# Patient Record
Sex: FEMALE | Race: BLACK OR AFRICAN AMERICAN | NOT HISPANIC OR LATINO | Employment: FULL TIME | URBAN - METROPOLITAN AREA
[De-identification: names, ages, dates, MRNs, and addresses within clinical notes are randomized per-mention and may not be internally consistent; named-entity substitution may affect disease eponyms.]

---

## 2017-02-04 RX ORDER — MEDROXYPROGESTERONE ACETATE 150 MG/ML
INJECTION, SUSPENSION INTRAMUSCULAR
Qty: 1 SYRINGE | Refills: 2 | Status: SHIPPED | OUTPATIENT
Start: 2017-02-04 | End: 2017-11-03 | Stop reason: SDUPTHER

## 2017-11-03 RX ORDER — MEDROXYPROGESTERONE ACETATE 150 MG/ML
INJECTION, SUSPENSION INTRAMUSCULAR
Qty: 1 SYRINGE | Refills: 2 | Status: SHIPPED | OUTPATIENT
Start: 2017-11-03 | End: 2018-08-24 | Stop reason: SDUPTHER

## 2018-02-14 ENCOUNTER — OFFICE VISIT (OUTPATIENT)
Dept: URGENT CARE | Facility: CLINIC | Age: 48
End: 2018-02-14
Payer: COMMERCIAL

## 2018-02-14 VITALS
HEIGHT: 69 IN | SYSTOLIC BLOOD PRESSURE: 157 MMHG | OXYGEN SATURATION: 97 % | TEMPERATURE: 100 F | DIASTOLIC BLOOD PRESSURE: 95 MMHG | BODY MASS INDEX: 29.47 KG/M2 | WEIGHT: 199 LBS | RESPIRATION RATE: 18 BRPM | HEART RATE: 89 BPM

## 2018-02-14 DIAGNOSIS — M25.512 LEFT SHOULDER PAIN, UNSPECIFIED CHRONICITY: Primary | ICD-10-CM

## 2018-02-14 PROCEDURE — 3008F BODY MASS INDEX DOCD: CPT | Mod: S$GLB,,, | Performed by: EMERGENCY MEDICINE

## 2018-02-14 PROCEDURE — 99213 OFFICE O/P EST LOW 20 MIN: CPT | Mod: S$GLB,,, | Performed by: EMERGENCY MEDICINE

## 2018-02-14 RX ORDER — NAPROXEN 500 MG/1
500 TABLET ORAL 2 TIMES DAILY WITH MEALS
Qty: 30 TABLET | Refills: 0 | Status: SHIPPED | OUTPATIENT
Start: 2018-02-14 | End: 2019-02-14

## 2018-02-15 NOTE — PATIENT INSTRUCTIONS
Shoulder Pain with Uncertain Cause  Shoulder pain can have many causes. Pain often comes from the structures that surround the shoulder joint. These are the joint capsule, ligaments, tendons, muscles, and bursa. Pain can also come from cartilage in the joint. Cartilage can become worn out or injured. Its important to know whats causing your pain so the healthcare provider can use the correct treatment. But sometimes its difficult to find the exact cause of shoulder pain. You may need to see a specialist (orthopedist). You may also need special tests such as a CT scan or MRI. The provider may need to use special tools to look inside the joint (arthroscopy).  Shoulder pain can be treated with a sling or a device that keeps your shoulder from moving. You can take an anti-inflammatory medicine such as ibuprofen to ease pain. You may need to do special shoulder exercises. Follow up with a specialist if the pain is severe or doesnt go away after a few weeks.  Home care  Follow these tips when caring for yourself at home:  · If a sling was given to you, leave it in place for the time advised by your healthcare provider. If you arent sure how long to wear it, ask for advice. If the sling becomes loose, adjust it so that your forearm is level with the ground. Your shoulder should feel well supported.  · Put an ice pack on the injured area for 20 minutes every 1 to 2 hours the first day. You can make your own ice pack by putting ice cubes in a plastic bag. Wrap the bag in a thin towel. Continue with ice packs 3 to 4 times a day for the next 2 days. Then use the pack as needed to ease pain and swelling.  · You may use acetaminophen or ibuprofen to control pain, unless another pain medicine was prescribed. If you have chronic liver or kidney disease, talk with your healthcare provider before using these medicines. Also talk with your provider if youve ever had a stomach ulcer or GI bleeding.  · Shoulder pain may seem  worse at night, when there is less to distract you from the pain. If you sleep on your side, try to keep weight off your painful shoulder. Propping pillows behind you may stop you from rolling over onto that shoulder during sleep.   · Shoulder and elbow joints can become stiff if left in a sling for too long. You should start range of motion exercises about 7 to 10 days after the injury. Talk with your provider to find out what type of exercises to do and how soon to start.  · You can take the sling off to shower or bathe.  Follow-up care  Follow up with your healthcare provider if you dont start to get better in the next 5 days.  When to seek medical advice  Call your healthcare provider right away if any of these occur:  · Pain or swelling gets worse or continues for more than a few days  · Your hand or fingers become cold, blue, numb, or tingly  · Large amount of bruising on your shoulder or upper arm  · Difficulty moving your hand or fingers  · Weakness in your hand or fingers  · Your shoulder becomes stiff  · It feels like your shoulder is popping out  · You are less able to do your daily activities  Date Last Reviewed: 10/1/2016  © 5373-4512 [employer]. [address] [place] [address], [place] [address]. All rights reserved. This information is not intended as a substitute for professional medical care. Always follow your healthcare professional's instructions.
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
## 2018-08-24 RX ORDER — MEDROXYPROGESTERONE ACETATE 150 MG/ML
INJECTION, SUSPENSION INTRAMUSCULAR
Qty: 1 SYRINGE | Refills: 2 | Status: SHIPPED | OUTPATIENT
Start: 2018-08-24 | End: 2019-05-31 | Stop reason: SDUPTHER

## 2019-05-08 ENCOUNTER — OFFICE VISIT (OUTPATIENT)
Dept: URGENT CARE | Facility: CLINIC | Age: 49
End: 2019-05-08
Payer: COMMERCIAL

## 2019-05-08 VITALS
TEMPERATURE: 99 F | HEART RATE: 90 BPM | WEIGHT: 200 LBS | SYSTOLIC BLOOD PRESSURE: 149 MMHG | HEIGHT: 69 IN | BODY MASS INDEX: 29.62 KG/M2 | DIASTOLIC BLOOD PRESSURE: 95 MMHG | RESPIRATION RATE: 19 BRPM | OXYGEN SATURATION: 100 %
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
## 2019-05-08 PROCEDURE — 90471 TDAP VACCINE GREATER THAN OR EQUAL TO 7YO IM: ICD-10-PCS | Mod: S$GLB,,, | Performed by: NURSE PRACTITIONER

## 2019-05-08 PROCEDURE — 3008F PR BODY MASS INDEX (BMI) DOCUMENTED: ICD-10-PCS | Mod: CPTII,S$GLB,, | Performed by: NURSE PRACTITIONER

## 2019-05-08 PROCEDURE — 90471 IMMUNIZATION ADMIN: CPT | Mod: S$GLB,,, | Performed by: NURSE PRACTITIONER

## 2019-05-08 PROCEDURE — 90715 TDAP VACCINE 7 YRS/> IM: CPT | Mod: S$GLB,,, | Performed by: NURSE PRACTITIONER

## 2019-05-08 PROCEDURE — 99214 PR OFFICE/OUTPT VISIT, EST, LEVL IV, 30-39 MIN: ICD-10-PCS | Mod: 25,S$GLB,, | Performed by: NURSE PRACTITIONER

## 2019-05-08 PROCEDURE — 99214 OFFICE O/P EST MOD 30 MIN: CPT | Mod: 25,S$GLB,, | Performed by: NURSE PRACTITIONER

## 2019-05-08 PROCEDURE — 3008F BODY MASS INDEX DOCD: CPT | Mod: CPTII,S$GLB,, | Performed by: NURSE PRACTITIONER

## 2019-05-08 PROCEDURE — 90715 TDAP VACCINE GREATER THAN OR EQUAL TO 7YO IM: ICD-10-PCS | Mod: S$GLB,,, | Performed by: NURSE PRACTITIONER

## 2019-05-08 RX ORDER — DOXYCYCLINE 100 MG/1
100 CAPSULE ORAL EVERY 12 HOURS
Qty: 10 CAPSULE | Refills: 0 | Status: SHIPPED | OUTPATIENT
Start: 2019-05-08 | End: 2019-05-13

## 2019-05-08 RX ORDER — MUPIROCIN 20 MG/G
OINTMENT TOPICAL
Qty: 22 G | Refills: 0 | Status: SHIPPED | OUTPATIENT
Start: 2019-05-08 | End: 2020-07-09 | Stop reason: SDUPTHER

## 2019-05-08 NOTE — PROGRESS NOTES
"Subjective:       Patient ID: Celia Alford is a 48 y.o. female.    Vitals:  height is 5' 9" (1.753 m) and weight is 90.7 kg (200 lb). Her oral temperature is 98.7 °F (37.1 °C). Her blood pressure is 149/95 (abnormal) and her pulse is 90. Her respiration is 19 and oxygen saturation is 100%.     Chief Complaint: Insect Bite    48 year old female presents today with complaints of "spider bite" to her lateral left thigh and posterior thigh. She states she did not see if it was a spider but assumed it was. Denies being diabetic.     Insect Bite   This is a new problem. The current episode started yesterday. The problem occurs constantly. The problem has been unchanged. Pertinent negatives include no abdominal pain, anorexia, arthralgias, change in bowel habit, chest pain, chills, congestion, coughing, diaphoresis, fatigue, fever, headaches, joint swelling, myalgias, nausea, neck pain, numbness, rash, sore throat, swollen glands, urinary symptoms, vertigo, visual change, vomiting or weakness. Associated symptoms comments: Itching, blistering. Nothing aggravates the symptoms. Treatments tried: Hydrocortisone. The treatment provided no relief.       Constitution: Negative for chills, sweating, fatigue and fever.   HENT: Negative for congestion and sore throat.    Neck: Negative for neck pain and painful lymph nodes.   Cardiovascular: Negative for chest pain and leg swelling.   Eyes: Negative for double vision and blurred vision.   Respiratory: Negative for cough and shortness of breath.    Gastrointestinal: Negative for abdominal pain, nausea, vomiting and diarrhea.   Genitourinary: Negative for dysuria, frequency, urgency and history of kidney stones.   Musculoskeletal: Negative for joint pain, joint swelling, muscle cramps and muscle ache.   Skin: Positive for erythema. Negative for color change, pale, rash and bruising.   Allergic/Immunologic: Negative for seasonal allergies.   Neurological: Negative for dizziness, " history of vertigo, light-headedness, passing out, headaches and numbness.   Hematologic/Lymphatic: Negative for swollen lymph nodes.   Psychiatric/Behavioral: Negative for nervous/anxious, sleep disturbance and depression. The patient is not nervous/anxious.        Objective:      Physical Exam   Constitutional: She is oriented to person, place, and time. She appears well-developed and well-nourished.   HENT:   Head: Normocephalic and atraumatic. Head is without abrasion, without contusion and without laceration.   Right Ear: External ear normal.   Left Ear: External ear normal.   Nose: Nose normal.   Mouth/Throat: Oropharynx is clear and moist.   Eyes: Pupils are equal, round, and reactive to light. Conjunctivae, EOM and lids are normal.   Neck: Trachea normal, full passive range of motion without pain and phonation normal. Neck supple.   Cardiovascular: Normal rate, regular rhythm and normal heart sounds.   Pulmonary/Chest: Effort normal and breath sounds normal. No stridor. No respiratory distress.   Musculoskeletal: Normal range of motion.   Neurological: She is alert and oriented to person, place, and time.   Skin: Skin is warm, dry and intact. Capillary refill takes less than 2 seconds. Rash noted. No abrasion, no bruising, no burn, no ecchymosis, no laceration and no lesion noted. Rash is pustular (draining clear yellow). There is erythema.        Psychiatric: She has a normal mood and affect. Her speech is normal and behavior is normal. Judgment and thought content normal. Cognition and memory are normal.   Nursing note and vitals reviewed.      Assessment:       1. Insect bite, initial encounter        Plan:         Insect bite, initial encounter  -     doxycycline (VIBRAMYCIN) 100 MG Cap; Take 1 capsule (100 mg total) by mouth every 12 (twelve) hours. for 5 days  Dispense: 10 capsule; Refill: 0  -     (In Office Administered) Tdap Vaccine    Other orders  -     mupirocin (BACTROBAN) 2 % ointment; Apply to  affected area 3 times daily  Dispense: 22 g; Refill: 0            Patient Instructions     CLEANSE WITH SOAP AND WATER  BACTROBAN OINTMENT AT LEAST TWICE A DAY    You must understand that you've received an Urgent Care treatment only and that you may be released before all your medical problems are known or treated. You, the patient, will arrange for follow up care as instructed.  If your condition worsens we recommend that you receive another evaluation at the emergency room immediately or contact your primary medical clinics after hours call service to discuss your concerns.  Please return here or go to the Emergency Department for any concerns or worsening of condition.      Insect Bite  Insects most often bite to protect themselves or their nests. Certain bugs, like fleas and mosquitoes, bite to feed. In some cases, the actual bite causes no pain. An itchy red welt or swelling may develop at the site of the bite. Most insect bites do not cause illness. And the itching and swelling most often go away without treatment. However, an infection can develop if the bite is scratched and the skin broken. Rarely, a person may have an allergic reaction to an insect bite.  If a stinger is visible at the bite spot, remove it as quickly as possible, as this can decrease the amount of venom that gets into your body. Scrape it out with a dull edge, such as the edge of a credit card. Try not to squeeze it. Do not try to dig it out, as you may damage the skin and also increase the chance of infection.     To help reduce swelling and itching, apply a cold pack or ice in a zip-top plastic bag wrapped in a thin towel.   Home care  · Your healthcare provider may prescribe over-the-counter medicines to help relieve itching and swelling. Use each medicine according to the directions on the package. If the bite becomes infected, you will need an antibiotic. This may be in pill form taken by mouth or as an ointment or cream put directly  on the skin. Be sure to use them exactly as prescribed.  · Bite symptoms usually go away on their own within a week or two.  · To help prevent infection, avoid scratching or picking at the bite.  · To help relieve itching and swelling, apply ice in a zip-top plastic bag wrapped in a thin towel to the bites. Do this for up to 10 minutes at a time. Avoid hot showers or baths as these tend to make itching worse.  · An over-the-counter anti-itch medicine such as calamine lotion or an antihistamine cream may be helpful.  · If you suspect you have insects in your home, talk to a licensed pest-control professional. He or she can inspect your home and tell you how to get rid of bugs safely.  Follow-up care  Follow up with your healthcare provider, or as advised.  Call 911  Call 911 if any of these occur:  · Trouble breathing or swallowing  · Wheezing  · Feeling like your throat is closing up  · Fainting, loss of consciousness  · Swelling around the face or mouth  When to seek medical advice  Call your healthcare provider right away if any of these occur:  · Fever of 100.4°F (38°C) or higher, or as directed by your healthcare provider  · Signs of infection, such as increased swelling and pain, warmth, red streaks, or drainage from the skin  · Signs of allergic reaction, such as hives, a spreading rash, or throat itching  Date Last Reviewed: 10/1/2016  © 6234-0109 [employer]. [address] [place] [address], [place] [address]. All rights reserved. This information is not intended as a substitute for professional medical care. Always follow your healthcare professional's instructions.

## 2019-05-08 NOTE — PATIENT INSTRUCTIONS
CLEANSE WITH SOAP AND WATER  BACTROBAN OINTMENT AT LEAST TWICE A DAY    You must understand that you've received an Urgent Care treatment only and that you may be released before all your medical problems are known or treated. You, the patient, will arrange for follow up care as instructed.  If your condition worsens we recommend that you receive another evaluation at the emergency room immediately or contact your primary medical clinics after hours call service to discuss your concerns.  Please return here or go to the Emergency Department for any concerns or worsening of condition.      Insect Bite  Insects most often bite to protect themselves or their nests. Certain bugs, like fleas and mosquitoes, bite to feed. In some cases, the actual bite causes no pain. An itchy red welt or swelling may develop at the site of the bite. Most insect bites do not cause illness. And the itching and swelling most often go away without treatment. However, an infection can develop if the bite is scratched and the skin broken. Rarely, a person may have an allergic reaction to an insect bite.  If a stinger is visible at the bite spot, remove it as quickly as possible, as this can decrease the amount of venom that gets into your body. Scrape it out with a dull edge, such as the edge of a credit card. Try not to squeeze it. Do not try to dig it out, as you may damage the skin and also increase the chance of infection.     To help reduce swelling and itching, apply a cold pack or ice in a zip-top plastic bag wrapped in a thin towel.   Home care  · Your healthcare provider may prescribe over-the-counter medicines to help relieve itching and swelling. Use each medicine according to the directions on the package. If the bite becomes infected, you will need an antibiotic. This may be in pill form taken by mouth or as an ointment or cream put directly on the skin. Be sure to use them exactly as prescribed.  · Bite symptoms usually go away  on their own within a week or two.  · To help prevent infection, avoid scratching or picking at the bite.  · To help relieve itching and swelling, apply ice in a zip-top plastic bag wrapped in a thin towel to the bites. Do this for up to 10 minutes at a time. Avoid hot showers or baths as these tend to make itching worse.  · An over-the-counter anti-itch medicine such as calamine lotion or an antihistamine cream may be helpful.  · If you suspect you have insects in your home, talk to a licensed pest-control professional. He or she can inspect your home and tell you how to get rid of bugs safely.  Follow-up care  Follow up with your healthcare provider, or as advised.  Call 911  Call 911 if any of these occur:  · Trouble breathing or swallowing  · Wheezing  · Feeling like your throat is closing up  · Fainting, loss of consciousness  · Swelling around the face or mouth  When to seek medical advice  Call your healthcare provider right away if any of these occur:  · Fever of 100.4°F (38°C) or higher, or as directed by your healthcare provider  · Signs of infection, such as increased swelling and pain, warmth, red streaks, or drainage from the skin  · Signs of allergic reaction, such as hives, a spreading rash, or throat itching  Date Last Reviewed: 10/1/2016  © 0246-5137 [employer]. [address] [place] [address], [place] [address]. All rights reserved. This information is not intended as a substitute for professional medical care. Always follow your healthcare professional's instructions.

## 2019-05-31 RX ORDER — MEDROXYPROGESTERONE ACETATE 150 MG/ML
INJECTION, SUSPENSION INTRAMUSCULAR
Qty: 1 SYRINGE | Refills: 2 | Status: SHIPPED | OUTPATIENT
Start: 2019-05-31 | End: 2020-07-09 | Stop reason: SDUPTHER

## 2019-07-18 ENCOUNTER — OFFICE VISIT (OUTPATIENT)
Dept: URGENT CARE | Facility: CLINIC | Age: 49
End: 2019-07-18
Payer: OTHER GOVERNMENT

## 2019-07-18 VITALS
HEIGHT: 69 IN | WEIGHT: 200 LBS | TEMPERATURE: 96 F | OXYGEN SATURATION: 100 % | RESPIRATION RATE: 16 BRPM | BODY MASS INDEX: 29.62 KG/M2 | HEART RATE: 88 BPM | DIASTOLIC BLOOD PRESSURE: 93 MMHG | SYSTOLIC BLOOD PRESSURE: 140 MMHG

## 2019-07-18 DIAGNOSIS — S80.00XA CONTUSION OF KNEE, UNSPECIFIED LATERALITY, INITIAL ENCOUNTER: ICD-10-CM
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
## 2019-07-18 DIAGNOSIS — S40.019A CONTUSION OF SHOULDER, UNSPECIFIED LATERALITY, INITIAL ENCOUNTER: ICD-10-CM

## 2019-07-18 PROCEDURE — 73562 X-RAY EXAM OF KNEE 3: CPT | Mod: RT,S$GLB,, | Performed by: RADIOLOGY

## 2019-07-18 PROCEDURE — 73562 XR KNEE 3 VIEW RIGHT: ICD-10-PCS | Mod: RT,S$GLB,, | Performed by: RADIOLOGY

## 2019-07-18 PROCEDURE — 99203 OFFICE O/P NEW LOW 30 MIN: CPT | Mod: S$GLB,,, | Performed by: FAMILY MEDICINE

## 2019-07-18 PROCEDURE — 99203 PR OFFICE/OUTPT VISIT, NEW, LEVL III, 30-44 MIN: ICD-10-PCS | Mod: S$GLB,,, | Performed by: FAMILY MEDICINE

## 2019-07-18 NOTE — LETTER
Work Status Summary - Page 1 of 2  ______________________________________________________________________    Date :  July 18, 2019 Carrier :    To :  Fax # :    ______________________________________________________________________    Patient Name: Celia Alford   YOB: 1970   Employer: [employer]   Occupation: Distrubution    Date of Injury: 7/18/19   Diagnosis: Shoulder and knee contusion   ______________________________________________________________________     [ X  ] ABLE to work (pre-injury work level / full duty)    [   ] NOT ABLE to work at present  Estimated release to return to work:      [   ] ABLE to work --- transitional duty (as follows):   [   ] Sedentary Work: Lifting 10 lbs. maximum and occasionally lifting and/or  carrying articles such as dockets, ledgers and small tools. Although a sedentary  job is defined as one which involves sitting, a certain amount of walking and  standing is often necessary in carrying out job duties. Jobs are sedentary if  walking and standing are required only occasionally and other sedentary criteria  are met.      [   ] Light Work: Lifting 20 lbs. maximum with frequent lifting and/or carrying of  objects weighing up to 10 lbs. Even though the weight lifted may be only a  negligible amount, a job is in this category when it requires walking or standing  to a significant degree, or when it involves sitting most of the time with a degree  of pushing and pulling of arm and/or leg controls.      [   ] Medium Work: Lifting 50 lbs. maximum with frequent lifting and/or carrying  of objects weighing up to 25 lbs.      [   ] Heavy Work: Lifting 100 lbs. maximum with frequent lifting and/or carrying  of objects weighing up to 50 lbs.      [   ] Very Heavy Work:  Lifting objects in excess of 100 lbs. with frequent lifting  and/or carrying of objects weighing 50 lbs or more.                   THERAPY RECOMMENDATIONS:   [   ] Physical Therapy     Visits per week:    Duration (in weeks):        [   ] Occupational Therapy  Visits per week:   Duration (in weeks):        Recommended Follow Up:    Primary Care Physician in:   days General Surgeon in:   days   Orthopedist in:   days Ophthalmologist in:   days     Other:  (list other follow up recommendation here)   days     Prescribed Medications:  OTC tylenol and ibuprofen     Comments:          juwan raza np 7/18/19 1:10pm    ______________________________________________________________________  Provider Signature / Print Name / Date / Time       Work Status Summary - Page 2 of 2    Form No. 3291   (Rev 6/21/16)   [employer] [facility]

## 2019-07-18 NOTE — PATIENT INSTRUCTIONS
PLEASE READ YOUR DISCHARGE INSTRUCTIONS ENTIRELY AS IT CONTAINS IMPORTANT INFORMATION.    Tylenol and ibuprofen for pain    Rest, ice, compress, and elevate at home    Avoid prolonged use of the affected area until better.     Please see occupational health if you cannot return to work tomorrow or if your symptoms get worse    Please arrange follow up with your primary medical clinic as soon as possible. You must understand that you've received an Urgent Care treatment only and that you may be released before all of your medical problems are known or treated. You, the patient, will arrange for follow up as instructed. If your symptoms worsen or fail to improve you should go to the Emergency Room.    Bruises (Contusions)    A contusion is a bruise. A bruise happens when a blow to your body doesn't break the skin but does break blood vessels beneath the skin. Blood leaking from the broken vessels causes redness and swelling. As it heals, your bruise is likely to turn colors like purple, green, and yellow. This is normal. The bruise should fade in 2 or 3 weeks.  Factors that make you more likely to bruise  Almost everyone bruises now and then. Certain people do bruise more easily than others. You're more prone to bruising as you get older. That's because blood vessels become more fragile with age. You're also more likely to bruise if you have a clotting disorder such as hemophilia or take medications that reduce clotting, including aspirin, coumadin, newer agents.  When to go to the emergency room (ER)  Bruises almost always heal on their own without special treatment. But for some people, a bad bruise can be serious. Seek medical care if you:  · Have a clotting disorder such as hemophilia.  · Have cirrhosis or other serious liver disease.  · Take blood-thinning medications such as warfarin (Coumadin).  What to expect in the ER  A doctor will examine your bruise and ask about any health conditions you have. In some  cases, you may have a test to check how well your blood clots. Other treatment will depend on your needs.  Follow-up care  Sometimes a bruise gets worse instead of better. It may become larger and more swollen. This can occur when your body walls off a small pool of blood under the skin (hematoma). In very rare cases, your doctor may need to drain excess blood from the area.  Tip:  Apply an ice pack or bag of frozen peas to a bruise (keep a thin cloth between the cold source and your skin). This can help reduce redness and swelling.   Date Last Reviewed: 12/1/2016  © 9107-7081 [employer]. [address] [place] [address], [place] [address]. All rights reserved. This information is not intended as a substitute for professional medical care. Always follow your healthcare professional's instructions.

## 2019-07-18 NOTE — PROGRESS NOTES
"Subjective:       Patient ID: Celia Alford is a 48 y.o. female.    Vitals:    07/18/19 1210   BP: (!) 140/93   Pulse: 88   Resp: 16   Temp: 96.2 °F (35.7 °C)   SpO2: 100%   Weight: 90.7 kg (200 lb)   Height: 5' 9" (1.753 m)       Chief Complaint: Knee Pain and Shoulder Pain    Pt states she tripped and fell this am at apprx. 9:00a.  Tripped on some strep several holding magazines together.  Did not hit her head.  Patient works with the [employer].  Pt states bilateral knee and shoulder pain. Right side worse knees hurting the worst out of everything.    Knee Injury   This is a new problem. The current episode started today. The problem occurs constantly. The problem has been unchanged. Pertinent negatives include no abdominal pain, chest pain, chills, fever, headaches, nausea, rash, sore throat or vomiting. Nothing aggravates the symptoms. She has tried nothing for the symptoms.     Review of Systems   Constitution: Negative for chills and fever.   HENT: Negative for sore throat.    Eyes: Negative for blurred vision.   Cardiovascular: Negative for chest pain.   Respiratory: Negative for shortness of breath.    Skin: Negative for rash.   Musculoskeletal: Positive for joint pain. Negative for back pain.   Gastrointestinal: Negative for abdominal pain, diarrhea, nausea and vomiting.   Neurological: Negative for headaches.   Psychiatric/Behavioral: The patient is not nervous/anxious.        Objective:      Physical Exam   Constitutional: She is oriented to person, place, and time. She appears well-developed and well-nourished. She is cooperative.  Non-toxic appearance. She does not appear ill. No distress.   HENT:   Head: Normocephalic and atraumatic. Head is without abrasion, without contusion and without laceration.   Right Ear: Hearing, tympanic membrane, external ear and ear canal normal. No hemotympanum.   Left Ear: Hearing, tympanic membrane, external ear and ear canal normal. No hemotympanum.   Nose: Nose " normal. No mucosal edema, rhinorrhea or nasal deformity. No epistaxis. Right sinus exhibits no maxillary sinus tenderness and no frontal sinus tenderness. Left sinus exhibits no maxillary sinus tenderness and no frontal sinus tenderness.   Mouth/Throat: Uvula is midline, oropharynx is clear and moist and mucous membranes are normal. No trismus in the jaw. Normal dentition. No uvula swelling. No posterior oropharyngeal erythema.   Eyes: Pupils are equal, round, and reactive to light. Conjunctivae, EOM and lids are normal. Right eye exhibits no discharge. Left eye exhibits no discharge. No scleral icterus.   Sclera clear bilat   Neck: Trachea normal, normal range of motion, full passive range of motion without pain and phonation normal. Neck supple. No spinous process tenderness and no muscular tenderness present. No neck rigidity. No tracheal deviation present.   Cardiovascular: Normal rate, regular rhythm, normal heart sounds, intact distal pulses and normal pulses.   Pulmonary/Chest: Effort normal and breath sounds normal. No respiratory distress.   Abdominal: Soft. Normal appearance and bowel sounds are normal. She exhibits no distension, no pulsatile midline mass and no mass. There is no tenderness.   Musculoskeletal: Normal range of motion. She exhibits no edema or deformity.        Right shoulder: She exhibits tenderness (Mild with moving). She exhibits normal range of motion, no bony tenderness, no crepitus, no deformity and no laceration.        Left shoulder: She exhibits normal range of motion, no tenderness, no bony tenderness, no crepitus and no deformity.        Right knee: She exhibits swelling (Mild) and bony tenderness (Mild). She exhibits normal range of motion, no ecchymosis, no deformity, no laceration, no LCL laxity, normal patellar mobility, normal meniscus and no MCL laxity.        Left knee: She exhibits bony tenderness (Mild). She exhibits normal range of motion, no effusion, no ecchymosis, no  deformity, no laceration, no LCL laxity, normal patellar mobility, normal meniscus and no MCL laxity.        Cervical back: She exhibits normal range of motion and no bony tenderness.   Shoulder bilaterally  Full range of motion maintained  Negative empty can test  No pain with internal-external rotation   Neurological: She is alert and oriented to person, place, and time. She has normal strength. No cranial nerve deficit or sensory deficit. She exhibits normal muscle tone. She displays no seizure activity. Coordination normal. GCS eye subscore is 4. GCS verbal subscore is 5. GCS motor subscore is 6.   Skin: Skin is warm, dry and intact. Capillary refill takes less than 2 seconds. No abrasion, no bruising, no burn, no ecchymosis and no laceration noted. She is not diaphoretic. No pallor.   Psychiatric: She has a normal mood and affect. Her speech is normal and behavior is normal. Judgment and thought content normal. Cognition and memory are normal.   Nursing note and vitals reviewed.    Xr Knee 3 View Right    Result Date: 7/18/2019  EXAMINATION: XR KNEE 3 VIEW RIGHT CLINICAL HISTORY: Unspecified fall, initial encounter TECHNIQUE: AP, lateral, and Merchant views of the right knee were performed. COMPARISON: None FINDINGS: On three views of the right knee I detect no fracture, dislocation, radiopaque retained foreign body, lytic or blastic lesion, erosion or chondrocalcinosis.  Lateral view was performed with routine technique rather than cross-table technique.  I detect no convincing evidence of fluid in the suprapatellar bursa. However if there is persistent clinical concern for acute nondisplaced fracture which can be radiographically occult, and especially if the patient has difficulty bearing weight, CT for more sensitive assessment.     Please see above. Electronically signed by: Avani Gillette MD Date:    07/18/2019 Time:    13:07    Assessment:       1. Fall, initial encounter    2. Contusion of knee,  unspecified laterality, initial encounter    3. Contusion of shoulder, unspecified laterality, initial encounter        Plan:       Celia was seen today for knee pain and shoulder pain.    Diagnoses and all orders for this visit:    Fall, initial encounter  -     XR KNEE 3 VIEW RIGHT; Future  -     Ambulatory referral to Occupational Medicine    Contusion of knee, unspecified laterality, initial encounter  -     Ambulatory referral to Occupational Medicine    Contusion of shoulder, unspecified laterality, initial encounter  -     Ambulatory referral to Occupational Medicine      Patient Instructions     PLEASE READ YOUR DISCHARGE INSTRUCTIONS ENTIRELY AS IT CONTAINS IMPORTANT INFORMATION.    Tylenol and ibuprofen for pain    Rest, ice, compress, and elevate at home    Avoid prolonged use of the affected area until better.     Please see occupational health if you cannot return to work tomorrow or if your symptoms get worse    Please arrange follow up with your primary medical clinic as soon as possible. You must understand that you've received an Urgent Care treatment only and that you may be released before all of your medical problems are known or treated. You, the patient, will arrange for follow up as instructed. If your symptoms worsen or fail to improve you should go to the Emergency Room.    Bruises (Contusions)    A contusion is a bruise. A bruise happens when a blow to your body doesn't break the skin but does break blood vessels beneath the skin. Blood leaking from the broken vessels causes redness and swelling. As it heals, your bruise is likely to turn colors like purple, green, and yellow. This is normal. The bruise should fade in 2 or 3 weeks.  Factors that make you more likely to bruise  Almost everyone bruises now and then. Certain people do bruise more easily than others. You're more prone to bruising as you get older. That's because blood vessels become more fragile with age. You're also more  likely to bruise if you have a clotting disorder such as hemophilia or take medications that reduce clotting, including aspirin, coumadin, newer agents.  When to go to the emergency room (ER)  Bruises almost always heal on their own without special treatment. But for some people, a bad bruise can be serious. Seek medical care if you:  · Have a clotting disorder such as hemophilia.  · Have cirrhosis or other serious liver disease.  · Take blood-thinning medications such as warfarin (Coumadin).  What to expect in the ER  A doctor will examine your bruise and ask about any health conditions you have. In some cases, you may have a test to check how well your blood clots. Other treatment will depend on your needs.  Follow-up care  Sometimes a bruise gets worse instead of better. It may become larger and more swollen. This can occur when your body walls off a small pool of blood under the skin (hematoma). In very rare cases, your doctor may need to drain excess blood from the area.  Tip:  Apply an ice pack or bag of frozen peas to a bruise (keep a thin cloth between the cold source and your skin). This can help reduce redness and swelling.   Date Last Reviewed: 12/1/2016  © 5585-0740 [employer]. [address] [place] [address], [place] [address]. All rights reserved. This information is not intended as a substitute for professional medical care. Always follow your healthcare professional's instructions.

## 2019-07-19 ENCOUNTER — OFFICE VISIT (OUTPATIENT)
Dept: URGENT CARE | Facility: CLINIC | Age: 49
End: 2019-07-19
Payer: OTHER GOVERNMENT

## 2019-07-19 VITALS
DIASTOLIC BLOOD PRESSURE: 87 MMHG | OXYGEN SATURATION: 98 % | WEIGHT: 200 LBS | TEMPERATURE: 98 F | SYSTOLIC BLOOD PRESSURE: 132 MMHG | HEART RATE: 95 BPM | RESPIRATION RATE: 16 BRPM | BODY MASS INDEX: 29.62 KG/M2 | HEIGHT: 69 IN
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
## 2019-07-19 DIAGNOSIS — S80.01XA CONTUSION OF RIGHT KNEE, INITIAL ENCOUNTER: ICD-10-CM
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
## 2019-07-19 DIAGNOSIS — S46.912A STRAIN OF LEFT SHOULDER, INITIAL ENCOUNTER: ICD-10-CM

## 2019-07-19 PROCEDURE — 99214 PR OFFICE/OUTPT VISIT, EST, LEVL IV, 30-39 MIN: ICD-10-PCS | Mod: S$GLB,,, | Performed by: NURSE PRACTITIONER

## 2019-07-19 PROCEDURE — 73030 X-RAY EXAM OF SHOULDER: CPT | Mod: LT,S$GLB,, | Performed by: RADIOLOGY

## 2019-07-19 PROCEDURE — 99214 OFFICE O/P EST MOD 30 MIN: CPT | Mod: S$GLB,,, | Performed by: NURSE PRACTITIONER

## 2019-07-19 PROCEDURE — 73030 XR SHOULDER TRAUMA 3 VIEW LEFT: ICD-10-PCS | Mod: LT,S$GLB,, | Performed by: RADIOLOGY

## 2019-07-19 RX ORDER — FLUTICASONE PROPIONATE 0.5 MG/G
CREAM TOPICAL
COMMUNITY
Start: 2019-07-18 | End: 2020-07-09 | Stop reason: SDUPTHER

## 2019-07-19 RX ORDER — DEXTROMETHORPHAN HYDROBROMIDE, GUAIFENESIN 5; 100 MG/5ML; MG/5ML
650 LIQUID ORAL EVERY 8 HOURS
Refills: 0 | COMMUNITY
Start: 2019-07-19 | End: 2019-08-16 | Stop reason: SDUPTHER

## 2019-07-19 RX ORDER — IBUPROFEN 200 MG
400 TABLET ORAL EVERY 6 HOURS PRN
Refills: 0 | COMMUNITY
Start: 2019-07-19 | End: 2019-07-26 | Stop reason: ALTCHOICE

## 2019-07-19 NOTE — PROGRESS NOTES
Subjective:       Patient ID: Celia Alford is a 48 y.o. female.    Chief Complaint: Work Related Injury    Pt presents for a f/u after a fall yesterday at work.  Pt states she fell on the concrete floor.  Pt states she has pain in both knees, but more in the right knee and pain in her left shoulder. Pt states this was 9:30am yesterday.  Pt took some tylenol last night.  Pt has not used ice or heat.    Fall   The accident occurred 12 to 24 hours ago. The fall occurred while walking. She fell from a height of 1 to 2 ft. She landed on concrete. There was no blood loss. The point of impact was the right knee and left knee. The pain is present in the left shoulder, right knee and left knee. The pain is at a severity of 6/10. The pain is moderate. The symptoms are aggravated by movement and pressure on injury. Pertinent negatives include no abdominal pain, fever, headaches, nausea, numbness, tingling or vomiting. She has tried acetaminophen and NSAID for the symptoms. The treatment provided mild relief.     Review of Systems   Constitution: Negative for chills and fever.   HENT: Negative for sore throat.    Eyes: Negative for blurred vision.   Cardiovascular: Negative for chest pain.   Respiratory: Negative for shortness of breath.    Skin: Negative for rash.   Musculoskeletal: Positive for falls, joint pain, joint swelling, myalgias and stiffness. Negative for back pain.   Gastrointestinal: Negative for abdominal pain, diarrhea, nausea and vomiting.   Neurological: Negative for headaches, numbness and tingling.   Psychiatric/Behavioral: The patient is not nervous/anxious.    All other systems reviewed and are negative.      Objective:      Physical Exam   Constitutional: She is oriented to person, place, and time. Vital signs are normal. She appears well-developed and well-nourished. She is cooperative.  Non-toxic appearance. She does not appear ill. No distress.   Uncomfortable on exam   HENT:   Head: Normocephalic and  atraumatic. Head is without abrasion, without contusion and without laceration.   Right Ear: Hearing and external ear normal. No hemotympanum.   Left Ear: Hearing and external ear normal. No hemotympanum.   Nose: Nose normal. No nasal deformity. No epistaxis.   Mouth/Throat: Mucous membranes are normal.   Eyes: Pupils are equal, round, and reactive to light. Conjunctivae, EOM and lids are normal. Right eye exhibits no discharge. Left eye exhibits no discharge. No scleral icterus.   Sclera clear bilat   Neck: Trachea normal, normal range of motion, full passive range of motion without pain and phonation normal. Neck supple. No spinous process tenderness and no muscular tenderness present. No neck rigidity. No tracheal deviation present.   Cardiovascular: Normal rate, intact distal pulses and normal pulses.   Pulses:       Radial pulses are 2+ on the right side, and 2+ on the left side.        Dorsalis pedis pulses are 2+ on the right side, and 2+ on the left side.        Posterior tibial pulses are 2+ on the right side, and 2+ on the left side.   Pulmonary/Chest: Effort normal. No stridor. No respiratory distress.   Abdominal: Normal appearance. She exhibits no pulsatile midline mass.   Musculoskeletal: She exhibits tenderness. She exhibits no edema or deformity.        Right shoulder: She exhibits normal range of motion, no tenderness, no bony tenderness, no crepitus, no deformity and no laceration.        Left shoulder: She exhibits decreased range of motion, tenderness, bony tenderness and pain. She exhibits no crepitus and no deformity.        Right knee: She exhibits swelling (Mild) and bony tenderness (Mild). She exhibits normal range of motion, no ecchymosis, no deformity, no laceration, no LCL laxity, normal patellar mobility, normal meniscus and no MCL laxity. Tenderness found. Medial joint line tenderness noted.        Left knee: She exhibits normal range of motion, no effusion, no ecchymosis, no deformity,  no laceration, no LCL laxity, normal patellar mobility, no bony tenderness, normal meniscus and no MCL laxity.        Cervical back: She exhibits normal range of motion and no bony tenderness.   Shoulder bilaterally  Full range of motion maintained  Negative empty can test  No pain with internal-external rotation  Stiffness to left shoulder with pain with abduction at 75° and with lift-off maneuver.  No weakness appreciated to the joint.  No  weakness bilaterally.   Neurological: She is alert and oriented to person, place, and time. She has normal strength. She displays normal reflexes. No cranial nerve deficit or sensory deficit. She exhibits normal muscle tone. She displays no seizure activity. Coordination normal. GCS eye subscore is 4. GCS verbal subscore is 5. GCS motor subscore is 6.   Skin: Skin is warm, dry and intact. Capillary refill takes less than 2 seconds. No abrasion, no bruising, no burn, no ecchymosis and no laceration noted. She is not diaphoretic. No pallor.   Psychiatric: She has a normal mood and affect. Her speech is normal and behavior is normal. Judgment and thought content normal. Cognition and memory are normal.   Nursing note and vitals reviewed.    Xr Knee 3 View Right    Result Date: 7/18/2019  EXAMINATION: XR KNEE 3 VIEW RIGHT CLINICAL HISTORY: Unspecified fall, initial encounter TECHNIQUE: AP, lateral, and Merchant views of the right knee were performed. COMPARISON: None FINDINGS: On three views of the right knee I detect no fracture, dislocation, radiopaque retained foreign body, lytic or blastic lesion, erosion or chondrocalcinosis.  Lateral view was performed with routine technique rather than cross-table technique.  I detect no convincing evidence of fluid in the suprapatellar bursa. However if there is persistent clinical concern for acute nondisplaced fracture which can be radiographically occult, and especially if the patient has difficulty bearing weight, CT for more  sensitive assessment.     Please see above. Electronically signed by: Avani Gillette MD Date:    07/18/2019 Time:    13:07    X-ray Shoulder Trauma 3 View Left    Result Date: 7/19/2019  EXAMINATION: XR SHOULDER TRAUMA 3 VIEW LEFT TECHNIQUE: Three views of the left shoulder were obtained, with AP, lateral, and axillary projections submitted. COMPARISON: No relevant comparison examinations are currently available.  Information obtained from the electronic medical record indicates a history of trauma on 07/18/2019. FINDINGS: Visualized osseous structures appear intact, with no definite evidence of recent fracture or other significant abnormality identified.  No glenohumeral dislocation.     As above Electronically signed by: Miguel Mcginnis MD Date:    07/19/2019 Time:    12:36  Assessment:       1. Work related injury    2. Contusion of right knee, initial encounter    3. Strain of left shoulder, initial encounter    4. Fall, initial encounter        Plan:         Medications Ordered This Encounter   Medications    acetaminophen (TYLENOL) 650 MG TbSR     Sig: Take 1 tablet (650 mg total) by mouth every 8 (eight) hours.     Refill:  0    ibuprofen (ADVIL,MOTRIN) 200 MG tablet     Sig: Take 2 tablets (400 mg total) by mouth every 6 (six) hours as needed for Pain.     Refill:  0     Patient Instructions: Attention not to aggravate affected area, Daily home exercises/warm soaks, Apply ice 24-48 hours then apply heat/warm soaks(you may take tylenol or ibuprofen for pain and discomfort)   Restrictions: (see CA17 for restrictions; 07/19/19)  Follow up in about 1 week (around 7/26/2019).

## 2019-07-19 NOTE — LETTER
[facility] [address] [address]  Phone: [phone]  Fax: [phone]  [facility]: [email or web address]    Pt Name: Celia Alford  Injury Date: 07/18/2019   Employee ID:  Date of First Treatment: 07/19/2019   Company: [employer]      Appointment Time: 11:00 AM Arrived: 1115am   Provider: Cinthia Maldonado NP Time Out:1248pm     Office Treatment:   1. Work related injury    2. Contusion of right knee, initial encounter    3. Strain of left shoulder, initial encounter    4. Fall, initial encounter      Medications Ordered This Encounter   Medications    acetaminophen (TYLENOL) 650 MG TbSR    ibuprofen (ADVIL,MOTRIN) 200 MG tablet      Patient Instructions: Attention not to aggravate affected area, Daily home exercises/warm soaks, Apply ice 24-48 hours then apply heat/warm soaks(you may take tylenol or ibuprofen for pain and discomfort)    Restrictions: (see CA17 for restrictions; 07/19/19)     Return Appointment: 07/26/19 at 10am

## 2019-07-26 ENCOUNTER — OFFICE VISIT (OUTPATIENT)
Dept: URGENT CARE | Facility: CLINIC | Age: 49
End: 2019-07-26
Payer: OTHER GOVERNMENT

## 2019-07-26 VITALS
BODY MASS INDEX: 29.71 KG/M2 | RESPIRATION RATE: 20 BRPM | SYSTOLIC BLOOD PRESSURE: 144 MMHG | HEART RATE: 93 BPM | HEIGHT: 69 IN | OXYGEN SATURATION: 100 % | WEIGHT: 200.63 LBS | DIASTOLIC BLOOD PRESSURE: 90 MMHG | TEMPERATURE: 98 F

## 2019-07-26 DIAGNOSIS — S46.912D STRAIN OF LEFT SHOULDER, SUBSEQUENT ENCOUNTER: ICD-10-CM

## 2019-07-26 DIAGNOSIS — S46.911D STRAIN OF RIGHT SHOULDER, SUBSEQUENT ENCOUNTER: ICD-10-CM

## 2019-07-26 DIAGNOSIS — S80.01XD CONTUSION OF RIGHT KNEE, SUBSEQUENT ENCOUNTER: Primary | ICD-10-CM
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
## 2019-07-26 PROCEDURE — 99214 OFFICE O/P EST MOD 30 MIN: CPT | Mod: S$GLB,,, | Performed by: PHYSICIAN ASSISTANT

## 2019-07-26 PROCEDURE — 99214 PR OFFICE/OUTPT VISIT, EST, LEVL IV, 30-39 MIN: ICD-10-PCS | Mod: S$GLB,,, | Performed by: PHYSICIAN ASSISTANT

## 2019-07-26 RX ORDER — NAPROXEN 500 MG/1
500 TABLET ORAL 2 TIMES DAILY WITH MEALS
Qty: 30 TABLET | Refills: 0 | Status: SHIPPED | OUTPATIENT
Start: 2019-07-26 | End: 2020-01-22 | Stop reason: SDUPTHER

## 2019-07-26 NOTE — PROGRESS NOTES
Subjective:       Patient ID: Celia Alford is a 48 y.o. female.    Chief Complaint: Work Related Injury    Patient fell at work on last Thursday the 18th of July and hurt both knees and arms. She still has pain in both shoulders and right knee.     Arm Pain    The incident occurred more than 1 week ago. The incident occurred at work. The injury mechanism was a fall. The pain is present in the left shoulder and right shoulder. The quality of the pain is described as aching. The pain does not radiate. The pain is at a severity of 6/10. The pain is moderate. The pain has been constant since the incident. Pertinent negatives include no chest pain or numbness. The symptoms are aggravated by movement. She has tried NSAIDs for the symptoms. The treatment provided moderate relief.     Review of Systems   Constitution: Negative for chills and fever.   HENT: Negative for hearing loss, nosebleeds and sore throat.    Eyes: Negative for blurred vision and redness.   Cardiovascular: Negative for chest pain and syncope.   Respiratory: Negative for cough and shortness of breath.    Hematologic/Lymphatic: Negative for bleeding problem.   Skin: Negative for color change and rash.   Musculoskeletal: Positive for falls and joint pain. Negative for back pain and neck pain.   Gastrointestinal: Negative for abdominal pain, diarrhea, nausea and vomiting.   Neurological: Negative for headaches, numbness and paresthesias.   Psychiatric/Behavioral: The patient is not nervous/anxious.    All other systems reviewed and are negative.      Objective:      Physical Exam   Constitutional: She appears well-developed and well-nourished. She is active. No distress.   HENT:   Head: Normocephalic and atraumatic.   Right Ear: Hearing and external ear normal.   Left Ear: Hearing and external ear normal.   Nose: Nose normal. No nasal deformity. No epistaxis.   Mouth/Throat: Oropharynx is clear and moist and mucous membranes are normal.   Eyes: Conjunctivae  and lids are normal. No scleral icterus.   Neck: Trachea normal and normal range of motion.   Cardiovascular: Intact distal pulses and normal pulses.   Pulmonary/Chest: Effort normal. No stridor. No respiratory distress.   Musculoskeletal:        Right shoulder: She exhibits tenderness (Anterior aspect). She exhibits normal range of motion, no swelling, no deformity, no laceration, no pain (Wang, O'Randall negative), normal pulse and normal strength.        Left shoulder: She exhibits decreased range of motion (Flexion to 170°, full active range of motion in all other planes) and tenderness (Anterior aspect, posterior trapezius region). She exhibits no swelling, no deformity, no pain (Wang, O'Randall negative), normal pulse and normal strength.        Right knee: She exhibits normal range of motion, no swelling, no effusion, no ecchymosis, no deformity, no LCL laxity, no bony tenderness, normal meniscus and no MCL laxity. Tenderness found.        Legs:  Neurological: She is alert. She has normal strength. She is not disoriented. No sensory deficit. GCS eye subscore is 4. GCS verbal subscore is 5. GCS motor subscore is 6.   Skin: Skin is warm, dry and intact. Capillary refill takes less than 2 seconds. She is not diaphoretic.   Psychiatric: She has a normal mood and affect. Her speech is normal and behavior is normal. She is attentive.   Nursing note and vitals reviewed.      Assessment:       1. Contusion of right knee, subsequent encounter    2. Strain of right shoulder, subsequent encounter    3. Strain of left shoulder, subsequent encounter    4. Work related injury        Plan:         Medications Ordered This Encounter   Medications    naproxen (NAPROSYN) 500 MG tablet     Sig: Take 1 tablet (500 mg total) by mouth 2 (two) times daily with meals.     Dispense:  30 tablet     Refill:  0     Patient Instructions: Daily home exercises/warm soaks   Restrictions: (See CA-17)  Follow up in about 1 week (around  8/2/2019).        Patient Instructions       Exercises for Shoulder Flexibility: External Rotation    This stretch can help restore shoulder flexibility and relieve pain over time. When stretching, be sure to breathe deeply. Follow any special instructions from your doctor or physical therapist:  1.  a doorway. Grasp the doorjamb with the hand on the frozen side. Your arm should be bent.  2. With the other hand, hold the elbow on the frozen side firmly against your body.  3. Standing in the same spot, rotate your body away from the doorjamb. Stop when you feel the stretch in the shoulder. At first, try to hold the stretch for 5 seconds.  4. Work up to doing 3 sets of this stretch, 3 times a day. Work up to holding the stretch for 30 to 60 seconds.  Note: Keep your arms as still as you can. Over time, rotate your body a little more to enhance the stretch. But be careful not to twist your back.  Frozen shoulder  Frozen shoulder is another name for adhesive capsulitis, which causes restricted movement in the shoulder. If you have frozen shoulder, this stretch may cause discomfort, especially when you first get started. A few months may pass before you achieve the results you want. But once your shoulder heals, it rarely becomes frozen again. So stick to your stretching program. If you have any questions, be sure to ask your doctor.   Date Last Reviewed: 8/16/2015 © 2000-2017 [employer]. [address] [place] [address]. All rights reserved. This information is not intended as a substitute for professional medical care. Always follow your healthcare professional's instructions.        Exercises for Shoulder Flexibility: Internal Rotation    This stretch can help restore shoulder flexibility and relieve pain over time. When stretching, be sure to breathe deeply. Follow any special instructions from your healthcare provider or physical therapist.  5. While seated, move the arm on the side  you want to stretch toward the middle of your back. The palm of your hand should face out.  6. Cup your other hand under the hand thats behind your back. Gently push your cupped hand upward until you feel the stretch in the shoulder. Try to hold the stretch for 5 seconds.  7. Work up to doing 3 sets of this stretch, 3 times a day. Work up to holding the stretch for 30 to 60 seconds.  Note: Keep your back straight. Its OK if your hand cant reach the middle of your back. Instead, start the stretch with your hand as close as you can get it to the middle of your back.     Frozen shoulder  Frozen shoulder is another name for adhesive capsulitis. This causes restricted movement in the shoulder. If you have frozen shoulder, this stretch may cause discomfort, especially when you first get started. A few months may pass before you achieve the results you want. But once your shoulder heals, it rarely becomes frozen again. So stick to your stretching program. If you have any questions, be sure to ask your healthcare provider.   Date Last Reviewed: 10/14/2015  © 4986-9400 [employer]. [address] [place] [address]. All rights reserved. This information is not intended as a substitute for professional medical care. Always follow your healthcare professional's instructions.        Exercises for Shoulder Flexibility: Adduction (Reaching Across)    This stretch can help restore shoulder flexibility and relieve pain over time. When stretching, be sure to breathe deeply. And follow any special instructions from your doctor or physical therapist:  8. Put the hand from the side you want to stretch on your opposite shoulder. Your elbow should point away from your body. Try to raise your elbow as close to shoulder height as you can.  9. With your other hand, push the raised elbow toward the opposite shoulder. Avoid turning your head. Stop when you feel the stretch. Try to hold the stretch  for 5 seconds.  10. Work up to doing 3 sets of this stretch, 3 times a day. Work up to holding the stretch for 30 to 60 seconds.  Note: Be sure to push your elbow across your chest, not up toward your chin. Over time, try to push your elbow farther across your chest to enhance the stretch.  Frozen shoulder  Frozen shoulder is another name for adhesive capsulitis, which causes restricted movement in the shoulder. If you have frozen shoulder, this stretch may cause discomfort, especially when you first get started. A few months may pass before you achieve the results you want. Once your shoulder heals, it rarely becomes frozen again. So stick to your stretching program. If you have any questions, be sure to ask your doctor.   Date Last Reviewed: 8/16/2015 © 2000-2017 [employer]. [address] [place] [address], [place] [address]. All rights reserved. This information is not intended as a substitute for professional medical care. Always follow your healthcare professional's instructions.        Exercises for Shoulder Flexibility: Back Scratch    Improving your flexibility can reduce pain. Stretching exercises also can help increase your range of pain-free motion. Breathe normally when you exercise. Try to use smooth, fluid movements. Never force a stretch.  Note: Follow any special instructions you are given. If you feel pain, stop the exercise. If the pain continues after stopping, call your healthcare provider.  · Stand straight, placing the back of your hand on the side you want to stretch flat against your lower back.  · Throw one end of a towel over your shoulder. Grab it behind your back with your other hand.  · Pull down gently on the towel with your front arm. Let your back arm slide up as high as is comfortable. Youll feel a stretch in your shoulder. Hold the stretch for a few seconds.  · Repeat 3 to 5 times. Build up to holding each stretch for 30 to 60 seconds.  For your safety, check with your  healthcare provider before starting an exercise program.   Date Last Reviewed: 8/26/2015  © 0599-2341 [employer] [address] [place] [address]. All rights reserved. This information is not intended as a substitute for professional medical care. Always follow your healthcare professional's instructions.        Exercises for Shoulder Flexibility: Wall Walk    Improving your flexibility can reduce pain. Stretching exercises also can help increase your range of pain-free motion. Breathe normally when you exercise. Use smooth, fluid movements.  Note: Follow any special instructions you are given. If you feel pain, stop the exercise. If the pain continues after stopping, call your healthcare provider:  · Stand with your shoulder about 2 feet from the wall.  · Raise your arm to shoulder level and gently walk your fingers up the wall as high as you can.  · Hold for a few seconds. Then walk your fingers back down.  · Repeat 3 times. Move closer to the wall as you repeat.  · Build up to holding each stretch for 30 seconds.  Caution: Do this stretch only if your healthcare provider recommends it. Dont do it when you are first injured.       Date Last Reviewed: 8/16/2015  © 9271-2116 [employer] [address] [place] [address]. All rights reserved. This information is not intended as a substitute for professional medical care. Always follow your healthcare professional's instructions.

## 2019-07-26 NOTE — LETTER
[facility] [address] [address]  Phone: [phone]  Fax: [phone]  [facility]: [id number]    Pt Name: Celia Alford  Injury Date: 07/18/2019   Employee ID:  Date of First Treatment: 07/26/2019   Company: [employer]      Appointment Time: 03:30 PM Arrived: 4:04 PM   Provider: Alex Aly PA-C Time Out: 5:15 PM     Office Treatment:   1. Contusion of right knee, subsequent encounter    2. Strain of right shoulder, subsequent encounter    3. Strain of left shoulder, subsequent encounter    4. Work related injury      Medications Ordered This Encounter   Medications    naproxen (NAPROSYN) 500 MG tablet      Patient Instructions: Daily home exercises/warm soaks    Restrictions: (See CA-17)     Return Appointment: Friday, 8/2/2019 at 3:45 PM

## 2019-08-02 ENCOUNTER — OFFICE VISIT (OUTPATIENT)
Dept: URGENT CARE | Facility: CLINIC | Age: 49
End: 2019-08-02
Payer: OTHER GOVERNMENT

## 2019-08-02 VITALS
WEIGHT: 200 LBS | HEIGHT: 69 IN | RESPIRATION RATE: 15 BRPM | DIASTOLIC BLOOD PRESSURE: 82 MMHG | SYSTOLIC BLOOD PRESSURE: 155 MMHG | BODY MASS INDEX: 29.62 KG/M2 | TEMPERATURE: 98 F | OXYGEN SATURATION: 99 % | HEART RATE: 100 BPM
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
## 2019-08-02 DIAGNOSIS — S46.911D STRAIN OF RIGHT SHOULDER, SUBSEQUENT ENCOUNTER: ICD-10-CM

## 2019-08-02 DIAGNOSIS — S46.912D STRAIN OF LEFT SHOULDER, SUBSEQUENT ENCOUNTER: ICD-10-CM

## 2019-08-02 PROCEDURE — 99214 OFFICE O/P EST MOD 30 MIN: CPT | Mod: S$GLB,,, | Performed by: NURSE PRACTITIONER

## 2019-08-02 PROCEDURE — 99214 PR OFFICE/OUTPT VISIT, EST, LEVL IV, 30-39 MIN: ICD-10-PCS | Mod: S$GLB,,, | Performed by: NURSE PRACTITIONER

## 2019-08-02 NOTE — PROGRESS NOTES
Subjective:       Patient ID: Celia Alford is a 48 y.o. female.    Chief Complaint: Shoulder Pain    Pt states her knees are better but still having bilat shoulder pain which is worse with erpetative movements.    Shoulder Pain    The pain is present in the right shoulder and left shoulder. This is a new problem. The current episode started 1 to 4 weeks ago. There has been a history of trauma. The problem occurs constantly. The problem has been gradually improving. The quality of the pain is described as burning and aching. The pain is at a severity of 6/10. The pain is mild. Associated symptoms include a limited range of motion and stiffness. Pertinent negatives include no fever, headaches or itching. The symptoms are aggravated by activity. She has tried acetaminophen and NSAIDS for the symptoms. The treatment provided mild relief.     Review of Systems   Constitution: Negative for chills and fever.   HENT: Negative for sore throat.    Eyes: Negative for blurred vision.   Cardiovascular: Negative for chest pain.   Respiratory: Negative for shortness of breath.    Skin: Negative for itching and rash.   Musculoskeletal: Positive for joint pain and stiffness. Negative for back pain.   Gastrointestinal: Negative for abdominal pain, diarrhea, nausea and vomiting.   Genitourinary: Negative for dysuria, genital sores, hematuria, missed menses, non-menstrual bleeding and urgency.   Neurological: Negative for headaches.   Psychiatric/Behavioral: The patient is not nervous/anxious.    All other systems reviewed and are negative.      Objective:      Physical Exam   Constitutional: She appears well-developed and well-nourished. She is active.  Non-toxic appearance. She does not appear ill. No distress.   Elevated bp in clinic   HENT:   Head: Normocephalic and atraumatic.   Right Ear: Hearing and external ear normal.   Left Ear: Hearing and external ear normal.   Nose: Nose normal. No nasal deformity. No epistaxis.    Mouth/Throat: Mucous membranes are normal.   Eyes: Conjunctivae and lids are normal. No scleral icterus.   Neck: Trachea normal, normal range of motion and full passive range of motion without pain. Neck supple. No spinous process tenderness and no muscular tenderness present. No neck rigidity. Normal range of motion present.   Cardiovascular: Intact distal pulses and normal pulses.   Pulses:       Radial pulses are 2+ on the right side, and 2+ on the left side.   Pulmonary/Chest: Effort normal. No stridor. No respiratory distress.   Musculoskeletal: She exhibits tenderness.        Right shoulder: She exhibits tenderness (Anterior aspect) and spasm. She exhibits normal range of motion, no swelling, no deformity, no laceration, no pain (Wang, O'Randall negative), normal pulse and normal strength.        Left shoulder: She exhibits decreased range of motion (Flexion to 170°, full active range of motion in all other planes) and tenderness (Anterior aspect, posterior trapezius region). She exhibits no swelling, no deformity, no pain (Wang, O'Randall negative), normal pulse and normal strength.        Right knee: Normal. She exhibits normal range of motion, no swelling, no effusion, no ecchymosis, no deformity, no LCL laxity, no bony tenderness, normal meniscus and no MCL laxity. No tenderness found.        Arms:       Legs:  Neurological: She is alert. She has normal strength. She is not disoriented. No sensory deficit. GCS eye subscore is 4. GCS verbal subscore is 5. GCS motor subscore is 6.   Skin: Skin is warm, dry and intact. Capillary refill takes less than 2 seconds. She is not diaphoretic.   Psychiatric: She has a normal mood and affect. Her speech is normal and behavior is normal. She is attentive.   Nursing note and vitals reviewed.      Assessment:       1. Work related injury    2. Strain of left shoulder, subsequent encounter    3. Strain of right shoulder, subsequent encounter        Plan:            Patient  Instructions: Attention not to aggravate affected area, Daily home exercises/warm soaks, Apply ice 24-48 hours then apply heat/warm soaks, PT to be scheduled once authorized(you may take tylenol or ibuprofen for pain and discomfort)   Restrictions: (see CA 17 for restrictions; 08/02/19)  Follow up in about 2 weeks (around 8/16/2019).

## 2019-08-02 NOTE — LETTER
[facility] [address] [address]  Phone: [phone]  Fax: [phone]  [facility]: [id number]    Pt Name: Celia Alford  Injury Date: 07/18/2019   Employee ID:  Date of First Treatment: 08/02/2019   Company: [employer]      Appointment Time: 03:30 PM Arrived: 1050am   Provider: Cinthia Maldonado NP Time Out:1125am     Office Treatment:   1. Work related injury    2. Strain of left shoulder, subsequent encounter    3. Strain of right shoulder, subsequent encounter          Patient Instructions: Attention not to aggravate affected area, Daily home exercises/warm soaks, Apply ice 24-48 hours then apply heat/warm soaks, PT to be scheduled once authorized(you may take tylenol or ibuprofen for pain and discomfort)    Restrictions: (see CA 17 for restrictions; 08/02/19)     Return Appointment: 08/16/19 [id number]

## 2019-08-16 ENCOUNTER — OFFICE VISIT (OUTPATIENT)
Dept: URGENT CARE | Facility: CLINIC | Age: 49
End: 2019-08-16
Payer: OTHER GOVERNMENT

## 2019-08-16 VITALS
WEIGHT: 200 LBS | BODY MASS INDEX: 29.62 KG/M2 | TEMPERATURE: 98 F | DIASTOLIC BLOOD PRESSURE: 97 MMHG | RESPIRATION RATE: 20 BRPM | OXYGEN SATURATION: 98 % | HEIGHT: 69 IN | SYSTOLIC BLOOD PRESSURE: 134 MMHG | HEART RATE: 74 BPM

## 2019-08-16 DIAGNOSIS — S80.01XA CONTUSION OF RIGHT KNEE, INITIAL ENCOUNTER: ICD-10-CM
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
## 2019-08-16 DIAGNOSIS — S46.912D STRAIN OF LEFT SHOULDER, SUBSEQUENT ENCOUNTER: ICD-10-CM

## 2019-08-16 DIAGNOSIS — S46.911D STRAIN OF RIGHT SHOULDER, SUBSEQUENT ENCOUNTER: ICD-10-CM

## 2019-08-16 PROCEDURE — 99214 PR OFFICE/OUTPT VISIT, EST, LEVL IV, 30-39 MIN: ICD-10-PCS | Mod: S$GLB,,, | Performed by: NURSE PRACTITIONER

## 2019-08-16 PROCEDURE — 99214 OFFICE O/P EST MOD 30 MIN: CPT | Mod: S$GLB,,, | Performed by: NURSE PRACTITIONER

## 2019-08-16 RX ORDER — DEXTROMETHORPHAN HYDROBROMIDE, GUAIFENESIN 5; 100 MG/5ML; MG/5ML
650 LIQUID ORAL EVERY 8 HOURS
Refills: 0 | COMMUNITY
Start: 2019-08-16 | End: 2019-08-30 | Stop reason: SDUPTHER

## 2019-08-16 NOTE — PROGRESS NOTES
Subjective:       Patient ID: Celia Alford is a 48 y.o. female.    Chief Complaint: Work Related Injury    Patient here today for a follow up visit on Right shoulder strain, Patient states her shoulder is not any better. Patient states she has not started PT.    Shoulder Injury    The incident occurred at work. The right shoulder is affected. The incident occurred more than 1 week ago. The injury mechanism was a fall. The quality of the pain is described as burning and aching. The pain does not radiate. The pain is at a severity of 6/10. The pain is moderate. Pertinent negatives include no chest pain, muscle weakness, numbness or tingling. The symptoms are aggravated by overhead lifting, movement and palpation. She has tried NSAIDs for the symptoms. The treatment provided mild relief.     Review of Systems   Constitution: Negative for chills, decreased appetite and diaphoresis.   Eyes: Negative for blurred vision, discharge and double vision.   Cardiovascular: Negative for chest pain, claudication, cyanosis and dyspnea on exertion.   Respiratory: Negative for cough and hemoptysis.    Skin: Negative for nail changes.   Musculoskeletal: Positive for joint pain and stiffness. Negative for muscle cramps, myalgias and neck pain.   Genitourinary: Negative for frequency, genital sores and hematuria.   Neurological: Negative for numbness and tingling.   Psychiatric/Behavioral: Negative for altered mental status, depression and hallucinations.   All other systems reviewed and are negative.      Objective:      Physical Exam   Constitutional: She appears well-developed and well-nourished. She is active.  Non-toxic appearance. She does not appear ill. No distress.   HENT:   Head: Normocephalic and atraumatic.   Right Ear: Hearing and external ear normal.   Left Ear: Hearing and external ear normal.   Nose: Nose normal. No nasal deformity. No epistaxis.   Mouth/Throat: Mucous membranes are normal.   Eyes: Conjunctivae and lids  are normal. No scleral icterus.   Neck: Trachea normal, normal range of motion and full passive range of motion without pain. Neck supple. No spinous process tenderness and no muscular tenderness present. No neck rigidity. Normal range of motion present.   Cardiovascular: Intact distal pulses and normal pulses.   Pulses:       Radial pulses are 2+ on the right side, and 2+ on the left side.   Pulmonary/Chest: Effort normal. No stridor. No respiratory distress.   Musculoskeletal: She exhibits tenderness.        Right shoulder: She exhibits tenderness (posterior aspect) and spasm. She exhibits normal range of motion, no swelling, no deformity, no laceration, no pain (Wang, O'Randall negative), normal pulse and normal strength.        Left shoulder: She exhibits tenderness (posterior trapezius region). She exhibits normal range of motion, no swelling, no deformity, no pain (Wang, O'Randall negative), normal pulse and normal strength.        Right knee: Normal. She exhibits normal range of motion, no swelling, no effusion, no ecchymosis, no deformity, no LCL laxity, no bony tenderness, normal meniscus and no MCL laxity. No tenderness found.        Arms:  Neurological: She is alert. She has normal strength. She is not disoriented. No sensory deficit. GCS eye subscore is 4. GCS verbal subscore is 5. GCS motor subscore is 6.   Skin: Skin is warm, dry and intact. Capillary refill takes less than 2 seconds. She is not diaphoretic.   Psychiatric: She has a normal mood and affect. Her speech is normal and behavior is normal. She is attentive.   Nursing note and vitals reviewed.      Assessment:       1. Work related injury    2. Strain of left shoulder, subsequent encounter    3. Strain of right shoulder, subsequent encounter    4. Contusion of right knee, initial encounter        Plan:     called the [facility] and spoke with John.  Paperwork has been sent over to [facility] for them to get approval from the  [employer] for physical therapy.  Discussed this with the patient.  She voiced understanding.    Medications Ordered This Encounter   Medications    acetaminophen (TYLENOL) 650 MG TbSR     Sig: Take 1 tablet (650 mg total) by mouth every 8 (eight) hours.     Refill:  0     Patient Instructions: Attention not to aggravate affected area, Daily home exercises/warm soaks, Apply ice 24-48 hours then apply heat/warm soaks, PT to be scheduled once authorized(You may take Tylenol or ibuprofen as directed on the bottle for pain and discomfort)   Restrictions: (See CA 17 for work restrictions; 08/16/2019)  Follow up in about 2 weeks (around 8/30/2019).

## 2019-08-16 NOTE — LETTER
[facility] [address] [address]  Phone: [phone]  Fax: [phone]  [facility]: [id number]    Pt Name: Celia Alford  Injury Date: 07/18/2019   Employee ID: [id number] Date of Treatment: 08/16/2019   Company: [employer]      Appointment Time: 10:15 AM Arrived: 9:30am   Provider: Cinthia Maldonado NP Time Out:10:00am     Office Treatment:   1. Work related injury    2. Strain of left shoulder, subsequent encounter    3. Strain of right shoulder, subsequent encounter    4. Contusion of right knee, initial encounter      Medications Ordered This Encounter   Medications    acetaminophen (TYLENOL) 650 MG TbSR      Patient Instructions: Attention not to aggravate affected area, Daily home exercises/warm soaks, Apply ice 24-48 hours then apply heat/warm soaks, PT to be scheduled once authorized(You may take Tylenol or ibuprofen as directed on the bottle for pain and discomfort)    Restrictions: (See CA 17 for work restrictions; 08/16/2019)     Return Appointment: 8/30/2019 at 9:30am

## 2019-08-26 ENCOUNTER — TELEPHONE (OUTPATIENT)
Dept: URGENT CARE | Facility: CLINIC | Age: 49
End: 2019-08-26

## 2019-08-26 NOTE — TELEPHONE ENCOUNTER
Attempted to contact patient re: PT being approved at [facility].  Unable to leave msg as voice mail is full.

## 2019-08-30 ENCOUNTER — OFFICE VISIT (OUTPATIENT)
Dept: URGENT CARE | Facility: CLINIC | Age: 49
End: 2019-08-30
Payer: OTHER GOVERNMENT

## 2019-08-30 VITALS
TEMPERATURE: 98 F | SYSTOLIC BLOOD PRESSURE: 143 MMHG | DIASTOLIC BLOOD PRESSURE: 95 MMHG | RESPIRATION RATE: 20 BRPM | BODY MASS INDEX: 29.61 KG/M2 | HEIGHT: 69 IN | OXYGEN SATURATION: 100 % | HEART RATE: 88 BPM | WEIGHT: 199.94 LBS

## 2019-08-30 DIAGNOSIS — S46.911D STRAIN OF RIGHT SHOULDER, SUBSEQUENT ENCOUNTER: ICD-10-CM
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
## 2019-08-30 DIAGNOSIS — S80.01XA CONTUSION OF RIGHT KNEE, INITIAL ENCOUNTER: ICD-10-CM

## 2019-08-30 DIAGNOSIS — S46.912D STRAIN OF LEFT SHOULDER, SUBSEQUENT ENCOUNTER: ICD-10-CM

## 2019-08-30 PROCEDURE — 99214 PR OFFICE/OUTPT VISIT, EST, LEVL IV, 30-39 MIN: ICD-10-PCS | Mod: S$GLB,,, | Performed by: NURSE PRACTITIONER

## 2019-08-30 PROCEDURE — 99214 OFFICE O/P EST MOD 30 MIN: CPT | Mod: S$GLB,,, | Performed by: NURSE PRACTITIONER

## 2019-08-30 RX ORDER — DEXTROMETHORPHAN HYDROBROMIDE, GUAIFENESIN 5; 100 MG/5ML; MG/5ML
650 LIQUID ORAL EVERY 8 HOURS
Refills: 0 | COMMUNITY
Start: 2019-08-30 | End: 2019-10-04 | Stop reason: SDUPTHER

## 2019-08-30 NOTE — LETTER
[facility] [address] [address]  Phone: [phone]  Fax: [phone]  [facility]: [id number]    Pt Name: Celia Alford  Injury Date: 07/18/2019   Employee ID:  Date of First Treatment: 08/30/2019   Company: [employer]      Appointment Time: 09:15 AM Arrived: 1010am   Provider: Cinthia Maldonado NP Time Out:1108am     Office Treatment:   1. Work related injury    2. Strain of left shoulder, subsequent encounter    3. Strain of right shoulder, subsequent encounter    4. Contusion of right knee, initial encounter    5. Fall, subsequent encounter      Medications Ordered This Encounter   Medications    acetaminophen (TYLENOL) 650 MG TbSR      Patient Instructions: Attention not to aggravate affected area, Daily home exercises/warm soaks, Apply ice 24-48 hours then apply heat/warm soaks, Continue Physical Therapy(You may take Tylenol or ibuprofen for pain and discomfort)    Restrictions: (See [place] 17 for work restrictions; 08/30/2019)     Return Appointment: 09/19/19 at 10am

## 2019-09-19 ENCOUNTER — OFFICE VISIT (OUTPATIENT)
Dept: URGENT CARE | Facility: CLINIC | Age: 49
End: 2019-09-19
Payer: OTHER GOVERNMENT

## 2019-09-19 VITALS
DIASTOLIC BLOOD PRESSURE: 79 MMHG | HEIGHT: 69 IN | TEMPERATURE: 98 F | WEIGHT: 200.63 LBS | BODY MASS INDEX: 29.71 KG/M2 | RESPIRATION RATE: 20 BRPM | HEART RATE: 94 BPM | SYSTOLIC BLOOD PRESSURE: 113 MMHG | OXYGEN SATURATION: 97 %
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
## 2019-09-19 DIAGNOSIS — S46.911D STRAIN OF RIGHT SHOULDER, SUBSEQUENT ENCOUNTER: ICD-10-CM
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
## 2019-09-19 PROCEDURE — 99214 PR OFFICE/OUTPT VISIT, EST, LEVL IV, 30-39 MIN: ICD-10-PCS | Mod: S$GLB,,, | Performed by: NURSE PRACTITIONER

## 2019-09-19 PROCEDURE — 99214 OFFICE O/P EST MOD 30 MIN: CPT | Mod: S$GLB,,, | Performed by: NURSE PRACTITIONER

## 2019-09-19 NOTE — PROGRESS NOTES
Subjective:       Patient ID: Celia Alford is a 48 y.o. female.    Chief Complaint: Work Related Injury    Patient states she is still having pain in her arm. She says she missed her PT last week and part of this week because she was out of town but the PT makes it hurt more.     Pain   This is a new problem. The current episode started more than 1 month ago. The problem occurs constantly. The problem has been gradually improving. Pertinent negatives include no abdominal pain, chest pain, chills, fever, headaches, nausea, rash, sore throat or vomiting. Nothing aggravates the symptoms. She has tried NSAIDs and acetaminophen (PT) for the symptoms. The treatment provided mild relief.     Review of Systems   Constitution: Negative for chills and fever.   HENT: Negative for sore throat.    Eyes: Negative for blurred vision.   Cardiovascular: Negative for chest pain.   Respiratory: Negative for shortness of breath.    Skin: Negative for rash.   Musculoskeletal: Positive for joint pain and stiffness. Negative for back pain.   Gastrointestinal: Negative for abdominal pain, diarrhea, nausea and vomiting.   Neurological: Negative for headaches.   Psychiatric/Behavioral: The patient is not nervous/anxious.    All other systems reviewed and are negative.      Objective:      Physical Exam   Constitutional: She appears well-developed and well-nourished. She is active.  Non-toxic appearance. She does not appear ill. No distress.   Uncomfortable and stiff on exam   HENT:   Head: Normocephalic and atraumatic.   Right Ear: Hearing and external ear normal.   Left Ear: Hearing and external ear normal.   Nose: Nose normal. No nasal deformity. No epistaxis.   Mouth/Throat: Mucous membranes are normal.   Eyes: Conjunctivae and lids are normal. No scleral icterus.   Neck: Trachea normal, normal range of motion and full passive range of motion without pain. Neck supple. No spinous process tenderness and no muscular tenderness present. No  neck rigidity. Normal range of motion present.   Cardiovascular: Intact distal pulses and normal pulses.   Pulses:       Radial pulses are 2+ on the right side, and 2+ on the left side.   Pulmonary/Chest: Effort normal. No stridor. No respiratory distress.   Musculoskeletal: She exhibits tenderness.        Right shoulder: She exhibits tenderness (posterior aspect), pain (Wang, O'Randall negative) and spasm. She exhibits normal range of motion, no swelling, no deformity, no laceration, normal pulse and normal strength.        Left shoulder: She exhibits normal range of motion, no tenderness, no swelling, no deformity, no pain (Wang, O'Randall negative), normal pulse and normal strength.        Right knee: Normal. She exhibits normal range of motion, no swelling, no effusion, no ecchymosis, no deformity, no LCL laxity, no bony tenderness, normal meniscus and no MCL laxity. No tenderness found.        Arms:       Right hand: Decreased strength () noted.   Neurological: She is alert. She has normal strength. She is not disoriented. No sensory deficit. GCS eye subscore is 4. GCS verbal subscore is 5. GCS motor subscore is 6.   Reflex Scores:       Patellar reflexes are 2+ on the right side and 2+ on the left side.  Skin: Skin is warm, dry and intact. Capillary refill takes less than 2 seconds. She is not diaphoretic.   Psychiatric: She has a normal mood and affect. Her speech is normal and behavior is normal. She is attentive.   Nursing note and vitals reviewed.      Assessment:       1. Work related injury    2. Strain of right shoulder, subsequent encounter    3. Fall, subsequent encounter        Plan:            Patient Instructions: Attention not to aggravate affected area, Daily home exercises/warm soaks, MRI to be scheduled once authorized, Continue Physical Therapy(please take tylenol or ibuprofen as directed for pain and discomfort)   Restrictions: (see CA17 for restrictions)  Follow up in about 2 weeks (around  10/3/2019).

## 2019-09-19 NOTE — LETTER
[facility] [address] [address]  Phone: [phone]  Fax: [phone]  [facility]: [id number]    Pt Name: Celia Alford  Injury Date: 07/18/2019   Employee ID:  Date of First Treatment: 09/19/2019   Company: [employer]      Appointment Time: 09:45 AM Arrived: 11am   Provider: Cinthia Maldonado NP Time Out:1145am     Office Treatment:   1. Work related injury    2. Strain of right shoulder, subsequent encounter    3. Fall, subsequent encounter          Patient Instructions: Attention not to aggravate affected area, Daily home exercises/warm soaks, MRI to be scheduled once authorized, Continue Physical Therapy(please take tylenol or ibuprofen as directed for pain and discomfort)    Restrictions: (see CA17 for restrictions)     Return Appointment: 10/03/19 at 11am

## 2019-09-19 NOTE — PROGRESS NOTES
"Subjective:       Patient ID: Celia Alford is a 48 y.o. female.    Vitals:  height is 5' 9" (1.753 m) and weight is 91 kg (200 lb 9.9 oz). Her temperature is 98.2 °F (36.8 °C). Her blood pressure is 113/79 and her pulse is 94. Her respiration is 20 and oxygen saturation is 97%.     Chief Complaint: Work Related Injury    HPI  <OUCOOHADULT>    Objective:      Physical Exam    Assessment:       No diagnosis found.    Plan:         There are no diagnoses linked to this encounter.     "

## 2019-09-24 ENCOUNTER — OFFICE VISIT (OUTPATIENT)
Dept: URGENT CARE | Facility: CLINIC | Age: 49
End: 2019-09-24
Payer: OTHER GOVERNMENT

## 2019-09-24 VITALS
DIASTOLIC BLOOD PRESSURE: 92 MMHG | WEIGHT: 200.63 LBS | TEMPERATURE: 98 F | SYSTOLIC BLOOD PRESSURE: 128 MMHG | OXYGEN SATURATION: 99 % | BODY MASS INDEX: 29.71 KG/M2 | HEIGHT: 69 IN | HEART RATE: 79 BPM | RESPIRATION RATE: 18 BRPM

## 2019-09-24 DIAGNOSIS — S46.911D STRAIN OF RIGHT SHOULDER, SUBSEQUENT ENCOUNTER: ICD-10-CM
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
## 2019-09-24 PROCEDURE — 99214 OFFICE O/P EST MOD 30 MIN: CPT | Mod: S$GLB,,, | Performed by: NURSE PRACTITIONER

## 2019-09-24 PROCEDURE — 99214 PR OFFICE/OUTPT VISIT, EST, LEVL IV, 30-39 MIN: ICD-10-PCS | Mod: S$GLB,,, | Performed by: NURSE PRACTITIONER

## 2019-09-24 RX ORDER — METHYLPREDNISOLONE 4 MG/1
TABLET ORAL
Qty: 1 PACKAGE | Refills: 0 | Status: SHIPPED | OUTPATIENT
Start: 2019-09-24 | End: 2019-10-04

## 2019-09-24 RX ORDER — CYCLOBENZAPRINE HCL 10 MG
10 TABLET ORAL 3 TIMES DAILY PRN
Qty: 21 TABLET | Refills: 0 | Status: SHIPPED | OUTPATIENT
Start: 2019-09-24 | End: 2019-10-01

## 2019-09-24 NOTE — LETTER
[facility] [address] [address]  Phone: [phone]  Fax: [phone]  [facility]: [id number]    Pt Name: Celia Alford  Injury Date: 07/18/2019   Employee ID:  Date of First Treatment: 09/24/2019   Company: [employer]      Appointment Time: 08:00 AM Arrived: 815am   Provider: Cinthia Maldonado NP Time Out:850am     Office Treatment:   1. Work related injury    2. Strain of right shoulder, subsequent encounter    3. Fall, subsequent encounter      Medications Ordered This Encounter   Medications    cyclobenzaprine (FLEXERIL) 10 MG tablet    methylPREDNISolone (MEDROL DOSEPACK) 4 mg tablet      Patient Instructions: Attention not to aggravate affected area, Daily home exercises/warm soaks, Continue Physical Therapy, MRI to be scheduled once authorized(Take meds as directed for pain and discomfort)    Restrictions: (See [place] 17 for work restrictions; 09/24/19)     Return Appointment: 10/3/2019 at 11am

## 2019-09-24 NOTE — PROGRESS NOTES
Subjective:       Patient ID: Celia Alford is a 48 y.o. female.    Chief Complaint: Work Related Injury    Patient reports waking up Sunday with increased pain in Right shoulder. States she completed PT on Friday and they are waiting for reapproval.    Shoulder Pain    The pain is present in the right shoulder. This is a recurrent problem. The current episode started more than 1 month ago. There has been a history of trauma. The problem has been gradually worsening. The quality of the pain is described as burning. The pain is at a severity of 6/10. The pain is moderate. Associated symptoms include a limited range of motion and stiffness. Pertinent negatives include no fever, headaches, inability to bear weight, itching, joint locking, joint swelling, numbness, tingling or visual symptoms. The symptoms are aggravated by activity. She has tried movement and heat (pt) for the symptoms. The treatment provided mild relief. Family history does not include arthritis. Her past medical history is significant for Injuries to Extremity. There is no history of diabetes or migraines.     Review of Systems   Constitution: Negative for fever.   Eyes: Negative for blurred vision, discharge, double vision and pain.   Endocrine: Negative for cold intolerance, heat intolerance and polydipsia.   Skin: Negative for itching.   Musculoskeletal: Positive for joint pain and stiffness.   Gastrointestinal: Negative for bloating, abdominal pain and anorexia.   Genitourinary: Negative for bladder incontinence, decreased libido, dysuria and flank pain.   Neurological: Negative for aphonia, brief paralysis, difficulty with concentration, headaches, numbness and tingling.   All other systems reviewed and are negative.      Objective:      Physical Exam   Constitutional: She appears well-developed and well-nourished. She is active.  Non-toxic appearance. She does not appear ill. No distress.   Uncomfortable and stiff on exam   HENT:   Head:  Normocephalic and atraumatic.   Right Ear: Hearing and external ear normal.   Left Ear: Hearing and external ear normal.   Nose: Nose normal. No nasal deformity. No epistaxis.   Mouth/Throat: Mucous membranes are normal.   Eyes: Conjunctivae and lids are normal. No scleral icterus.   Neck: Trachea normal, normal range of motion and full passive range of motion without pain. Neck supple. No spinous process tenderness and no muscular tenderness present. No neck rigidity. Normal range of motion present.   Cardiovascular: Intact distal pulses and normal pulses.   Pulses:       Radial pulses are 2+ on the right side, and 2+ on the left side.   Pulmonary/Chest: Effort normal. No stridor. No respiratory distress.   Musculoskeletal: She exhibits tenderness.        Right shoulder: She exhibits decreased range of motion, tenderness (posterior aspect), pain (Wang, O'Randall negative), spasm and decreased strength. She exhibits no swelling, no deformity, no laceration and normal pulse.        Left shoulder: She exhibits normal range of motion, no tenderness, no swelling, no deformity, no pain (Wang, O'Randall negative), normal pulse and normal strength.        Right knee: Normal. She exhibits normal range of motion, no swelling, no effusion, no ecchymosis, no deformity, no LCL laxity, no bony tenderness, normal meniscus and no MCL laxity. No tenderness found.        Arms:       Right hand: Decreased strength () noted.   Neurological: She is alert. She has normal strength. She is not disoriented. No sensory deficit. GCS eye subscore is 4. GCS verbal subscore is 5. GCS motor subscore is 6.   Reflex Scores:       Patellar reflexes are 2+ on the right side and 2+ on the left side.  Skin: Skin is warm, dry and intact. Capillary refill takes less than 2 seconds. She is not diaphoretic.   Psychiatric: She has a normal mood and affect. Her speech is normal and behavior is normal. She is attentive.   Nursing note and vitals reviewed.       Assessment:       1. Work related injury    2. Strain of right shoulder, subsequent encounter    3. Fall, subsequent encounter        Plan:         Medications Ordered This Encounter   Medications    cyclobenzaprine (FLEXERIL) 10 MG tablet     Sig: Take 1 tablet (10 mg total) by mouth 3 (three) times daily as needed.     Dispense:  21 tablet     Refill:  0    methylPREDNISolone (MEDROL DOSEPACK) 4 mg tablet     Sig: use as directed     Dispense:  1 Package     Refill:  0     Patient Instructions: Attention not to aggravate affected area, Daily home exercises/warm soaks, Continue Physical Therapy, MRI to be scheduled once authorized(Take meds as directed for pain and discomfort)   Restrictions: (See [place] 17 for work restrictions; 09/24/19)  Follow up in about 9 days (around 10/3/2019).

## 2019-10-04 ENCOUNTER — OFFICE VISIT (OUTPATIENT)
Dept: URGENT CARE | Facility: CLINIC | Age: 49
End: 2019-10-04
Payer: OTHER GOVERNMENT

## 2019-10-04 VITALS
HEART RATE: 89 BPM | TEMPERATURE: 98 F | BODY MASS INDEX: 29.71 KG/M2 | SYSTOLIC BLOOD PRESSURE: 140 MMHG | OXYGEN SATURATION: 100 % | HEIGHT: 69 IN | DIASTOLIC BLOOD PRESSURE: 88 MMHG | WEIGHT: 200.63 LBS | RESPIRATION RATE: 20 BRPM
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
## 2019-10-04 DIAGNOSIS — S46.911D STRAIN OF RIGHT SHOULDER, SUBSEQUENT ENCOUNTER: ICD-10-CM
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
## 2019-10-04 PROCEDURE — 99214 PR OFFICE/OUTPT VISIT, EST, LEVL IV, 30-39 MIN: ICD-10-PCS | Mod: S$GLB,,, | Performed by: NURSE PRACTITIONER

## 2019-10-04 PROCEDURE — 99214 OFFICE O/P EST MOD 30 MIN: CPT | Mod: S$GLB,,, | Performed by: NURSE PRACTITIONER

## 2019-10-04 RX ORDER — DEXTROMETHORPHAN HYDROBROMIDE, GUAIFENESIN 5; 100 MG/5ML; MG/5ML
650 LIQUID ORAL EVERY 8 HOURS
Refills: 0 | COMMUNITY
Start: 2019-10-04 | End: 2020-07-09 | Stop reason: SDUPTHER

## 2019-10-04 NOTE — LETTER
[facility] [address] [address]  Phone: [phone]  Fax: [phone]  [facility]: [id number]    Pt Name: Celia Alford  Injury Date: 07/18/2019   Employee ID:  Date of First Treatment: 10/04/2019   Company: [employer]      Appointment Time: 11:45 AM Arrived: 1pm   Provider: Cinthia Maldonado NP Time Out:2pm     Office Treatment:   1. Work related injury    2. Strain of right shoulder, subsequent encounter    3. Fall, subsequent encounter      Medications Ordered This Encounter   Medications    acetaminophen (TYLENOL) 650 MG TbSR      Patient Instructions: Attention not to aggravate affected area, Daily home exercises/warm soaks, Apply ice 24-48 hours then apply heat/warm soaks, Continue Physical Therapy(please take meds as directed for pain and discomfort)    Restrictions: (See [place] 17 for restrictions)     Return Appointment: 10/18/19 at 1pm

## 2019-10-18 ENCOUNTER — OFFICE VISIT (OUTPATIENT)
Dept: URGENT CARE | Facility: CLINIC | Age: 49
End: 2019-10-18
Payer: OTHER GOVERNMENT

## 2019-10-18 VITALS
DIASTOLIC BLOOD PRESSURE: 88 MMHG | SYSTOLIC BLOOD PRESSURE: 129 MMHG | HEART RATE: 109 BPM | WEIGHT: 200 LBS | TEMPERATURE: 99 F | BODY MASS INDEX: 29.62 KG/M2 | OXYGEN SATURATION: 97 % | RESPIRATION RATE: 16 BRPM | HEIGHT: 69 IN
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
## 2019-10-18 DIAGNOSIS — M77.8 TENDONITIS OF SHOULDER, RIGHT: ICD-10-CM
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
## 2019-10-18 PROCEDURE — 99214 PR OFFICE/OUTPT VISIT, EST, LEVL IV, 30-39 MIN: ICD-10-PCS | Mod: S$GLB,,, | Performed by: NURSE PRACTITIONER

## 2019-10-18 PROCEDURE — 99214 OFFICE O/P EST MOD 30 MIN: CPT | Mod: S$GLB,,, | Performed by: NURSE PRACTITIONER

## 2019-10-18 NOTE — PROGRESS NOTES
Subjective:       Patient ID: Celia Alford is a 49 y.o. female.    Chief Complaint: Work Related Injury    Work related injury follow up right shoulder pain. States she had her MRI performed.     Shoulder Pain    The pain is present in the right shoulder. The current episode started more than 1 month ago. There has been a history of trauma. The problem occurs intermittently. The problem has been gradually improving. The quality of the pain is described as aching and dull. The pain is at a severity of 4/10. The pain is moderate. Associated symptoms include a limited range of motion and stiffness. Pertinent negatives include no fever or headaches. The symptoms are aggravated by activity (palpation). The treatment provided moderate relief.     Review of Systems   Constitution: Negative for chills and fever.   HENT: Negative for sore throat.    Eyes: Negative for blurred vision.   Cardiovascular: Negative for chest pain.   Respiratory: Negative for shortness of breath.    Skin: Negative for rash.   Musculoskeletal: Positive for joint pain and stiffness. Negative for back pain.   Gastrointestinal: Negative for abdominal pain, diarrhea, nausea and vomiting.   Neurological: Negative for headaches.   Psychiatric/Behavioral: The patient is not nervous/anxious.    All other systems reviewed and are negative.      Objective:      Physical Exam   Constitutional: She appears well-developed and well-nourished. She is active.  Non-toxic appearance. She does not appear ill. No distress.   HENT:   Head: Normocephalic and atraumatic.   Right Ear: Hearing and external ear normal.   Left Ear: Hearing and external ear normal.   Nose: Nose normal. No nasal deformity. No epistaxis.   Mouth/Throat: Mucous membranes are normal.   Eyes: Conjunctivae and lids are normal. No scleral icterus.   Neck: Trachea normal, normal range of motion and full passive range of motion without pain. Neck supple. No spinous process tenderness and no muscular  tenderness present. No neck rigidity. Normal range of motion present.   Cardiovascular: Intact distal pulses and normal pulses.   Pulses:       Radial pulses are 2+ on the right side, and 2+ on the left side.   Pulmonary/Chest: Effort normal. No stridor. No respiratory distress.   Musculoskeletal: She exhibits tenderness.        Right shoulder: She exhibits decreased range of motion, tenderness (posterior aspect), pain (Wang, O'Randall negative), spasm and decreased strength. She exhibits no swelling, no deformity, no laceration and normal pulse.        Left shoulder: She exhibits normal range of motion, no tenderness, no swelling, no deformity, no pain (Wang, O'Randall negative), normal pulse and normal strength.        Right knee: Normal. She exhibits normal range of motion, no swelling, no effusion, no ecchymosis, no deformity, no LCL laxity, no bony tenderness, normal meniscus and no MCL laxity. No tenderness found.        Arms:       Right hand: Decreased strength () noted.   ROM improved over last visit   Neurological: She is alert. She has normal strength. She is not disoriented. No sensory deficit. GCS eye subscore is 4. GCS verbal subscore is 5. GCS motor subscore is 6.   Reflex Scores:       Patellar reflexes are 2+ on the right side and 2+ on the left side.  Skin: Skin is warm, dry and intact. Capillary refill takes less than 2 seconds. She is not diaphoretic.   Psychiatric: She has a normal mood and affect. Her speech is normal and behavior is normal. She is attentive.   Nursing note and vitals reviewed.      Assessment:       1. Work related injury    2. Tendonitis of shoulder, right    3. Fall, subsequent encounter        Plan:            Patient Instructions: Attention not to aggravate affected area, Referral to specialist to be scheduled, once authorized, Continue Physical Therapy   Restrictions: (See [place] 17 for restrictions; 10/18/2019)  Follow up in about 2 weeks (around 11/1/2019).

## 2019-10-18 NOTE — LETTER
[facility] [address] [address]  Phone: [phone]  Fax: [phone]  [facility]: [id number]    Pt Name: Celia Alford  Injury Date: 07/18/2019   Employee ID:  Date of First Treatment: 10/18/2019   Company: [employer]      Appointment Time: 12:45 PM Arrived: 1145pm   Provider: Cinthia Maldonado NP Time Out:1215pm     Office Treatment:   1. Work related injury    2. Tendonitis of shoulder, right    3. Fall, subsequent encounter          Patient Instructions: Attention not to aggravate affected area, Referral to specialist to be scheduled, once authorized, Continue Physical Therapy    Restrictions: (See [place] 17 for restrictions; 10/18/2019)     Return Appointment: 11/1/2019 at 11am

## 2019-11-06 ENCOUNTER — OFFICE VISIT (OUTPATIENT)
Dept: ORTHOPEDICS | Facility: CLINIC | Age: 49
End: 2019-11-06
Attending: ORTHOPAEDIC SURGERY
Payer: OTHER GOVERNMENT

## 2019-11-06 VITALS — BODY MASS INDEX: 29.62 KG/M2 | WEIGHT: 200 LBS | HEIGHT: 69 IN

## 2019-11-06 DIAGNOSIS — G89.29 CHRONIC RIGHT SHOULDER PAIN: ICD-10-CM

## 2019-11-06 DIAGNOSIS — M25.511 CHRONIC RIGHT SHOULDER PAIN: ICD-10-CM

## 2019-11-06 PROCEDURE — 20610 PR DRAIN/INJECT LARGE JOINT/BURSA: ICD-10-PCS | Mod: RT,S$GLB,, | Performed by: ORTHOPAEDIC SURGERY

## 2019-11-06 PROCEDURE — 99203 PR OFFICE/OUTPT VISIT, NEW, LEVL III, 30-44 MIN: ICD-10-PCS | Mod: 25,S$GLB,, | Performed by: ORTHOPAEDIC SURGERY

## 2019-11-06 PROCEDURE — 99999 PR PBB SHADOW E&M-EST. PATIENT-LVL III: CPT | Mod: PBBFAC,,, | Performed by: ORTHOPAEDIC SURGERY

## 2019-11-06 PROCEDURE — 20610 DRAIN/INJ JOINT/BURSA W/O US: CPT | Mod: RT,S$GLB,, | Performed by: ORTHOPAEDIC SURGERY

## 2019-11-06 PROCEDURE — 99203 OFFICE O/P NEW LOW 30 MIN: CPT | Mod: 25,S$GLB,, | Performed by: ORTHOPAEDIC SURGERY

## 2019-11-06 PROCEDURE — 99999 PR PBB SHADOW E&M-EST. PATIENT-LVL III: ICD-10-PCS | Mod: PBBFAC,,, | Performed by: ORTHOPAEDIC SURGERY

## 2019-11-06 RX ORDER — ETODOLAC 400 MG/1
400 TABLET, EXTENDED RELEASE ORAL DAILY
Qty: 30 TABLET | Refills: 1 | Status: SHIPPED | OUTPATIENT
Start: 2019-11-06 | End: 2019-12-06

## 2019-11-06 RX ORDER — TRIAMCINOLONE ACETONIDE 40 MG/ML
40 INJECTION, SUSPENSION INTRA-ARTICULAR; INTRAMUSCULAR
Status: COMPLETED | OUTPATIENT
Start: 2019-11-06 | End: 2019-11-06

## 2019-11-06 RX ADMIN — TRIAMCINOLONE ACETONIDE 40 MG: 40 INJECTION, SUSPENSION INTRA-ARTICULAR; INTRAMUSCULAR at 03:11

## 2019-11-06 NOTE — LETTER
November 6, 2019      Cinthia Maldonado, NP  [address]  [place] [address]           [facility] [address] [facility] [address]  [address], [address]  [facility] [address]  Phone: [phone]          Patient: Celia Alford   MR Number: [id number]   YOB: 1970   Date of Visit: 11/6/2019       Dear Cinthia Maldonado:    Thank you for referring Celia Alford to me for evaluation. Attached you will find relevant portions of my assessment and plan of care.    If you have questions, please do not hesitate to call me. I look forward to following Celia Alford along with you.    Sincerely,    Jens Han Jr., MD    Enclosure  CC:  No Recipients    If you would like to receive this communication electronically, please contact [email or web address] or [phone] to request more information on [employer] access.    For providers and/or their staff who would like to refer a patient to [facility], please contact us through our one-stop-shop provider referral line, [facility], at [phone].    If you feel you have received this communication in error or would no longer like to receive these types of communications, please e-mail [email or web address]

## 2019-11-06 NOTE — PROGRESS NOTES
Subjective:      Patient ID: Celia Alford is a 49 y.o. female.    Chief Complaint: Pain of the Right Shoulder      HPI  Celia Alford is a  49 y.o. female presenting today for right shoulder pain.  There was a history of trauma.  Onset of symptoms began about 3 months ago when she fell at work sustained injury to her right shoulder  X-rays at that time negative for fracture  However she has had ongoing pain in the right shoulder  She is currently in therapy but thinks that therapy may be making her symptoms worse  She is currently on light duty work.      Review of patient's allergies indicates:   Allergen Reactions    Pcn [penicillins] Hives and Rash         Current Outpatient Medications   Medication Sig Dispense Refill    acetaminophen (TYLENOL) 650 MG TbSR Take 1 tablet (650 mg total) by mouth every 8 (eight) hours.  0    medroxyPROGESTERone (DEPO-PROVERA) 150 mg/mL injection Inject 150 mg into the muscle every 3 (three) months.        medroxyPROGESTERone (DEPO-PROVERA) 150 mg/mL Syrg INJECT 1 ML (150 MG TOTAL) INTO THE MUSCLE EVERY 3 (THREE) MONTHS. 1 Syringe 2    medroxyPROGESTERone (DEPO-PROVERA) 150 mg/mL Syrg Inject 1 mL (150 mg total) into the muscle every 3 (three) months. 1 mL 3    medroxyPROGESTERone (DEPO-PROVERA) 150 mg/mL Syrg INJECT 1 ML INTO THE MUSCLE EVERY 3 MONTHS 1 Syringe 2    etodolac (LODINE XL) 400 MG 24 hr tablet Take 1 tablet (400 mg total) by mouth once daily. 30 tablet 1    fluticasone propionate (CUTIVATE) 0.05 % cream       mupirocin (BACTROBAN) 2 % ointment Apply to affected area 3 times daily (Patient not taking: Reported on 10/18/2019) 22 g 0    naproxen (NAPROSYN) 500 MG tablet Take 1 tablet (500 mg total) by mouth 2 (two) times daily with meals. (Patient not taking: Reported on 11/6/2019) 30 tablet 0     Current Facility-Administered Medications   Medication Dose Route Frequency Provider Last Rate Last Dose    [COMPLETED] triamcinolone acetonide injection 40 mg  40 mg  "Other 1 time in Clinic/HOD Jens Han Jr., MD   40 mg at 11/06/19 1530       No past medical history on file.    Past Surgical History:   Procedure Laterality Date    TONSILLECTOMY         Review of Systems:  ROS    OBJECTIVE:     PHYSICAL EXAM:  Height: 5' 9" (175.3 cm) Weight: 90.7 kg (200 lb)  Vitals:    11/06/19 1450   Weight: 90.7 kg (200 lb)   Height: 5' 9" (1.753 m)   PainSc:   4     Well developed, well nourished female in no acute distress  Alert and oriented x 3  HEENT- Normal exam  Lungs- Clear to auscultation  Heart- Regular rate and rhythm  Abdomen- Soft nontender  Extremity exam- examination right shoulder no tenderness no swelling  Range of motion shoulder full  Positive impingement sign  Rotator cuff strength intact  Mildly positive supraspinatus stress test  Negative drop-arm test  Neurologic exam intact  No instability right shoulder      RADIOGRAPHS:  MRI exam reviewed right shoulder demonstrates some mild tendinosis of the rotator cuff with impingement right shoulder  Comments: I have personally reviewed the imaging and I agree with the above radiologist's report.    ASSESSMENT/PLAN:     IMPRESSION:  Right shoulder tendinitis/tendinosis rotator cuff    PLAN:  I explained the nature of the problem to the patient. Recommended injection today.  After pause for time-out identified the right shoulder injected with Kenalog 40 mg 2 cc xylocaine sterile technique  Tolerated the procedure well without complication  I have started her on Lodine 400 mg once a day with food  Recommended she continue therapy  Remain light duty work no lifting more than 10 lb  Follow-up 3-4 weeks for recheck       - We talked at length about the anatomy and pathophysiology of   Encounter Diagnosis   Name Primary?    Chronic right shoulder pain            Disclaimer: This note has been generated using voice-recognition software. There may be typographical errors that have been missed during proof-reading.  "

## 2019-12-11 ENCOUNTER — OFFICE VISIT (OUTPATIENT)
Dept: ORTHOPEDICS | Facility: CLINIC | Age: 49
End: 2019-12-11
Attending: ORTHOPAEDIC SURGERY
Payer: OTHER GOVERNMENT

## 2019-12-11 DIAGNOSIS — M25.511 RIGHT SHOULDER PAIN, UNSPECIFIED CHRONICITY: Primary | ICD-10-CM

## 2019-12-11 DIAGNOSIS — M25.511 CHRONIC RIGHT SHOULDER PAIN: ICD-10-CM

## 2019-12-11 DIAGNOSIS — G89.29 CHRONIC RIGHT SHOULDER PAIN: ICD-10-CM

## 2019-12-11 PROCEDURE — 99999 PR PBB SHADOW E&M-EST. PATIENT-LVL III: ICD-10-PCS | Mod: PBBFAC,,, | Performed by: ORTHOPAEDIC SURGERY

## 2019-12-11 PROCEDURE — 99213 OFFICE O/P EST LOW 20 MIN: CPT | Mod: S$GLB,,, | Performed by: ORTHOPAEDIC SURGERY

## 2019-12-11 PROCEDURE — 99999 PR PBB SHADOW E&M-EST. PATIENT-LVL III: CPT | Mod: PBBFAC,,, | Performed by: ORTHOPAEDIC SURGERY

## 2019-12-11 PROCEDURE — 99213 PR OFFICE/OUTPT VISIT, EST, LEVL III, 20-29 MIN: ICD-10-PCS | Mod: S$GLB,,, | Performed by: ORTHOPAEDIC SURGERY

## 2019-12-11 RX ORDER — NABUMETONE 500 MG/1
500 TABLET, FILM COATED ORAL 2 TIMES DAILY WITH MEALS
Qty: 60 TABLET | Refills: 1 | Status: SHIPPED | OUTPATIENT
Start: 2019-12-11 | End: 2020-07-09 | Stop reason: SDUPTHER

## 2019-12-11 NOTE — PROGRESS NOTES
Subjective:      Patient ID: Celia Alford is a 49 y.o. female.  Chief Complaint: Pain of the Right Shoulder      HPI  Celia Alford is a  49 y.o. female presenting today for follow up of right shoulder symptoms.  She reports that she is still having pain in the right shoulder particularly with elevation of the arm and with lifting male at work which she does with the right arm outs stretched and this seems to cause pain she describes as a burning type pain  No numbness or tingling is reported.  She is not currently in therapy  She reports only slight improvement after the injection last visit.    Review of patient's allergies indicates:   Allergen Reactions    Pcn [penicillins] Hives and Rash         Current Outpatient Medications   Medication Sig Dispense Refill    acetaminophen (TYLENOL) 650 MG TbSR Take 1 tablet (650 mg total) by mouth every 8 (eight) hours.  0    fluticasone propionate (CUTIVATE) 0.05 % cream       medroxyPROGESTERone (DEPO-PROVERA) 150 mg/mL injection Inject 150 mg into the muscle every 3 (three) months.        medroxyPROGESTERone (DEPO-PROVERA) 150 mg/mL Syrg INJECT 1 ML (150 MG TOTAL) INTO THE MUSCLE EVERY 3 (THREE) MONTHS. 1 Syringe 2    medroxyPROGESTERone (DEPO-PROVERA) 150 mg/mL Syrg Inject 1 mL (150 mg total) into the muscle every 3 (three) months. 1 mL 3    medroxyPROGESTERone (DEPO-PROVERA) 150 mg/mL Syrg INJECT 1 ML INTO THE MUSCLE EVERY 3 MONTHS 1 Syringe 2    mupirocin (BACTROBAN) 2 % ointment Apply to affected area 3 times daily 22 g 0    naproxen (NAPROSYN) 500 MG tablet Take 1 tablet (500 mg total) by mouth 2 (two) times daily with meals. 30 tablet 0     No current facility-administered medications for this visit.        No past medical history on file.    Past Surgical History:   Procedure Laterality Date    TONSILLECTOMY         OBJECTIVE:   PHYSICAL EXAM:       Vitals:    12/11/19 1416   PainSc:   4     Ortho/SPM Exam  Examination right shoulder some mild tenderness  anteriorly  No bruising no swelling  Full range of motion right shoulder  Mildly positive impingement sign  No instability  Rotator cuff strength intact  Neurologic exam normal    RADIOGRAPHS:  Previous MRI reviewed again shows mild tendinosis of the rotator cuff right shoulder no evidence of rotator cuff tear or labral tear  Comments: I have personally reviewed the imaging and I agree with the above radiologist's report.    ASSESSMENT/PLAN:  Related to rotator cuff tendinosis     IMPRESSION:  Right shoulder pain    PLAN:  I think we should try some physical therapy again so I have ordered that today for the right shoulder for range of motion and strengthening of the rotator cuff  I have placed her on Relafen 500 mg b.i.d. with food  Continue light duty work  No lifting more than 10 lb      FOLLOW UP:  4-6 weeks  If symptoms do not improve then we may need to consider surgical treatment    Disclaimer: This note has been generated using voice-recognition software. There may be typographical errors that have been missed during proof-reading.

## 2020-01-15 ENCOUNTER — TELEPHONE (OUTPATIENT)
Dept: ORTHOPEDICS | Facility: CLINIC | Age: 50
End: 2020-01-15

## 2020-01-15 NOTE — TELEPHONE ENCOUNTER
----- Message from Ave Engel sent at 1/15/2020 12:10 PM CST -----  Contact: Pt  Pt called to speak to the nurse to reschedule her workers comp appt for today at 2:40 pm and would like a call back to r/s and can be reached at [phone]

## 2020-01-15 NOTE — TELEPHONE ENCOUNTER
Spoke with patient. Rescheduled appointment to 1/22 at 3:40 at the [facility] location. Patient is aware of date and time.

## 2020-01-22 ENCOUNTER — OFFICE VISIT (OUTPATIENT)
Dept: ORTHOPEDICS | Facility: CLINIC | Age: 50
End: 2020-01-22
Attending: ORTHOPAEDIC SURGERY
Payer: OTHER GOVERNMENT

## 2020-01-22 DIAGNOSIS — S46.911D STRAIN OF RIGHT SHOULDER, SUBSEQUENT ENCOUNTER: ICD-10-CM

## 2020-01-22 DIAGNOSIS — G89.29 CHRONIC RIGHT SHOULDER PAIN: Primary | ICD-10-CM

## 2020-01-22 DIAGNOSIS — M25.511 CHRONIC RIGHT SHOULDER PAIN: Primary | ICD-10-CM

## 2020-01-22 PROCEDURE — 99213 PR OFFICE/OUTPT VISIT, EST, LEVL III, 20-29 MIN: ICD-10-PCS | Mod: S$GLB,,, | Performed by: ORTHOPAEDIC SURGERY

## 2020-01-22 PROCEDURE — 99999 PR PBB SHADOW E&M-EST. PATIENT-LVL III: CPT | Mod: PBBFAC,,, | Performed by: ORTHOPAEDIC SURGERY

## 2020-01-22 PROCEDURE — 99213 OFFICE O/P EST LOW 20 MIN: CPT | Mod: S$GLB,,, | Performed by: ORTHOPAEDIC SURGERY

## 2020-01-22 PROCEDURE — 99999 PR PBB SHADOW E&M-EST. PATIENT-LVL III: ICD-10-PCS | Mod: PBBFAC,,, | Performed by: ORTHOPAEDIC SURGERY

## 2020-01-22 RX ORDER — NAPROXEN 500 MG/1
500 TABLET ORAL 2 TIMES DAILY WITH MEALS
Qty: 60 TABLET | Refills: 1 | Status: SHIPPED | OUTPATIENT
Start: 2020-01-22 | End: 2020-03-04 | Stop reason: SDUPTHER

## 2020-01-22 NOTE — PROGRESS NOTES
Subjective:      Patient ID: Celia Alford is a 49 y.o. female.  Chief Complaint: Pain of the Right Shoulder      HPI  Celia Alford is a  49 y.o. female presenting today for follow up of right shoulder rotator cuff tendinosis.  She reports that she is still having the same symptoms as before  Last visit I ordered therapy but through some miss communication it was never followed up on but she thinks it is approved with workman's comp.    Review of patient's allergies indicates:   Allergen Reactions    Pcn [penicillins] Hives and Rash         Current Outpatient Medications   Medication Sig Dispense Refill    acetaminophen (TYLENOL) 650 MG TbSR Take 1 tablet (650 mg total) by mouth every 8 (eight) hours.  0    fluticasone propionate (CUTIVATE) 0.05 % cream       medroxyPROGESTERone (DEPO-PROVERA) 150 mg/mL injection Inject 150 mg into the muscle every 3 (three) months.        medroxyPROGESTERone (DEPO-PROVERA) 150 mg/mL Syrg INJECT 1 ML (150 MG TOTAL) INTO THE MUSCLE EVERY 3 (THREE) MONTHS. 1 Syringe 2    medroxyPROGESTERone (DEPO-PROVERA) 150 mg/mL Syrg Inject 1 mL (150 mg total) into the muscle every 3 (three) months. 1 mL 3    medroxyPROGESTERone (DEPO-PROVERA) 150 mg/mL Syrg INJECT 1 ML INTO THE MUSCLE EVERY 3 MONTHS 1 Syringe 2    mupirocin (BACTROBAN) 2 % ointment Apply to affected area 3 times daily 22 g 0    nabumetone (RELAFEN) 500 MG tablet Take 1 tablet (500 mg total) by mouth 2 (two) times daily with meals. 60 tablet 1    naproxen (NAPROSYN) 500 MG tablet Take 1 tablet (500 mg total) by mouth 2 (two) times daily with meals. 30 tablet 0     No current facility-administered medications for this visit.        No past medical history on file.    Past Surgical History:   Procedure Laterality Date    TONSILLECTOMY         OBJECTIVE:   PHYSICAL EXAM:       Vitals:    01/22/20 1540   PainSc:   3     Ortho/SPM Exam  She is currently on light duty work examination right shoulder there is no tenderness no  swelling  Range of motion full  Positive impingement sign  Positive supraspinatus stress test  No instability  Neurologic exam normal    RADIOGRAPHS:  Previous MRI demonstrates rotator cuff tendinosis  Comments: I have personally reviewed the imaging and I agree with the above radiologist's report.    ASSESSMENT/PLAN:     IMPRESSION:  Right shoulder impingement tendinosis    PLAN:  Since the therapy is ordered I would like her to go ahead and follow-up with therapy for rotator cuff strengthening right shoulder  However I explained to the patient that if symptoms fail to improve we may need to go ahead and consider surgery for right shoulder arthroscopy and decompression  In the meantime continue anti-inflammatory medication by mouth (Naprosyn)  And also continue light duty work no lifting more than 20 lb    FOLLOW UP:  4-6 weeks    Disclaimer: This note has been generated using voice-recognition software. There may be typographical errors that have been missed during proof-reading.

## 2020-01-28 RX ORDER — ETODOLAC 400 MG/1
TABLET, EXTENDED RELEASE ORAL
Qty: 30 TABLET | Refills: 1 | Status: SHIPPED | OUTPATIENT
Start: 2020-01-28 | End: 2020-07-09 | Stop reason: SDUPTHER

## 2020-02-17 ENCOUNTER — CLINICAL SUPPORT (OUTPATIENT)
Dept: REHABILITATION | Facility: HOSPITAL | Age: 50
End: 2020-02-17
Attending: ORTHOPAEDIC SURGERY
Payer: OTHER GOVERNMENT

## 2020-02-17 DIAGNOSIS — S46.911D STRAIN OF RIGHT SHOULDER, SUBSEQUENT ENCOUNTER: ICD-10-CM

## 2020-02-17 PROCEDURE — 97010 HOT OR COLD PACKS THERAPY: CPT | Mod: PO

## 2020-02-17 PROCEDURE — 97110 THERAPEUTIC EXERCISES: CPT | Mod: PO

## 2020-02-17 PROCEDURE — 97161 PT EVAL LOW COMPLEX 20 MIN: CPT | Mod: PO

## 2020-02-17 PROCEDURE — 97140 MANUAL THERAPY 1/> REGIONS: CPT | Mod: PO,59

## 2020-02-17 NOTE — PROGRESS NOTES
[facility]  Physical Therapy Initial Evaluation    Name: Celia Alford  Clinic Number: [id number]    Therapy Diagnosis: R shoulder pain, shoulder dyskinesia, decreased shoulder ROM  Physician: Jens Han Jr., *    Physician Orders: PT Eval and Treat   Medical Diagnosis from Referral: S46.911D (ICD-10-CM) - Strain of right shoulder, subsequent encounter  Evaluation Date: 2/17/2020;  Authorization Period Expiration: 12/31/20  Plan of Care Expiration: 4/30/20  Visit # / Visits authorized: 1/ 20    Time In: 807 am  Time Out: 900 am  Total Billable Time: 53  Minutes :  Eval, MT, TE, MHP x 10 min  Precautions: Standard    Subjective   Date of onset: 7 months ago with fall at work around July 2019  History of current condition - Celia reports:   Per MD report: 11/6/2019 with Dr. Han: Celia Alford is a  49 y.o. female presenting today for right shoulder pain.  There was a history of trauma.  Onset of symptoms began about 3 months ago,in July 2019 when she fell at work sustained injury to her right shoulder  X-rays at that time negative for fracture  However she has had ongoing pain in the right shoulder  She is currently in therapy but thinks that therapy may be making her symptoms worse due to overly aggressive stretch, and use of dry needling was not effective which has been discontinued and steroid injection towards the end of the year has made minimal improvement  She is currently on light duty work.      Medical History:   No past medical history on file.    Surgical History:   Celia Alford  has a past surgical history that includes Tonsillectomy.    Medications:   Celia has a current medication list which includes the following prescription(s): acetaminophen, etodolac, fluticasone propionate, medroxyprogesterone, medroxyprogesterone, medroxyprogesterone, medroxyprogesterone, mupirocin, nabumetone, and naproxen.    Allergies:   Review of patient's allergies indicates:   Allergen  Reactions    Pcn [penicillins] Hives and Rash        Imaging, :   Reading Physician Reading Date Result Priority   Miguel Mcginnis MD 7/19/2019       Narrative     EXAMINATION:  XR SHOULDER TRAUMA 3 VIEW LEFT    TECHNIQUE:  Three views of the left shoulder were obtained, with AP, lateral, and axillary projections submitted.    COMPARISON:  No relevant comparison examinations are currently available.  Information obtained from the electronic medical record indicates a history of trauma on 07/18/2019.    FINDINGS:  Visualized osseous structures appear intact, with no definite evidence of recent fracture or other significant abnormality identified.  No glenohumeral dislocation.      Impression       As above      Electronically signed by: Miguel Mcginnis MD  Date: 07/19/2019  Time: 12:36            Prior Therapy: yes  Social History:  lives alone  Occupation:   Prior Level of Function: community level ambulation no restriction at work  Current Level of Function: community level ambulation on light duty due to shoulder injury    Pain:  Current 3/10, worst 6/10, best 2/10   Location: right shoulder   Description: Aching, Burning and Tight  Aggravating Factors: use of R arm especially overhead and reaching across the body  Easing Factors: relaxation, pain medication and heating pad    Pts goals: pt to be pain free for most of the day work with equal use of B arms and not overuse of left arm    Objective     AROM/PROM    Cervical  WFL in flex and ext and bilateral rotation but painful on R side with right side rotation.    Shoulder pain at end ROM on right  Flex R 165/170 L WNL  Ext R WFL  L WNL  IR R WFL  L WNL  ER R 60/65 L WNL  ABD  R 150/170 L WNL      Strength at shoulder    Flex R 3-/5  L  5/5  Ext R 4/5  L 5/5  IR R 4/5  L 5/5  ER R 4-/5  L 5/5  ABD     R 3-/5  L 5/5    Alar ligament intact  Empty can positive R  Speeds positive R  Cervical compression NP    Palpation and joint play R sided unless designated  B/bilateral, rhomboids, levator, teres minor, suboccipitals B, scalenes, B, SCM B, supraspinatus tendon, long head biceps tendon, medial and long head triceps, forearm flexor,       CMS Impairment/Limitation/Restriction for FOTO shoulder Survey    Therapist reviewed FOTO scores for Celia Alford on 2/17/2020.   FOTO documents entered into [employer] - see Media section.    Limitation Score: 43%  Category: carrying and occupational functional activities    Current : CK = at least 40% but < 60% impaired, limited or restricted  Goal: CJ = at least 20% but < 40% impaired, limited or restricted  Discharge:          TREATMENT   Treatment Time In: 820 am  Treatment Time Out: 900 am  Total Treatment time separate from Evaluation: 40 minutes    Celia received therapeutic exercises to develop strength, endurance and ROM for 8 minutes including: cervical 1 set and shoulder exercises demo only as noted.    Cervical  AROM slow and controlled  Flex and extension 1-3 x 20 reps pause for 1 seconds at end ROM   Rotation left to right 1-3 x 20 reps pause for 1 second at end ROM  Chin tucks 1-3 x 20 reps pause for 1 second at end ROM    Shoulder all start with performing and holding shoulder retraction  Shoulder retraction 20x hold 1 sec  Shoulder extension with YTB 3 x10 reps demo only  Shoulder rows with Otb 3 x10  Reps demo only  Bilateral ER with OTB 3 x10 reps demo only     Celia received the following manual therapy techniques: Manual traction, Myofacial release and Soft tissue Mobilization were applied to the: Levator, supraspinatus, teres minor, scalenes, SCM, suboccipital for 17 minutes, including:  Cervical traction grade II  GH distraction and caudal traction grade II    Celia received hot pack for 10 minutes to cervical and R shoulder.      Home Exercises and Patient Education Provided    Education provided:   - HEP, pain management and sleeping position    Written Home Exercises Provided: Patient instructed to cont  prior HEP.  Exercises were reviewed and Celia was able to demonstrate them prior to the end of the session.  Celia demonstrated good  understanding of the education provided.     See EMR under Patient Instructions for exercises provided 2/17/2020 and pt reported independence with exercises from prior PT sessions a few months ago.    Assessment   Celia is a 49 y.o. female referred to outpatient Physical Therapy with a medical diagnosis of R shoulder strain. Pt presents with R shoulder guarding and impaired mechanics and dysfunction since fall 7 months ago. Pt with good overall AROM but pain at end ROM and decreased overall endurance and strength due to pain.  Pt with prior attempts with steroid injection and rehab services to include dry needling and cupping which has not been successful or yielded relief. Pt with decreased guarding after session especially with levator, rhomboids, and teres minor.    Pt prognosis is Good.   Pt will benefit from skilled outpatient Physical Therapy to address the deficits stated above and in the chart below, provide pt/family education, and to maximize pt's level of independence.     Plan of care discussed with patient: Yes  Pt's spiritual, cultural and educational needs considered and patient is agreeable to the plan of care and goals as stated below:     Anticipated Barriers for therapy: chronic nature of injury at this point and occupational duties.    Medical Necessity is demonstrated by the following  History  Co-morbidities and personal factors that may impact the plan of care Co-morbidities:   young age    Personal Factors:   none     low   Examination  Body Structures and Functions, activity limitations and participation restrictions that may impact the plan of care Body Regions:   neck  upper extremities    Body Systems:    ROM  strength  gross coordinated movement    Participation Restrictions:   none    Activity limitations:   Learning and applying knowledge  no  deficits    General Tasks and Commands  no deficits    Communication  no deficits    Mobility  lifting and carrying objects  fine hand use (grasping/picking up)  driving (bike, car, motorcycle)    Self care  washing oneself (bathing, drying, washing hands)  dressing    Domestic Life  shopping  cooking  doing house work (cleaning house, washing dishes, laundry)    Interactions/Relationships  no deficits    Life Areas  no deficits    Community and Social Life  recreation and leisure         moderate   Clinical Presentation stable and uncomplicated moderate   Decision Making/ Complexity Score: moderate     Goals:    Short Term Goals: 3 weeks   1.  Pt to be independent with HEP   2.  Pt to decrease pain after session to 2/10  3.  Pt to increase AROM at R shoulder to 170 without pain  4.  Pt to increase activity tolerance to 45 min of shopping or housework without increased pain for increased functional activity      Long Term Goals: 10 weeks   1.  Pt to be independent with sleeping position and verbalize 2 pain management strategies.  2.  Pt to decrease pain  to 2/10 over 50% of the work day with light duty to include overhead activities.  3.  Pt to increase AROM at R shoulder abduction  to 165 without pain  4.  Pt to increase activity tolerance to 4 hrs continuous work without increased pain to start the day.  5.  Pt to decrease limitation score to less than 25% for increased functional mobility and improved pain.  6.  Pt to perform ADLs dressing and self care in the morning without compensation and no increased pain for increased functional activities.    Plan   Plan of care Certification: 2/17/2020 to 4/31/20.    Outpatient Physical Therapy 2 times weekly for 10 weeks to include the following interventions: Cervical/Lumbar Traction, Electrical Stimulation, kinesiotape, Manual Therapy, Moist Heat/ Ice, Patient Education and Therapeutic Exercise.     Darya Chou, PT

## 2020-03-04 ENCOUNTER — OFFICE VISIT (OUTPATIENT)
Dept: ORTHOPEDICS | Facility: CLINIC | Age: 50
End: 2020-03-04
Attending: ORTHOPAEDIC SURGERY
Payer: OTHER GOVERNMENT

## 2020-03-04 VITALS — WEIGHT: 200 LBS | HEIGHT: 69 IN | BODY MASS INDEX: 29.62 KG/M2

## 2020-03-04 DIAGNOSIS — S46.911D STRAIN OF RIGHT SHOULDER, SUBSEQUENT ENCOUNTER: ICD-10-CM

## 2020-03-04 DIAGNOSIS — G89.29 CHRONIC RIGHT SHOULDER PAIN: Primary | ICD-10-CM

## 2020-03-04 DIAGNOSIS — M25.511 CHRONIC RIGHT SHOULDER PAIN: Primary | ICD-10-CM

## 2020-03-04 PROCEDURE — 99213 PR OFFICE/OUTPT VISIT, EST, LEVL III, 20-29 MIN: ICD-10-PCS | Mod: S$GLB,,, | Performed by: ORTHOPAEDIC SURGERY

## 2020-03-04 PROCEDURE — 99213 OFFICE O/P EST LOW 20 MIN: CPT | Mod: S$GLB,,, | Performed by: ORTHOPAEDIC SURGERY

## 2020-03-04 PROCEDURE — 99999 PR PBB SHADOW E&M-EST. PATIENT-LVL III: CPT | Mod: PBBFAC,,, | Performed by: ORTHOPAEDIC SURGERY

## 2020-03-04 PROCEDURE — 99999 PR PBB SHADOW E&M-EST. PATIENT-LVL III: ICD-10-PCS | Mod: PBBFAC,,, | Performed by: ORTHOPAEDIC SURGERY

## 2020-03-04 RX ORDER — TIZANIDINE 4 MG/1
4 TABLET ORAL 2 TIMES DAILY PRN
Qty: 60 TABLET | Refills: 1 | Status: SHIPPED | OUTPATIENT
Start: 2020-03-04 | End: 2020-03-29

## 2020-03-04 RX ORDER — NAPROXEN 500 MG/1
500 TABLET ORAL 2 TIMES DAILY WITH MEALS
Qty: 60 TABLET | Refills: 1 | Status: SHIPPED | OUTPATIENT
Start: 2020-03-04 | End: 2021-01-11 | Stop reason: SDUPTHER

## 2020-03-04 NOTE — PROGRESS NOTES
Subjective:      Patient ID: Celia Alford is a 49 y.o. female.  Chief Complaint: Follow-up and Pain of the Right Shoulder      HPI  Celia Alford is a  49 y.o. female presenting today for follow up of right shoulder impingement and pain.  She reports that she is still having pain in the right shoulder  She is currently in therapy but has only had 1 or 2 visits  She is currently on light duty work.    Review of patient's allergies indicates:   Allergen Reactions    Pcn [penicillins] Hives and Rash         Current Outpatient Medications   Medication Sig Dispense Refill    acetaminophen (TYLENOL) 650 MG TbSR Take 1 tablet (650 mg total) by mouth every 8 (eight) hours.  0    medroxyPROGESTERone (DEPO-PROVERA) 150 mg/mL injection Inject 150 mg into the muscle every 3 (three) months.        medroxyPROGESTERone (DEPO-PROVERA) 150 mg/mL Syrg INJECT 1 ML (150 MG TOTAL) INTO THE MUSCLE EVERY 3 (THREE) MONTHS. 1 Syringe 2    medroxyPROGESTERone (DEPO-PROVERA) 150 mg/mL Syrg Inject 1 mL (150 mg total) into the muscle every 3 (three) months. 1 mL 3    medroxyPROGESTERone (DEPO-PROVERA) 150 mg/mL Syrg INJECT 1 ML INTO THE MUSCLE EVERY 3 MONTHS 1 Syringe 2    naproxen (NAPROSYN) 500 MG tablet Take 1 tablet (500 mg total) by mouth 2 (two) times daily with meals. 60 tablet 1    etodolac (LODINE XL) 400 MG 24 hr tablet TAKE 1 TABLET BY MOUTH ONCE DAILY (Patient not taking: Reported on 3/4/2020) 30 tablet 1    fluticasone propionate (CUTIVATE) 0.05 % cream       mupirocin (BACTROBAN) 2 % ointment Apply to affected area 3 times daily (Patient not taking: Reported on 3/4/2020) 22 g 0    nabumetone (RELAFEN) 500 MG tablet Take 1 tablet (500 mg total) by mouth 2 (two) times daily with meals. (Patient not taking: Reported on 3/4/2020) 60 tablet 1     No current facility-administered medications for this visit.        No past medical history on file.    Past Surgical History:   Procedure Laterality Date    TONSILLECTOMY    "      OBJECTIVE:   PHYSICAL EXAM:  Height: 5' 9" (175.3 cm) Weight: 90.7 kg (200 lb)  Vitals:    03/04/20 1500   Weight: 90.7 kg (200 lb)   Height: 5' 9" (1.753 m)   PainSc:   4     Ortho/SPM Exam  Examination right shoulder no tenderness no swelling  Range of motion slightly decreased secondary to pain  Positive impingement sign  No instability  Rotator cuff strength intact  Neurologic exam normal    RAD no tenderness no swelling   IOGRAPHS:  None  Comments: I have personally reviewed the imaging and I agree with the above radiologist's report.    ASSESSMENT/PLAN:     IMPRESSION:  Right shoulder impingement tendinitis    PLAN:  Continue therapy she is having some muscle spasms so I have started her on Zanaflex twice a day with food continue Naprosyn refilled today  Continue light duty work no lifting more than 10 lb    FOLLOW UP:  3-4 weeks    Disclaimer: This note has been generated using voice-recognition software. There may be typographical errors that have been missed during proof-reading.  "

## 2020-03-09 ENCOUNTER — TELEPHONE (OUTPATIENT)
Dept: ORTHOPEDICS | Facility: CLINIC | Age: 50
End: 2020-03-09

## 2020-03-09 ENCOUNTER — CLINICAL SUPPORT (OUTPATIENT)
Dept: REHABILITATION | Facility: HOSPITAL | Age: 50
End: 2020-03-09
Attending: ORTHOPAEDIC SURGERY
Payer: OTHER GOVERNMENT

## 2020-03-09 DIAGNOSIS — M25.9 SHOULDER JOINT DYSFUNCTION: ICD-10-CM

## 2020-03-09 DIAGNOSIS — M25.511 CHRONIC RIGHT SHOULDER PAIN: ICD-10-CM

## 2020-03-09 DIAGNOSIS — M25.611 DECREASED RANGE OF MOTION OF RIGHT SHOULDER: ICD-10-CM

## 2020-03-09 DIAGNOSIS — G89.29 CHRONIC RIGHT SHOULDER PAIN: ICD-10-CM

## 2020-03-09 PROCEDURE — 97140 MANUAL THERAPY 1/> REGIONS: CPT | Mod: PO

## 2020-03-09 PROCEDURE — 97010 HOT OR COLD PACKS THERAPY: CPT | Mod: PO

## 2020-03-09 PROCEDURE — 97110 THERAPEUTIC EXERCISES: CPT | Mod: PO

## 2020-03-09 NOTE — TELEPHONE ENCOUNTER
----- Message from Aida Martinez sent at 3/9/2020  8:25 AM CDT -----  Contact: Celia [phone]  Type: Patient Call Back    Who called:Celia     What is the request in detail: The patient is requesting a call back from the staff. Its in regards to form that she needs filled out.    Can the clinic reply by [facility]?no    Would the patient rather a call back or a response via [facility]? Call back     Best call back number:[phone]

## 2020-03-09 NOTE — TELEPHONE ENCOUNTER
----- Message from David Bueno MA sent at 3/9/2020 10:47 AM CDT -----  Contact: PAT VANG      ----- Message -----  From: Gwendolyn Ghosh  Sent: 3/9/2020  10:40 AM CDT  To: [facility]AY Staff    Name of Who is Calling: PAT VANG      What is the request in detail: Would like to speak to staff in regards to needing a form filled out and needing to make a sooner appointment. Please advise.       Can the clinic reply by [facility]: No      What Number to [facility] Back [facility]: [phone]

## 2020-03-09 NOTE — PROGRESS NOTES
Physical Therapy Daily Treatment Note     Name: Celia Alford  Clinic Number: [id number]    Therapy Diagnosis: R shoulder pain, shoulder dyskinesia, decreased shoulder ROM  Physician: Jens Han Jr., *    Visit Date: 3/9/2020    Physician Orders: PT Eval and Treat   Medical Diagnosis from Referral: S46.911D (ICD-10-CM) - Strain of right shoulder, subsequent encounter  Evaluation Date: 2/17/2020;  Authorization Period Expiration: 12/31/20  Plan of Care Expiration: 4/30/20  Visit # / Visits authorized: 2/ 20    Time In: 340 pm  Time Out: 440 pm  Total Billable Time: 60 minutes    TE 2, MT1, MHP x 15 min    Precautions: Standard    Subjective     Pt reports: increased pain due to workload and stress.  She was partially compliant with home exercise program.  Response to previous treatment: none at this time just temporary relief  Functional change: none at this time    Pain: 6/10 pre-session and  0/10 post session at neck and 4/10 at R shoulder only.  Location: bilateral neck R>L and shoulder      Objective     Celia received therapeutic exercises to develop strength, endurance, ROM and posture for 30 minutes including:  Cervical  AROM slow and controlled  Flex and extension 3 x 10 reps pause for 1 seconds at end ROM   Rotation left to right 3 x 10 reps pause for 1 second at end ROM  Chin tucks 1 x 20 reps pause for 1 second at end ROM     Shoulder all start with performing and holding shoulder retraction  Shoulder retraction 20x hold 1 sec  Shoulder extension with YTB 3 x10 reps   Shoulder rows with Otb 3 x10  Reps  Bilateral ER with OTB 3 x10 reps NP    Wall stretch reach up and just across midline for thoracic, teres minor, latissimus.  3 x 30 to each side.  Open book in sidelying or standing with 3 x 10 reps and 30 sec stretch after each set in open book finishing position NP    Celia received the following manual therapy techniques: Manual traction, Myofacial release and Soft tissue Mobilization were  applied to the: Levator, supraspinatus, teres minor, scalenes, SCM, suboccipital for 15 minutes, including:  GH and cervical traction        Celia received hot pack for 15 minutes to R shoulder and cervical.          Home Exercises Provided and Patient Education Provided     Education provided:   - HEP, pain management, sleep positioning.    Written Home Exercises Provided: Patient instructed to cont prior HEP.  Exercises were reviewed and Celia was able to demonstrate them prior to the end of the session.  Celia demonstrated good  understanding of the education provided.     See EMR under Pt instructions for exercises provided prior visit.    Assessment     Pt is limited progress due to decreased compliance with cervical AROM and increased stress and workload at work. Pt with increased scapular tightness and especially levator and rhomboids but hopefully with more consistency with sessions and compliance will make progress after +17 day break from the date of eval and treat.  Pt with mild reduction in pain at R shoulder and no pain at cervical after session.    Celia is progressing well towards her goals.   Pt prognosis is Good.     Pt will continue to benefit from skilled outpatient physical therapy to address the deficits listed in the problem list box on initial evaluation, provide pt/family education and to maximize pt's level of independence in the home and community environment.     Pt's spiritual, cultural and educational needs considered and pt agreeable to plan of care and goals.     Anticipated barriers to physical therapy: none    Goals:   Short Term Goals: 3 weeks   1.  Pt to be independent with HEP. Progressing 3/9/20  2.  Pt to decrease pain after session to 2/10. Progressing 3/9/20  3.  Pt to increase AROM at R shoulder to 170 without pain  4.  Pt to increase activity tolerance to 45 min of shopping or housework without increased pain for increased functional activity        Long Term  Goals: 10 weeks   1.  Pt to be independent with sleeping position and verbalize 2 pain management strategies.  2.  Pt to decrease pain  to 2/10 over 50% of the work day with light duty to include overhead activities.  3.  Pt to increase AROM at R shoulder abduction  to 165 without pain  4.  Pt to increase activity tolerance to 4 hrs continuous work without increased pain to start the day.  5.  Pt to decrease limitation score to less than 25% for increased functional mobility and improved pain.  6.  Pt to perform ADLs dressing and self care in the morning without compensation and no increased pain for increased functional activities.    Plan     Cont with POC    Darya Chou, PT

## 2020-03-11 ENCOUNTER — CLINICAL SUPPORT (OUTPATIENT)
Dept: REHABILITATION | Facility: HOSPITAL | Age: 50
End: 2020-03-11
Attending: ORTHOPAEDIC SURGERY
Payer: OTHER GOVERNMENT

## 2020-03-11 DIAGNOSIS — M25.611 DECREASED RANGE OF MOTION OF RIGHT SHOULDER: ICD-10-CM

## 2020-03-11 DIAGNOSIS — M25.9 SHOULDER JOINT DYSFUNCTION: ICD-10-CM

## 2020-03-11 PROCEDURE — 97010 HOT OR COLD PACKS THERAPY: CPT | Mod: PO

## 2020-03-11 PROCEDURE — 97110 THERAPEUTIC EXERCISES: CPT | Mod: PO

## 2020-03-11 PROCEDURE — 97140 MANUAL THERAPY 1/> REGIONS: CPT | Mod: PO

## 2020-03-11 NOTE — PROGRESS NOTES
Physical Therapy Daily Treatment Note     Name: Celia Alford  Clinic Number: [id number]    Therapy Diagnosis: R shoulder pain, shoulder dyskinesia, decreased shoulder ROM  Physician: Jens Han Jr., *    Visit Date: 3/11/2020    Physician Orders: PT Eval and Treat   Medical Diagnosis from Referral: S46.911D (ICD-10-CM) - Strain of right shoulder, subsequent encounter  Evaluation Date: 2/17/2020;  Authorization Period Expiration: 12/31/20  Plan of Care Expiration: 4/30/20  Visit # / Visits authorized: 2/ 20    Time In: 110 pm  Time Out: 200 pm  Total Billable Time: 40 minutes    TE 1, MT1, MHP x 15 min    Precautions: Standard    Subjective     Pt reports: had good day after session but did not take pain meds prior to sleep and woke up with pain in 4 hrs into sleep.  She was partially compliant with home exercise program.  Response to previous treatment: none at this time just temporary relief  Functional change: none at this time    Pain: 0/10 pre-session and  0/10 post session at neck and 5/10 at R shoulder and /10 after session  Location: bilateral neck R>L and shoulder      Objective     Celia received therapeutic exercises to develop strength, endurance, ROM and posture for 20 minutes including:  Cervical only 1 set today  AROM slow and controlled  Flex and extension 3 x 10 reps pause for 1 seconds at end ROM   Rotation left to right 3 x 10 reps pause for 1 second at end ROM  Chin tucks 1 x 20 reps pause for 1 second at end ROM     Shoulder all start with performing and holding shoulder retraction  Shoulder retraction 20x hold 1 sec  Shoulder extension with YTB 3 x10 reps   Shoulder rows with Otb 3 x10  Reps  Bilateral ER with OTB 3 x10 reps NP    Wall stretch reach up and just across midline for thoracic, teres minor, latissimus.  3 x 30 to each side.  Open book in sidelying or standing with 3 x 10 reps and 30 sec stretch after each set in open book finishing position ALICIA    Celia received the  following manual therapy techniques: Manual traction, Myofacial release and Soft tissue Mobilization were applied to the: Levator, supraspinatus, teres minor, scalenes, SCM, suboccipital for 20 minutes, including:  GH and cervical traction        Celia received hot pack for 15 minutes to R shoulder and cervical.          Home Exercises Provided and Patient Education Provided     Education provided:   - HEP, pain management, sleep positioning.    Written Home Exercises Provided: Patient instructed to cont prior HEP.  Exercises were reviewed and Celia was able to demonstrate them prior to the end of the session.  Celia demonstrated fair/poor understanding of the education provided especially with pain management.     See EMR under Pt instructions for exercises provided prior visit.    Assessment     Pt is limited progress due to decreased compliance with cervical AROM and educated on need to relax during session as we do manual with no use of the phone during session especially when we are trying to shut down guarding but was non compliant twice after education.  PT asked if she was awaiting an important call or any emergent issues which she responded no.  Session limited due to late arrival and decreased compliance. Pt with improved form and mechanics on exercise but needed cues to do rest breaks for 30 secs for recovery.    Celia is progressing well towards her goals.   Pt prognosis is Good.     Pt will continue to benefit from skilled outpatient physical therapy to address the deficits listed in the problem list box on initial evaluation, provide pt/family education and to maximize pt's level of independence in the home and community environment.     Pt's spiritual, cultural and educational needs considered and pt agreeable to plan of care and goals.     Anticipated barriers to physical therapy: compliance, insight into POC    Goals:   Short Term Goals: 3 weeks   1.  Pt to be independent with HEP.  Progressing 3/11/20  2.  Pt to decrease pain after session to 2/10. Progressing 3/11/20  3.  Pt to increase AROM at R shoulder to 170 without pain  4.  Pt to increase activity tolerance to 45 min of shopping or housework without increased pain for increased functional activity        Long Term Goals: 10 weeks   1.  Pt to be independent with sleeping position and verbalize 2 pain management strategies.  2.  Pt to decrease pain  to 2/10 over 50% of the work day with light duty to include overhead activities.  3.  Pt to increase AROM at R shoulder abduction  to 165 without pain  4.  Pt to increase activity tolerance to 4 hrs continuous work without increased pain to start the day.  5.  Pt to decrease limitation score to less than 25% for increased functional mobility and improved pain.  6.  Pt to perform ADLs dressing and self care in the morning without compensation and no increased pain for increased functional activities.    Plan     Cont with JAVIER Chou, PT

## 2020-03-24 RX ORDER — MEDROXYPROGESTERONE ACETATE 150 MG/ML
INJECTION, SUSPENSION INTRAMUSCULAR
Qty: 1 SYRINGE | Refills: 2 | OUTPATIENT
Start: 2020-03-24

## 2020-03-25 ENCOUNTER — TELEPHONE (OUTPATIENT)
Dept: REHABILITATION | Facility: HOSPITAL | Age: 50
End: 2020-03-25

## 2020-03-25 NOTE — TELEPHONE ENCOUNTER
Patient: Celia Alfodr  Date: 3/25/2020  MRN: [id number]    Left VM with patient due to therapy following updates regarding COVID-19 closely and taking every precaution to ensure the safety of our patients, staff and community.  In an abundance of caution and in an effort to help reduce risk and limit community spread, we have decided to temporarily postpone appointments for patients who may be at increased risk to attend in-person therapy or where therapy is not critically needed at this time. Patient is instructed to contact clinic with questions regarding HEP/POC/therapy as needed.     Stephanie Field, PT, DPT  3/25/2020

## 2020-03-26 ENCOUNTER — TELEPHONE (OUTPATIENT)
Dept: ORTHOPEDICS | Facility: CLINIC | Age: 50
End: 2020-03-26

## 2020-03-29 RX ORDER — TIZANIDINE 4 MG/1
4 TABLET ORAL 2 TIMES DAILY PRN
Qty: 60 TABLET | Refills: 1 | Status: SHIPPED | OUTPATIENT
Start: 2020-03-29 | End: 2020-05-04 | Stop reason: SDUPTHER

## 2020-04-02 ENCOUNTER — TELEPHONE (OUTPATIENT)
Dept: OBSTETRICS AND GYNECOLOGY | Facility: CLINIC | Age: 50
End: 2020-04-02

## 2020-04-02 ENCOUNTER — PATIENT MESSAGE (OUTPATIENT)
Dept: OBSTETRICS AND GYNECOLOGY | Facility: CLINIC | Age: 50
End: 2020-04-02

## 2020-04-02 NOTE — TELEPHONE ENCOUNTER
Appointment Request From: Celia Alford      With Provider: Alex Kincaid MD [[facility] - [facility]]      Preferred Date Range: Any date 5/6/2020 or later      Preferred Times: Wednesday Afternoon      Reason for visit: Annual check up      Comments:   Annual checkup to receive Depo!

## 2020-04-03 ENCOUNTER — TELEPHONE (OUTPATIENT)
Dept: ORTHOPEDICS | Facility: CLINIC | Age: 50
End: 2020-04-03

## 2020-04-03 ENCOUNTER — TELEPHONE (OUTPATIENT)
Dept: REHABILITATION | Facility: HOSPITAL | Age: 50
End: 2020-04-03

## 2020-04-03 NOTE — TELEPHONE ENCOUNTER
----- Message from Vianca Alexis sent at 4/3/2020  8:59 AM CDT -----  Contact: Self [phone]  Patient Returning Your Phone Call please fax paper work to [phone] patient states is the light duty form.

## 2020-04-03 NOTE — TELEPHONE ENCOUNTER
Attempted to contact patient today for weekly follow up.  Unable to leave voice mail as per recording, the mail box is full.  Will follow up next week.    Stephanie Field, PT, DPT  4/3/2020

## 2020-04-03 NOTE — TELEPHONE ENCOUNTER
----- Message from Janeth August sent at 4/3/2020  8:25 AM CDT -----  Contact: [phone]/self   Patient calling to speak with you about her paperwork   Please call back to assist at [phone]

## 2020-04-09 ENCOUNTER — TELEPHONE (OUTPATIENT)
Dept: REHABILITATION | Facility: HOSPITAL | Age: 50
End: 2020-04-09

## 2020-04-09 NOTE — TELEPHONE ENCOUNTER
Called patient for weekly follow up and to see if patient is interested in virtual visits.  No answer and unable to LVM as patient mailbox is full.     Stephanie Field, PT, DPT  4/9/2020

## 2020-04-11 ENCOUNTER — PATIENT MESSAGE (OUTPATIENT)
Dept: ORTHOPEDICS | Facility: CLINIC | Age: 50
End: 2020-04-11

## 2020-04-13 ENCOUNTER — PATIENT MESSAGE (OUTPATIENT)
Dept: ORTHOPEDICS | Facility: CLINIC | Age: 50
End: 2020-04-13

## 2020-04-14 ENCOUNTER — TELEPHONE (OUTPATIENT)
Dept: REHABILITATION | Facility: HOSPITAL | Age: 50
End: 2020-04-14

## 2020-04-14 NOTE — TELEPHONE ENCOUNTER
Called patient and LVM for weekly follow up and inform patient of virtual visits. Patient was asked to contact clinic with questions/concerns related to HEP/POC and/or to make virtual visit appointment.  Clinic phone number was provided.     Stephanie Field, PT, DPT  4/14/2020

## 2020-04-21 ENCOUNTER — PATIENT MESSAGE (OUTPATIENT)
Dept: ORTHOPEDICS | Facility: CLINIC | Age: 50
End: 2020-04-21

## 2020-04-21 ENCOUNTER — OFFICE VISIT (OUTPATIENT)
Dept: ORTHOPEDICS | Facility: CLINIC | Age: 50
End: 2020-04-21
Payer: OTHER GOVERNMENT

## 2020-04-21 DIAGNOSIS — G89.29 CHRONIC RIGHT SHOULDER PAIN: Primary | ICD-10-CM

## 2020-04-21 DIAGNOSIS — M25.511 CHRONIC RIGHT SHOULDER PAIN: Primary | ICD-10-CM

## 2020-04-21 PROCEDURE — 99213 PR OFFICE/OUTPT VISIT, EST, LEVL III, 20-29 MIN: ICD-10-PCS | Mod: 95,,, | Performed by: ORTHOPAEDIC SURGERY

## 2020-04-21 PROCEDURE — 99213 OFFICE O/P EST LOW 20 MIN: CPT | Mod: 95,,, | Performed by: ORTHOPAEDIC SURGERY

## 2020-04-21 NOTE — PROGRESS NOTES
The patient location is:  home  The chief complaint leading to consultation is:  Right shoulder pain related to impingement  Visit type: audiovisual  Total time spent with patient:  15 min  Each patient to whom he or she provides medical services by telemedicine is:  (1) informed of the relationship between the physician and patient and the respective role of any other health care provider with respect to management of the patient; and (2) notified that he or she may decline to receive medical services by telemedicine and may withdraw from such care at any time.    Notes:   Subjective:      Patient ID: Celia Alford is a 49 y.o. female.  Chief Complaint: No chief complaint on file.      HPI  Celia Alford is a  49 y.o. female presenting today for follow up of right shoulder impingement.  She reports that she is still having pain in the right shoulder which is getting worse  Previously ordered therapy but it was discontinued because of COVID crisis  Her pain is getting worse  She has difficulty with overhead lifting  She remains on light duty work no lifting more than 5-10 lb  No overhead use right arm  We have discussed surgery in the future she may want to consider that sooner rather than later if her symptoms continue to worsen.    Review of patient's allergies indicates:   Allergen Reactions    Pcn [penicillins] Hives and Rash         Current Outpatient Medications   Medication Sig Dispense Refill    acetaminophen (TYLENOL) 650 MG TbSR Take 1 tablet (650 mg total) by mouth every 8 (eight) hours.  0    etodolac (LODINE XL) 400 MG 24 hr tablet TAKE 1 TABLET BY MOUTH ONCE DAILY (Patient not taking: Reported on 3/4/2020) 30 tablet 1    fluticasone propionate (CUTIVATE) 0.05 % cream       medroxyPROGESTERone (DEPO-PROVERA) 150 mg/mL injection Inject 150 mg into the muscle every 3 (three) months.        medroxyPROGESTERone (DEPO-PROVERA) 150 mg/mL Syrg INJECT 1 ML (150 MG TOTAL) INTO THE MUSCLE EVERY 3 (THREE)  MONTHS. 1 Syringe 2    medroxyPROGESTERone (DEPO-PROVERA) 150 mg/mL Syrg Inject 1 mL (150 mg total) into the muscle every 3 (three) months. 1 mL 3    medroxyPROGESTERone (DEPO-PROVERA) 150 mg/mL Syrg INJECT 1 ML INTO THE MUSCLE EVERY 3 MONTHS 1 Syringe 2    mupirocin (BACTROBAN) 2 % ointment Apply to affected area 3 times daily (Patient not taking: Reported on 3/4/2020) 22 g 0    nabumetone (RELAFEN) 500 MG tablet Take 1 tablet (500 mg total) by mouth 2 (two) times daily with meals. (Patient not taking: Reported on 3/4/2020) 60 tablet 1    naproxen (NAPROSYN) 500 MG tablet Take 1 tablet (500 mg total) by mouth 2 (two) times daily with meals. 60 tablet 1    tiZANidine (ZANAFLEX) 4 MG tablet TAKE 1 TABLET (4 MG TOTAL) BY MOUTH 2 (TWO) TIMES DAILY AS NEEDED. 60 tablet 1     No current facility-administered medications for this visit.        No past medical history on file.    Past Surgical History:   Procedure Laterality Date    TONSILLECTOMY         OBJECTIVE:   PHYSICAL EXAM:       There were no vitals filed for this visit.  Ortho/SPM Exam  Examination right shoulder by video there is tenderness described over the anterior acromion  She has pain with abduction and elevation of the arm beyond 90°  No numbness reported    RADIOGRAPHS:  None  Comments: I have personally reviewed the imaging and I agree with the above radiologist's report.    ASSESSMENT/PLAN:     IMPRESSION:  Right shoulder impingement tendinosis    PLAN:  I have reordered therapy for the right shoulder  I want to try this for another 3 or 4 weeks  Continue anti-inflammatory medication  Continue light duty work and avoid overhead lifting  No lifting more than 5-10 lb  Follow-up 3-4 weeks if symptoms have not improved we may need to proceed with surgical treatment for right shoulder arthroscopy    FOLLOW UP:  3-4 weeks    Disclaimer: This note has been generated using voice-recognition software. There may be typographical errors that have been  missed during proof-reading.

## 2020-04-22 ENCOUNTER — TELEPHONE (OUTPATIENT)
Dept: REHABILITATION | Facility: HOSPITAL | Age: 50
End: 2020-04-22

## 2020-04-22 NOTE — TELEPHONE ENCOUNTER
Called patient for weekly follow up due to COVID19.  Patient stated she went to the doctor the other day and is ready to start therapy again. Information was provided to  to contact patient to get an appointment set up either virtually or in person based on pt preference as she is work comp.  Patient verbalized understanding.    Gave information to  to contact patient to schedule follow up visits.     Stephanie Field, PT, DPT  4/22/2020

## 2020-05-04 RX ORDER — TIZANIDINE 4 MG/1
4 TABLET ORAL 2 TIMES DAILY PRN
Qty: 60 TABLET | Refills: 1 | Status: SHIPPED | OUTPATIENT
Start: 2020-05-04 | End: 2021-03-22 | Stop reason: ALTCHOICE

## 2020-05-21 ENCOUNTER — OFFICE VISIT (OUTPATIENT)
Dept: ORTHOPEDICS | Facility: CLINIC | Age: 50
End: 2020-05-21
Payer: OTHER GOVERNMENT

## 2020-05-21 VITALS — TEMPERATURE: 98 F

## 2020-05-21 DIAGNOSIS — M25.511 CHRONIC RIGHT SHOULDER PAIN: Primary | ICD-10-CM

## 2020-05-21 DIAGNOSIS — G89.29 CHRONIC RIGHT SHOULDER PAIN: Primary | ICD-10-CM

## 2020-05-21 PROCEDURE — 20610 DRAIN/INJ JOINT/BURSA W/O US: CPT | Mod: RT,S$GLB,, | Performed by: ORTHOPAEDIC SURGERY

## 2020-05-21 PROCEDURE — 99213 OFFICE O/P EST LOW 20 MIN: CPT | Mod: 25,S$GLB,, | Performed by: ORTHOPAEDIC SURGERY

## 2020-05-21 PROCEDURE — 99999 PR PBB SHADOW E&M-EST. PATIENT-LVL III: ICD-10-PCS | Mod: PBBFAC,,, | Performed by: ORTHOPAEDIC SURGERY

## 2020-05-21 PROCEDURE — 20610 PR DRAIN/INJECT LARGE JOINT/BURSA: ICD-10-PCS | Mod: RT,S$GLB,, | Performed by: ORTHOPAEDIC SURGERY

## 2020-05-21 PROCEDURE — 99213 PR OFFICE/OUTPT VISIT, EST, LEVL III, 20-29 MIN: ICD-10-PCS | Mod: 25,S$GLB,, | Performed by: ORTHOPAEDIC SURGERY

## 2020-05-21 PROCEDURE — 99999 PR PBB SHADOW E&M-EST. PATIENT-LVL III: CPT | Mod: PBBFAC,,, | Performed by: ORTHOPAEDIC SURGERY

## 2020-05-21 RX ORDER — TRIAMCINOLONE ACETONIDE 40 MG/ML
40 INJECTION, SUSPENSION INTRA-ARTICULAR; INTRAMUSCULAR
Status: COMPLETED | OUTPATIENT
Start: 2020-05-21 | End: 2020-05-21

## 2020-05-21 RX ADMIN — TRIAMCINOLONE ACETONIDE 40 MG: 40 INJECTION, SUSPENSION INTRA-ARTICULAR; INTRAMUSCULAR at 11:05

## 2020-05-21 NOTE — PROGRESS NOTES
Subjective:      Patient ID: Celia Alford is a 49 y.o. female.  Chief Complaint: Pain of the Right Shoulder      HPI  Celia Alford is a  49 y.o. female presenting today for follow up of right shoulder impingement tendinosis.  She reports that she is continues to have pain in the right shoulder and difficulty with overhead lifting  She is currently on light duty work  No numbness or tingling reported  She has been through physical therapy without much improvement  We have discussed surgery as an option in the future.    Review of patient's allergies indicates:   Allergen Reactions    Pcn [penicillins] Hives and Rash         Current Outpatient Medications   Medication Sig Dispense Refill    acetaminophen (TYLENOL) 650 MG TbSR Take 1 tablet (650 mg total) by mouth every 8 (eight) hours.  0    etodolac (LODINE XL) 400 MG 24 hr tablet TAKE 1 TABLET BY MOUTH ONCE DAILY 30 tablet 1    fluticasone propionate (CUTIVATE) 0.05 % cream       medroxyPROGESTERone (DEPO-PROVERA) 150 mg/mL injection Inject 150 mg into the muscle every 3 (three) months.        medroxyPROGESTERone (DEPO-PROVERA) 150 mg/mL Syrg INJECT 1 ML (150 MG TOTAL) INTO THE MUSCLE EVERY 3 (THREE) MONTHS. 1 Syringe 2    medroxyPROGESTERone (DEPO-PROVERA) 150 mg/mL Syrg Inject 1 mL (150 mg total) into the muscle every 3 (three) months. 1 mL 3    medroxyPROGESTERone (DEPO-PROVERA) 150 mg/mL Syrg INJECT 1 ML INTO THE MUSCLE EVERY 3 MONTHS 1 Syringe 2    mupirocin (BACTROBAN) 2 % ointment Apply to affected area 3 times daily 22 g 0    nabumetone (RELAFEN) 500 MG tablet Take 1 tablet (500 mg total) by mouth 2 (two) times daily with meals. 60 tablet 1    naproxen (NAPROSYN) 500 MG tablet Take 1 tablet (500 mg total) by mouth 2 (two) times daily with meals. 60 tablet 1    tiZANidine (ZANAFLEX) 4 MG tablet TAKE 1 TABLET (4 MG TOTAL) BY MOUTH 2 (TWO) TIMES DAILY AS NEEDED. 60 tablet 1     No current facility-administered medications for this visit.        No  past medical history on file.    Past Surgical History:   Procedure Laterality Date    TONSILLECTOMY         OBJECTIVE:   PHYSICAL EXAM:       Vitals:    05/21/20 1114   Temp: 98.3 °F (36.8 °C)   PainSc:   4     Ortho/SPM Exam  Examination right shoulder no tenderness no swelling  Positive impingement sign  Positive supraspinatus stress test  No instability  Rotator cuff strength intact  Neurologic exam normal      RADIOGRAPHS:  Previous MRI demonstrates impingement of the rotator cuff and tendinosis  Comments: I have personally reviewed the imaging and I agree with the above radiologist's report.    ASSESSMENT/PLAN:     IMPRESSION:  Rotator cuff tendinosis with impingement right shoulder    PLAN:  I think at this point we need to consider surgical treatment but the patient would like try another injection 1st  After pause for time-out identified the right shoulder injected with Kenalog 40 mg 2 cc xylocaine sterile technique  She tolerated the procedure well without complication  Continue anti-inflammatory medication by mouth home exercise program for strengthening rotator cuff  Light duty work no lifting more than 10 lb      FOLLOW UP:  4-6 weeks    Disclaimer: This note has been generated using voice-recognition software. There may be typographical errors that have been missed during proof-reading.

## 2020-07-01 ENCOUNTER — OFFICE VISIT (OUTPATIENT)
Dept: URGENT CARE | Facility: CLINIC | Age: 50
End: 2020-07-01
Payer: COMMERCIAL

## 2020-07-01 VITALS
OXYGEN SATURATION: 98 % | DIASTOLIC BLOOD PRESSURE: 94 MMHG | WEIGHT: 200 LBS | RESPIRATION RATE: 16 BRPM | HEART RATE: 117 BPM | HEIGHT: 69 IN | BODY MASS INDEX: 29.62 KG/M2 | SYSTOLIC BLOOD PRESSURE: 135 MMHG | TEMPERATURE: 98 F

## 2020-07-01 DIAGNOSIS — R10.9 ABDOMINAL PAIN, UNSPECIFIED ABDOMINAL LOCATION: Primary | ICD-10-CM

## 2020-07-01 LAB
BILIRUB UR QL STRIP: NEGATIVE
GLUCOSE UR QL STRIP: NEGATIVE
KETONES UR QL STRIP: NEGATIVE
LEUKOCYTE ESTERASE UR QL STRIP: NEGATIVE
PH, POC UA: 6 (ref 5–8)
POC BLOOD, URINE: NEGATIVE
POC NITRATES, URINE: NEGATIVE
PROT UR QL STRIP: NEGATIVE
SP GR UR STRIP: 1.02 (ref 1–1.03)
UROBILINOGEN UR STRIP-ACNC: NORMAL (ref 0.1–1.1)

## 2020-07-01 PROCEDURE — 99213 OFFICE O/P EST LOW 20 MIN: CPT | Mod: 25,S$GLB,, | Performed by: NURSE PRACTITIONER

## 2020-07-01 PROCEDURE — 81003 URINALYSIS AUTO W/O SCOPE: CPT | Mod: QW,S$GLB,, | Performed by: NURSE PRACTITIONER

## 2020-07-01 PROCEDURE — 81003 POCT URINALYSIS, DIPSTICK, AUTOMATED, W/O SCOPE: ICD-10-PCS | Mod: QW,S$GLB,, | Performed by: NURSE PRACTITIONER

## 2020-07-01 PROCEDURE — 99213 PR OFFICE/OUTPT VISIT, EST, LEVL III, 20-29 MIN: ICD-10-PCS | Mod: 25,S$GLB,, | Performed by: NURSE PRACTITIONER

## 2020-07-01 NOTE — PROGRESS NOTES
"Subjective:       Patient ID: Celia Alford is a 49 y.o. female.    Vitals:  height is 5' 9" (1.753 m) and weight is 90.7 kg (200 lb). Her temperature is 98.3 °F (36.8 °C). Her blood pressure is 135/94 (abnormal) and her pulse is 117 (abnormal). Her respiration is 16 and oxygen saturation is 98%.     Chief Complaint: Abdominal Pain    Pt reporting abd tenderness and bloating since yesterday.  She has attempted milk of magnesia without relief.  Denies fever, urinary symptoms, diarrhea, vomiting or blood in the stool.  Pt reports pain when going over bumps in car. RUQ and RLQ abd pain.     Abdominal Pain  This is a new problem. The current episode started today. The onset quality is sudden. The problem occurs constantly. The problem has been unchanged. The pain is located in the generalized abdominal region. The pain is at a severity of 10/10. The pain is severe. The quality of the pain is aching. The abdominal pain does not radiate. Pertinent negatives include no constipation, diarrhea, dysuria, fever, nausea or vomiting. The pain is aggravated by movement. The pain is relieved by nothing. Treatments tried: imodium  The treatment provided no relief. There is no history of abdominal surgery.       Constitution: Negative for appetite change, chills, sweating and fever.   HENT: Negative for trouble swallowing.    Cardiovascular: Negative for chest pain.   Respiratory: Negative for shortness of breath.    Gastrointestinal: Positive for abdominal pain and abdominal bloating. Negative for abdominal trauma, history of abdominal surgery, nausea, vomiting, constipation, diarrhea, dark colored stools and heartburn.   Genitourinary: Negative for dysuria, missed menses and pelvic pain.   Musculoskeletal: Negative for back pain.       Objective:      Physical Exam   Constitutional: She is oriented to person, place, and time. She appears well-developed.   HENT:   Head: Normocephalic and atraumatic.   Right Ear: External ear normal. "   Left Ear: External ear normal.   Nose: Nose normal.   Mouth/Throat: Mucous membranes are normal.   Eyes: Conjunctivae and lids are normal.   Neck: Trachea normal and full passive range of motion without pain. Neck supple.   Cardiovascular: Normal rate, regular rhythm and normal heart sounds.   Pulmonary/Chest: Effort normal and breath sounds normal. No respiratory distress.   Abdominal: Soft. Normal appearance and bowel sounds are normal. She exhibits no distension, no abdominal bruit, no pulsatile midline mass and no mass. There is abdominal tenderness in the right upper quadrant and epigastric area. There is rebound, guarding and positive Swartz's sign.   Musculoskeletal: Normal range of motion.   Neurological: She is alert and oriented to person, place, and time. She has normal strength.   Skin: Skin is warm, dry, intact, not diaphoretic and not pale.   Psychiatric: Her speech is normal and behavior is normal. Judgment and thought content normal.   Nursing note and vitals reviewed.        Assessment:       1. Abdominal pain, unspecified abdominal location        Plan:       Abdominal pain with rebound and guarding.  Peritoneal symptoms.    Spoke with Dr Jaimes in the ER.      Abdominal pain, unspecified abdominal location  -     POCT Urinalysis, Dipstick, Automated, W/O Scope         Patient Instructions       Abdominal Pain    Abdominal pain is pain in the stomach or belly area. Everyone has this pain from time to time. In many cases it goes away on its own. But abdominal pain can sometimes be due to a serious problem, such as appendicitis. So its important to know when to seek help.  Causes of abdominal pain  There are many possible causes of abdominal pain. Common causes in adults include:  · Constipation, diarrhea, or gas  · Stomach acid flowing back up into the esophagus (acid reflux or heartburn)  · Severe acid reflux, called GERD (gastroesophageal reflux disease)  · A sore in the lining of the stomach or  small intestine (peptic ulcer)  · Inflammation of the gallbladder, liver, or pancreas  · Gallstones or kidney stones  · Appendicitis   · Intestinal blockage   · An internal organ pushing through a muscle or other tissue (hernia)  · Urinary tract infections  · In women, menstrual cramps, fibroids, or endometriosis  · Inflammation or infection of the intestines  Diagnosing the cause of abdominal pain  Your healthcare provider will do a physical exam help find the cause of your pain. If needed, tests will be ordered. Belly pain has many possible causes. So it can be hard to find the reason for your pain. Giving details about your pain can help. Tell your provider where and when you feel the pain, and what makes it better or worse. Also let your provider know if you have other symptoms such as:  · Fever  · Tiredness  · Upset stomach (nausea)  · Vomiting  · Changes in bathroom habits  Treating abdominal pain  Some causes of pain need emergency medical treatment right away. These include appendicitis or a bowel blockage. Other problems can be treated with rest, fluids, or medicines. Your healthcare provider can give you specific instructions for treatment or self-care based on what is causing your pain.  If you have vomiting or diarrhea, sip water or other clear fluids. When you are ready to eat solid foods again, start with small amounts of easy-to-digest, low-fat foods. These include apple sauce, toast, or crackers.   When to seek medical care  Call 911 or go to the hospital right away if you:  · Cant pass stool and are vomiting  · Are vomiting blood or have bloody diarrhea or black, tarry diarrhea  · Have chest, neck, or shoulder pain  · Feel like you might pass out  · Have pain in your shoulder blades with nausea  · Have sudden, severe belly pain  · Have new, severe pain unlike any you have felt before  · Have a belly that is rigid, hard, and tender to touch  Call your healthcare provider if you have:  · Pain for  more than 5 days  · Bloating for more than 2 days  · Diarrhea for more than 5 days  · A fever of 100.4°F (38.0°C) or higher, or as directed by your provider  · Pain that gets worse  · Weight loss for no reason  · Continued lack of appetite  · Blood in your stool  How to prevent abdominal pain  Here are some tips to help prevent abdominal pain:  · Eat smaller amounts of food at one time.  · Avoid greasy, fried, or other high-fat foods.  · Avoid foods that give you gas.  · Exercise regularly.  · Drink plenty of fluids.  To help prevent GERD symptoms:  · Quit smoking.  · Reduce alcohol and certain foods that increase stomach acid.  · Avoid aspirin and over-the-counter pain and fever medicines (NSAIDS or nonsteroidal anti-inflammatory drugs), if possible  · Lose extra weight.  · Finish eating at least 2 hours before you go to bed or lie down.  · Raise the head of your bed.  Date Last Reviewed: 7/1/2016  © 1181-6532 [employer], [employer]. [address] [place] [address], [place] [address]. All rights reserved. This information is not intended as a substitute for professional medical care. Always follow your healthcare professional's instructions.

## 2020-07-01 NOTE — PATIENT INSTRUCTIONS
Abdominal Pain    Abdominal pain is pain in the stomach or belly area. Everyone has this pain from time to time. In many cases it goes away on its own. But abdominal pain can sometimes be due to a serious problem, such as appendicitis. So its important to know when to seek help.  Causes of abdominal pain  There are many possible causes of abdominal pain. Common causes in adults include:  · Constipation, diarrhea, or gas  · Stomach acid flowing back up into the esophagus (acid reflux or heartburn)  · Severe acid reflux, called GERD (gastroesophageal reflux disease)  · A sore in the lining of the stomach or small intestine (peptic ulcer)  · Inflammation of the gallbladder, liver, or pancreas  · Gallstones or kidney stones  · Appendicitis   · Intestinal blockage   · An internal organ pushing through a muscle or other tissue (hernia)  · Urinary tract infections  · In women, menstrual cramps, fibroids, or endometriosis  · Inflammation or infection of the intestines  Diagnosing the cause of abdominal pain  Your healthcare provider will do a physical exam help find the cause of your pain. If needed, tests will be ordered. Belly pain has many possible causes. So it can be hard to find the reason for your pain. Giving details about your pain can help. Tell your provider where and when you feel the pain, and what makes it better or worse. Also let your provider know if you have other symptoms such as:  · Fever  · Tiredness  · Upset stomach (nausea)  · Vomiting  · Changes in bathroom habits  Treating abdominal pain  Some causes of pain need emergency medical treatment right away. These include appendicitis or a bowel blockage. Other problems can be treated with rest, fluids, or medicines. Your healthcare provider can give you specific instructions for treatment or self-care based on what is causing your pain.  If you have vomiting or diarrhea, sip water or other clear fluids. When you are ready to eat solid foods again,  start with small amounts of easy-to-digest, low-fat foods. These include apple sauce, toast, or crackers.   When to seek medical care  Call 911 or go to the hospital right away if you:  · Cant pass stool and are vomiting  · Are vomiting blood or have bloody diarrhea or black, tarry diarrhea  · Have chest, neck, or shoulder pain  · Feel like you might pass out  · Have pain in your shoulder blades with nausea  · Have sudden, severe belly pain  · Have new, severe pain unlike any you have felt before  · Have a belly that is rigid, hard, and tender to touch  Call your healthcare provider if you have:  · Pain for more than 5 days  · Bloating for more than 2 days  · Diarrhea for more than 5 days  · A fever of 100.4°F (38.0°C) or higher, or as directed by your provider  · Pain that gets worse  · Weight loss for no reason  · Continued lack of appetite  · Blood in your stool  How to prevent abdominal pain  Here are some tips to help prevent abdominal pain:  · Eat smaller amounts of food at one time.  · Avoid greasy, fried, or other high-fat foods.  · Avoid foods that give you gas.  · Exercise regularly.  · Drink plenty of fluids.  To help prevent GERD symptoms:  · Quit smoking.  · Reduce alcohol and certain foods that increase stomach acid.  · Avoid aspirin and over-the-counter pain and fever medicines (NSAIDS or nonsteroidal anti-inflammatory drugs), if possible  · Lose extra weight.  · Finish eating at least 2 hours before you go to bed or lie down.  · Raise the head of your bed.  Date Last Reviewed: 7/1/2016  © 6811-5688 [employer]. [address] [place] [address], [place] [address]. All rights reserved. This information is not intended as a substitute for professional medical care. Always follow your healthcare professional's instructions.

## 2020-07-09 ENCOUNTER — OFFICE VISIT (OUTPATIENT)
Dept: OBSTETRICS AND GYNECOLOGY | Facility: CLINIC | Age: 50
End: 2020-07-09
Payer: COMMERCIAL

## 2020-07-09 VITALS
DIASTOLIC BLOOD PRESSURE: 86 MMHG | BODY MASS INDEX: 29.59 KG/M2 | WEIGHT: 199.75 LBS | SYSTOLIC BLOOD PRESSURE: 126 MMHG | HEIGHT: 69 IN

## 2020-07-09 DIAGNOSIS — Z12.39 ENCOUNTER FOR SCREENING BREAST EXAMINATION: ICD-10-CM

## 2020-07-09 DIAGNOSIS — Z01.419 WELL WOMAN EXAM: ICD-10-CM

## 2020-07-09 DIAGNOSIS — Z12.4 CERVICAL CANCER SCREENING: Primary | ICD-10-CM

## 2020-07-09 PROCEDURE — 99999 PR PBB SHADOW E&M-EST. PATIENT-LVL III: ICD-10-PCS | Mod: PBBFAC,,, | Performed by: OBSTETRICS & GYNECOLOGY

## 2020-07-09 PROCEDURE — 99999 PR PBB SHADOW E&M-EST. PATIENT-LVL III: CPT | Mod: PBBFAC,,, | Performed by: OBSTETRICS & GYNECOLOGY

## 2020-07-09 PROCEDURE — 99386 PREV VISIT NEW AGE 40-64: CPT | Mod: S$GLB,,, | Performed by: OBSTETRICS & GYNECOLOGY

## 2020-07-09 PROCEDURE — 88175 CYTOPATH C/V AUTO FLUID REDO: CPT

## 2020-07-09 PROCEDURE — 99386 PR PREVENTIVE VISIT,NEW,40-64: ICD-10-PCS | Mod: S$GLB,,, | Performed by: OBSTETRICS & GYNECOLOGY

## 2020-07-09 PROCEDURE — 87624 HPV HI-RISK TYP POOLED RSLT: CPT

## 2020-07-09 RX ORDER — PIMECROLIMUS 10 MG/G
CREAM TOPICAL
COMMUNITY
Start: 2020-04-28 | End: 2022-07-29

## 2020-07-09 RX ORDER — MEDROXYPROGESTERONE ACETATE 150 MG/ML
150 INJECTION, SUSPENSION INTRAMUSCULAR
Qty: 1 SYRINGE | Refills: 3 | Status: SHIPPED | OUTPATIENT
Start: 2020-07-09 | End: 2021-07-27

## 2020-07-09 RX ORDER — HYDROCORTISONE BUTYRATE 1 MG/G
CREAM TOPICAL
COMMUNITY
Start: 2020-04-28 | End: 2022-07-29

## 2020-07-09 RX ORDER — KETOCONAZOLE 20 MG/G
CREAM TOPICAL
COMMUNITY
Start: 2020-04-28 | End: 2022-07-29

## 2020-07-09 NOTE — PROGRESS NOTES
CC: 50 yo  female, here for AE    HPI: Colby is overall well today.  She is a  s/p  x 2. Last pap smear was 2016, normal. Last mammogram was in 2016. She has 3 girls - two age 30 (twins) and 1 in college ([employer]). She works at [employer]. [employer] fan! Likes to go to [employer]. No regular exercise, works a lot. Not SA in some time. She is on depo and would like to continue.     History reviewed. No pertinent past medical history.    Past Surgical History:   Procedure Laterality Date    TONSILLECTOMY         OB History        2    Para   2    Term   2            AB        Living   3       SAB        TAB        Ectopic        Multiple   1    Live Births   3                 Current Outpatient Medications on File Prior to Visit   Medication Sig Dispense Refill    hydrocortisone butyrate 0.1 % Crea cream 1 APPLICATION 2 TIMES A WEEK TOPICALLY 30 DAYS      ketoconazole (NIZORAL) 2 % cream 1 APPLICATION ONCE A DAY TOPICALLY 30 DAYS      naproxen (NAPROSYN) 500 MG tablet Take 1 tablet (500 mg total) by mouth 2 (two) times daily with meals. 60 tablet 1    pimecrolimus (ELIDEL) 1 % cream 1 APPLICATION TWICE A DAY TOPICALLY 30 DAYS      [DISCONTINUED] medroxyPROGESTERone (DEPO-PROVERA) 150 mg/mL Syrg INJECT 1 ML INTO THE MUSCLE EVERY 3 MONTHS 1 Syringe 2    tiZANidine (ZANAFLEX) 4 MG tablet TAKE 1 TABLET (4 MG TOTAL) BY MOUTH 2 (TWO) TIMES DAILY AS NEEDED. (Patient not taking: Reported on 2020) 60 tablet 1    [DISCONTINUED] acetaminophen (TYLENOL) 650 MG TbSR Take 1 tablet (650 mg total) by mouth every 8 (eight) hours.  0    [DISCONTINUED] etodolac (LODINE XL) 400 MG 24 hr tablet TAKE 1 TABLET BY MOUTH ONCE DAILY 30 tablet 1    [DISCONTINUED] fluticasone propionate (CUTIVATE) 0.05 % cream       [DISCONTINUED] medroxyPROGESTERone (DEPO-PROVERA) 150 mg/mL injection Inject 150 mg into the muscle every 3 (three) months.        [DISCONTINUED] medroxyPROGESTERone (DEPO-PROVERA) 150 mg/mL Syrg  "INJECT 1 ML (150 MG TOTAL) INTO THE MUSCLE EVERY 3 (THREE) MONTHS. 1 Syringe 2    [DISCONTINUED] medroxyPROGESTERone (DEPO-PROVERA) 150 mg/mL Syrg Inject 1 mL (150 mg total) into the muscle every 3 (three) months. 1 mL 3    [DISCONTINUED] mupirocin (BACTROBAN) 2 % ointment Apply to affected area 3 times daily 22 g 0    [DISCONTINUED] nabumetone (RELAFEN) 500 MG tablet Take 1 tablet (500 mg total) by mouth 2 (two) times daily with meals. 60 tablet 1     No current facility-administered medications on file prior to visit.          ROS:  GENERAL: Denies weight gain or weight loss. Feeling well overall.   SKIN: Denies rash or lesions.   HEAD: Denies head injury or headache.   CHEST: Denies chest pain or shortness of breath.   CARDIOVASCULAR: Denies palpitations or left sided chest pain.   ABDOMEN: No abdominal pain, constipation, diarrhea, nausea, vomiting or rectal bleeding.   URINARY: No frequency, dysuria, hematuria or burning on urination.  REPRODUCTIVE: See HPI.   HEMATOLOGIC: No easy bruisability or excessive bleeding.   MUSCULOSKELETAL: Denies joint pain or swelling.   NEUROLOGIC: Denies syncope or weakness.   PSYCHIATRIC: Denies depression, anxiety or mood swings.    Physical Exam:   /86 (BP Location: Left arm, Patient Position: Sitting, BP Method: Large (Manual))   Ht 5' 9" (1.753 m)   Wt 90.6 kg (199 lb 11.8 oz)   LMP  (LMP Unknown)   BMI 29.50 kg/m²   General: No distress, well appearing  HEENT: normocephalic, atraumatic   Heart: Regular rate  Lungs: No increased work of breathing  Breasts: Symmetrical, no masses, discharge or skin retractions.  Abdomen: soft, nontender, no masses  MS: lower extremeties symmetrical, no edema  Pelvic Exam:     GENITALIA: Normal external genitalia, no lesions   URETHRA: normal appearing   Bladder non tender   VAGINA: normal vaginal mucosa, no lesions   CERVIX: no lesions visible, no CMT    UTERUS: small, mobile, non tender   ADNEXA: no adnexal masses, tenderness or " fullness  PSYCH: Normal affect, mood appropriate       ASSESSMENT/PLAN: Celia was seen today for well woman.    Diagnoses and all orders for this visit:    Cervical cancer screening  -     Liquid-Based Pap Smear, Screening  -     HPV High Risk Genotypes, PCR    Encounter for screening breast examination  -     Mammo Digital Screening Bilat; Future    Well woman exam  -     Ambulatory referral/consult to Family Practice; Future    Other orders  -     medroxyPROGESTERone (DEPO-PROVERA) 150 mg/mL Syrg; Inject 1 mL (150 mg total) into the muscle every 3 (three) months.      RTO in 1 year for [employer] or sooner if needed.        Faiza Fernando MD  Obstetrics and Gynecology  [facility]     Statement Selected

## 2020-07-10 ENCOUNTER — TELEPHONE (OUTPATIENT)
Dept: OBSTETRICS AND GYNECOLOGY | Facility: CLINIC | Age: 50
End: 2020-07-10

## 2020-07-10 ENCOUNTER — HOSPITAL ENCOUNTER (OUTPATIENT)
Dept: RADIOLOGY | Facility: OTHER | Age: 50
Discharge: HOME | End: 2020-07-10
Attending: OBSTETRICS & GYNECOLOGY
Payer: COMMERCIAL

## 2020-07-10 VITALS — HEIGHT: 69 IN | BODY MASS INDEX: 29.59 KG/M2 | WEIGHT: 199.75 LBS

## 2020-07-10 DIAGNOSIS — Z12.31 BREAST CANCER SCREENING BY MAMMOGRAM: ICD-10-CM

## 2020-07-10 DIAGNOSIS — Z12.39 ENCOUNTER FOR SCREENING BREAST EXAMINATION: ICD-10-CM

## 2020-07-10 PROCEDURE — 77067 SCR MAMMO BI INCL CAD: CPT | Mod: TC

## 2020-07-10 PROCEDURE — 77063 MAMMO DIGITAL SCREENING BILAT WITH TOMOSYNTHESIS_CAD: ICD-10-PCS | Mod: 26,,, | Performed by: RADIOLOGY

## 2020-07-10 PROCEDURE — 77067 MAMMO DIGITAL SCREENING BILAT WITH TOMOSYNTHESIS_CAD: ICD-10-PCS | Mod: 26,,, | Performed by: RADIOLOGY

## 2020-07-10 PROCEDURE — 77067 SCR MAMMO BI INCL CAD: CPT | Mod: 26,,, | Performed by: RADIOLOGY

## 2020-07-10 PROCEDURE — 77063 BREAST TOMOSYNTHESIS BI: CPT | Mod: 26,,, | Performed by: RADIOLOGY

## 2020-07-13 ENCOUNTER — TELEPHONE (OUTPATIENT)
Dept: ORTHOPEDICS | Facility: CLINIC | Age: 50
End: 2020-07-13

## 2020-07-13 ENCOUNTER — OFFICE VISIT (OUTPATIENT)
Dept: ORTHOPEDICS | Facility: CLINIC | Age: 50
End: 2020-07-13
Attending: ORTHOPAEDIC SURGERY
Payer: OTHER GOVERNMENT

## 2020-07-13 ENCOUNTER — HOSPITAL ENCOUNTER (OUTPATIENT)
Dept: RADIOLOGY | Facility: HOSPITAL | Age: 50
Discharge: HOME | End: 2020-07-13
Attending: ORTHOPAEDIC SURGERY
Payer: OTHER GOVERNMENT

## 2020-07-13 VITALS — WEIGHT: 199 LBS | HEIGHT: 69 IN | BODY MASS INDEX: 29.47 KG/M2

## 2020-07-13 DIAGNOSIS — Z41.9 ELECTIVE SURGERY: Primary | ICD-10-CM

## 2020-07-13 DIAGNOSIS — G89.29 CHRONIC RIGHT SHOULDER PAIN: ICD-10-CM

## 2020-07-13 DIAGNOSIS — M54.2 NECK PAIN ON RIGHT SIDE: ICD-10-CM

## 2020-07-13 DIAGNOSIS — M25.9 SHOULDER JOINT DYSFUNCTION: ICD-10-CM

## 2020-07-13 DIAGNOSIS — M25.511 CHRONIC RIGHT SHOULDER PAIN: ICD-10-CM

## 2020-07-13 DIAGNOSIS — M54.2 NECK PAIN ON RIGHT SIDE: Primary | ICD-10-CM

## 2020-07-13 PROCEDURE — 99999 PR PBB SHADOW E&M-EST. PATIENT-LVL III: ICD-10-PCS | Mod: PBBFAC,,, | Performed by: ORTHOPAEDIC SURGERY

## 2020-07-13 PROCEDURE — 72040 X-RAY EXAM NECK SPINE 2-3 VW: CPT | Mod: 26,,, | Performed by: RADIOLOGY

## 2020-07-13 PROCEDURE — 99214 PR OFFICE/OUTPT VISIT, EST, LEVL IV, 30-39 MIN: ICD-10-PCS | Mod: S$GLB,,, | Performed by: ORTHOPAEDIC SURGERY

## 2020-07-13 PROCEDURE — 99999 PR PBB SHADOW E&M-EST. PATIENT-LVL III: CPT | Mod: PBBFAC,,, | Performed by: ORTHOPAEDIC SURGERY

## 2020-07-13 PROCEDURE — 72040 XR CERVICAL SPINE AP LATERAL: ICD-10-PCS | Mod: 26,,, | Performed by: RADIOLOGY

## 2020-07-13 PROCEDURE — 99214 OFFICE O/P EST MOD 30 MIN: CPT | Mod: S$GLB,,, | Performed by: ORTHOPAEDIC SURGERY

## 2020-07-13 PROCEDURE — 72040 X-RAY EXAM NECK SPINE 2-3 VW: CPT | Mod: TC,PN

## 2020-07-13 NOTE — PROGRESS NOTES
CC:  Right shoulder impingement tendinosis possible partial tear rotator cuff        HPI:  Celia Alford is a very pleasant 49 y.o. female with ongoing symptoms right shoulder for 1 year  Symptoms brought on by work activities  We have tried physical therapy injection as well as activity modification without long-term relief  She has been doing exercises at home  She takes anti-inflammatory medication by mouth  Recently symptoms have gotten worse she is now having pain which starts in the neck and radiates down the right arm  No numbness or tingling reported         PAST MEDICAL HISTORY: No past medical history on file.  PAST SURGICAL HISTORY:   Past Surgical History:   Procedure Laterality Date    TONSILLECTOMY       FAMILY HISTORY:   Family History   Problem Relation Age of Onset    Diabetes Maternal Grandmother     Hypertension Maternal Grandmother     No Known Problems Mother     Breast cancer Neg Hx     Colon cancer Neg Hx     Ovarian cancer Neg Hx      SOCIAL HISTORY:   Social History     Socioeconomic History    Marital status: Single     Spouse name: Not on file    Number of children: Not on file    Years of education: Not on file    Highest education level: Not on file   Occupational History    Not on file   Social Needs    Financial resource strain: Not on file    Food insecurity     Worry: Not on file     Inability: Not on file    Transportation needs     Medical: Not on file     Non-medical: Not on file   Tobacco Use    Smoking status: Never Smoker    Smokeless tobacco: Never Used   Substance and Sexual Activity    Alcohol use: Yes     Comment: seldom    Drug use: No    Sexual activity: Not Currently     Partners: Male     Birth control/protection: Injection   Lifestyle    Physical activity     Days per week: Not on file     Minutes per session: Not on file    Stress: Not on file   Relationships    Social connections     Talks on phone: Not on file     Gets together: Not on file      "Attends Yazidism service: Not on file     Active member of club or organization: Not on file     Attends meetings of clubs or organizations: Not on file     Relationship status: Not on file   Other Topics Concern    Not on file   Social History Narrative    Not on file       MEDICATIONS:   Current Outpatient Medications:     hydrocortisone butyrate 0.1 % Crea cream, 1 APPLICATION 2 TIMES A WEEK TOPICALLY 30 DAYS, Disp: , Rfl:     ketoconazole (NIZORAL) 2 % cream, 1 APPLICATION ONCE A DAY TOPICALLY 30 DAYS, Disp: , Rfl:     medroxyPROGESTERone (DEPO-PROVERA) 150 mg/mL Syrg, Inject 1 mL (150 mg total) into the muscle every 3 (three) months., Disp: 1 Syringe, Rfl: 3    naproxen (NAPROSYN) 500 MG tablet, Take 1 tablet (500 mg total) by mouth 2 (two) times daily with meals., Disp: 60 tablet, Rfl: 1    pimecrolimus (ELIDEL) 1 % cream, 1 APPLICATION TWICE A DAY TOPICALLY 30 DAYS, Disp: , Rfl:     tiZANidine (ZANAFLEX) 4 MG tablet, TAKE 1 TABLET (4 MG TOTAL) BY MOUTH 2 (TWO) TIMES DAILY AS NEEDED. (Patient not taking: Reported on 7/9/2020), Disp: 60 tablet, Rfl: 1  ALLERGIES:   Review of patient's allergies indicates:   Allergen Reactions    Pcn [penicillins] Hives and Rash       Review of Systems:  Constitutional: no fever or chills  ENT: no nasal congestion or sore throat  Respiratory: no cough or shortness of breath  Cardiovascular: no chest pain or palpitations  Gastrointestinal: no nausea or vomiting, PUD, GERD, NSAID intolerance  Genitourinary: no hematuria or dysuria  Integument/Breast: no rash or pruritis  Hematologic/Lymphatic: no easy bruising or lymphadenopathy  Musculoskeletal: see HPI  Neurological: no seizures or tremors  Behavioral/Psych: no auditory or visual hallucinations      Physical Exam   Vitals:    07/13/20 0917   Weight: 90.3 kg (199 lb)   Height: 5' 9" (1.753 m)   PainSc:   4       Constitutional: Oriented to person, place, and time. Appears well-developed and well-nourished.   HENT: " "  Head: Normocephalic and atraumatic.   Nose: Nose normal.   Eyes: No scleral icterus.   Neck: Normal range of motion.   Cardiovascular: Normal rate and regular rhythm.    Pulses:       Radial pulses are 2+ on the right side, and 2+ on the left side.   Pulmonary/Chest: Effort normal and breath sounds normal.   Abdominal: Soft.   Neurological: Alert and oriented to person, place, and time.   Skin: Skin is warm.   Psychiatric: Normal mood and affect.     MUSCULOSKELETAL UPPER EXTREMITY:  Examination right shoulder no tenderness no swelling  Range of motion right shoulder full  Positive impingement sign  Positive supraspinatus stress test  No instability  Neck is slightly tender  Neurologic exam intact            Diagnostic Studies:  AP lateral x-rays of the right shoulder demonstrates some spurring at the anterolateral acromion  Previous MRI shows impingement of the rotator cuff possible partial tear  AP lateral x-rays of the cervical spine demonstrates some mild degenerative changes at the C5-C6 level        Assessment:  1.  Right shoulder impingement possible partial tear.    2.  Mild cervical disc disease    Plan:  Patient symptoms have been long-standing involving the right shoulder   But we have tried all other options for the shoulder there forearm recommending right shoulder surgery which would include right shoulder arthroscopy subacromial decompression and possible rotator cuff repair  The risks and benefits of surgery explained to her today she understands in the meantime she remains on light duty work  I explained to her today that there may be some overlay from the neck causing her neck symptoms possibly related to cervical disc disease      The risks and benefits of surgery were discussed with the patient today and they understand.  The consent was signed in the office for surgery.      Jens Han MD (Jay)  [facility]  Orthopedic Upper Extremity Surgery      "

## 2020-07-16 LAB
HPV HR 12 DNA SPEC QL NAA+PROBE: NEGATIVE
HPV16 AG SPEC QL: NEGATIVE
HPV18 DNA SPEC QL NAA+PROBE: NEGATIVE

## 2020-07-17 LAB
FINAL PATHOLOGIC DIAGNOSIS: NORMAL
Lab: NORMAL

## 2020-08-17 RX ORDER — TRAMADOL HYDROCHLORIDE 50 MG/1
50 TABLET ORAL EVERY 6 HOURS PRN
Qty: 40 TABLET | Refills: 0 | Status: SHIPPED | OUTPATIENT
Start: 2020-08-17 | End: 2021-03-03 | Stop reason: SDUPTHER

## 2020-09-19 NOTE — PROGRESS NOTES
Patient: Scotty Oliveira    Procedure(s):  Left  UPPER EXTREMITY Evacuation    Diagnosis: Intramural hematoma of artery of left upper extremity [S45.992A]  Diagnosis Additional Information: No value filed.    Anesthesia Type:   General, Peripheral Nerve Block     Note:  Airway :Face Mask  Patient transferred to:PACU  Handoff Report: Identifed the Patient, Identified the Reponsible Provider, Reviewed the pertinent medical history, Discussed the surgical course, Reviewed Intra-OP anesthesia mangement and issues during anesthesia, Set expectations for post-procedure period and Allowed opportunity for questions and acknowledgement of understanding      Vitals: (Last set prior to Anesthesia Care Transfer)    CRNA VITALS  9/18/2020 1839 - 9/18/2020 1921      9/18/2020             Pulse:  77    SpO2:  100 %                Electronically Signed By: Hernán Calderon III, MD  September 18, 2020  7:21 PM   Subjective:       Patient ID: Celia Alford is a 48 y.o. female.    Chief Complaint: Work Related Injury    Patient here today for a follow up visit. Patient states her Right arm was feeling better until she went to PT on Wednesday, since then she has had increased pain since then.  Shoulder Pain    The pain is present in the right shoulder and left shoulder. This is a new problem. The current episode started more than 1 month ago. There has been a history of trauma. The problem occurs constantly. The problem has been unchanged. The quality of the pain is described as aching and dull. The pain is at a severity of 7/10. The pain is moderate. Associated symptoms include a limited range of motion, stiffness and tingling. The symptoms are aggravated by activity. She has tried acetaminophen and NSAIDS (PT) for the symptoms. The treatment provided mild relief.     Review of Systems   Eyes: Negative for blurred vision, discharge, double vision and pain.   Respiratory: Negative for cough, hemoptysis, shortness of breath and sleep disturbances due to breathing.    Endocrine: Negative for cold intolerance, heat intolerance, polydipsia, polyphagia and polyuria.   Skin: Negative for color change, dry skin, flushing and nail changes.   Musculoskeletal: Positive for joint pain and stiffness.   Neurological: Positive for tingling.   All other systems reviewed and are negative.      Objective:      Physical Exam   Constitutional: She appears well-developed and well-nourished. She is active.  Non-toxic appearance. She does not appear ill. She appears distressed.   Uncomfortable and stiff on exam, elevated bp in clinic   HENT:   Head: Normocephalic and atraumatic.   Right Ear: Hearing and external ear normal.   Left Ear: Hearing and external ear normal.   Nose: Nose normal. No nasal deformity. No epistaxis.   Mouth/Throat: Mucous membranes are normal.   Eyes: Conjunctivae and lids are normal. No scleral icterus.   Neck: Trachea  normal, normal range of motion and full passive range of motion without pain. Neck supple. No spinous process tenderness and no muscular tenderness present. No neck rigidity. Normal range of motion present.   Cardiovascular: Intact distal pulses and normal pulses.   Pulses:       Radial pulses are 2+ on the right side, and 2+ on the left side.   Pulmonary/Chest: Effort normal. No stridor. No respiratory distress.   Musculoskeletal: She exhibits tenderness.        Right shoulder: She exhibits tenderness (posterior aspect) and spasm. She exhibits normal range of motion, no swelling, no deformity, no laceration, no pain (Wang, O'Randall negative), normal pulse and normal strength.        Left shoulder: She exhibits tenderness (posterior trapezius region). She exhibits normal range of motion, no swelling, no deformity, no pain (Wang, O'Randall negative), normal pulse and normal strength.        Right knee: Normal. She exhibits normal range of motion, no swelling, no effusion, no ecchymosis, no deformity, no LCL laxity, no bony tenderness, normal meniscus and no MCL laxity. No tenderness found.        Arms:  Neurological: She is alert. She has normal strength. She is not disoriented. No sensory deficit. GCS eye subscore is 4. GCS verbal subscore is 5. GCS motor subscore is 6.   Reflex Scores:       Patellar reflexes are 2+ on the right side and 2+ on the left side.  Skin: Skin is warm, dry and intact. Capillary refill takes less than 2 seconds. She is not diaphoretic.   Psychiatric: She has a normal mood and affect. Her speech is normal and behavior is normal. She is attentive.   Nursing note and vitals reviewed.      Assessment:       1. Work related injury    2. Strain of left shoulder, subsequent encounter    3. Strain of right shoulder, subsequent encounter    4. Contusion of right knee, initial encounter    5. Fall, subsequent encounter        Plan:         Medications Ordered This Encounter   Medications     acetaminophen (TYLENOL) 650 MG TbSR     Sig: Take 1 tablet (650 mg total) by mouth every 8 (eight) hours.     Refill:  0     Patient Instructions: Attention not to aggravate affected area, Daily home exercises/warm soaks, Apply ice 24-48 hours then apply heat/warm soaks, Continue Physical Therapy(You may take Tylenol or ibuprofen for pain and discomfort)   Restrictions: (See [place] 17 for work restrictions; 08/30/2019)  Follow up in about 20 days (around 9/19/2019).

## 2020-09-29 ENCOUNTER — TELEPHONE (OUTPATIENT)
Dept: SURGERY | Facility: HOSPITAL | Age: 50
End: 2020-09-29

## 2020-09-30 ENCOUNTER — TELEPHONE (OUTPATIENT)
Dept: SURGERY | Facility: HOSPITAL | Age: 50
End: 2020-09-30

## 2020-10-05 ENCOUNTER — ANESTHESIA EVENT (OUTPATIENT)
Dept: SURGERY | Facility: HOSPITAL | Age: 50
End: 2020-10-05
Payer: OTHER GOVERNMENT

## 2020-10-08 ENCOUNTER — PATIENT MESSAGE (OUTPATIENT)
Dept: SURGERY | Facility: HOSPITAL | Age: 50
End: 2020-10-08

## 2020-10-09 ENCOUNTER — TELEPHONE (OUTPATIENT)
Dept: ORTHOPEDICS | Facility: CLINIC | Age: 50
End: 2020-10-09

## 2020-10-09 NOTE — TELEPHONE ENCOUNTER
Returned call to pt. Going straight to voicemail  [employer] message sent regarding available dates for surgery.

## 2020-10-09 NOTE — TELEPHONE ENCOUNTER
----- Message from Janeth August sent at 10/9/2020  8:52 AM CDT -----  Contact: patient   [phone]  Celia Alford calling to speak with you regarding rescheduling her procedure   Please advise

## 2020-10-16 ENCOUNTER — ANESTHESIA (OUTPATIENT)
Dept: SURGERY | Facility: HOSPITAL | Age: 50
End: 2020-10-16
Payer: OTHER GOVERNMENT

## 2020-11-11 ENCOUNTER — HOSPITAL ENCOUNTER (OUTPATIENT)
Dept: PREADMISSION TESTING | Facility: HOSPITAL | Age: 50
Discharge: HOME | End: 2020-11-11
Attending: ORTHOPAEDIC SURGERY
Payer: OTHER GOVERNMENT

## 2020-11-11 VITALS
DIASTOLIC BLOOD PRESSURE: 87 MMHG | BODY MASS INDEX: 29.62 KG/M2 | RESPIRATION RATE: 18 BRPM | HEIGHT: 69 IN | OXYGEN SATURATION: 97 % | SYSTOLIC BLOOD PRESSURE: 129 MMHG | WEIGHT: 200 LBS | HEART RATE: 86 BPM

## 2020-11-11 DIAGNOSIS — G89.29 CHRONIC RIGHT SHOULDER PAIN: Primary | ICD-10-CM

## 2020-11-11 DIAGNOSIS — M25.511 CHRONIC RIGHT SHOULDER PAIN: Primary | ICD-10-CM

## 2020-11-11 RX ORDER — SCOLOPAMINE TRANSDERMAL SYSTEM 1 MG/1
1 PATCH, EXTENDED RELEASE TRANSDERMAL
Status: CANCELLED | OUTPATIENT
Start: 2020-11-11

## 2020-11-11 RX ORDER — LIDOCAINE HYDROCHLORIDE 10 MG/ML
1 INJECTION, SOLUTION EPIDURAL; INFILTRATION; INTRACAUDAL; PERINEURAL ONCE
Status: CANCELLED | OUTPATIENT
Start: 2020-11-11 | End: 2020-11-11

## 2020-11-11 RX ORDER — SODIUM CHLORIDE, SODIUM LACTATE, POTASSIUM CHLORIDE, CALCIUM CHLORIDE 600; 310; 30; 20 MG/100ML; MG/100ML; MG/100ML; MG/100ML
INJECTION, SOLUTION INTRAVENOUS CONTINUOUS
Status: CANCELLED | OUTPATIENT
Start: 2020-11-11

## 2020-11-11 NOTE — ANESTHESIA PREPROCEDURE EVALUATION
11/11/2020  Celia Alford is a 50 y.o., female scheduled for right RCR on 11/20/2020.    History reviewed. No pertinent past medical history.     Past Surgical History:   Procedure Laterality Date    TONSILLECTOMY         Anesthesia Evaluation    I have reviewed the Patient Summary Reports.    I have reviewed the Nursing Notes.    I have reviewed the Medications.     Review of Systems  Anesthesia Hx:  No problems with previous Anesthesia  Denies Family Hx of Anesthesia complications.    Social:  Non-Smoker, Social Alcohol Use    Hematology/Oncology:  Hematology Normal        Cardiovascular:  Cardiovascular Normal Exercise tolerance: good     Pulmonary:  Pulmonary Normal    Renal/:  Renal/ Normal     Hepatic/GI:  Hepatic/GI Normal    Neurological:  Neurology Normal    Endocrine:  Endocrine Normal        Physical Exam  General:  Well nourished    Airway/Jaw/Neck:  Airway Findings: Mouth Opening: Normal Tongue: Normal  General Airway Assessment: Adult  Mallampati: II  TM Distance: Normal, at least 6 cm       Chest/Lungs:  Chest/Lungs Findings: Clear to auscultation, Normal Respiratory Rate     Heart/Vascular:  Heart Findings: Rate: Normal  Rhythm: Regular Rhythm  Sounds: Normal        Mental Status:  Mental Status Findings:  Cooperative, Alert and Oriented         Anesthesia Plan  Type of Anesthesia, risks & benefits discussed:  Anesthesia Type:  general, regional  Patient's Preference:   Intra-op Monitoring Plan: standard ASA monitors  Intra-op Monitoring Plan Comments:   Post Op Pain Control Plan: multimodal analgesia and peripheral nerve block  Post Op Pain Control Plan Comments:   Induction:   IV  Beta Blocker:  Patient is not currently on a Beta-Blocker (No further documentation required).       Informed Consent:    ASA Score: 2     Day of Surgery Review of History & Physical:        Anesthesia  Plan Notes: Anesthesia consent to be signed prior to procedure on 11/20/2020  Covid testing scheduled 11/17.            Ready For Surgery From Anesthesia Perspective.

## 2020-11-11 NOTE — DISCHARGE INSTRUCTIONS
Your surgery is scheduled for 11/20/20    Please report to Front Lobby on the 1st Floor at 1:15p.m.    THIS TIME IS SUBJECT TO CHANGE.  YOU WILL RECEIVE A PHONE CALL THE DAY BEFORE SURGERY BY 3:30 PM TO CONFIRM YOUR TIME OF ARRIVAL.  IF YOU HAVE NOT RECEIVED A PHONE CALL BY 3:30 PM THE DAY BEFORE YOUR SURGERY PLEASE CALL [phone]     INSTRUCTIONS IMPORTANT!!!  ¨ Do not eat or drink after 12 midnight-including water. OK to brush teeth, no   gum, candy or mints!      ____  Proceed to [facility] on 11/11/20 for additional testing.        ____  Do not wear makeup, including mascara.  ____  No powder, lotions or creams to surgical area.  ____  Please remove all jewelry, including piercings and leave at home.  ____  No money or valuables needed. Please leave at home.  ____  Please bring any documents given by your doctor.  ____  If going home the same day, arrange for a ride home. You will not be able to             drive if Anesthesia was used.  ____  Wear loose fitting clothing. Allow for dressings, bandages.  ____  Stop Aspirin, Ibuprofen, Motrin and Aleve at least 3-5 days before surgery, unless otherwise instructed by your doctor, or the nurse.   You MAY use Tylenol/acetaminophen until day of surgery.  ____  Wash the surgical area with Hibiclens the night before surgery, and again the             morning of surgery.  Be sure to rinse hibiclens off completely (if instructed by   nurse).  ____  If you take diabetic medication, do not take am of surgery unless instructed by Doctor.  ____  Call MD for temperature above 101 degrees or any other signs of infection such as Urinary (bladder) infection, Upper respiratory infection, skin boils, etc.  ____ Stop taking any Fish Oil supplement or any Vitamins that contain Vitamin E at least 5 days prior to surgery.  ____ Do Not wear your contact lenses the day of your procedure.  You may wear your glasses.      ____Do not shave surgical site for 3 days prior to  surgery.  ____ Practice Good hand washing before, during, and after procedure.      I have read or had read and explained to me, and understand the above information.  Additional comments or instructions:  For additional questions call [phone]      ANESTHESIA SIDE EFFECTS  -For the first 24 hours after surgery:  Do not drive, use heavy equipment, make important decisions, or drink alcohol  -It is normal to feel sleepy for several hours.  Rest until you are more awake.  -Have someone stay with you, if needed.  They can watch for problems and help keep you safe.  -Some possible post anesthesia side effects include: nausea and vomiting, sore throat and hoarseness, sleepiness, and dizziness.        Pre-Op Bathing Instructions    Before surgery, you can play an important role in your own health.    Because skin is not sterile, we need to be sure that your skin is as free of germs as possible. By following the instructions below, you can reduce the number of germs on your skin before surgery.    IMPORTANT: You will need to shower with a special soap called Hibiclens*, available at any pharmacy.  If you are allergic to Chlorhexidine (the antiseptic in Hibiclens), use an antibacterial soap such as Dial Soap for your preoperative shower.  You will shower with Hibiclens both the night before your surgery and the morning of your surgery.  Do not use Hibiclens on the head, face or genitals to avoid injury to those areas.    STEP #1: THE NIGHT BEFORE YOUR SURGERY     1. Do not shave the area of your body where your surgery will be performed.  2. Shower and wash your hair and body as usual with your normal soap and shampoo.  3. Rinse your hair and body thoroughly after you shower to remove all soap residue.  4. With your hand, apply one packet of Hibiclens soap to the surgical site.   5. Wash the site gently for five (5) minutes. Do not scrub your skin too hard.   6. Do not wash with your regular soap after Hibiclens is  used.  7. Rinse your body thoroughly.  8. Pat yourself dry with a clean, soft towel.  9. Do not use lotion, cream, or powder.  10. Wear clean clothes.    STEP #2: THE MORNING OF YOUR SURGERY     1. Repeat Step #1.    * Not to be used by people allergic to Chlorhexidine.

## 2020-11-13 ENCOUNTER — TELEPHONE (OUTPATIENT)
Dept: ORTHOPEDICS | Facility: CLINIC | Age: 50
End: 2020-11-13

## 2020-11-13 ENCOUNTER — PATIENT MESSAGE (OUTPATIENT)
Dept: ORTHOPEDICS | Facility: CLINIC | Age: 50
End: 2020-11-13

## 2020-11-13 NOTE — TELEPHONE ENCOUNTER
----- Message from Vianca Alexis sent at 11/13/2020 12:36 PM CST -----  Regarding: call back  Contact: [phone]  Patient is calling to talk to nurse in regards to her procedure time.

## 2020-11-17 ENCOUNTER — LAB VISIT (OUTPATIENT)
Dept: LAB | Facility: OTHER | Age: 50
End: 2020-11-17
Attending: ORTHOPAEDIC SURGERY
Payer: OTHER GOVERNMENT

## 2020-11-17 ENCOUNTER — HOSPITAL ENCOUNTER (OUTPATIENT)
Dept: PREADMISSION TESTING | Facility: OTHER | Age: 50
Discharge: HOME | End: 2020-11-17
Attending: ANESTHESIOLOGY
Payer: OTHER GOVERNMENT

## 2020-11-17 DIAGNOSIS — M25.511 CHRONIC RIGHT SHOULDER PAIN: ICD-10-CM

## 2020-11-17 DIAGNOSIS — Z41.9 ELECTIVE SURGERY: ICD-10-CM

## 2020-11-17 DIAGNOSIS — G89.29 CHRONIC RIGHT SHOULDER PAIN: ICD-10-CM

## 2020-11-17 LAB
ANION GAP SERPL CALC-SCNC: 9 MMOL/L (ref 8–16)
BUN SERPL-MCNC: 10 MG/DL (ref 6–20)
CALCIUM SERPL-MCNC: 8.9 MG/DL (ref 8.7–10.5)
CHLORIDE SERPL-SCNC: 107 MMOL/L (ref 95–110)
CO2 SERPL-SCNC: 25 MMOL/L (ref 23–29)
CREAT SERPL-MCNC: 0.8 MG/DL (ref 0.5–1.4)
EST. GFR  (AFRICAN AMERICAN): >60 ML/MIN/1.73 M^2
EST. GFR  (NON AFRICAN AMERICAN): >60 ML/MIN/1.73 M^2
GLUCOSE SERPL-MCNC: 97 MG/DL (ref 70–110)
POTASSIUM SERPL-SCNC: 3.9 MMOL/L (ref 3.5–5.1)
SODIUM SERPL-SCNC: 141 MMOL/L (ref 136–145)

## 2020-11-17 PROCEDURE — 80048 BASIC METABOLIC PNL TOTAL CA: CPT

## 2020-11-17 PROCEDURE — 36415 COLL VENOUS BLD VENIPUNCTURE: CPT

## 2020-11-17 PROCEDURE — U0003 INFECTIOUS AGENT DETECTION BY NUCLEIC ACID (DNA OR RNA); SEVERE ACUTE RESPIRATORY SYNDROME CORONAVIRUS 2 (SARS-COV-2) (CORONAVIRUS DISEASE [COVID-19]), AMPLIFIED PROBE TECHNIQUE, MAKING USE OF HIGH THROUGHPUT TECHNOLOGIES AS DESCRIBED BY CMS-2020-01-R: HCPCS

## 2020-11-19 LAB — SARS-COV-2 RNA RESP QL NAA+PROBE: NOT DETECTED

## 2020-11-20 ENCOUNTER — HOSPITAL ENCOUNTER (OUTPATIENT)
Facility: HOSPITAL | Age: 50
Discharge: HOME | End: 2020-11-20
Attending: ORTHOPAEDIC SURGERY | Admitting: ORTHOPAEDIC SURGERY
Payer: OTHER GOVERNMENT

## 2020-11-20 VITALS
TEMPERATURE: 97 F | SYSTOLIC BLOOD PRESSURE: 144 MMHG | DIASTOLIC BLOOD PRESSURE: 92 MMHG | HEART RATE: 91 BPM | RESPIRATION RATE: 16 BRPM | OXYGEN SATURATION: 99 % | HEIGHT: 69 IN | WEIGHT: 200 LBS | BODY MASS INDEX: 29.62 KG/M2

## 2020-11-20 DIAGNOSIS — S46.011D TRAUMATIC INCOMPLETE TEAR OF RIGHT ROTATOR CUFF, SUBSEQUENT ENCOUNTER: Primary | ICD-10-CM

## 2020-11-20 DIAGNOSIS — M54.2 NECK PAIN ON RIGHT SIDE: ICD-10-CM

## 2020-11-20 DIAGNOSIS — G89.29 CHRONIC RIGHT SHOULDER PAIN: ICD-10-CM

## 2020-11-20 DIAGNOSIS — M25.511 CHRONIC RIGHT SHOULDER PAIN: ICD-10-CM

## 2020-11-20 PROBLEM — S46.011A TRAUMATIC INCOMPLETE TEAR OF RIGHT ROTATOR CUFF: Status: ACTIVE | Noted: 2020-11-20

## 2020-11-20 PROBLEM — S46.012A TRAUMATIC INCOMPLETE TEAR OF LEFT ROTATOR CUFF: Status: ACTIVE | Noted: 2020-11-20

## 2020-11-20 PROCEDURE — 25000003 PHARM REV CODE 250: Performed by: STUDENT IN AN ORGANIZED HEALTH CARE EDUCATION/TRAINING PROGRAM

## 2020-11-20 PROCEDURE — 29822 PR SHLDR ARTHROSCOP,PART DEBRIDE: ICD-10-PCS | Mod: AS,RT,, | Performed by: ORTHOPAEDIC SURGERY

## 2020-11-20 PROCEDURE — 36000711: Performed by: ORTHOPAEDIC SURGERY

## 2020-11-20 PROCEDURE — 64415 NJX AA&/STRD BRCH PLXS IMG: CPT | Performed by: STUDENT IN AN ORGANIZED HEALTH CARE EDUCATION/TRAINING PROGRAM

## 2020-11-20 PROCEDURE — 29826 SHO ARTHRS SRG DECOMPRESSION: CPT | Mod: RT,,, | Performed by: ORTHOPAEDIC SURGERY

## 2020-11-20 PROCEDURE — 27201423 OPTIME MED/SURG SUP & DEVICES STERILE SUPPLY: Performed by: ORTHOPAEDIC SURGERY

## 2020-11-20 PROCEDURE — 29822 PR SHLDR ARTHROSCOP,PART DEBRIDE: ICD-10-PCS | Mod: RT,,, | Performed by: ORTHOPAEDIC SURGERY

## 2020-11-20 PROCEDURE — 71000015 HC POSTOP RECOV 1ST HR: Performed by: ORTHOPAEDIC SURGERY

## 2020-11-20 PROCEDURE — 71000033 HC RECOVERY, INTIAL HOUR: Performed by: ORTHOPAEDIC SURGERY

## 2020-11-20 PROCEDURE — 37000008 HC ANESTHESIA 1ST 15 MINUTES: Performed by: ORTHOPAEDIC SURGERY

## 2020-11-20 PROCEDURE — 36000710: Performed by: ORTHOPAEDIC SURGERY

## 2020-11-20 PROCEDURE — 71000016 HC POSTOP RECOV ADDL HR: Performed by: ORTHOPAEDIC SURGERY

## 2020-11-20 PROCEDURE — 29822 SHO ARTHRS SRG LMTD DBRDMT: CPT | Mod: RT,,, | Performed by: ORTHOPAEDIC SURGERY

## 2020-11-20 PROCEDURE — 29826 PR SHLDR ARTHROSCOP,PART ACROMIOPLAS: ICD-10-PCS | Mod: RT,,, | Performed by: ORTHOPAEDIC SURGERY

## 2020-11-20 PROCEDURE — 63600175 PHARM REV CODE 636 W HCPCS: Performed by: STUDENT IN AN ORGANIZED HEALTH CARE EDUCATION/TRAINING PROGRAM

## 2020-11-20 PROCEDURE — 37000009 HC ANESTHESIA EA ADD 15 MINS: Performed by: ORTHOPAEDIC SURGERY

## 2020-11-20 PROCEDURE — 63600175 PHARM REV CODE 636 W HCPCS: Performed by: ORTHOPAEDIC SURGERY

## 2020-11-20 PROCEDURE — 25000003 PHARM REV CODE 250: Performed by: ORTHOPAEDIC SURGERY

## 2020-11-20 PROCEDURE — 29822 SHO ARTHRS SRG LMTD DBRDMT: CPT | Mod: AS,RT,, | Performed by: ORTHOPAEDIC SURGERY

## 2020-11-20 PROCEDURE — 29826 SHO ARTHRS SRG DECOMPRESSION: CPT | Mod: AS,RT,, | Performed by: ORTHOPAEDIC SURGERY

## 2020-11-20 PROCEDURE — 25000003 PHARM REV CODE 250: Performed by: NURSE PRACTITIONER

## 2020-11-20 PROCEDURE — 29826 PR SHLDR ARTHROSCOP,PART ACROMIOPLAS: ICD-10-PCS | Mod: AS,RT,, | Performed by: ORTHOPAEDIC SURGERY

## 2020-11-20 PROCEDURE — C9290 INJ, BUPIVACAINE LIPOSOME: HCPCS | Performed by: STUDENT IN AN ORGANIZED HEALTH CARE EDUCATION/TRAINING PROGRAM

## 2020-11-20 PROCEDURE — 76942 ECHO GUIDE FOR BIOPSY: CPT | Performed by: STUDENT IN AN ORGANIZED HEALTH CARE EDUCATION/TRAINING PROGRAM

## 2020-11-20 RX ORDER — EPINEPHRINE 1 MG/ML
INJECTION, SOLUTION INTRACARDIAC; INTRAMUSCULAR; INTRAVENOUS; SUBCUTANEOUS
Status: DISCONTINUED | OUTPATIENT
Start: 2020-11-20 | End: 2020-11-20 | Stop reason: HOSPADM

## 2020-11-20 RX ORDER — OXYCODONE HYDROCHLORIDE 5 MG/1
10 TABLET ORAL EVERY 4 HOURS PRN
Status: DISCONTINUED | OUTPATIENT
Start: 2020-11-20 | End: 2020-11-20 | Stop reason: HOSPADM

## 2020-11-20 RX ORDER — ONDANSETRON 8 MG/1
8 TABLET, ORALLY DISINTEGRATING ORAL EVERY 8 HOURS PRN
Status: DISCONTINUED | OUTPATIENT
Start: 2020-11-20 | End: 2020-11-20 | Stop reason: HOSPADM

## 2020-11-20 RX ORDER — MIDAZOLAM HYDROCHLORIDE 1 MG/ML
INJECTION, SOLUTION INTRAMUSCULAR; INTRAVENOUS
Status: DISCONTINUED | OUTPATIENT
Start: 2020-11-20 | End: 2020-11-20

## 2020-11-20 RX ORDER — NEOSTIGMINE METHYLSULFATE 1 MG/ML
INJECTION, SOLUTION INTRAVENOUS
Status: DISCONTINUED | OUTPATIENT
Start: 2020-11-20 | End: 2020-11-20

## 2020-11-20 RX ORDER — SODIUM CHLORIDE 0.9 % (FLUSH) 0.9 %
10 SYRINGE (ML) INJECTION
Status: DISCONTINUED | OUTPATIENT
Start: 2020-11-20 | End: 2020-11-20 | Stop reason: HOSPADM

## 2020-11-20 RX ORDER — CLINDAMYCIN PHOSPHATE 900 MG/50ML
900 INJECTION, SOLUTION INTRAVENOUS
Status: COMPLETED | OUTPATIENT
Start: 2020-11-20 | End: 2020-11-20

## 2020-11-20 RX ORDER — KETOROLAC TROMETHAMINE 30 MG/ML
30 INJECTION, SOLUTION INTRAMUSCULAR; INTRAVENOUS ONCE
Status: DISCONTINUED | OUTPATIENT
Start: 2020-11-20 | End: 2020-11-20 | Stop reason: HOSPADM

## 2020-11-20 RX ORDER — LIDOCAINE HCL/PF 100 MG/5ML
SYRINGE (ML) INTRAVENOUS
Status: DISCONTINUED | OUTPATIENT
Start: 2020-11-20 | End: 2020-11-20

## 2020-11-20 RX ORDER — DEXAMETHASONE SODIUM PHOSPHATE 4 MG/ML
INJECTION, SOLUTION INTRA-ARTICULAR; INTRALESIONAL; INTRAMUSCULAR; INTRAVENOUS; SOFT TISSUE
Status: DISCONTINUED | OUTPATIENT
Start: 2020-11-20 | End: 2020-11-20

## 2020-11-20 RX ORDER — SODIUM CHLORIDE, SODIUM LACTATE, POTASSIUM CHLORIDE, CALCIUM CHLORIDE 600; 310; 30; 20 MG/100ML; MG/100ML; MG/100ML; MG/100ML
INJECTION, SOLUTION INTRAVENOUS CONTINUOUS PRN
Status: DISCONTINUED | OUTPATIENT
Start: 2020-11-20 | End: 2020-11-20

## 2020-11-20 RX ORDER — LIDOCAINE HYDROCHLORIDE 10 MG/ML
1 INJECTION, SOLUTION EPIDURAL; INFILTRATION; INTRACAUDAL; PERINEURAL ONCE
Status: DISCONTINUED | OUTPATIENT
Start: 2020-11-20 | End: 2020-11-20 | Stop reason: HOSPADM

## 2020-11-20 RX ORDER — SODIUM CHLORIDE, SODIUM LACTATE, POTASSIUM CHLORIDE, CALCIUM CHLORIDE 600; 310; 30; 20 MG/100ML; MG/100ML; MG/100ML; MG/100ML
INJECTION, SOLUTION INTRAVENOUS CONTINUOUS
Status: DISCONTINUED | OUTPATIENT
Start: 2020-11-20 | End: 2020-11-20 | Stop reason: HOSPADM

## 2020-11-20 RX ORDER — OXYCODONE AND ACETAMINOPHEN 7.5; 325 MG/1; MG/1
1 TABLET ORAL EVERY 4 HOURS PRN
Qty: 40 TABLET | Refills: 0 | Status: SHIPPED | OUTPATIENT
Start: 2020-11-20 | End: 2022-07-03

## 2020-11-20 RX ORDER — PHENYLEPHRINE HYDROCHLORIDE 10 MG/ML
INJECTION INTRAVENOUS
Status: DISCONTINUED | OUTPATIENT
Start: 2020-11-20 | End: 2020-11-20

## 2020-11-20 RX ORDER — ACETAMINOPHEN 325 MG/1
650 TABLET ORAL EVERY 4 HOURS PRN
Status: DISCONTINUED | OUTPATIENT
Start: 2020-11-20 | End: 2020-11-20 | Stop reason: HOSPADM

## 2020-11-20 RX ORDER — PROPOFOL 10 MG/ML
VIAL (ML) INTRAVENOUS
Status: DISCONTINUED | OUTPATIENT
Start: 2020-11-20 | End: 2020-11-20

## 2020-11-20 RX ORDER — SUCCINYLCHOLINE CHLORIDE 20 MG/ML
INJECTION INTRAMUSCULAR; INTRAVENOUS
Status: DISCONTINUED | OUTPATIENT
Start: 2020-11-20 | End: 2020-11-20

## 2020-11-20 RX ORDER — SCOLOPAMINE TRANSDERMAL SYSTEM 1 MG/1
1 PATCH, EXTENDED RELEASE TRANSDERMAL
Status: DISCONTINUED | OUTPATIENT
Start: 2020-11-20 | End: 2020-11-20 | Stop reason: HOSPADM

## 2020-11-20 RX ORDER — FENTANYL CITRATE 50 UG/ML
INJECTION, SOLUTION INTRAMUSCULAR; INTRAVENOUS
Status: DISCONTINUED | OUTPATIENT
Start: 2020-11-20 | End: 2020-11-20

## 2020-11-20 RX ORDER — BUPIVACAINE HYDROCHLORIDE 2.5 MG/ML
INJECTION, SOLUTION EPIDURAL; INFILTRATION; INTRACAUDAL
Status: DISCONTINUED | OUTPATIENT
Start: 2020-11-20 | End: 2020-11-20

## 2020-11-20 RX ORDER — HYDROMORPHONE HYDROCHLORIDE 2 MG/ML
0.5 INJECTION, SOLUTION INTRAMUSCULAR; INTRAVENOUS; SUBCUTANEOUS EVERY 5 MIN PRN
Status: DISCONTINUED | OUTPATIENT
Start: 2020-11-20 | End: 2020-11-20 | Stop reason: HOSPADM

## 2020-11-20 RX ORDER — ROCURONIUM BROMIDE 10 MG/ML
INJECTION, SOLUTION INTRAVENOUS
Status: DISCONTINUED | OUTPATIENT
Start: 2020-11-20 | End: 2020-11-20

## 2020-11-20 RX ORDER — ONDANSETRON 2 MG/ML
INJECTION INTRAMUSCULAR; INTRAVENOUS
Status: DISCONTINUED | OUTPATIENT
Start: 2020-11-20 | End: 2020-11-20

## 2020-11-20 RX ADMIN — PHENYLEPHRINE HYDROCHLORIDE 50 MCG/MIN: 10 INJECTION INTRAVENOUS at 10:11

## 2020-11-20 RX ADMIN — FENTANYL CITRATE 50 MCG: 50 INJECTION, SOLUTION INTRAMUSCULAR; INTRAVENOUS at 11:11

## 2020-11-20 RX ADMIN — LIDOCAINE HYDROCHLORIDE 100 MG: 20 INJECTION, SOLUTION INTRAVENOUS at 10:11

## 2020-11-20 RX ADMIN — PROPOFOL 50 MG: 10 INJECTION, EMULSION INTRAVENOUS at 11:11

## 2020-11-20 RX ADMIN — MIDAZOLAM 4 MG: 1 INJECTION INTRAMUSCULAR; INTRAVENOUS at 10:11

## 2020-11-20 RX ADMIN — PHENYLEPHRINE HYDROCHLORIDE 100 MCG: 10 INJECTION INTRAVENOUS at 10:11

## 2020-11-20 RX ADMIN — BUPIVACAINE HYDROCHLORIDE 10 ML: 2.5 INJECTION, SOLUTION EPIDURAL; INFILTRATION; INTRACAUDAL; PERINEURAL at 10:11

## 2020-11-20 RX ADMIN — PHENYLEPHRINE HYDROCHLORIDE 200 MCG: 10 INJECTION INTRAVENOUS at 10:11

## 2020-11-20 RX ADMIN — BUPIVACAINE 10 ML: 13.3 INJECTION, SUSPENSION, LIPOSOMAL INFILTRATION at 10:11

## 2020-11-20 RX ADMIN — SUCCINYLCHOLINE CHLORIDE 120 MG: 20 INJECTION, SOLUTION INTRAMUSCULAR; INTRAVENOUS at 10:11

## 2020-11-20 RX ADMIN — SODIUM CHLORIDE, SODIUM LACTATE, POTASSIUM CHLORIDE, AND CALCIUM CHLORIDE: .6; .31; .03; .02 INJECTION, SOLUTION INTRAVENOUS at 10:11

## 2020-11-20 RX ADMIN — CLINDAMYCIN PHOSPHATE 900 MG: 18 INJECTION, SOLUTION INTRAVENOUS at 10:11

## 2020-11-20 RX ADMIN — ROCURONIUM BROMIDE 35 MG: 10 INJECTION, SOLUTION INTRAVENOUS at 10:11

## 2020-11-20 RX ADMIN — FENTANYL CITRATE 100 MCG: 50 INJECTION, SOLUTION INTRAMUSCULAR; INTRAVENOUS at 10:11

## 2020-11-20 RX ADMIN — GLYCOPYRROLATE 0.8 MG: 0.2 INJECTION, SOLUTION INTRAMUSCULAR; INTRAVITREAL at 11:11

## 2020-11-20 RX ADMIN — NEOSTIGMINE METHYLSULFATE 5 MG: 1 INJECTION INTRAVENOUS at 11:11

## 2020-11-20 RX ADMIN — PROPOFOL 150 MG: 10 INJECTION, EMULSION INTRAVENOUS at 10:11

## 2020-11-20 RX ADMIN — ONDANSETRON 4 MG: 2 INJECTION, SOLUTION INTRAMUSCULAR; INTRAVENOUS at 11:11

## 2020-11-20 RX ADMIN — SCOPALAMINE 1 PATCH: 1 PATCH, EXTENDED RELEASE TRANSDERMAL at 09:11

## 2020-11-20 RX ADMIN — DEXAMETHASONE SODIUM PHOSPHATE 4 MG: 4 INJECTION, SOLUTION INTRA-ARTICULAR; INTRALESIONAL; INTRAMUSCULAR; INTRAVENOUS; SOFT TISSUE at 11:11

## 2020-11-20 NOTE — ANESTHESIA POSTPROCEDURE EVALUATION
Anesthesia Post Evaluation    Patient: Celia Alford    Procedure(s) Performed: Procedure(s) (LRB):  REPAIR, ROTATOR CUFF, ARTHROSCOPIC (Right)    Final Anesthesia Type: general    Patient location during evaluation: PACU  Patient participation: Yes- Able to Participate  Level of consciousness: awake and alert  Post-procedure vital signs: reviewed and stable  Pain management: adequate  Airway patency: patent    PONV status at discharge: No PONV  Anesthetic complications: no      Cardiovascular status: blood pressure returned to baseline  Respiratory status: unassisted  Hydration status: euvolemic  Follow-up not needed.          Vitals Value Taken Time   /92 11/20/20 1430   Temp 36.2 °C (97.2 °F) 11/20/20 1430   Pulse 91 11/20/20 1430   Resp 16 11/20/20 1430   SpO2 99 % 11/20/20 1430         Event Time   Out of Recovery 12:59:03         Pain/Maria Teresa Score: Pain Rating Prior to Med Admin: 0 (11/20/2020 12:51 PM)  Pain Rating Post Med Admin: 0 (11/20/2020 12:51 PM)  Maria Teresa Score: 10 (11/20/2020  2:35 PM)

## 2020-11-20 NOTE — ANESTHESIA PROCEDURE NOTES
Intubation  Performed by: Ray Kam MD  Authorized by: Sheeba Barfield MD     Intubation:     Induction:  Intravenous    Intubated:  Postinduction    Mask Ventilation:  Easy mask    Attempts:  1    Attempted By:  Resident anesthesiologist    Method of Intubation:  Direct    Blade:  Fercho 3    Laryngeal View Grade: Grade I - full view of chords      Difficult Airway Encountered?: No      Airway Device:  Oral endotracheal tube    Airway Device Size:  7.0    Style/Cuff Inflation:  Cuffed    Inflation Amount (mL):  8    Tube secured:  23    Secured at:  The lips    Placement Verified By:  Capnometry    Complicating Factors:  None    Findings Post-Intubation:  BS equal bilateral and atraumatic/condition of teeth unchanged

## 2020-11-20 NOTE — TRANSFER OF CARE
"Anesthesia Transfer of Care Note    Patient: Celia Alford    Procedure(s) Performed: Procedure(s) (LRB):  REPAIR, ROTATOR CUFF, ARTHROSCOPIC (Right)    Patient location: PACU    Transport from OR: Transported from OR on 6-10 L/min O2 by face mask with adequate spontaneous ventilation    Post pain: adequate analgesia    Post assessment: no apparent anesthetic complications    Post vital signs: stable    Level of consciousness: awake, alert and oriented    Nausea/Vomiting: no nausea/vomiting    Complications: none    Transfer of care protocol was followed      Last vitals:   Visit Vitals  /85   Pulse 86   Temp 37.2 °C (99 °F) (Skin)   Resp 16   Ht 5' 9" (1.753 m)   Wt 90.7 kg (200 lb)   SpO2 97%   Breastfeeding No   BMI 29.53 kg/m²     "

## 2020-11-20 NOTE — ANESTHESIA PROCEDURE NOTES
Peripheral Block    Patient location during procedure: pre-op   Block not for primary anesthetic.  Reason for block: at surgeon's request and post-op pain management   Post-op Pain Location: RUE   Start time: 11/20/2020 10:05 AM  Timeout: 11/20/2020 10:00 AM   End time: 11/20/2020 10:11 AM    Staffing  Authorizing Provider: Tara Gatica MD  Performing Provider: Kota Epstein MD    Preanesthetic Checklist  Completed: patient identified, site marked, surgical consent, pre-op evaluation, timeout performed, IV checked, risks and benefits discussed and monitors and equipment checked  Peripheral Block  Patient position: sitting  Prep: ChloraPrep  Patient monitoring: heart rate, cardiac monitor, continuous pulse ox and frequent blood pressure checks  Block type: interscalene  Laterality: right  Injection technique: single shot  Needle  Needle type: Stimuplex   Needle gauge: 21 G  Needle length: 4 in  Needle localization: anatomical landmarks and ultrasound guidance   -ultrasound image captured on disc.  Assessment  Injection assessment: negative aspiration, negative parasthesia and local visualized surrounding nerve  Paresthesia pain: none  Heart rate change: no  Slow fractionated injection: yes  Additional Notes  VSS.  [facility] RN monitoring vitals throughout procedure.  Patient tolerated procedure well.

## 2020-11-20 NOTE — H&P
CC:  Right shoulder impingement tendinosis possible partial tear rotator cuff           HPI:  Celia Alford is a very pleasant 49 y.o. female with ongoing symptoms right shoulder for 1 year  Symptoms brought on by work activities  We have tried physical therapy injection as well as activity modification without long-term relief  She has been doing exercises at home  She takes anti-inflammatory medication by mouth  Recently symptoms have gotten worse she is now having pain which starts in the neck and radiates down the right arm  No numbness or tingling reported            PAST MEDICAL HISTORY: No past medical history on file.  PAST SURGICAL HISTORY:         Past Surgical History:   Procedure Laterality Date    TONSILLECTOMY         FAMILY HISTORY:         Family History   Problem Relation Age of Onset    Diabetes Maternal Grandmother      Hypertension Maternal Grandmother      No Known Problems Mother      Breast cancer Neg Hx      Colon cancer Neg Hx      Ovarian cancer Neg Hx       SOCIAL HISTORY:   Social History               Socioeconomic History    Marital status: Single       Spouse name: Not on file    Number of children: Not on file    Years of education: Not on file    Highest education level: Not on file   Occupational History    Not on file   Social Needs    Financial resource strain: Not on file    Food insecurity       Worry: Not on file       Inability: Not on file    Transportation needs       Medical: Not on file       Non-medical: Not on file   Tobacco Use    Smoking status: Never Smoker    Smokeless tobacco: Never Used   Substance and Sexual Activity    Alcohol use: Yes       Comment: seldom    Drug use: No    Sexual activity: Not Currently       Partners: Male       Birth control/protection: Injection   Lifestyle    Physical activity       Days per week: Not on file       Minutes per session: Not on file    Stress: Not on file   Relationships    Social connections        "Talks on phone: Not on file       Gets together: Not on file       Attends Caoda[employer] service: Not on file       Active member of club or organization: Not on file       Attends meetings of clubs or organizations: Not on file       Relationship status: Not on file   Other Topics Concern    Not on file   Social History Narrative    Not on file           MEDICATIONS:   Current Outpatient Medications:     hydrocortisone butyrate 0.1 % Crea cream, 1 APPLICATION 2 TIMES A WEEK TOPICALLY 30 DAYS, Disp: , Rfl:     ketoconazole (NIZORAL) 2 % cream, 1 APPLICATION ONCE A DAY TOPICALLY 30 DAYS, Disp: , Rfl:     medroxyPROGESTERone (DEPO-PROVERA) 150 mg/mL Syrg, Inject 1 mL (150 mg total) into the muscle every 3 (three) months., Disp: 1 Syringe, Rfl: 3    naproxen (NAPROSYN) 500 MG tablet, Take 1 tablet (500 mg total) by mouth 2 (two) times daily with meals., Disp: 60 tablet, Rfl: 1    pimecrolimus (ELIDEL) 1 % cream, 1 APPLICATION TWICE A DAY TOPICALLY 30 DAYS, Disp: , Rfl:     tiZANidine (ZANAFLEX) 4 MG tablet, TAKE 1 TABLET (4 MG TOTAL) BY MOUTH 2 (TWO) TIMES DAILY AS NEEDED. (Patient not taking: Reported on 7/9/2020), Disp: 60 tablet, Rfl: 1  ALLERGIES:        Review of patient's allergies indicates:   Allergen Reactions    Pcn [penicillins] Hives and Rash        Review of Systems:  Constitutional: no fever or chills  ENT: no nasal congestion or sore throat  Respiratory: no cough or shortness of breath  Cardiovascular: no chest pain or palpitations  Gastrointestinal: no nausea or vomiting, PUD, GERD, NSAID intolerance  Genitourinary: no hematuria or dysuria  Integument/Breast: no rash or pruritis  Hematologic/Lymphatic: no easy bruising or lymphadenopathy  Musculoskeletal: see HPI  Neurological: no seizures or tremors  Behavioral/Psych: no auditory or visual hallucinations        Physical Exam       Vitals:     07/13/20 0917   Weight: 90.3 kg (199 lb)   Height: 5' 9" (1.753 m)   PainSc:   4        Constitutional: " "Oriented to person, place, and time. Appears well-developed and well-nourished.   HENT:   Head: Normocephalic and atraumatic.   Nose: Nose normal.   Eyes: No scleral icterus.   Neck: Normal range of motion.   Cardiovascular: Normal rate and regular rhythm.    Pulses:       Radial pulses are 2+ on the right side, and 2+ on the left side.   Pulmonary/Chest: Effort normal and breath sounds normal.   Abdominal: Soft.   Neurological: Alert and oriented to person, place, and time.   Skin: Skin is warm.   Psychiatric: Normal mood and affect.      MUSCULOSKELETAL UPPER EXTREMITY:  Examination right shoulder no tenderness no swelling  Range of motion right shoulder full  Positive impingement sign  Positive supraspinatus stress test  No instability  Neck is slightly tender  Neurologic exam intact                 Diagnostic Studies:  AP lateral x-rays of the right shoulder demonstrates some spurring at the anterolateral acromion  Previous MRI shows impingement of the rotator cuff possible partial tear  AP lateral x-rays of the cervical spine demonstrates some mild degenerative changes at the C5-C6 level           Assessment:  1.  Right shoulder impingement possible partial tear.     2.  Mild cervical disc disease     Plan:  Patient symptoms have been long-standing involving the right shoulder   But we have tried all other options for the shoulder there forearm recommending right shoulder surgery which would include right shoulder arthroscopy subacromial decompression and possible rotator cuff repair  The risks and benefits of surgery explained to her today she understands in the meantime she remains on light duty work  I explained to her today that there may be some overlay from the neck causing her neck symptoms possibly related to cervical disc disease        The risks and benefits of surgery were discussed with the patient today and they understand.  The consent was signed in the office for surgery.        Jens Cody" " MD [place]  [facility]  Orthopedic Upper Extremity Surgery

## 2020-11-20 NOTE — OP NOTE
[facility]  Operative Note      Date of Procedure: 11/20/2020     Procedure: Procedure(s) (LRB):  REPAIR, ROTATOR CUFF, ARTHROSCOPIC (Right)     Surgeon(s) and Role:     * Jens Han Jr., MD - Primary    Assisting Surgeon: None    Pre-Operative Diagnosis: Chronic right shoulder pain [M25.511, G89.29]    Post-Operative Diagnosis: Post-Op Diagnosis Codes:     * Chronic right shoulder pain [M25.511, G89.29]    Anesthesia: General    Technical Procedures Used:  Shoulder arthroscopy right    Description of the Findings of the Procedure: DATE OF PROCEDURE:   11/20/2020     PREOPERATIVE DIAGNOSES: right shoulder impingement tendinosis with partial tear rotator cuff     POSTOPERATIVE DIAGNOSES:right shoulder impingement tendinosis with partial tear     OPERATIVE PROCEDURES: right shoulder arthroscopy with subacromial decompression and debridement of partial tear rotator cuff     SURGEON:  Jens Han Jr., MJOHNATHAN     FIRST ASSISTANT:  Ellen Contreras.     ANESTHESIA:  General endotracheal.     ESTIMATED BLOOD LOSS:  Minimal.     COMPLICATIONS:  None.     SPECIMENS:  None.     BRIEF INDICATIONS:  A 50-year-old female with impingement of the shoulder, possible partial tear, taken to surgery for the above procedure.     OPERATIVE PROCEDURE IN DETAIL:  After operative consent was obtained, the   patient brought to the Operating Room, placed supine on the operating room   table.  Anesthesia by GET method was performed by the Anesthesia staff.  After   the patient was asleep, carefully turned to lateral decubitus position   stabilized on the bean bag, theright shoulder then prepped and draped out in the normal sterile fashion suspended longitudinal traction 12 pounds.     Posterolateral stab incision was made with a #15 blade and the scope inserted in   the left shoulder joint.  Diagnostic arthroscopy showed a partial tear on the   bursal side less than 50%.  The scope was placed into the subacromial space  and   a lateral portal was created with a #15 blade and a sucker shaver inserted   laterally and a complete bursectomy performed including removal of the CA   ligament, which was very tight and thickened.  The rotator cuff was visualized   and noted to have a partial tear less than 50%.  This was just debrided   carefully.  The bur was then brought in laterally and an acromioplasty was   performed from lateral to medial, decompressing the subacromial space all the   way to the AC joint.  The distal clavicle had some mild degeneration and it was   co-planed slightly.  After smoothing all bony surfaces, the rotator cuff was   visualized again noted to be intact with excellent decompression of the space.    The instruments were then removed from the joint.  Excess fluid and debris   evacuated from the joint.  The portals then closed using interrupted 3-0 nylon   suture on the skin.  Sterile dressing applied followed by a sling.  The patient   extubated and brought to Recovery Room in stable condition.  All sponge and   needle counts reported as correct.  No complications.           Significant Surgical Tasks Conducted by the Assistant(s), if Applicable: scope    Complications: No    Estimated Blood Loss (EBL): * No values recorded between 11/20/2020 11:03 AM and 11/20/2020 11:59 AM *           Implants: * No implants in log *    Specimens:   Specimen (12h ago, onward)    None                  Condition: Good    Disposition: PACU - hemodynamically stable.    Attestation: I was present and scrubbed for the entire procedure.    Discharge Note    OUTCOME: Patient tolerated treatment/procedure well without complication and is now ready for discharge.    DISPOSITION: Home or Self Care    FINAL DIAGNOSIS:  Traumatic incomplete tear of right rotator cuff    FOLLOWUP: In clinic    DISCHARGE INSTRUCTIONS:    Discharge Procedure Orders   Diet general     Call MD for:  temperature >100.4     Call MD for:  persistent nausea and  vomiting     Call MD for:  severe uncontrolled pain     Ice to affected area   Order Comments: using barrier between ice and skin (specify duration&frequency)     Remove dressing in 24 hours

## 2020-11-20 NOTE — OP NOTE
Certification of Assistant at Surgery       Surgery Date: 11/20/2020     Participating Surgeons:  Surgeon(s) and Role:     * Jens Han Jr., MD - Primary    Procedures:  Procedure(s) (LRB):  REPAIR, ROTATOR CUFF, ARTHROSCOPIC (Right)    Assistant Surgeon's Certification of Necessity:  I understand that section 1842 (b) (6) (d) of the Social Security Act generally prohibits Medicare Part B reasonable charge payment for the services of assistants at surgery in teaching hospitals when qualified residents are available to furnish such services. I certify that the services for which payment is claimed were medically necessary, and that no qualified resident was available to perform the services. I further understand that these services are subject to post-payment review by the Medicare carrier.    **  Ellen Contreras PA-C    11/20/2020  2:54 PM

## 2020-11-24 ENCOUNTER — TELEPHONE (OUTPATIENT)
Dept: ORTHOPEDICS | Facility: CLINIC | Age: 50
End: 2020-11-24

## 2020-12-01 ENCOUNTER — OFFICE VISIT (OUTPATIENT)
Dept: ORTHOPEDICS | Facility: CLINIC | Age: 50
End: 2020-12-01
Payer: OTHER GOVERNMENT

## 2020-12-01 VITALS
HEIGHT: 69 IN | HEART RATE: 107 BPM | BODY MASS INDEX: 29.61 KG/M2 | SYSTOLIC BLOOD PRESSURE: 132 MMHG | WEIGHT: 199.94 LBS | DIASTOLIC BLOOD PRESSURE: 91 MMHG

## 2020-12-01 DIAGNOSIS — Z98.890 S/P ROTATOR CUFF SURGERY: Primary | ICD-10-CM

## 2020-12-01 PROCEDURE — 99024 POSTOP FOLLOW-UP VISIT: CPT | Mod: S$GLB,,, | Performed by: ORTHOPAEDIC SURGERY

## 2020-12-01 PROCEDURE — 99024 PR POST-OP FOLLOW-UP VISIT: ICD-10-PCS | Mod: S$GLB,,, | Performed by: ORTHOPAEDIC SURGERY

## 2020-12-01 PROCEDURE — 99999 PR PBB SHADOW E&M-EST. PATIENT-LVL III: CPT | Mod: PBBFAC,,, | Performed by: ORTHOPAEDIC SURGERY

## 2020-12-01 PROCEDURE — 99999 PR PBB SHADOW E&M-EST. PATIENT-LVL III: ICD-10-PCS | Mod: PBBFAC,,, | Performed by: ORTHOPAEDIC SURGERY

## 2020-12-01 NOTE — LETTER
December 1, 2020      Jens Han Jr., MD  [address]  [place] [address]           [facility]  [address]  [facility] [address]  Phone: [phone]          Patient: Celia Alford   MR Number: [id number]   YOB: 1970   Date of Visit: 12/1/2020       Dear Dr. Jens Han Jr.:    Thank you for referring Celia Alford to me for evaluation. Attached you will find relevant portions of my assessment and plan of care.    If you have questions, please do not hesitate to call me. I look forward to following Celia Alford along with you.    Sincerely,    Ellen Contreras PA-C    Enclosure  CC:  No Recipients    If you would like to receive this communication electronically, please contact [email or web address] or [phone] to request more information on [employer] access.    For providers and/or their staff who would like to refer a patient to [facility], please contact us through our one-stop-shop provider referral line, [facility], at [phone].    If you feel you have received this communication in error or would no longer like to receive these types of communications, please e-mail [email or web address]

## 2020-12-01 NOTE — PROGRESS NOTES
"Subjective:      Patient ID: Celia Alford is a 50 y.o. female.    Chief Complaint: Post-op Evaluation (right shoulder)      HPI: Celia Alford is here for a PO visit. She is approximately 2 weeks s/p right shoulder arthroscopy with subacromial decompression and debridement of partial rotator cuff tear. She reports pain is tolerable with naproxen as needed. She has been complaint with the sling for activities. PO Complaints include: none.    History reviewed. No pertinent past medical history.    Current Outpatient Medications:     hydrocortisone butyrate 0.1 % Crea cream, 1 APPLICATION 2 TIMES A WEEK TOPICALLY 30 DAYS, Disp: , Rfl:     ketoconazole (NIZORAL) 2 % cream, 1 APPLICATION ONCE A DAY TOPICALLY 30 DAYS, Disp: , Rfl:     medroxyPROGESTERone (DEPO-PROVERA) 150 mg/mL Syrg, Inject 1 mL (150 mg total) into the muscle every 3 (three) months., Disp: 1 Syringe, Rfl: 3    naproxen (NAPROSYN) 500 MG tablet, Take 1 tablet (500 mg total) by mouth 2 (two) times daily with meals., Disp: 60 tablet, Rfl: 1    oxyCODONE-acetaminophen (PERCOCET) 7.5-325 mg per tablet, Take 1 tablet by mouth every 4 (four) hours as needed for Pain., Disp: 40 tablet, Rfl: 0    pimecrolimus (ELIDEL) 1 % cream, 1 APPLICATION TWICE A DAY TOPICALLY 30 DAYS, Disp: , Rfl:     tiZANidine (ZANAFLEX) 4 MG tablet, TAKE 1 TABLET (4 MG TOTAL) BY MOUTH 2 (TWO) TIMES DAILY AS NEEDED., Disp: 60 tablet, Rfl: 1    traMADoL (ULTRAM) 50 mg tablet, Take 1 tablet (50 mg total) by mouth every 6 (six) hours as needed for Pain., Disp: 40 tablet, Rfl: 0  Review of patient's allergies indicates:   Allergen Reactions    Pcn [penicillins] Hives and Rash       BP (!) 132/91 (BP Location: Left arm, Patient Position: Sitting, BP Method: Medium (Automatic))   Pulse 107   Ht 5' 9" (1.753 m)   Wt 90.7 kg (199 lb 15.3 oz)   BMI 29.53 kg/m²     Review of Systems   Constitution: Negative for chills and fever.   Cardiovascular: Negative for chest pain " and palpitations.   Respiratory: Negative for shortness of breath and wheezing.    Skin: Negative for poor wound healing and rash.   Musculoskeletal: Positive for joint pain and stiffness.   Gastrointestinal: Negative for nausea and vomiting.   Genitourinary: Negative for dysuria and hematuria.   Neurological: Negative for numbness, paresthesias, seizures and tremors.   Psychiatric/Behavioral: Negative for altered mental status.   Allergic/Immunologic: Negative for environmental allergies and persistent infections.         Objective:    Ortho Exam       Right shoulder  Skin: No rashes or lesions on exposed areas. 2 small incisions with sutures in place. Wound margins are well approximated. No sign of infection.  Atrophy: none noted.  Tenderness to palpation: None.  PROM (deg): abduction-90, flexion-130, rotation- unrestricted, painful rotation- absent.  Cross arm adduction test- equivocal.  Instability testing: negative.   Distal neuro: normal, no muscle wasting or atrophy.  Pulses: Positive peripheral pulses..  GEN: Well developed, well nourished female. AAOX3. No acute distress.   Normocephalic, atraumatic.   VERO  Breathing unlabored.  Mood and affect appropriate.     Assessment:     Imaging: No new        1. S/P rotator cuff surgery          Plan:       Orders Placed This Encounter    Ambulatory referral/consult to Physical/Occupational Therapy      I explained the natural post op recovery.   Sutures removed and wound care explained.   Start PT.  Encouraged pendulum and ROM exercises.  May wean out of sling.   No heavy lifting.   Return to work, light duty     Follow up in about 3 weeks (around 12/22/2020).

## 2020-12-08 ENCOUNTER — CLINICAL SUPPORT (OUTPATIENT)
Dept: REHABILITATION | Facility: HOSPITAL | Age: 50
End: 2020-12-08
Payer: OTHER GOVERNMENT

## 2020-12-08 DIAGNOSIS — M25.511 CHRONIC RIGHT SHOULDER PAIN: ICD-10-CM

## 2020-12-08 DIAGNOSIS — G89.29 CHRONIC RIGHT SHOULDER PAIN: ICD-10-CM

## 2020-12-08 DIAGNOSIS — Z98.890 S/P ROTATOR CUFF SURGERY: ICD-10-CM

## 2020-12-08 PROCEDURE — 97161 PT EVAL LOW COMPLEX 20 MIN: CPT | Mod: PO

## 2020-12-08 PROCEDURE — 97530 THERAPEUTIC ACTIVITIES: CPT | Mod: PO

## 2020-12-08 NOTE — PROGRESS NOTES
[facility]  Physical Therapy Initial Evaluation    Date: 12/8/2020   Name: Celia Alford  Clinic Number: [id number]    Therapy Diagnosis:   Encounter Diagnoses   Name Primary?    S/P rotator cuff surgery     Chronic right shoulder pain      Physician: Ellen Contreras PA-C    Physician Orders: PT Eval and Treat   Medical Diagnosis from Referral: S/P rotator cuff surgery  Evaluation Date: 12/8/2020  Authorization Period Expiration: 3/30/2021  Plan of Care Expiration: 2/7/20201  Visit # / Visits authorized: 1/ 24    Time In: 9:45  Time Out: 10:30  Total Appointment Time (timed & untimed codes): 45 minutes    Precautions: Standard and R rotator cuff repair 11/20/2020    Subjective   Date of onset: R rotator cuff surgery 11/20/2020 (shoulder decompression and tendon debridement)   History of current condition - Celia reports: Pt fell at work in July of 2019 injuring her R shoulder     Medical History:   No past medical history on file.    Surgical History:   Celia Alford  has a past surgical history that includes Tonsillectomy and Arthroscopic repair of rotator cuff of shoulder (Right, 11/20/2020).    Medications:   Celia has a current medication list which includes the following prescription(s): hydrocortisone butyrate, ketoconazole, medroxyprogesterone, naproxen, oxycodone-acetaminophen, pimecrolimus, tizanidine, and tramadol.    Allergies:   Review of patient's allergies indicates:   Allergen Reactions    Pcn [penicillins] Hives and Rash        Imaging, MRI studies:     Prior Therapy: yes  Social History: Pt lives with their family  Occupation: lead [employer]  Prior Level of Function: independent in ADL's prior to her fall in July of 2019  Current Level of Function: difficult to dress and bath.  Not able to lift over 10#.    Pain:  Current 6/10, worst 8/10, best 6/10   Location: right shoulder    Description: Burning  Aggravating Factors: movement  Easing  Factors: rest    Patients goals: to get her R arm back to 100%    Objective     Observation: Pt entered the clinic guarding her R UE    Posture: rounded shoulders      Passive Range of Motion:   Shoulder Left Right   Flexion 170 30   Abduction 165 40   ER at 0 nt 14   ER at 90 90 nt   IR 50 To abdomin        Upper Extremity Strength   (L) UE (R) UE   Shoulder flexion: 4+/5 nt   Shoulder Abduction: 4/5 nt   Shoulder ER 4+/5 3-/5   Shoulder IR 5/5 4/5   Lower Trap nt nt   Middle Trap nt nt   Rhomboids nt nt         Joint Mobility: Hypomobile R GH joint    Palpation: R shoulder joint line    Sensation: intact (pt reports intermittent numbnenss)    Flexibility: not tested      TREATMENT   Treatment Time In: 10:10  Treatment Time Out: 10:30  Total Treatment time (time-based codes) separate from Evaluation: 20 minutes    Celia received therapeutic exercises to develop strength, ROM, posture and core stabilization for 15 minutes including:  Codman's forward and back  Passive R shoulder ER  Scapular retractions    Celia received the following manual therapy techniques: Joint mobilizations were applied to the: R GH joint for 5 minutes, including:  Passive mobilization R GH joint      Home Exercises and Patient Education Provided    Education provided:   - yes    Written Home Exercises Provided: yes.  Exercises were reviewed and Celia was able to demonstrate them prior to the end of the session.  Celia demonstrated good  understanding of the education provided.     See EMR under Patient Instructions for exercises provided 12/8/2020.    Assessment   Celia is a 50 y.o. female referred to outpatient Physical Therapy with a medical diagnosis of S/P rotator cuff surgery. Patient presents with limited R shoulder ROM and reported pain with R UE movements    Patient prognosis is Good.   Patientt will benefit from skilled outpatient Physical Therapy to address the deficits stated above and in the chart below, provide  patient /family education, and to maximize patientt's level of independence.     Plan of care discussed with patient: Yes  Patient's spiritual, cultural and educational needs considered and patient is agreeable to the plan of care and goals as stated below:     Anticipated Barriers for therapy: scheduling    Medical Necessity is demonstrated by the following  History  Co-morbidities and personal factors that may impact the plan of care Co-morbidities:   none    Personal Factors:   original R shoulder injury in July of 2019     low   Examination  Body Structures and Functions, activity limitations and participation restrictions that may impact the plan of care Body Regions:   neck  upper extremities  trunk    Body Systems:    ROM  strength    Participation Restrictions:   Overhead work with R UE    Activity limitations:   Learning and applying knowledge  no deficits    General Tasks and Commands  no deficits    Communication  no deficits    Mobility  lifting and carrying objects    Self care  washing oneself (bathing, drying, washing hands)  dressing    Domestic Life  doing house work (cleaning house, washing dishes, laundry)    Interactions/Relationships  no deficits    Life Areas  no deficits    Community and Social Life  recreation and leisure         low   Clinical Presentation stable and uncomplicated low   Decision Making/ Complexity Score: low     Goals:  Short Term Goals: 3 weeks   1. Pt will be instructed in an exercise program to address R shoulder ROM and R UE functional deficits  2. Improve R shoulder flexion to 110 degrees.  3. Improve R shoulder abduction to 90 degrees.  4. Improve R shoulder external rotation to 45 degrees at 0 degrees of abduction.    Long Term Goals: 12 weeks   1. Pt will be independent in a HEP to further improve R shoulder ROM, R shoulder girdle strength and function.  2. Improve R shoulder flexion to 165 degrees.  3. Improve R shoulder abduction to 160 degrees.  4. Improve R  shoulder external rotation to 80 degrees at 90 degrees of abduction.  5. Pt will report no difficulty with dressing or bathing  6. Pt will report that she is independent in all ADL's and work related activities.     Plan   Plan of care Certification: 12/8/2020 to 2/5/2021.    Outpatient Physical Therapy 2 times weekly for 10 weeks to include the following interventions: Electrical Stimulation as indicated, Manual Therapy, Moist Heat/ Ice, Neuromuscular Re-ed, Patient Education, Self Care, Therapeutic Activites, Therapeutic Exercise and Dry needling.     Sudhakar Santiago, PT

## 2020-12-10 ENCOUNTER — CLINICAL SUPPORT (OUTPATIENT)
Dept: REHABILITATION | Facility: HOSPITAL | Age: 50
End: 2020-12-10
Payer: OTHER GOVERNMENT

## 2020-12-10 DIAGNOSIS — M25.511 RIGHT SHOULDER PAIN, UNSPECIFIED CHRONICITY: ICD-10-CM

## 2020-12-10 PROCEDURE — 97140 MANUAL THERAPY 1/> REGIONS: CPT | Mod: PO

## 2020-12-10 NOTE — PROGRESS NOTES
Physical Therapy Treatment Note     Name: Celia Alford  Clinic Number: [id number]    Therapy Diagnosis:   Encounter Diagnosis   Name Primary?    Right shoulder pain, unspecified chronicity      Physician: Ellen Contreras PA-C    Visit Date: 12/10/2020     Physician Orders: PT Eval and Treat   Medical Diagnosis from Referral: S/P rotator cuff surgery  Evaluation Date: 12/8/2020  Authorization Period Expiration: 3/30/2021  Plan of Care Expiration: 2/7/20201  Visit # / Visits authorized: 1/ 24     Time In: 11:05  Time Out: 11:45  Total Appointment Time (timed & untimed codes): 35 minutes     Precautions: Standard and R rotator cuff repair 11/20/2020    Subjective     Pt reports: that she has been wearing her sling so that people will not bump into her arm, but that her shoulder seems to hurt more in the sling.  She was compliant with home exercise program.  Response to previous treatment: pt was sore following her initial evaluation.  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    Pain: 5/10  Location: right shoulder      Objective      Celia received therapeutic exercises to develop strength, posture and core stabilization for 5 minutes including:  Scapular retractions - reviewed   Pendulum - reviewed    Celia received the following manual therapy techniques: Joint mobilizations and ROM were applied to the: R shoulder for 25 minutes, including:  Passive mobilization R GH joint grade I - II  PROM R shoulder as tolerated    Celia received hot pack for 10 minutes to R shoulder following Tx.          Home Exercises Provided and Patient Education Provided     Education provided:   - continue with HEP    Written Home Exercises Provided: Patient instructed to cont prior HEP.  Exercises were reviewed and Celia was able to demonstrate them prior to the end of the session.  Celia demonstrated good  understanding of the education provided.     See EMR  under Patient Instructions for exercises provided prior visit.    Assessment     Pt in her sling today so that incase someone were to bump into her at work.  Pt remains very limited with R shoulder ROM   Celia is progressing well towards her goals.   Pt prognosis is Good.     Pt will continue to benefit from skilled outpatient physical therapy to address the deficits listed in the problem list box on initial evaluation, provide pt/family education and to maximize pt's level of independence in the home and community environment.     Pt's spiritual, cultural and educational needs considered and pt agreeable to plan of care and goals.     Anticipated barriers to physical therapy: none    Goals:   Short Term Goals: 3 weeks   1. Pt will be instructed in an exercise program to address R shoulder ROM and R UE functional deficits  2. Improve R shoulder flexion to 110 degrees.  3. Improve R shoulder abduction to 90 degrees.  4. Improve R shoulder external rotation to 45 degrees at 0 degrees of abduction.     Long Term Goals: 12 weeks   1. Pt will be independent in a HEP to further improve R shoulder ROM, R shoulder girdle strength and function.  2. Improve R shoulder flexion to 165 degrees.  3. Improve R shoulder abduction to 160 degrees.  4. Improve R shoulder external rotation to 80 degrees at 90 degrees of abduction.  5. Pt will report no difficulty with dressing or bathing  6. Pt will report that she is independent in all ADL's and work related activities.   Plan     Continue Physical Therapy to improve R shoulder ROM, R shoulder girdle muscle strength and function.    Sudhakar Santiago, PT

## 2020-12-10 NOTE — PLAN OF CARE
[facility]  Physical Therapy Initial Evaluation    Date: 12/8/2020   Name: Celia Alford  Clinic Number: [id number]    Therapy Diagnosis:   Encounter Diagnoses   Name Primary?    S/P rotator cuff surgery     Chronic right shoulder pain      Physician: Ellen Contreras PA-C    Physician Orders: PT Eval and Treat   Medical Diagnosis from Referral: S/P rotator cuff surgery  Evaluation Date: 12/8/2020  Authorization Period Expiration: 3/30/2021  Plan of Care Expiration: 2/7/20201  Visit # / Visits authorized: 1/ 24    Time In: 9:45  Time Out: 10:30  Total Appointment Time (timed & untimed codes): 45 minutes    Precautions: Standard and R rotator cuff repair 11/20/2020    Subjective   Date of onset: R rotator cuff surgery 11/20/2020 (shoulder decompression and tendon debridement)   History of current condition - Celia reports: Pt fell at work in July of 2019 injuring her R shoulder     Medical History:   No past medical history on file.    Surgical History:   Celia Alford  has a past surgical history that includes Tonsillectomy and Arthroscopic repair of rotator cuff of shoulder (Right, 11/20/2020).    Medications:   Celia has a current medication list which includes the following prescription(s): hydrocortisone butyrate, ketoconazole, medroxyprogesterone, naproxen, oxycodone-acetaminophen, pimecrolimus, tizanidine, and tramadol.    Allergies:   Review of patient's allergies indicates:   Allergen Reactions    Pcn [penicillins] Hives and Rash        Imaging, MRI studies:     Prior Therapy: yes  Social History: Pt lives with their family  Occupation: lead [employer]  Prior Level of Function: independent in ADL's prior to her fall in July of 2019  Current Level of Function: difficult to dress and bath.  Not able to lift over 10#.    Pain:  Current 6/10, worst 8/10, best 6/10   Location: right shoulder    Description: Burning  Aggravating Factors: movement  Easing  Factors: rest    Patients goals: to get her R arm back to 100%    Objective     Observation: Pt entered the clinic guarding her R UE    Posture: rounded shoulders      Passive Range of Motion:   Shoulder Left Right   Flexion 170 30   Abduction 165 40   ER at 0 nt 14   ER at 90 90 nt   IR 50 To abdomin        Upper Extremity Strength   (L) UE (R) UE   Shoulder flexion: 4+/5 nt   Shoulder Abduction: 4/5 nt   Shoulder ER 4+/5 3-/5   Shoulder IR 5/5 4/5   Lower Trap nt nt   Middle Trap nt nt   Rhomboids nt nt         Joint Mobility: Hypomobile R GH joint    Palpation: R shoulder joint line    Sensation: intact (pt reports intermittent numbnenss)    Flexibility: not tested      TREATMENT   Treatment Time In: 10:10  Treatment Time Out: 10:30  Total Treatment time (time-based codes) separate from Evaluation: 20 minutes    Celia received therapeutic exercises to develop strength, ROM, posture and core stabilization for 15 minutes including:  Codman's forward and back  Passive R shoulder ER  Scapular retractions    Celia received the following manual therapy techniques: Joint mobilizations were applied to the: R GH joint for 5 minutes, including:  Passive mobilization R GH joint      Home Exercises and Patient Education Provided    Education provided:   - yes    Written Home Exercises Provided: yes.  Exercises were reviewed and Celia was able to demonstrate them prior to the end of the session.  Celia demonstrated good  understanding of the education provided.     See EMR under Patient Instructions for exercises provided 12/8/2020.    Assessment   Celia is a 50 y.o. female referred to outpatient Physical Therapy with a medical diagnosis of S/P rotator cuff surgery. Patient presents with limited R shoulder ROM and reported pain with R UE movements    Patient prognosis is Good.   Patientt will benefit from skilled outpatient Physical Therapy to address the deficits stated above and in the chart below, provide  patient /family education, and to maximize patientt's level of independence.     Plan of care discussed with patient: Yes  Patient's spiritual, cultural and educational needs considered and patient is agreeable to the plan of care and goals as stated below:     Anticipated Barriers for therapy: scheduling    Medical Necessity is demonstrated by the following  History  Co-morbidities and personal factors that may impact the plan of care Co-morbidities:   none    Personal Factors:   original R shoulder injury in July of 2019     low   Examination  Body Structures and Functions, activity limitations and participation restrictions that may impact the plan of care Body Regions:   neck  upper extremities  trunk    Body Systems:    ROM  strength    Participation Restrictions:   Overhead work with R UE    Activity limitations:   Learning and applying knowledge  no deficits    General Tasks and Commands  no deficits    Communication  no deficits    Mobility  lifting and carrying objects    Self care  washing oneself (bathing, drying, washing hands)  dressing    Domestic Life  doing house work (cleaning house, washing dishes, laundry)    Interactions/Relationships  no deficits    Life Areas  no deficits    Community and Social Life  recreation and leisure         low   Clinical Presentation stable and uncomplicated low   Decision Making/ Complexity Score: low     Goals:  Short Term Goals: 3 weeks   1. Pt will be instructed in an exercise program to address R shoulder ROM and R UE functional deficits  2. Improve R shoulder flexion to 110 degrees.  3. Improve R shoulder abduction to 90 degrees.  4. Improve R shoulder external rotation to 45 degrees at 0 degrees of abduction.    Long Term Goals: 12 weeks   1. Pt will be independent in a HEP to further improve R shoulder ROM, R shoulder girdle strength and function.  2. Improve R shoulder flexion to 165 degrees.  3. Improve R shoulder abduction to 160 degrees.  4. Improve R  shoulder external rotation to 80 degrees at 90 degrees of abduction.  5. Pt will report no difficulty with dressing or bathing  6. Pt will report that she is independent in all ADL's and work related activities.     Plan   Plan of care Certification: 12/8/2020 to 2/5/2021.    Outpatient Physical Therapy 2 times weekly for 10 weeks to include the following interventions: Electrical Stimulation as indicated, Manual Therapy, Moist Heat/ Ice, Neuromuscular Re-ed, Patient Education, Self Care, Therapeutic Activites, Therapeutic Exercise and Dry needling.     Sudhakar Santiago, PT

## 2020-12-14 ENCOUNTER — CLINICAL SUPPORT (OUTPATIENT)
Dept: REHABILITATION | Facility: HOSPITAL | Age: 50
End: 2020-12-14
Payer: OTHER GOVERNMENT

## 2020-12-14 DIAGNOSIS — M25.511 RIGHT SHOULDER PAIN, UNSPECIFIED CHRONICITY: ICD-10-CM

## 2020-12-14 PROCEDURE — 97110 THERAPEUTIC EXERCISES: CPT | Mod: PO

## 2020-12-14 NOTE — PROGRESS NOTES
Physical Therapy Treatment Note     Name: Celia Hernandez shawn  Clinic Number: [id number]    Therapy Diagnosis:   Encounter Diagnosis   Name Primary?    Right shoulder pain, unspecified chronicity         Physician: Ellen Contreras PA-C    Visit Date: 12/14/2020     Physician Orders: PT Eval and Treat   Medical Diagnosis from Referral: S/P rotator cuff surgery  Evaluation Date: 12/8/2020  Authorization Period Expiration: 3/30/2021  Plan of Care Expiration: 2/7/20201  Visit # / Visits authorized: 3/ 24     Time In: 9:55  Time Out: 10:40  Total Appointment Time (timed & untimed codes): 45 minutes     Precautions: Standard and R rotator cuff repair 11/20/2020    Subjective     Pt reports: Her shoulder has been ok, is not hurting worse after therapy. Her arm feels tight.   She was compliant with home exercise program.  Response to previous treatment: pt reports her arm felt ok after therapy, no more pain than before.  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    Pain: 5/10  Location: right shoulder      Objective      Celia received therapeutic exercises to develop strength, posture and core stabilization for 10 minutes including:  Scapular retractions - reviewed and performed   Pendulum - reviewed  Ball squeezes R side 3x10   Elbow flexion/ extension 2x10  Wrist AROM flexion/ extension 3x10         Celia received the following manual therapy techniques: Joint mobilizations and ROM were applied to the: R shoulder for 25 minutes, including:  Passive mobilization R GH joint grade I - II- discontinued due to pt tolerance   PROM R shoulder as tolerated- Flexion, ABD  Elbow PROM           Celia received hot pack for 10 minutes to R shoulder following Tx.          Home Exercises Provided and Patient Education Provided     Education provided:   - continue with HEP, follow protocol with using sling    Written Home Exercises Provided: Patient instructed to cont  prior HEP.  Exercises were reviewed and Celia was able to demonstrate them prior to the end of the session.  Celia demonstrated good  understanding of the education provided.     See EMR under Patient Instructions for exercises provided prior visit.    Assessment     Pt tolerated treatment fair today. She did not tolerate any palpation of shoulder joint. Pt remains very limited with R shoulder ROM.   Celia is progressing well towards her goals.   Pt prognosis is Good.     Pt will continue to benefit from skilled outpatient physical therapy to address the deficits listed in the problem list box on initial evaluation, provide pt/family education and to maximize pt's level of independence in the home and community environment.     Pt's spiritual, cultural and educational needs considered and pt agreeable to plan of care and goals.     Anticipated barriers to physical therapy: none    Goals:   Short Term Goals: 3 weeks   1. Pt will be instructed in an exercise program to address R shoulder ROM and R UE functional deficits  2. Improve R shoulder flexion to 110 degrees.  3. Improve R shoulder abduction to 90 degrees.  4. Improve R shoulder external rotation to 45 degrees at 0 degrees of abduction.     Long Term Goals: 12 weeks   1. Pt will be independent in a HEP to further improve R shoulder ROM, R shoulder girdle strength and function.  2. Improve R shoulder flexion to 165 degrees.  3. Improve R shoulder abduction to 160 degrees.  4. Improve R shoulder external rotation to 80 degrees at 90 degrees of abduction.  5. Pt will report no difficulty with dressing or bathing  6. Pt will report that she is independent in all ADL's and work related activities.   Plan     Continue Physical Therapy to improve R shoulder ROM, R shoulder girdle muscle strength and function.    Leandra Urbina, PT

## 2020-12-28 ENCOUNTER — CLINICAL SUPPORT (OUTPATIENT)
Dept: REHABILITATION | Facility: HOSPITAL | Age: 50
End: 2020-12-28
Payer: OTHER GOVERNMENT

## 2020-12-28 DIAGNOSIS — M25.511 ACUTE PAIN OF RIGHT SHOULDER: ICD-10-CM

## 2020-12-28 PROCEDURE — 97140 MANUAL THERAPY 1/> REGIONS: CPT | Mod: PO

## 2020-12-28 PROCEDURE — 97110 THERAPEUTIC EXERCISES: CPT | Mod: PO

## 2020-12-28 NOTE — PROGRESS NOTES
"  Physical Therapy Treatment Note     Name: Celia Hernandez shawn  Clinic Number: [id number]    Therapy Diagnosis:   Encounter Diagnosis   Name Primary?    Right shoulder pain, unspecified chronicity         Physician: Ellen Contreras PA-C    Visit Date: 12/28/2020     Physician Orders: PT Eval and Treat   Medical Diagnosis from Referral: S/P rotator cuff surgery  Evaluation Date: 12/8/2020  Authorization Period Expiration: 3/30/2021  Plan of Care Expiration: 2/7/20201  Visit # / Visits authorized: 4/ 24     Time In: 1040, pt arrived 10 mins late for session  Time Out: 1115  Total Appointment Time (timed & untimed codes): 35 minutes     Precautions: Standard and R rotator cuff repair 11/20/2020    Subjective     Pt reports: Her shoulder just feels tight.     She was compliant with home exercise program.  Response to previous treatment: pt reports her arm felt ok after therapy, no more pain than before.  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    Pain: 3/10  Location: right shoulder      Objective      Celia received therapeutic exercises to develop strength, posture and core stabilization for 8 minutes including:  Scapular retractions - 10"x30  Pendulum - NP, on HEP    Elbow flexion/ extension 2x10  Wrist AROM flexion/ extension 3x10     Celia received the following manual therapy techniques: Joint mobilizations and ROM were applied to the: R shoulder for 23 minutes, including:  Gentle distraction w/grade 1-2 oscillation for R GH joint   PROM R shoulder as tolerated- Flexion, ABD  STM and passive stretching to R upper trap, B levator scap insertions    Celia received hot pack for 00 minutes to R shoulder following Tx.          Home Exercises Provided and Patient Education Provided     Education provided:   - continue with HEP, follow protocol with using sling    Written Home Exercises Provided: Patient instructed to cont prior HEP.  Exercises were " reviewed and Celia was able to demonstrate them prior to the end of the session.  Celia demonstrated good  understanding of the education provided.     See EMR under Patient Instructions for exercises provided prior visit.    Assessment     Pt with appropriate tightness during MT following her RC procedure. She required cueing to relax her arm to allow for increased PROM. Noted comfort with MT to neck and scapular tissue.   Advised pt to apply cold pack to her shoulder for ten mins later today.     Celia is progressing well towards her goals.   Pt prognosis is Good.     Pt will continue to benefit from skilled outpatient physical therapy to address the deficits listed in the problem list box on initial evaluation, provide pt/family education and to maximize pt's level of independence in the home and community environment.     Pt's spiritual, cultural and educational needs considered and pt agreeable to plan of care and goals.     Anticipated barriers to physical therapy: none    Goals:   Short Term Goals: 3 weeks   1. Pt will be instructed in an exercise program to address R shoulder ROM and R UE functional deficits  2. Improve R shoulder flexion to 110 degrees.  3. Improve R shoulder abduction to 90 degrees.  4. Improve R shoulder external rotation to 45 degrees at 0 degrees of abduction.     Long Term Goals: 12 weeks   1. Pt will be independent in a HEP to further improve R shoulder ROM, R shoulder girdle strength and function.  2. Improve R shoulder flexion to 165 degrees.  3. Improve R shoulder abduction to 160 degrees.  4. Improve R shoulder external rotation to 80 degrees at 90 degrees of abduction.  5. Pt will report no difficulty with dressing or bathing  6. Pt will report that she is independent in all ADL's and work related activities.   Plan     Continue Physical Therapy to improve R shoulder ROM, R shoulder girdle muscle strength and function.    Jessie Garcia, PT

## 2020-12-30 ENCOUNTER — PATIENT MESSAGE (OUTPATIENT)
Dept: ORTHOPEDICS | Facility: CLINIC | Age: 50
End: 2020-12-30

## 2021-01-05 ENCOUNTER — CLINICAL SUPPORT (OUTPATIENT)
Dept: REHABILITATION | Facility: HOSPITAL | Age: 51
End: 2021-01-05
Payer: OTHER GOVERNMENT

## 2021-01-05 DIAGNOSIS — M25.511 ACUTE PAIN OF RIGHT SHOULDER: ICD-10-CM

## 2021-01-05 PROCEDURE — 97140 MANUAL THERAPY 1/> REGIONS: CPT | Mod: PO

## 2021-01-05 PROCEDURE — 97110 THERAPEUTIC EXERCISES: CPT | Mod: PO

## 2021-01-05 PROCEDURE — 97530 THERAPEUTIC ACTIVITIES: CPT | Mod: PO

## 2021-01-07 ENCOUNTER — CLINICAL SUPPORT (OUTPATIENT)
Dept: REHABILITATION | Facility: HOSPITAL | Age: 51
End: 2021-01-07
Payer: OTHER GOVERNMENT

## 2021-01-07 DIAGNOSIS — M25.511 ACUTE PAIN OF RIGHT SHOULDER: ICD-10-CM

## 2021-01-07 PROCEDURE — 97110 THERAPEUTIC EXERCISES: CPT | Mod: PO

## 2021-01-07 PROCEDURE — 97140 MANUAL THERAPY 1/> REGIONS: CPT | Mod: PO

## 2021-01-09 ENCOUNTER — OFFICE VISIT (OUTPATIENT)
Dept: URGENT CARE | Facility: CLINIC | Age: 51
End: 2021-01-09
Payer: COMMERCIAL

## 2021-01-09 VITALS
HEIGHT: 69 IN | BODY MASS INDEX: 29.61 KG/M2 | SYSTOLIC BLOOD PRESSURE: 166 MMHG | TEMPERATURE: 98 F | HEART RATE: 87 BPM | WEIGHT: 199.94 LBS | RESPIRATION RATE: 20 BRPM | DIASTOLIC BLOOD PRESSURE: 110 MMHG | OXYGEN SATURATION: 99 %

## 2021-01-09 DIAGNOSIS — R03.0 ELEVATED BLOOD PRESSURE READING IN OFFICE WITHOUT DIAGNOSIS OF HYPERTENSION: ICD-10-CM

## 2021-01-09 DIAGNOSIS — R06.02 SHORTNESS OF BREATH: ICD-10-CM

## 2021-01-09 DIAGNOSIS — R05.9 COUGH: Primary | ICD-10-CM

## 2021-01-09 LAB
CTP QC/QA: YES
SARS-COV-2 RDRP RESP QL NAA+PROBE: NEGATIVE

## 2021-01-09 PROCEDURE — 99214 PR OFFICE/OUTPT VISIT, EST, LEVL IV, 30-39 MIN: ICD-10-PCS | Mod: S$GLB,,, | Performed by: PHYSICIAN ASSISTANT

## 2021-01-09 PROCEDURE — 3008F PR BODY MASS INDEX (BMI) DOCUMENTED: ICD-10-PCS | Mod: CPTII,S$GLB,, | Performed by: PHYSICIAN ASSISTANT

## 2021-01-09 PROCEDURE — U0002: ICD-10-PCS | Mod: QW,S$GLB,, | Performed by: PHYSICIAN ASSISTANT

## 2021-01-09 PROCEDURE — 99214 OFFICE O/P EST MOD 30 MIN: CPT | Mod: S$GLB,,, | Performed by: PHYSICIAN ASSISTANT

## 2021-01-09 PROCEDURE — 3008F BODY MASS INDEX DOCD: CPT | Mod: CPTII,S$GLB,, | Performed by: PHYSICIAN ASSISTANT

## 2021-01-09 PROCEDURE — U0002 COVID-19 LAB TEST NON-CDC: HCPCS | Mod: QW,S$GLB,, | Performed by: PHYSICIAN ASSISTANT

## 2021-01-09 RX ORDER — BENZONATATE 200 MG/1
200 CAPSULE ORAL 3 TIMES DAILY PRN
Qty: 30 CAPSULE | Refills: 0 | Status: SHIPPED | OUTPATIENT
Start: 2021-01-09 | End: 2021-01-19

## 2021-01-09 RX ORDER — PROMETHAZINE HYDROCHLORIDE AND DEXTROMETHORPHAN HYDROBROMIDE 6.25; 15 MG/5ML; MG/5ML
5 SYRUP ORAL NIGHTLY PRN
Qty: 118 ML | Refills: 0 | Status: SHIPPED | OUTPATIENT
Start: 2021-01-09 | End: 2021-01-19

## 2021-01-09 RX ORDER — ALBUTEROL SULFATE 90 UG/1
1-2 AEROSOL, METERED RESPIRATORY (INHALATION) EVERY 6 HOURS PRN
Qty: 18 G | Refills: 0 | Status: SHIPPED | OUTPATIENT
Start: 2021-01-09 | End: 2021-03-29

## 2021-01-11 ENCOUNTER — OFFICE VISIT (OUTPATIENT)
Dept: ORTHOPEDICS | Facility: CLINIC | Age: 51
End: 2021-01-11
Attending: ORTHOPAEDIC SURGERY
Payer: OTHER GOVERNMENT

## 2021-01-11 VITALS — BODY MASS INDEX: 29.47 KG/M2 | WEIGHT: 199 LBS | HEIGHT: 69 IN

## 2021-01-11 DIAGNOSIS — S46.011D TRAUMATIC INCOMPLETE TEAR OF RIGHT ROTATOR CUFF, SUBSEQUENT ENCOUNTER: Primary | ICD-10-CM

## 2021-01-11 DIAGNOSIS — S46.911D STRAIN OF RIGHT SHOULDER, SUBSEQUENT ENCOUNTER: ICD-10-CM

## 2021-01-11 PROCEDURE — 99999 PR PBB SHADOW E&M-EST. PATIENT-LVL III: ICD-10-PCS | Mod: PBBFAC,,, | Performed by: ORTHOPAEDIC SURGERY

## 2021-01-11 PROCEDURE — 99024 POSTOP FOLLOW-UP VISIT: CPT | Mod: S$GLB,,, | Performed by: ORTHOPAEDIC SURGERY

## 2021-01-11 PROCEDURE — 99024 PR POST-OP FOLLOW-UP VISIT: ICD-10-PCS | Mod: S$GLB,,, | Performed by: ORTHOPAEDIC SURGERY

## 2021-01-11 PROCEDURE — 99999 PR PBB SHADOW E&M-EST. PATIENT-LVL III: CPT | Mod: PBBFAC,,, | Performed by: ORTHOPAEDIC SURGERY

## 2021-01-11 RX ORDER — NAPROXEN 500 MG/1
500 TABLET ORAL 2 TIMES DAILY WITH MEALS
Qty: 60 TABLET | Refills: 1 | Status: SHIPPED | OUTPATIENT
Start: 2021-01-11 | End: 2021-03-22 | Stop reason: ALTCHOICE

## 2021-01-12 ENCOUNTER — CLINICAL SUPPORT (OUTPATIENT)
Dept: REHABILITATION | Facility: HOSPITAL | Age: 51
End: 2021-01-12
Payer: OTHER GOVERNMENT

## 2021-01-12 DIAGNOSIS — M25.511 ACUTE PAIN OF RIGHT SHOULDER: ICD-10-CM

## 2021-01-12 PROCEDURE — 97140 MANUAL THERAPY 1/> REGIONS: CPT | Mod: PO

## 2021-01-12 PROCEDURE — 97110 THERAPEUTIC EXERCISES: CPT | Mod: PO

## 2021-01-21 ENCOUNTER — CLINICAL SUPPORT (OUTPATIENT)
Dept: REHABILITATION | Facility: HOSPITAL | Age: 51
End: 2021-01-21
Payer: OTHER GOVERNMENT

## 2021-01-21 DIAGNOSIS — M25.511 ACUTE PAIN OF RIGHT SHOULDER: ICD-10-CM

## 2021-01-21 PROCEDURE — 97140 MANUAL THERAPY 1/> REGIONS: CPT | Mod: PO

## 2021-02-04 ENCOUNTER — CLINICAL SUPPORT (OUTPATIENT)
Dept: REHABILITATION | Facility: HOSPITAL | Age: 51
End: 2021-02-04
Payer: COMMERCIAL

## 2021-02-04 DIAGNOSIS — M25.511 ACUTE PAIN OF RIGHT SHOULDER: ICD-10-CM

## 2021-02-04 PROCEDURE — 97110 THERAPEUTIC EXERCISES: CPT | Mod: PO,CQ

## 2021-02-04 PROCEDURE — 97140 MANUAL THERAPY 1/> REGIONS: CPT | Mod: PO,CQ

## 2021-02-14 ENCOUNTER — PATIENT MESSAGE (OUTPATIENT)
Dept: ORTHOPEDICS | Facility: CLINIC | Age: 51
End: 2021-02-14

## 2021-02-18 ENCOUNTER — CLINICAL SUPPORT (OUTPATIENT)
Dept: REHABILITATION | Facility: HOSPITAL | Age: 51
End: 2021-02-18
Payer: COMMERCIAL

## 2021-02-18 DIAGNOSIS — M25.511 ACUTE PAIN OF RIGHT SHOULDER: ICD-10-CM

## 2021-02-18 PROCEDURE — 97140 MANUAL THERAPY 1/> REGIONS: CPT | Mod: PO,CQ

## 2021-03-03 ENCOUNTER — OFFICE VISIT (OUTPATIENT)
Dept: ORTHOPEDICS | Facility: CLINIC | Age: 51
End: 2021-03-03
Attending: ORTHOPAEDIC SURGERY
Payer: OTHER GOVERNMENT

## 2021-03-03 VITALS — BODY MASS INDEX: 29.48 KG/M2 | TEMPERATURE: 98 F | WEIGHT: 199.06 LBS | HEIGHT: 69 IN

## 2021-03-03 DIAGNOSIS — S46.011D TRAUMATIC INCOMPLETE TEAR OF RIGHT ROTATOR CUFF, SUBSEQUENT ENCOUNTER: Primary | ICD-10-CM

## 2021-03-03 PROCEDURE — 99999 PR PBB SHADOW E&M-EST. PATIENT-LVL III: ICD-10-PCS | Mod: PBBFAC,,, | Performed by: ORTHOPAEDIC SURGERY

## 2021-03-03 PROCEDURE — 3008F PR BODY MASS INDEX (BMI) DOCUMENTED: ICD-10-PCS | Mod: CPTII,S$GLB,, | Performed by: ORTHOPAEDIC SURGERY

## 2021-03-03 PROCEDURE — 3008F BODY MASS INDEX DOCD: CPT | Mod: CPTII,S$GLB,, | Performed by: ORTHOPAEDIC SURGERY

## 2021-03-03 PROCEDURE — 1125F AMNT PAIN NOTED PAIN PRSNT: CPT | Mod: S$GLB,,, | Performed by: ORTHOPAEDIC SURGERY

## 2021-03-03 PROCEDURE — 99999 PR PBB SHADOW E&M-EST. PATIENT-LVL III: CPT | Mod: PBBFAC,,, | Performed by: ORTHOPAEDIC SURGERY

## 2021-03-03 PROCEDURE — 1125F PR PAIN SEVERITY QUANTIFIED, PAIN PRESENT: ICD-10-PCS | Mod: S$GLB,,, | Performed by: ORTHOPAEDIC SURGERY

## 2021-03-03 PROCEDURE — 99213 OFFICE O/P EST LOW 20 MIN: CPT | Mod: S$GLB,,, | Performed by: ORTHOPAEDIC SURGERY

## 2021-03-03 PROCEDURE — 99213 PR OFFICE/OUTPT VISIT, EST, LEVL III, 20-29 MIN: ICD-10-PCS | Mod: S$GLB,,, | Performed by: ORTHOPAEDIC SURGERY

## 2021-03-03 RX ORDER — TRAMADOL HYDROCHLORIDE 50 MG/1
50 TABLET ORAL
Qty: 30 TABLET | Refills: 0 | Status: SHIPPED | OUTPATIENT
Start: 2021-03-03 | End: 2021-04-02

## 2021-03-16 ENCOUNTER — PATIENT MESSAGE (OUTPATIENT)
Dept: ORTHOPEDICS | Facility: CLINIC | Age: 51
End: 2021-03-16

## 2021-03-22 ENCOUNTER — OFFICE VISIT (OUTPATIENT)
Dept: URGENT CARE | Facility: CLINIC | Age: 51
End: 2021-03-22
Payer: COMMERCIAL

## 2021-03-22 VITALS
RESPIRATION RATE: 18 BRPM | TEMPERATURE: 99 F | SYSTOLIC BLOOD PRESSURE: 129 MMHG | DIASTOLIC BLOOD PRESSURE: 83 MMHG | BODY MASS INDEX: 29.62 KG/M2 | OXYGEN SATURATION: 97 % | HEIGHT: 69 IN | HEART RATE: 90 BPM | WEIGHT: 200 LBS

## 2021-03-22 DIAGNOSIS — M75.100 TEAR OF ROTATOR CUFF, UNSPECIFIED LATERALITY, UNSPECIFIED TEAR EXTENT, UNSPECIFIED WHETHER TRAUMATIC: Primary | ICD-10-CM

## 2021-03-22 DIAGNOSIS — Z00.00 HEALTH CARE MAINTENANCE: ICD-10-CM

## 2021-03-22 DIAGNOSIS — M54.41 ACUTE RIGHT-SIDED LOW BACK PAIN WITH RIGHT-SIDED SCIATICA: Primary | ICD-10-CM

## 2021-03-22 PROCEDURE — 99214 PR OFFICE/OUTPT VISIT, EST, LEVL IV, 30-39 MIN: ICD-10-PCS | Mod: 25,S$GLB,, | Performed by: NURSE PRACTITIONER

## 2021-03-22 PROCEDURE — 96372 THER/PROPH/DIAG INJ SC/IM: CPT | Mod: S$GLB,,, | Performed by: NURSE PRACTITIONER

## 2021-03-22 PROCEDURE — 96372 PR INJECTION,THERAP/PROPH/DIAG2ST, IM OR SUBCUT: ICD-10-PCS | Mod: S$GLB,,, | Performed by: NURSE PRACTITIONER

## 2021-03-22 PROCEDURE — 3008F BODY MASS INDEX DOCD: CPT | Mod: CPTII,S$GLB,, | Performed by: NURSE PRACTITIONER

## 2021-03-22 PROCEDURE — 3008F PR BODY MASS INDEX (BMI) DOCUMENTED: ICD-10-PCS | Mod: CPTII,S$GLB,, | Performed by: NURSE PRACTITIONER

## 2021-03-22 PROCEDURE — 99214 OFFICE O/P EST MOD 30 MIN: CPT | Mod: 25,S$GLB,, | Performed by: NURSE PRACTITIONER

## 2021-03-22 RX ORDER — IBUPROFEN 600 MG/1
600 TABLET ORAL EVERY 8 HOURS PRN
Qty: 30 TABLET | Refills: 0 | Status: SHIPPED | OUTPATIENT
Start: 2021-03-23 | End: 2021-09-09

## 2021-03-22 RX ORDER — CYCLOBENZAPRINE HCL 10 MG
TABLET ORAL
Qty: 30 TABLET | Refills: 0 | Status: SHIPPED | OUTPATIENT
Start: 2021-03-22 | End: 2021-09-09 | Stop reason: SDUPTHER

## 2021-03-22 RX ORDER — KETOROLAC TROMETHAMINE 30 MG/ML
30 INJECTION, SOLUTION INTRAMUSCULAR; INTRAVENOUS
Status: COMPLETED | OUTPATIENT
Start: 2021-03-22 | End: 2021-03-22

## 2021-03-22 RX ADMIN — KETOROLAC TROMETHAMINE 30 MG: 30 INJECTION, SOLUTION INTRAMUSCULAR; INTRAVENOUS at 11:03

## 2021-03-29 ENCOUNTER — OFFICE VISIT (OUTPATIENT)
Dept: INTERNAL MEDICINE | Facility: CLINIC | Age: 51
End: 2021-03-29
Payer: COMMERCIAL

## 2021-03-29 VITALS
WEIGHT: 215.63 LBS | HEIGHT: 69 IN | OXYGEN SATURATION: 97 % | SYSTOLIC BLOOD PRESSURE: 136 MMHG | BODY MASS INDEX: 31.94 KG/M2 | DIASTOLIC BLOOD PRESSURE: 82 MMHG | HEART RATE: 101 BPM

## 2021-03-29 DIAGNOSIS — M54.41 ACUTE RIGHT-SIDED LOW BACK PAIN WITH RIGHT-SIDED SCIATICA: Primary | ICD-10-CM

## 2021-03-29 PROCEDURE — 3008F BODY MASS INDEX DOCD: CPT | Mod: CPTII,S$GLB,, | Performed by: FAMILY MEDICINE

## 2021-03-29 PROCEDURE — 99999 PR PBB SHADOW E&M-EST. PATIENT-LVL IV: CPT | Mod: PBBFAC,,, | Performed by: FAMILY MEDICINE

## 2021-03-29 PROCEDURE — 99214 OFFICE O/P EST MOD 30 MIN: CPT | Mod: S$GLB,,, | Performed by: FAMILY MEDICINE

## 2021-03-29 PROCEDURE — 99214 PR OFFICE/OUTPT VISIT, EST, LEVL IV, 30-39 MIN: ICD-10-PCS | Mod: S$GLB,,, | Performed by: FAMILY MEDICINE

## 2021-03-29 PROCEDURE — 99999 PR PBB SHADOW E&M-EST. PATIENT-LVL IV: ICD-10-PCS | Mod: PBBFAC,,, | Performed by: FAMILY MEDICINE

## 2021-03-29 PROCEDURE — 1125F PR PAIN SEVERITY QUANTIFIED, PAIN PRESENT: ICD-10-PCS | Mod: S$GLB,,, | Performed by: FAMILY MEDICINE

## 2021-03-29 PROCEDURE — 3008F PR BODY MASS INDEX (BMI) DOCUMENTED: ICD-10-PCS | Mod: CPTII,S$GLB,, | Performed by: FAMILY MEDICINE

## 2021-03-29 PROCEDURE — 1125F AMNT PAIN NOTED PAIN PRSNT: CPT | Mod: S$GLB,,, | Performed by: FAMILY MEDICINE

## 2021-03-30 ENCOUNTER — CLINICAL SUPPORT (OUTPATIENT)
Dept: REHABILITATION | Facility: OTHER | Age: 51
End: 2021-03-30
Attending: ORTHOPAEDIC SURGERY
Payer: OTHER GOVERNMENT

## 2021-03-30 DIAGNOSIS — M75.100 TEAR OF ROTATOR CUFF, UNSPECIFIED LATERALITY, UNSPECIFIED TEAR EXTENT, UNSPECIFIED WHETHER TRAUMATIC: ICD-10-CM

## 2021-03-30 DIAGNOSIS — R29.898 DECREASED STRENGTH OF UPPER EXTREMITY: ICD-10-CM

## 2021-03-30 DIAGNOSIS — M25.611 DECREASED RANGE OF MOTION OF SHOULDER, RIGHT: ICD-10-CM

## 2021-03-30 PROCEDURE — 97110 THERAPEUTIC EXERCISES: CPT | Mod: PN

## 2021-03-30 PROCEDURE — 97161 PT EVAL LOW COMPLEX 20 MIN: CPT | Mod: PN

## 2021-03-31 PROBLEM — M25.611 DECREASED RANGE OF MOTION OF SHOULDER, RIGHT: Status: ACTIVE | Noted: 2021-03-31

## 2021-03-31 PROBLEM — R29.898 DECREASED STRENGTH OF UPPER EXTREMITY: Status: ACTIVE | Noted: 2021-03-31

## 2021-04-01 ENCOUNTER — TELEPHONE (OUTPATIENT)
Dept: INTERNAL MEDICINE | Facility: CLINIC | Age: 51
End: 2021-04-01

## 2021-04-09 ENCOUNTER — CLINICAL SUPPORT (OUTPATIENT)
Dept: REHABILITATION | Facility: OTHER | Age: 51
End: 2021-04-09
Attending: ORTHOPAEDIC SURGERY
Payer: COMMERCIAL

## 2021-04-09 DIAGNOSIS — M25.611 DECREASED RANGE OF MOTION OF SHOULDER, RIGHT: Primary | ICD-10-CM

## 2021-04-09 DIAGNOSIS — R29.898 DECREASED STRENGTH OF UPPER EXTREMITY: ICD-10-CM

## 2021-04-09 PROCEDURE — 97110 THERAPEUTIC EXERCISES: CPT | Mod: PN

## 2021-04-09 PROCEDURE — 97140 MANUAL THERAPY 1/> REGIONS: CPT | Mod: PN

## 2021-04-13 ENCOUNTER — DOCUMENTATION ONLY (OUTPATIENT)
Dept: REHABILITATION | Facility: OTHER | Age: 51
End: 2021-04-13

## 2021-04-14 ENCOUNTER — DOCUMENTATION ONLY (OUTPATIENT)
Dept: REHABILITATION | Facility: OTHER | Age: 51
End: 2021-04-14

## 2021-04-21 ENCOUNTER — PATIENT MESSAGE (OUTPATIENT)
Dept: ORTHOPEDICS | Facility: CLINIC | Age: 51
End: 2021-04-21

## 2021-04-22 ENCOUNTER — PATIENT MESSAGE (OUTPATIENT)
Dept: REHABILITATION | Facility: OTHER | Age: 51
End: 2021-04-22

## 2021-04-22 ENCOUNTER — DOCUMENTATION ONLY (OUTPATIENT)
Dept: REHABILITATION | Facility: OTHER | Age: 51
End: 2021-04-22

## 2021-04-27 ENCOUNTER — PATIENT MESSAGE (OUTPATIENT)
Dept: INTERNAL MEDICINE | Facility: CLINIC | Age: 51
End: 2021-04-27

## 2021-05-18 ENCOUNTER — PATIENT MESSAGE (OUTPATIENT)
Dept: ORTHOPEDICS | Facility: CLINIC | Age: 51
End: 2021-05-18

## 2021-05-19 ENCOUNTER — OFFICE VISIT (OUTPATIENT)
Dept: ORTHOPEDICS | Facility: CLINIC | Age: 51
End: 2021-05-19
Attending: ORTHOPAEDIC SURGERY
Payer: OTHER GOVERNMENT

## 2021-05-19 VITALS — HEIGHT: 69 IN | BODY MASS INDEX: 31.94 KG/M2 | WEIGHT: 215.63 LBS

## 2021-05-19 DIAGNOSIS — S46.011D TRAUMATIC INCOMPLETE TEAR OF RIGHT ROTATOR CUFF, SUBSEQUENT ENCOUNTER: Primary | ICD-10-CM

## 2021-05-19 PROCEDURE — 99999 PR PBB SHADOW E&M-EST. PATIENT-LVL III: ICD-10-PCS | Mod: PBBFAC,,, | Performed by: ORTHOPAEDIC SURGERY

## 2021-05-19 PROCEDURE — 99999 PR PBB SHADOW E&M-EST. PATIENT-LVL III: CPT | Mod: PBBFAC,,, | Performed by: ORTHOPAEDIC SURGERY

## 2021-05-19 PROCEDURE — 99213 PR OFFICE/OUTPT VISIT, EST, LEVL III, 20-29 MIN: ICD-10-PCS | Mod: S$GLB,,, | Performed by: ORTHOPAEDIC SURGERY

## 2021-05-19 PROCEDURE — 99213 OFFICE O/P EST LOW 20 MIN: CPT | Mod: S$GLB,,, | Performed by: ORTHOPAEDIC SURGERY

## 2021-05-19 RX ORDER — TRAMADOL HYDROCHLORIDE 50 MG/1
50 TABLET ORAL EVERY 6 HOURS PRN
Qty: 30 TABLET | Refills: 0 | Status: SHIPPED | OUTPATIENT
Start: 2021-05-19 | End: 2021-05-29

## 2021-06-30 ENCOUNTER — OFFICE VISIT (OUTPATIENT)
Dept: ORTHOPEDICS | Facility: CLINIC | Age: 51
End: 2021-06-30
Attending: ORTHOPAEDIC SURGERY
Payer: OTHER GOVERNMENT

## 2021-06-30 VITALS — BODY MASS INDEX: 31.84 KG/M2 | WEIGHT: 215 LBS | HEIGHT: 69 IN

## 2021-06-30 DIAGNOSIS — S46.011D TRAUMATIC INCOMPLETE TEAR OF RIGHT ROTATOR CUFF, SUBSEQUENT ENCOUNTER: Primary | ICD-10-CM

## 2021-06-30 PROCEDURE — 99213 OFFICE O/P EST LOW 20 MIN: CPT | Mod: S$GLB,,, | Performed by: ORTHOPAEDIC SURGERY

## 2021-06-30 PROCEDURE — 99999 PR PBB SHADOW E&M-EST. PATIENT-LVL III: CPT | Mod: PBBFAC,,, | Performed by: ORTHOPAEDIC SURGERY

## 2021-06-30 PROCEDURE — 99213 PR OFFICE/OUTPT VISIT, EST, LEVL III, 20-29 MIN: ICD-10-PCS | Mod: S$GLB,,, | Performed by: ORTHOPAEDIC SURGERY

## 2021-06-30 PROCEDURE — 99999 PR PBB SHADOW E&M-EST. PATIENT-LVL III: ICD-10-PCS | Mod: PBBFAC,,, | Performed by: ORTHOPAEDIC SURGERY

## 2021-07-19 NOTE — PROGRESS NOTES
Date of Service: 06/28/2021    FOLLOWUP:  1.  Diabetes mellitus type 2.  2.  Hypercholesterolemia.  3.  Nicotine addiction.  4.  Due for cardiovascular screening.    Efraín is 48. He has diabetes mellitus type 2, controlled, improved significantly.  Has nicotine dependency.  I urged him to quit smoking.  He has hypercholesterolemia.  We have discussed diet, exercise and medical management. He is trying his best. He is due for cardiovascular screening.  No chest pain.  Weight loss is related to better diabetes control and more awareness of his environment with food intake.  No loss of consciousness.    PHYSICAL EXAMINATION:  GENERAL:  Pleasant, very appropriate behavior.  SKIN:  No skin rashes, good skin turgor.  HEENT:  No conjunctival erythema.  RESPIRATORY:  No respiratory distress. Lungs are clear to auscultation.  HEART:  S1, S2 regular.  No peripheral edema.  PSYCHIATRIC:  Normal judgment and insight.  Alert and oriented x3.  EXTREMITIES:  No clubbing.  NEUROLOGIC:  Gait and station normal.    IMPRESSION AND PLAN:  1.  Diabetes mellitus type 2, at goal.  Continue treatment.  2.  Hypercholesterolemia.  Diet, exercise and medical management.  3.  Nicotine addiction, quit smoking.  4.  Due for cardiovascular screening, stress test.    Follow up with me whenever the patient has any complaints.  Regular follow up in 6 months.      Dictated By: Delmis Joseph MD  Signing Provider: Delmis Joseph MD    MS/SS1 ([id number])  DD: 07/16/2021 12:46:08 TD: 07/19/2021 06:52:33    Copy Sent To:    Subjective:       Patient ID: Celia Alford is a 48 y.o. female.    Chief Complaint: Work Related Injury    Patient is still having pain in her arm. States she is waiting to have her MRI.  Patient states that when they call to schedule they told her she had to use her private insurance.  When she told him that it was work comp date shoulder that it was not approved yet.  States she also starts back at physical therapy on Tuesday.    Arm Pain    The incident occurred more than 1 week ago. The incident occurred at work. The injury mechanism was a direct blow. The pain is present in the right shoulder. The quality of the pain is described as aching. The pain does not radiate. The pain is at a severity of 5/10. The pain is moderate. The pain has been constant since the incident. Pertinent negatives include no chest pain. The symptoms are aggravated by lifting and movement. She has tried NSAIDs (PT) for the symptoms. The treatment provided mild relief.     Review of Systems   Constitution: Negative for chills and fever.   HENT: Negative for sore throat.    Eyes: Negative for blurred vision.   Cardiovascular: Negative for chest pain.   Respiratory: Negative for shortness of breath.    Skin: Negative for rash.   Musculoskeletal: Positive for joint pain and stiffness. Negative for back pain.   Gastrointestinal: Negative for abdominal pain, diarrhea, nausea and vomiting.   Neurological: Negative for headaches.   Psychiatric/Behavioral: The patient is not nervous/anxious.    All other systems reviewed and are negative.      Objective:      Physical Exam   Constitutional: She appears well-developed and well-nourished. She is active.  Non-toxic appearance. She does not appear ill. No distress.   Uncomfortable and stiff on exam   HENT:   Head: Normocephalic and atraumatic.   Right Ear: Hearing and external ear normal.   Left Ear: Hearing and external ear normal.   Nose: Nose normal. No nasal deformity. No epistaxis.    Mouth/Throat: Mucous membranes are normal.   Eyes: Conjunctivae and lids are normal. No scleral icterus.   Neck: Trachea normal, normal range of motion and full passive range of motion without pain. Neck supple. No spinous process tenderness and no muscular tenderness present. No neck rigidity. Normal range of motion present.   Cardiovascular: Intact distal pulses and normal pulses.   Pulses:       Radial pulses are 2+ on the right side, and 2+ on the left side.   Pulmonary/Chest: Effort normal. No stridor. No respiratory distress.   Musculoskeletal: She exhibits tenderness.        Right shoulder: She exhibits decreased range of motion, tenderness (posterior aspect), pain (Wang, O'Randall negative), spasm and decreased strength. She exhibits no swelling, no deformity, no laceration and normal pulse.        Left shoulder: She exhibits normal range of motion, no tenderness, no swelling, no deformity, no pain (Wang, O'Randall negative), normal pulse and normal strength.        Right knee: Normal. She exhibits normal range of motion, no swelling, no effusion, no ecchymosis, no deformity, no LCL laxity, no bony tenderness, normal meniscus and no MCL laxity. No tenderness found.        Arms:       Right hand: Decreased strength () noted.   No change in assessment over prior visit   Neurological: She is alert. She has normal strength. She is not disoriented. No sensory deficit. GCS eye subscore is 4. GCS verbal subscore is 5. GCS motor subscore is 6.   Reflex Scores:       Patellar reflexes are 2+ on the right side and 2+ on the left side.  Skin: Skin is warm, dry and intact. Capillary refill takes less than 2 seconds. She is not diaphoretic.   Psychiatric: She has a normal mood and affect. Her speech is normal and behavior is normal. She is attentive.   Nursing note and vitals reviewed.      Assessment:       1. Work related injury    2. Strain of right shoulder, subsequent encounter    3. Fall, subsequent encounter         Plan:         Medications Ordered This Encounter   Medications    acetaminophen (TYLENOL) 650 MG TbSR     Sig: Take 1 tablet (650 mg total) by mouth every 8 (eight) hours.     Refill:  0     Patient Instructions: Attention not to aggravate affected area, Daily home exercises/warm soaks, Apply ice 24-48 hours then apply heat/warm soaks, Continue Physical Therapy(please take meds as directed for pain and discomfort)   Restrictions: (See CA 17 for restrictions)  Follow up in about 2 weeks (around 10/18/2019).

## 2021-07-27 ENCOUNTER — PATIENT MESSAGE (OUTPATIENT)
Dept: INTERNAL MEDICINE | Facility: CLINIC | Age: 51
End: 2021-07-27

## 2021-07-27 DIAGNOSIS — U07.1 COVID-19 VIRUS INFECTION: Primary | ICD-10-CM

## 2021-07-27 DIAGNOSIS — R51.9 ACUTE NONINTRACTABLE HEADACHE, UNSPECIFIED HEADACHE TYPE: Primary | ICD-10-CM

## 2021-07-27 RX ORDER — BUTALBITAL, ACETAMINOPHEN AND CAFFEINE 50; 325; 40 MG/1; MG/1; MG/1
1 TABLET ORAL EVERY 6 HOURS PRN
Qty: 30 TABLET | Refills: 0 | Status: SHIPPED | OUTPATIENT
Start: 2021-07-27 | End: 2021-08-26

## 2021-07-28 DIAGNOSIS — Z12.31 OTHER SCREENING MAMMOGRAM: ICD-10-CM

## 2021-07-30 ENCOUNTER — NURSE TRIAGE (OUTPATIENT)
Dept: ADMINISTRATIVE | Facility: CLINIC | Age: 51
End: 2021-07-30

## 2021-08-04 ENCOUNTER — PATIENT MESSAGE (OUTPATIENT)
Dept: INTERNAL MEDICINE | Facility: CLINIC | Age: 51
End: 2021-08-04

## 2021-08-04 ENCOUNTER — PATIENT MESSAGE (OUTPATIENT)
Dept: ORTHOPEDICS | Facility: CLINIC | Age: 51
End: 2021-08-04

## 2021-08-06 ENCOUNTER — OFFICE VISIT (OUTPATIENT)
Dept: INTERNAL MEDICINE | Facility: CLINIC | Age: 51
End: 2021-08-06
Payer: COMMERCIAL

## 2021-08-06 DIAGNOSIS — U07.1 COVID-19 VIRUS INFECTION: Primary | ICD-10-CM

## 2021-08-06 DIAGNOSIS — R53.83 FATIGUE, UNSPECIFIED TYPE: ICD-10-CM

## 2021-08-06 DIAGNOSIS — J20.9 ACUTE BRONCHITIS, UNSPECIFIED ORGANISM: ICD-10-CM

## 2021-08-06 PROCEDURE — 99214 PR OFFICE/OUTPT VISIT, EST, LEVL IV, 30-39 MIN: ICD-10-PCS | Mod: 95,,, | Performed by: FAMILY MEDICINE

## 2021-08-06 PROCEDURE — 1159F PR MEDICATION LIST DOCUMENTED IN MEDICAL RECORD: ICD-10-PCS | Mod: CPTII,95,, | Performed by: FAMILY MEDICINE

## 2021-08-06 PROCEDURE — 99214 OFFICE O/P EST MOD 30 MIN: CPT | Mod: 95,,, | Performed by: FAMILY MEDICINE

## 2021-08-06 PROCEDURE — 1160F RVW MEDS BY RX/DR IN RCRD: CPT | Mod: CPTII,95,, | Performed by: FAMILY MEDICINE

## 2021-08-06 PROCEDURE — 1159F MED LIST DOCD IN RCRD: CPT | Mod: CPTII,95,, | Performed by: FAMILY MEDICINE

## 2021-08-06 PROCEDURE — 1160F PR REVIEW ALL MEDS BY PRESCRIBER/CLIN PHARMACIST DOCUMENTED: ICD-10-PCS | Mod: CPTII,95,, | Performed by: FAMILY MEDICINE

## 2021-08-06 RX ORDER — BENZONATATE 100 MG/1
100 CAPSULE ORAL 3 TIMES DAILY PRN
COMMUNITY
Start: 2021-07-24 | End: 2021-09-09

## 2021-08-06 RX ORDER — ALBUTEROL SULFATE 90 UG/1
2 AEROSOL, METERED RESPIRATORY (INHALATION) EVERY 4 HOURS PRN
COMMUNITY
Start: 2021-07-24 | End: 2021-09-09

## 2021-08-06 RX ORDER — FLUTICASONE PROPIONATE 50 MCG
1 SPRAY, SUSPENSION (ML) NASAL 2 TIMES DAILY
COMMUNITY
Start: 2021-07-23 | End: 2021-09-09

## 2021-08-06 RX ORDER — AZITHROMYCIN 250 MG/1
TABLET, FILM COATED ORAL
Qty: 6 TABLET | Refills: 0 | Status: SHIPPED | OUTPATIENT
Start: 2021-08-06 | End: 2021-08-11

## 2021-08-06 RX ORDER — METHYLPREDNISOLONE 4 MG/1
TABLET ORAL
Qty: 1 PACKAGE | Refills: 0 | Status: SHIPPED | OUTPATIENT
Start: 2021-08-06 | End: 2022-07-03

## 2021-08-19 ENCOUNTER — PATIENT OUTREACH (OUTPATIENT)
Dept: ADMINISTRATIVE | Facility: OTHER | Age: 51
End: 2021-08-19

## 2021-08-24 ENCOUNTER — OFFICE VISIT (OUTPATIENT)
Dept: ORTHOPEDICS | Facility: CLINIC | Age: 51
End: 2021-08-24
Payer: OTHER GOVERNMENT

## 2021-08-24 VITALS — HEIGHT: 69 IN | BODY MASS INDEX: 31.84 KG/M2 | WEIGHT: 215 LBS

## 2021-08-24 DIAGNOSIS — M75.110 INCOMPLETE TEAR OF ROTATOR CUFF, UNSPECIFIED LATERALITY, UNSPECIFIED WHETHER TRAUMATIC: Primary | ICD-10-CM

## 2021-08-24 DIAGNOSIS — S46.011D TRAUMATIC INCOMPLETE TEAR OF RIGHT ROTATOR CUFF, SUBSEQUENT ENCOUNTER: ICD-10-CM

## 2021-08-24 PROCEDURE — 99213 PR OFFICE/OUTPT VISIT, EST, LEVL III, 20-29 MIN: ICD-10-PCS | Mod: S$GLB,,, | Performed by: ORTHOPAEDIC SURGERY

## 2021-08-24 PROCEDURE — 99213 OFFICE O/P EST LOW 20 MIN: CPT | Mod: S$GLB,,, | Performed by: ORTHOPAEDIC SURGERY

## 2021-08-24 PROCEDURE — 99999 PR PBB SHADOW E&M-EST. PATIENT-LVL III: CPT | Mod: PBBFAC,,, | Performed by: ORTHOPAEDIC SURGERY

## 2021-08-24 PROCEDURE — 99999 PR PBB SHADOW E&M-EST. PATIENT-LVL III: ICD-10-PCS | Mod: PBBFAC,,, | Performed by: ORTHOPAEDIC SURGERY

## 2021-08-24 RX ORDER — IBUPROFEN 600 MG/1
600 TABLET ORAL 2 TIMES DAILY WITH MEALS
Qty: 60 TABLET | Refills: 1 | Status: SHIPPED | OUTPATIENT
Start: 2021-08-24 | End: 2022-07-29

## 2021-09-07 ENCOUNTER — PATIENT MESSAGE (OUTPATIENT)
Dept: INTERNAL MEDICINE | Facility: CLINIC | Age: 51
End: 2021-09-07

## 2021-09-09 ENCOUNTER — OFFICE VISIT (OUTPATIENT)
Dept: INTERNAL MEDICINE | Facility: CLINIC | Age: 51
End: 2021-09-09
Payer: COMMERCIAL

## 2021-09-09 ENCOUNTER — LAB VISIT (OUTPATIENT)
Dept: LAB | Facility: HOSPITAL | Age: 51
End: 2021-09-09
Attending: FAMILY MEDICINE
Payer: COMMERCIAL

## 2021-09-09 VITALS
WEIGHT: 207.88 LBS | SYSTOLIC BLOOD PRESSURE: 133 MMHG | HEART RATE: 87 BPM | OXYGEN SATURATION: 97 % | BODY MASS INDEX: 30.79 KG/M2 | HEIGHT: 69 IN | DIASTOLIC BLOOD PRESSURE: 82 MMHG

## 2021-09-09 DIAGNOSIS — Z11.59 NEED FOR HEPATITIS C SCREENING TEST: ICD-10-CM

## 2021-09-09 DIAGNOSIS — R00.2 PALPITATIONS: ICD-10-CM

## 2021-09-09 DIAGNOSIS — R03.0 ELEVATED BLOOD PRESSURE READING WITHOUT DIAGNOSIS OF HYPERTENSION: Primary | ICD-10-CM

## 2021-09-09 DIAGNOSIS — Z79.899 ENCOUNTER FOR LONG-TERM (CURRENT) USE OF OTHER MEDICATIONS: ICD-10-CM

## 2021-09-09 DIAGNOSIS — R03.0 ELEVATED BLOOD PRESSURE READING WITHOUT DIAGNOSIS OF HYPERTENSION: ICD-10-CM

## 2021-09-09 DIAGNOSIS — E66.09 CLASS 1 OBESITY DUE TO EXCESS CALORIES WITHOUT SERIOUS COMORBIDITY WITH BODY MASS INDEX (BMI) OF 30.0 TO 30.9 IN ADULT: ICD-10-CM

## 2021-09-09 DIAGNOSIS — M62.838 NECK MUSCLE SPASM: ICD-10-CM

## 2021-09-09 DIAGNOSIS — Z13.220 SCREENING CHOLESTEROL LEVEL: ICD-10-CM

## 2021-09-09 PROBLEM — E66.811 CLASS 1 OBESITY DUE TO EXCESS CALORIES WITHOUT SERIOUS COMORBIDITY WITH BODY MASS INDEX (BMI) OF 30.0 TO 30.9 IN ADULT: Status: ACTIVE | Noted: 2021-09-09

## 2021-09-09 LAB
ALBUMIN SERPL BCP-MCNC: 4.7 G/DL (ref 3.5–5.2)
ALP SERPL-CCNC: 69 U/L (ref 55–135)
ALT SERPL W/O P-5'-P-CCNC: 46 U/L (ref 10–44)
ANION GAP SERPL CALC-SCNC: 13 MMOL/L (ref 8–16)
AST SERPL-CCNC: 27 U/L (ref 10–40)
BILIRUB SERPL-MCNC: 0.6 MG/DL (ref 0.1–1)
BUN SERPL-MCNC: 8 MG/DL (ref 6–20)
CALCIUM SERPL-MCNC: 10.3 MG/DL (ref 8.7–10.5)
CHLORIDE SERPL-SCNC: 107 MMOL/L (ref 95–110)
CHOLEST SERPL-MCNC: 260 MG/DL (ref 120–199)
CHOLEST/HDLC SERPL: 4.2 {RATIO} (ref 2–5)
CO2 SERPL-SCNC: 21 MMOL/L (ref 23–29)
CREAT SERPL-MCNC: 0.8 MG/DL (ref 0.5–1.4)
ERYTHROCYTE [DISTWIDTH] IN BLOOD BY AUTOMATED COUNT: 13.2 % (ref 11.5–14.5)
EST. GFR  (AFRICAN AMERICAN): >60 ML/MIN/1.73 M^2
EST. GFR  (NON AFRICAN AMERICAN): >60 ML/MIN/1.73 M^2
ESTIMATED AVG GLUCOSE: 100 MG/DL (ref 68–131)
GLUCOSE SERPL-MCNC: 85 MG/DL (ref 70–110)
HBA1C MFR BLD: 5.1 % (ref 4–5.6)
HCT VFR BLD AUTO: 37.4 % (ref 37–48.5)
HDLC SERPL-MCNC: 62 MG/DL (ref 40–75)
HDLC SERPL: 23.8 % (ref 20–50)
HGB BLD-MCNC: 11.9 G/DL (ref 12–16)
LDLC SERPL CALC-MCNC: 168.4 MG/DL (ref 63–159)
MCH RBC QN AUTO: 33.1 PG (ref 27–31)
MCHC RBC AUTO-ENTMCNC: 31.8 G/DL (ref 32–36)
MCV RBC AUTO: 104 FL (ref 82–98)
NONHDLC SERPL-MCNC: 198 MG/DL
PLATELET # BLD AUTO: 389 K/UL (ref 150–450)
PMV BLD AUTO: 10.3 FL (ref 9.2–12.9)
POTASSIUM SERPL-SCNC: 3.8 MMOL/L (ref 3.5–5.1)
PROT SERPL-MCNC: 8 G/DL (ref 6–8.4)
RBC # BLD AUTO: 3.6 M/UL (ref 4–5.4)
SODIUM SERPL-SCNC: 141 MMOL/L (ref 136–145)
TRIGL SERPL-MCNC: 148 MG/DL (ref 30–150)
TSH SERPL DL<=0.005 MIU/L-ACNC: 0.67 UIU/ML (ref 0.4–4)
WBC # BLD AUTO: 5.58 K/UL (ref 3.9–12.7)

## 2021-09-09 PROCEDURE — 1160F RVW MEDS BY RX/DR IN RCRD: CPT | Mod: CPTII,S$GLB,, | Performed by: FAMILY MEDICINE

## 2021-09-09 PROCEDURE — 86803 HEPATITIS C AB TEST: CPT | Performed by: FAMILY MEDICINE

## 2021-09-09 PROCEDURE — 3008F BODY MASS INDEX DOCD: CPT | Mod: CPTII,S$GLB,, | Performed by: FAMILY MEDICINE

## 2021-09-09 PROCEDURE — 1159F MED LIST DOCD IN RCRD: CPT | Mod: CPTII,S$GLB,, | Performed by: FAMILY MEDICINE

## 2021-09-09 PROCEDURE — 99214 PR OFFICE/OUTPT VISIT, EST, LEVL IV, 30-39 MIN: ICD-10-PCS | Mod: S$GLB,,, | Performed by: FAMILY MEDICINE

## 2021-09-09 PROCEDURE — 99214 OFFICE O/P EST MOD 30 MIN: CPT | Mod: S$GLB,,, | Performed by: FAMILY MEDICINE

## 2021-09-09 PROCEDURE — 1159F PR MEDICATION LIST DOCUMENTED IN MEDICAL RECORD: ICD-10-PCS | Mod: CPTII,S$GLB,, | Performed by: FAMILY MEDICINE

## 2021-09-09 PROCEDURE — 3075F PR MOST RECENT SYSTOLIC BLOOD PRESS GE 130-139MM HG: ICD-10-PCS | Mod: CPTII,S$GLB,, | Performed by: FAMILY MEDICINE

## 2021-09-09 PROCEDURE — 84443 ASSAY THYROID STIM HORMONE: CPT | Performed by: FAMILY MEDICINE

## 2021-09-09 PROCEDURE — 85027 COMPLETE CBC AUTOMATED: CPT | Performed by: FAMILY MEDICINE

## 2021-09-09 PROCEDURE — 80061 LIPID PANEL: CPT | Performed by: FAMILY MEDICINE

## 2021-09-09 PROCEDURE — 99999 PR PBB SHADOW E&M-EST. PATIENT-LVL III: CPT | Mod: PBBFAC,,, | Performed by: FAMILY MEDICINE

## 2021-09-09 PROCEDURE — 3075F SYST BP GE 130 - 139MM HG: CPT | Mod: CPTII,S$GLB,, | Performed by: FAMILY MEDICINE

## 2021-09-09 PROCEDURE — 1160F PR REVIEW ALL MEDS BY PRESCRIBER/CLIN PHARMACIST DOCUMENTED: ICD-10-PCS | Mod: CPTII,S$GLB,, | Performed by: FAMILY MEDICINE

## 2021-09-09 PROCEDURE — 36415 COLL VENOUS BLD VENIPUNCTURE: CPT | Performed by: FAMILY MEDICINE

## 2021-09-09 PROCEDURE — 80053 COMPREHEN METABOLIC PANEL: CPT | Performed by: FAMILY MEDICINE

## 2021-09-09 PROCEDURE — 3079F PR MOST RECENT DIASTOLIC BLOOD PRESSURE 80-89 MM HG: ICD-10-PCS | Mod: CPTII,S$GLB,, | Performed by: FAMILY MEDICINE

## 2021-09-09 PROCEDURE — 3079F DIAST BP 80-89 MM HG: CPT | Mod: CPTII,S$GLB,, | Performed by: FAMILY MEDICINE

## 2021-09-09 PROCEDURE — 99999 PR PBB SHADOW E&M-EST. PATIENT-LVL III: ICD-10-PCS | Mod: PBBFAC,,, | Performed by: FAMILY MEDICINE

## 2021-09-09 PROCEDURE — 83036 HEMOGLOBIN GLYCOSYLATED A1C: CPT | Performed by: FAMILY MEDICINE

## 2021-09-09 PROCEDURE — 3008F PR BODY MASS INDEX (BMI) DOCUMENTED: ICD-10-PCS | Mod: CPTII,S$GLB,, | Performed by: FAMILY MEDICINE

## 2021-09-09 RX ORDER — CYCLOBENZAPRINE HCL 10 MG
10 TABLET ORAL 3 TIMES DAILY PRN
Qty: 30 TABLET | Refills: 0 | Status: SHIPPED | OUTPATIENT
Start: 2021-09-09 | End: 2022-07-03

## 2021-09-09 RX ORDER — HYDROCORTISONE 25 MG/G
OINTMENT TOPICAL DAILY
COMMUNITY
Start: 2021-08-24 | End: 2022-07-29

## 2021-09-10 LAB — HCV AB SERPL QL IA: NEGATIVE

## 2021-10-04 ENCOUNTER — PATIENT MESSAGE (OUTPATIENT)
Dept: ADMINISTRATIVE | Facility: HOSPITAL | Age: 51
End: 2021-10-04

## 2021-10-06 ENCOUNTER — PATIENT OUTREACH (OUTPATIENT)
Dept: ADMINISTRATIVE | Facility: OTHER | Age: 51
End: 2021-10-06

## 2021-10-06 ENCOUNTER — PATIENT MESSAGE (OUTPATIENT)
Dept: ORTHOPEDICS | Facility: CLINIC | Age: 51
End: 2021-10-06

## 2021-10-07 ENCOUNTER — PATIENT MESSAGE (OUTPATIENT)
Dept: ORTHOPEDICS | Facility: CLINIC | Age: 51
End: 2021-10-07

## 2021-10-13 ENCOUNTER — TELEPHONE (OUTPATIENT)
Dept: ORTHOPEDICS | Facility: CLINIC | Age: 51
End: 2021-10-13

## 2021-10-25 ENCOUNTER — OFFICE VISIT (OUTPATIENT)
Dept: ORTHOPEDICS | Facility: CLINIC | Age: 51
End: 2021-10-25
Payer: OTHER GOVERNMENT

## 2021-10-25 VITALS — WEIGHT: 207 LBS | BODY MASS INDEX: 30.57 KG/M2

## 2021-10-25 DIAGNOSIS — S46.011D TRAUMATIC INCOMPLETE TEAR OF RIGHT ROTATOR CUFF, SUBSEQUENT ENCOUNTER: Primary | ICD-10-CM

## 2021-10-25 DIAGNOSIS — M25.519 SHOULDER PAIN, UNSPECIFIED CHRONICITY, UNSPECIFIED LATERALITY: ICD-10-CM

## 2021-10-25 PROCEDURE — 99213 OFFICE O/P EST LOW 20 MIN: CPT | Mod: S$GLB,,, | Performed by: ORTHOPAEDIC SURGERY

## 2021-10-25 PROCEDURE — 99999 PR PBB SHADOW E&M-EST. PATIENT-LVL III: CPT | Mod: PBBFAC,,, | Performed by: ORTHOPAEDIC SURGERY

## 2021-10-25 PROCEDURE — 99999 PR PBB SHADOW E&M-EST. PATIENT-LVL III: ICD-10-PCS | Mod: PBBFAC,,, | Performed by: ORTHOPAEDIC SURGERY

## 2021-10-25 PROCEDURE — 99213 PR OFFICE/OUTPT VISIT, EST, LEVL III, 20-29 MIN: ICD-10-PCS | Mod: S$GLB,,, | Performed by: ORTHOPAEDIC SURGERY

## 2021-11-05 ENCOUNTER — IMMUNIZATION (OUTPATIENT)
Dept: INTERNAL MEDICINE | Facility: CLINIC | Age: 51
End: 2021-11-05
Payer: COMMERCIAL

## 2021-11-05 DIAGNOSIS — Z23 NEED FOR VACCINATION: Primary | ICD-10-CM

## 2021-11-05 PROCEDURE — 0004A COVID-19, MRNA, LNP-S, PF, 30 MCG/0.3 ML DOSE VACCINE: CPT | Mod: PBBFAC | Performed by: INTERNAL MEDICINE

## 2021-11-09 ENCOUNTER — TELEPHONE (OUTPATIENT)
Dept: ORTHOPEDICS | Facility: CLINIC | Age: 51
End: 2021-11-09
Payer: COMMERCIAL

## 2021-11-10 ENCOUNTER — PATIENT MESSAGE (OUTPATIENT)
Dept: ORTHOPEDICS | Facility: CLINIC | Age: 51
End: 2021-11-10
Payer: COMMERCIAL

## 2021-11-22 ENCOUNTER — PATIENT MESSAGE (OUTPATIENT)
Dept: ORTHOPEDICS | Facility: CLINIC | Age: 51
End: 2021-11-22
Payer: COMMERCIAL

## 2021-11-22 RX ORDER — DIAZEPAM 5 MG/1
5 TABLET ORAL DAILY PRN
Qty: 3 TABLET | Refills: 0 | Status: SHIPPED | OUTPATIENT
Start: 2021-11-22 | End: 2022-07-03

## 2021-11-30 ENCOUNTER — PATIENT MESSAGE (OUTPATIENT)
Dept: ORTHOPEDICS | Facility: CLINIC | Age: 51
End: 2021-11-30
Payer: COMMERCIAL

## 2021-12-06 ENCOUNTER — HOSPITAL ENCOUNTER (OUTPATIENT)
Dept: RADIOLOGY | Facility: OTHER | Age: 51
Discharge: HOME | End: 2021-12-06
Attending: ORTHOPAEDIC SURGERY
Payer: COMMERCIAL

## 2021-12-06 ENCOUNTER — PATIENT MESSAGE (OUTPATIENT)
Dept: ORTHOPEDICS | Facility: CLINIC | Age: 51
End: 2021-12-06
Payer: COMMERCIAL

## 2021-12-06 DIAGNOSIS — M25.519 SHOULDER PAIN, UNSPECIFIED CHRONICITY, UNSPECIFIED LATERALITY: ICD-10-CM

## 2022-01-12 ENCOUNTER — PATIENT MESSAGE (OUTPATIENT)
Dept: ORTHOPEDICS | Facility: CLINIC | Age: 52
End: 2022-01-12
Payer: COMMERCIAL

## 2022-01-19 ENCOUNTER — PATIENT OUTREACH (OUTPATIENT)
Dept: ADMINISTRATIVE | Facility: OTHER | Age: 52
End: 2022-01-19
Payer: COMMERCIAL

## 2022-01-19 DIAGNOSIS — Z12.11 ENCOUNTER FOR FIT (FECAL IMMUNOCHEMICAL TEST) SCREENING: Primary | ICD-10-CM

## 2022-01-19 NOTE — PROGRESS NOTES
Health Maintenance Due   Topic Date Due    Colorectal Cancer Screening  Never done    Mammogram  07/10/2021    Influenza Vaccine (1) 09/01/2021     Updates were requested from care everywhere.  Chart was reviewed for overdue Proactive [facility] Encounters ([facility]) topics (CRS, Breast Cancer Screening, Eye exam)  Health Maintenance has been updated.  [facility] immunization registry triggered.  Immunizations were reconciled.  Order placed for FIT kit.

## 2022-01-20 ENCOUNTER — OFFICE VISIT (OUTPATIENT)
Dept: ORTHOPEDICS | Facility: CLINIC | Age: 52
End: 2022-01-20
Payer: OTHER GOVERNMENT

## 2022-01-20 VITALS — WEIGHT: 207 LBS | HEIGHT: 69 IN | BODY MASS INDEX: 30.66 KG/M2

## 2022-01-20 DIAGNOSIS — M25.511 CHRONIC RIGHT SHOULDER PAIN: Primary | ICD-10-CM

## 2022-01-20 DIAGNOSIS — G89.29 CHRONIC RIGHT SHOULDER PAIN: Primary | ICD-10-CM

## 2022-01-20 PROCEDURE — 20610 DRAIN/INJ JOINT/BURSA W/O US: CPT | Mod: RT,S$GLB,, | Performed by: ORTHOPAEDIC SURGERY

## 2022-01-20 PROCEDURE — 99999 PR PBB SHADOW E&M-EST. PATIENT-LVL III: CPT | Mod: PBBFAC,,, | Performed by: ORTHOPAEDIC SURGERY

## 2022-01-20 PROCEDURE — 99999 PR PBB SHADOW E&M-EST. PATIENT-LVL III: ICD-10-PCS | Mod: PBBFAC,,, | Performed by: ORTHOPAEDIC SURGERY

## 2022-01-20 PROCEDURE — 99213 OFFICE O/P EST LOW 20 MIN: CPT | Mod: 25,S$GLB,, | Performed by: ORTHOPAEDIC SURGERY

## 2022-01-20 PROCEDURE — 20610 PR DRAIN/INJECT LARGE JOINT/BURSA: ICD-10-PCS | Mod: RT,S$GLB,, | Performed by: ORTHOPAEDIC SURGERY

## 2022-01-20 PROCEDURE — 99213 PR OFFICE/OUTPT VISIT, EST, LEVL III, 20-29 MIN: ICD-10-PCS | Mod: 25,S$GLB,, | Performed by: ORTHOPAEDIC SURGERY

## 2022-01-20 RX ORDER — TRIAMCINOLONE ACETONIDE 40 MG/ML
40 INJECTION, SUSPENSION INTRA-ARTICULAR; INTRAMUSCULAR
Status: COMPLETED | OUTPATIENT
Start: 2022-01-20 | End: 2022-01-20

## 2022-01-20 RX ADMIN — TRIAMCINOLONE ACETONIDE 40 MG: 40 INJECTION, SUSPENSION INTRA-ARTICULAR; INTRAMUSCULAR at 09:01

## 2022-01-20 NOTE — PROGRESS NOTES
Subjective:      Patient ID: Celia Alford is a 51 y.o. female.  Chief Complaint: Results of the Right Shoulder      HPI  Celia Alford is a  51 y.o. female presenting today for follow up of right shoulder pain.  She reports that she is still having pain in the right shoulder after previous shoulder arthroscopy was about a year ago  Recent MRI scan of the right shoulder was reviewed with the patient  The findings on the MRI shows some mild arthritis and mild tendinosis no evidence of rotator cuff tear or other abnormalities  I went over that with her and gave her copy the report  She is still having pain in the right shoulder in having difficulty with use she is currently on medium duty work.    Review of patient's allergies indicates:   Allergen Reactions    Pcn [penicillins] Hives and Rash         Current Outpatient Medications   Medication Sig Dispense Refill    cyclobenzaprine (FLEXERIL) 10 MG tablet Take 1 tablet (10 mg total) by mouth 3 (three) times daily as needed for Muscle spasms. 30 tablet 0    diazePAM (VALIUM) 5 MG tablet Take 1 tablet (5 mg total) by mouth daily as needed for Anxiety. 3 tablet 0    hydrocortisone 2.5 % ointment Apply topically once daily.      hydrocortisone butyrate 0.1 % Crea cream 1 APPLICATION 2 TIMES A WEEK TOPICALLY 30 DAYS      ibuprofen (ADVIL,MOTRIN) 600 MG tablet Take 1 tablet (600 mg total) by mouth 2 (two) times daily with meals. 60 tablet 1    ketoconazole (NIZORAL) 2 % cream 1 APPLICATION ONCE A DAY TOPICALLY 30 DAYS      medroxyPROGESTERone (DEPO-PROVERA) 150 mg/mL Syrg INJECT 1 ML (150 MG TOTAL) INTO THE MUSCLE EVERY 3 (THREE) MONTHS. 1 Syringe 3    pimecrolimus (ELIDEL) 1 % cream 1 APPLICATION TWICE A DAY TOPICALLY 30 DAYS      methylPREDNISolone (MEDROL, SHAN,) 4 mg tablet use as directed (Patient not taking: Reported on 1/20/2022) 1 Package 0    oxyCODONE-acetaminophen (PERCOCET) 7.5-325 mg per tablet Take 1 tablet by mouth every 4 (four)  "hours as needed for Pain. (Patient not taking: Reported on 1/20/2022) 40 tablet 0     No current facility-administered medications for this visit.       No past medical history on file.    Past Surgical History:   Procedure Laterality Date    ARTHROSCOPIC REPAIR OF ROTATOR CUFF OF SHOULDER Right 11/20/2020    Procedure: REPAIR, ROTATOR CUFF, ARTHROSCOPIC;  Surgeon: Jens Han Jr., MD;  Location: [facility];  Service: Orthopedics;  Laterality: Right;  need opus system- [employer] notified tori-11/10  video Yarsani confirmed 11/19/2020 KB 0915    ROTATOR CUFF REPAIR  11/20/2020    TONSILLECTOMY         OBJECTIVE:   PHYSICAL EXAM:  Height: 5' 9" (175.3 cm) Weight: 93.9 kg (207 lb)  Vitals:    01/20/22 0850   Weight: 93.9 kg (207 lb)   Height: 5' 9" (1.753 m)   PainSc:   4     Ortho/SPM Exam  Examination right shoulder no tenderness no swelling  Range of motion full  Strength is good in the rotator cuff no instability neurologic exam normal    RADIOGRAPHS:  MRI report is noted above  Comments: I have personally reviewed the imaging and I agree with the above radiologist's report.    ASSESSMENT/PLAN:     IMPRESSION:  Right shoulder pain after previous shoulder arthroscopy    PLAN:  I explained the patient that I believe her symptoms are most likely coming from the arthritis in the shoulder  For treatment I recommended that she continue with Advil or Motrin by mouth and we may consider an injection today  She would like try injection  After pause for time-out identified the right shoulder injected with Kenalog 40 mg 2 cc xylocaine sterile technique  She tolerated the procedure well without complication  Fortunately the arthritis is mild and I do not think it will cause significant problems in the future  In terms of work I think she can be released full duty work at this point      FOLLOW UP:  6 weeks    Disclaimer: This note has been generated using voice-recognition software. There may be typographical errors " that have been missed during proof-reading.

## 2022-01-24 ENCOUNTER — TELEPHONE (OUTPATIENT)
Dept: ADMINISTRATIVE | Facility: OTHER | Age: 52
End: 2022-01-24
Payer: COMMERCIAL

## 2022-01-26 ENCOUNTER — PATIENT MESSAGE (OUTPATIENT)
Dept: ADMINISTRATIVE | Facility: HOSPITAL | Age: 52
End: 2022-01-26
Payer: COMMERCIAL

## 2022-02-28 ENCOUNTER — PATIENT OUTREACH (OUTPATIENT)
Dept: ADMINISTRATIVE | Facility: OTHER | Age: 52
End: 2022-02-28
Payer: COMMERCIAL

## 2022-03-01 NOTE — PROGRESS NOTES
Health Maintenance Due   Topic Date Due    Colorectal Cancer Screening  Never done    Mammogram  07/10/2021    Influenza Vaccine (1) 09/01/2021     Updates were requested from care everywhere.  Chart was reviewed for overdue Proactive [facility] Encounters ([facility]) topics (CRS, Breast Cancer Screening, Eye exam)  Health Maintenance has been updated.  LINKS immunization registry triggered.  Immunizations were reconciled.

## 2022-03-02 ENCOUNTER — PATIENT MESSAGE (OUTPATIENT)
Dept: ORTHOPEDICS | Facility: CLINIC | Age: 52
End: 2022-03-02
Payer: COMMERCIAL

## 2022-03-16 ENCOUNTER — PATIENT MESSAGE (OUTPATIENT)
Dept: ADMINISTRATIVE | Facility: HOSPITAL | Age: 52
End: 2022-03-16
Payer: COMMERCIAL

## 2022-03-21 ENCOUNTER — OFFICE VISIT (OUTPATIENT)
Dept: ORTHOPEDICS | Facility: CLINIC | Age: 52
End: 2022-03-21
Payer: OTHER GOVERNMENT

## 2022-03-21 ENCOUNTER — PATIENT MESSAGE (OUTPATIENT)
Dept: ADMINISTRATIVE | Facility: HOSPITAL | Age: 52
End: 2022-03-21
Payer: COMMERCIAL

## 2022-03-21 VITALS — BODY MASS INDEX: 30.66 KG/M2 | HEIGHT: 69 IN | WEIGHT: 207 LBS

## 2022-03-21 DIAGNOSIS — M25.511 CHRONIC RIGHT SHOULDER PAIN: Primary | ICD-10-CM

## 2022-03-21 DIAGNOSIS — G89.29 CHRONIC RIGHT SHOULDER PAIN: Primary | ICD-10-CM

## 2022-03-21 PROCEDURE — 99999 PR PBB SHADOW E&M-EST. PATIENT-LVL III: ICD-10-PCS | Mod: PBBFAC,,, | Performed by: ORTHOPAEDIC SURGERY

## 2022-03-21 PROCEDURE — 99213 PR OFFICE/OUTPT VISIT, EST, LEVL III, 20-29 MIN: ICD-10-PCS | Mod: S$GLB,,, | Performed by: ORTHOPAEDIC SURGERY

## 2022-03-21 PROCEDURE — 99213 OFFICE O/P EST LOW 20 MIN: CPT | Mod: S$GLB,,, | Performed by: ORTHOPAEDIC SURGERY

## 2022-03-21 PROCEDURE — 99999 PR PBB SHADOW E&M-EST. PATIENT-LVL III: CPT | Mod: PBBFAC,,, | Performed by: ORTHOPAEDIC SURGERY

## 2022-03-21 NOTE — PROGRESS NOTES
Subjective:      Patient ID: Celia Alford is a 51 y.o. female.  Chief Complaint: Pain and Follow-up of the Right Shoulder      HPI  Celia Alford is a  51 y.o. female presenting today for follow up of chronic right shoulder pain after previous shoulder arthroscopy.  She reports that she is still having the same symptoms in her right shoulder  We tried an injection 2 months ago and she says this really did not help  I have also given her various anti-inflammatory medication by mouth which she also says does not help  Recent MRI scan reviewed with the patient showed some mild.  Tendinosis of the rotator cuff no evidence of tear or other abnormalities  Review of patient's allergies indicates:   Allergen Reactions    Pcn [penicillins] Hives and Rash         Current Outpatient Medications   Medication Sig Dispense Refill    cyclobenzaprine (FLEXERIL) 10 MG tablet Take 1 tablet (10 mg total) by mouth 3 (three) times daily as needed for Muscle spasms. 30 tablet 0    diazePAM (VALIUM) 5 MG tablet Take 1 tablet (5 mg total) by mouth daily as needed for Anxiety. 3 tablet 0    hydrocortisone 2.5 % ointment Apply topically once daily.      hydrocortisone butyrate 0.1 % Crea cream 1 APPLICATION 2 TIMES A WEEK TOPICALLY 30 DAYS      ibuprofen (ADVIL,MOTRIN) 600 MG tablet Take 1 tablet (600 mg total) by mouth 2 (two) times daily with meals. 60 tablet 1    ketoconazole (NIZORAL) 2 % cream 1 APPLICATION ONCE A DAY TOPICALLY 30 DAYS      medroxyPROGESTERone (DEPO-PROVERA) 150 mg/mL Syrg INJECT 1 ML (150 MG TOTAL) INTO THE MUSCLE EVERY 3 (THREE) MONTHS. 1 Syringe 3    methylPREDNISolone (MEDROL, SHAN,) 4 mg tablet use as directed 1 Package 0    oxyCODONE-acetaminophen (PERCOCET) 7.5-325 mg per tablet Take 1 tablet by mouth every 4 (four) hours as needed for Pain. 40 tablet 0    pimecrolimus (ELIDEL) 1 % cream 1 APPLICATION TWICE A DAY TOPICALLY 30 DAYS       No current facility-administered medications for  "this visit.       No past medical history on file.    Past Surgical History:   Procedure Laterality Date    ARTHROSCOPIC REPAIR OF ROTATOR CUFF OF SHOULDER Right 11/20/2020    Procedure: REPAIR, ROTATOR CUFF, ARTHROSCOPIC;  Surgeon: Jens Han Jr., MD;  Location: [facility];  Service: Orthopedics;  Laterality: Right;  need opus system- W[employer] notified tori-11/10  video Yarsanism confirmed 11/19/2020 KB 0915    ROTATOR CUFF REPAIR  11/20/2020    TONSILLECTOMY         OBJECTIVE:   PHYSICAL EXAM:  Height: 5' 9" (175.3 cm) Weight: 93.9 kg (207 lb)  Vitals:    03/21/22 0930   Weight: 93.9 kg (207 lb)   Height: 5' 9" (1.753 m)   PainSc:   4     Ortho/SPM Exam  Examination right shoulder there is some mild tenderness over the anterior biceps near the humeral head  No swelling  Full range of motion negative impingement sign  Strength intact no instability    RADIOGRAPHS:  None  Comments: I have personally reviewed the imaging and I agree with the above radiologist's report.    ASSESSMENT/PLAN:     IMPRESSION:  Biceps tendinitis right shoulder    PLAN:  I explained to the patient we have tried pretty much everything we know how to do for her shoulder but maybe another course of therapy might be helpful  She would like to try this so  I will set up some therapy for her  I do think it is odd that the anti-inflammatories in the injections have not helped so I think just keeping her on Advil or Tylenol over-the-counter would be fine  I do not see any reason to restrict her so continue full duty work  We might try adding a topical anti-inflammatory that she could use on the shoulder so will set that up for for use t.i.d.  Follow-up 6-8 weeks    FOLLOW UP:  6-8 week    Disclaimer: This note has been generated using voice-recognition software. There may be typographical errors that have been missed during proof-reading.    "

## 2022-04-21 ENCOUNTER — PATIENT OUTREACH (OUTPATIENT)
Dept: ADMINISTRATIVE | Facility: OTHER | Age: 52
End: 2022-04-21
Payer: COMMERCIAL

## 2022-04-24 ENCOUNTER — PATIENT MESSAGE (OUTPATIENT)
Dept: ADMINISTRATIVE | Facility: HOSPITAL | Age: 52
End: 2022-04-24
Payer: COMMERCIAL

## 2022-04-24 ENCOUNTER — PATIENT MESSAGE (OUTPATIENT)
Dept: ORTHOPEDICS | Facility: CLINIC | Age: 52
End: 2022-04-24
Payer: COMMERCIAL

## 2022-04-25 DIAGNOSIS — Z12.11 SCREENING FOR COLON CANCER: ICD-10-CM

## 2022-05-10 ENCOUNTER — OFFICE VISIT (OUTPATIENT)
Dept: ORTHOPEDICS | Facility: CLINIC | Age: 52
End: 2022-05-10
Payer: OTHER GOVERNMENT

## 2022-05-10 VITALS — BODY MASS INDEX: 30.66 KG/M2 | HEIGHT: 69 IN | WEIGHT: 207 LBS

## 2022-05-10 DIAGNOSIS — M25.511 CHRONIC RIGHT SHOULDER PAIN: Primary | ICD-10-CM

## 2022-05-10 DIAGNOSIS — G89.29 CHRONIC RIGHT SHOULDER PAIN: Primary | ICD-10-CM

## 2022-05-10 PROCEDURE — 99213 PR OFFICE/OUTPT VISIT, EST, LEVL III, 20-29 MIN: ICD-10-PCS | Mod: S$GLB,,, | Performed by: ORTHOPAEDIC SURGERY

## 2022-05-10 PROCEDURE — 99999 PR PBB SHADOW E&M-EST. PATIENT-LVL III: CPT | Mod: PBBFAC,,, | Performed by: ORTHOPAEDIC SURGERY

## 2022-05-10 PROCEDURE — 99213 OFFICE O/P EST LOW 20 MIN: CPT | Mod: S$GLB,,, | Performed by: ORTHOPAEDIC SURGERY

## 2022-05-10 PROCEDURE — 99999 PR PBB SHADOW E&M-EST. PATIENT-LVL III: ICD-10-PCS | Mod: PBBFAC,,, | Performed by: ORTHOPAEDIC SURGERY

## 2022-05-10 NOTE — PROGRESS NOTES
Subjective:      Patient ID: Celia Alford is a 51 y.o. female.  Chief Complaint: Follow-up and Pain of the Right Shoulder      HPI  Celia Alford is a  51 y.o. female presenting today for follow up of right shoulder pain chronic.  She reports that she is still having pain in the right shoulder now more than 1 year after right shoulder arthroscopy with subacromial decompression and debridement of partial tear  We have been through physical therapy at least twice  We have also tried several injections in the last year so without relief  She has been released full duty work but has difficulty at work particularly with lifting and overhead use  Symptoms seem similar to those before surgery but she does have pain which radiates into her neck on the right side  No numbness or tingling is reported.    Review of patient's allergies indicates:   Allergen Reactions    Pcn [penicillins] Hives and Rash         Current Outpatient Medications   Medication Sig Dispense Refill    cyclobenzaprine (FLEXERIL) 10 MG tablet Take 1 tablet (10 mg total) by mouth 3 (three) times daily as needed for Muscle spasms. 30 tablet 0    diazePAM (VALIUM) 5 MG tablet Take 1 tablet (5 mg total) by mouth daily as needed for Anxiety. 3 tablet 0    hydrocortisone 2.5 % ointment Apply topically once daily.      hydrocortisone butyrate 0.1 % Crea cream 1 APPLICATION 2 TIMES A WEEK TOPICALLY 30 DAYS      ibuprofen (ADVIL,MOTRIN) 600 MG tablet Take 1 tablet (600 mg total) by mouth 2 (two) times daily with meals. 60 tablet 1    ketoconazole (NIZORAL) 2 % cream 1 APPLICATION ONCE A DAY TOPICALLY 30 DAYS      medroxyPROGESTERone (DEPO-PROVERA) 150 mg/mL Syrg INJECT 1 ML (150 MG TOTAL) INTO THE MUSCLE EVERY 3 (THREE) MONTHS. 1 Syringe 3    methylPREDNISolone (MEDROL, SHAN,) 4 mg tablet use as directed 1 Package 0    oxyCODONE-acetaminophen (PERCOCET) 7.5-325 mg per tablet Take 1 tablet by mouth every 4 (four) hours as needed for Pain.  "40 tablet 0    pimecrolimus (ELIDEL) 1 % cream 1 APPLICATION TWICE A DAY TOPICALLY 30 DAYS       No current facility-administered medications for this visit.       No past medical history on file.    Past Surgical History:   Procedure Laterality Date    ARTHROSCOPIC REPAIR OF ROTATOR CUFF OF SHOULDER Right 11/20/2020    Procedure: REPAIR, ROTATOR CUFF, ARTHROSCOPIC;  Surgeon: Jens Han Jr., MD;  Location: [facility];  Service: Orthopedics;  Laterality: Right;  need opus system- Yarsani notified tori-11/10  video [employer] confirmed 11/19/2020 KB 0915    ROTATOR CUFF REPAIR  11/20/2020    TONSILLECTOMY         OBJECTIVE:   PHYSICAL EXAM:  Height: 5' 9" (175.3 cm) Weight: 93.9 kg (207 lb)  Vitals:    05/10/22 0942   Weight: 93.9 kg (207 lb)   Height: 5' 9" (1.753 m)   PainSc:   4     Ortho/SPM Exam  Examination right shoulder there is some mild tenderness over the AC joint  No bruising no swelling  Range of motion right shoulder is full with a mildly positive impingement sign  No instability  Rotator cuff strength intact  Neurologic exam intact      RADIOGRAPHS:  Previous MRI from several months ago (open sided) showed only some mild bursitis no other abnormalities noted  Comments: I have personally reviewed the imaging and I agree with the above radiologist's report.    ASSESSMENT/PLAN:     IMPRESSION:  Chronic right shoulder pain after previous shoulder arthroscopy and decompression    PLAN:  I explained the patient I really have exhausted all treatment options at this point  I think that a 2nd opinion may be indicated so I have made referral to Dr. Olman Pillai to evaluate her right shoulder  In the meantime continue full duty work anti-inflammatory medication by mouth    FOLLOW UP:  After the 2nd opinion    Disclaimer: This note has been generated using voice-recognition software. There may be typographical errors that have been missed during proof-reading.    "

## 2022-05-24 ENCOUNTER — PATIENT MESSAGE (OUTPATIENT)
Dept: ORTHOPEDICS | Facility: CLINIC | Age: 52
End: 2022-05-24
Payer: OTHER GOVERNMENT

## 2022-05-24 ENCOUNTER — TELEPHONE (OUTPATIENT)
Dept: ORTHOPEDICS | Facility: CLINIC | Age: 52
End: 2022-05-24
Payer: COMMERCIAL

## 2022-05-24 DIAGNOSIS — R52 PAIN: Primary | ICD-10-CM

## 2022-05-30 ENCOUNTER — PATIENT MESSAGE (OUTPATIENT)
Dept: ADMINISTRATIVE | Facility: HOSPITAL | Age: 52
End: 2022-05-30
Payer: OTHER GOVERNMENT

## 2022-06-01 ENCOUNTER — OFFICE VISIT (OUTPATIENT)
Dept: ORTHOPEDICS | Facility: CLINIC | Age: 52
End: 2022-06-01
Payer: OTHER GOVERNMENT

## 2022-06-01 ENCOUNTER — HOSPITAL ENCOUNTER (OUTPATIENT)
Dept: RADIOLOGY | Facility: HOSPITAL | Age: 52
Discharge: HOME | End: 2022-06-01
Attending: ORTHOPAEDIC SURGERY
Payer: COMMERCIAL

## 2022-06-01 ENCOUNTER — HOSPITAL ENCOUNTER (OUTPATIENT)
Dept: RADIOLOGY | Facility: HOSPITAL | Age: 52
Discharge: HOME | End: 2022-06-01
Attending: ORTHOPAEDIC SURGERY
Payer: OTHER GOVERNMENT

## 2022-06-01 VITALS
HEART RATE: 88 BPM | HEIGHT: 69 IN | DIASTOLIC BLOOD PRESSURE: 84 MMHG | BODY MASS INDEX: 29.95 KG/M2 | SYSTOLIC BLOOD PRESSURE: 124 MMHG | WEIGHT: 202.19 LBS

## 2022-06-01 DIAGNOSIS — M54.12 RIGHT CERVICAL RADICULOPATHY: Primary | ICD-10-CM

## 2022-06-01 DIAGNOSIS — M54.12 RIGHT CERVICAL RADICULOPATHY: ICD-10-CM

## 2022-06-01 DIAGNOSIS — R52 PAIN: ICD-10-CM

## 2022-06-01 DIAGNOSIS — M75.21 BICEPS TENDONITIS ON RIGHT: ICD-10-CM

## 2022-06-01 PROCEDURE — 73030 X-RAY EXAM OF SHOULDER: CPT | Mod: TC,FY,RT

## 2022-06-01 PROCEDURE — 72050 XR CERVICAL SPINE COMPLETE 5 VIEW: ICD-10-PCS | Mod: 26,,, | Performed by: RADIOLOGY

## 2022-06-01 PROCEDURE — 73030 XR SHOULDER COMPLETE 2 OR MORE VIEWS RIGHT: ICD-10-PCS | Mod: 26,RT,, | Performed by: RADIOLOGY

## 2022-06-01 PROCEDURE — 99204 PR OFFICE/OUTPT VISIT, NEW, LEVL IV, 45-59 MIN: ICD-10-PCS | Mod: 25,S$GLB,, | Performed by: ORTHOPAEDIC SURGERY

## 2022-06-01 PROCEDURE — 99999 PR PBB SHADOW E&M-EST. PATIENT-LVL V: ICD-10-PCS | Mod: PBBFAC,,, | Performed by: ORTHOPAEDIC SURGERY

## 2022-06-01 PROCEDURE — 99204 OFFICE O/P NEW MOD 45 MIN: CPT | Mod: 25,S$GLB,, | Performed by: ORTHOPAEDIC SURGERY

## 2022-06-01 PROCEDURE — 72050 X-RAY EXAM NECK SPINE 4/5VWS: CPT | Mod: TC,FY

## 2022-06-01 PROCEDURE — 20550 TENDON SHEATH: ICD-10-PCS | Mod: RT,S$GLB,, | Performed by: ORTHOPAEDIC SURGERY

## 2022-06-01 PROCEDURE — 99999 PR PBB SHADOW E&M-EST. PATIENT-LVL V: CPT | Mod: PBBFAC,,, | Performed by: ORTHOPAEDIC SURGERY

## 2022-06-01 PROCEDURE — 20550 NJX 1 TENDON SHEATH/LIGAMENT: CPT | Mod: RT,S$GLB,, | Performed by: ORTHOPAEDIC SURGERY

## 2022-06-01 PROCEDURE — 73030 X-RAY EXAM OF SHOULDER: CPT | Mod: 26,RT,, | Performed by: RADIOLOGY

## 2022-06-01 PROCEDURE — 72050 X-RAY EXAM NECK SPINE 4/5VWS: CPT | Mod: 26,,, | Performed by: RADIOLOGY

## 2022-06-01 RX ORDER — TRIAMCINOLONE ACETONIDE 40 MG/ML
40 INJECTION, SUSPENSION INTRA-ARTICULAR; INTRAMUSCULAR
Status: DISCONTINUED | OUTPATIENT
Start: 2022-06-01 | End: 2022-06-02 | Stop reason: HOSPADM

## 2022-06-01 RX ADMIN — TRIAMCINOLONE ACETONIDE 40 MG: 40 INJECTION, SUSPENSION INTRA-ARTICULAR; INTRAMUSCULAR at 09:06

## 2022-06-01 NOTE — LETTER
June 1, 2022      [facility]  [address]  [place] [address]  Phone: [phone]  Fax: [phone]       Patient: Celia Alford   YOB: 1970  Date of Visit: 06/01/2022    To Whom It May Concern:    Connie Alford  was at [facility] on 06/01/2022. The patient may return to work/school on today 6/1/2022.  If you have any questions or concerns, or if I can be of further assistance, please do not hesitate to contact me.    Sincerely,      Olman Pillai IV, MD

## 2022-06-01 NOTE — PROGRESS NOTES
[facility], Orthopedics and Sports Medicine  [facility]    New Patient Shoulder Office Visit  06/01/2022     Subjective:      Celia Alford is a 51 y.o. right handed female referred by Dr. Jens Han Jr. for evaluation and treatment of a right shoulder and right neck problem. This is evaluated as a workers compensation injury.  She presents today for a second opinion regarding her right shoulder and neck problem.      The patient had a fall in 2019 while at work, landing on the right shoulder.  Had surgery 11/2020 for rotator cuff impingement and tendinosis and a partial tear of the rotator cuff (per op note) by another surgeon. She had persistent pain after surgery and never really got better.  She had an MRI after surgery in 12/2021 showing no new rotator cuff tear. The patient had a CSI in January 2022 due to  persistent pain after surgery and had minimal pain relief.  Current pain located anterior shoulder at biceps groove.  Pain with lifting arm overhead and pain at night.    She also complains of pain in the right side of her neck.  This pain shoots down the posterior and anterior aspect of the shoulder and chest/back.  She has not had treatment for a neck issue.      The patient works as a [employer] worker.      Outside reports reviewed: historical medical records and office notes.    No past medical history on file.    Patient Active Problem List   Diagnosis    Chronic right shoulder pain    Shoulder joint dysfunction    Neck pain on right side    Traumatic incomplete tear of right rotator cuff    Decreased range of motion of shoulder, right    Decreased strength of upper extremity    COVID-19 virus infection    Elevated blood pressure reading without diagnosis of hypertension    Neck muscle spasm    Encounter for long-term (current) use of other medications    Class 1 obesity due to excess calories without serious comorbidity with body mass index (BMI) of 30.0 to  30.9 in adult    Palpitations    Right cervical radiculopathy       Past Surgical History:   Procedure Laterality Date    ARTHROSCOPIC REPAIR OF ROTATOR CUFF OF SHOULDER Right 11/20/2020    Procedure: REPAIR, ROTATOR CUFF, ARTHROSCOPIC;  Surgeon: Jens Han Jr., MD;  Location: [facility];  Service: Orthopedics;  Laterality: Right;  need opus system- Voodoo notified tori-11/10  video Oriental orthodox confirmed 11/19/2020 KB 0915    ROTATOR CUFF REPAIR  11/20/2020    TONSILLECTOMY          Current Outpatient Medications   Medication Instructions    cyclobenzaprine (FLEXERIL) 10 mg, Oral, 3 times daily PRN    diazePAM (VALIUM) 5 mg, Oral, Daily PRN    hydrocortisone 2.5 % ointment Topical (Top), Daily    hydrocortisone butyrate 0.1 % Crea cream 1 APPLICATION 2 TIMES A WEEK TOPICALLY 30 DAYS    ibuprofen (ADVIL,MOTRIN) 600 mg, Oral, 2 times daily with meals    ketoconazole (NIZORAL) 2 % cream 1 APPLICATION ONCE A DAY TOPICALLY 30 DAYS    medroxyPROGESTERone (DEPO-PROVERA) 150 mg, Intramuscular, Every 3 months    methylPREDNISolone (MEDROL, SHAN,) 4 mg tablet use as directed    oxyCODONE-acetaminophen (PERCOCET) 7.5-325 mg per tablet 1 tablet, Oral, Every 4 hours PRN    pimecrolimus (ELIDEL) 1 % cream 1 APPLICATION TWICE A DAY TOPICALLY 30 DAYS        Review of patient's allergies indicates:   Allergen Reactions    Pcn [penicillins] Hives and Rash       Social History     Socioeconomic History    Marital status: Single   Tobacco Use    Smoking status: Never Smoker    Smokeless tobacco: Never Used   Substance and Sexual Activity    Alcohol use: Yes     Comment: seldom    Drug use: No    Sexual activity: Not Currently     Partners: Male     Birth control/protection: Injection       Family History   Problem Relation Age of Onset    Diabetes Maternal Grandmother     Hypertension Maternal Grandmother     No Known Problems Mother     Breast cancer Neg Hx     Colon cancer Neg Hx     Ovarian cancer Neg  Hx          Review of Systems   Constitutional: Negative for chills and fever.   HENT: Negative for hearing loss.    Eyes: Negative for blurred vision.   Cardiovascular: Negative for chest pain.   Respiratory: Negative for shortness of breath.    Gastrointestinal: Negative for abdominal pain.   Neurological: Negative for light-headedness.        Objective:      General    Nursing note and vitals reviewed.  Constitutional: She is oriented to person, place, and time. She appears well-developed and well-nourished. No distress.   HENT:   Head: Normocephalic and atraumatic.   Eyes: Pupils are equal, round, and reactive to light.   Cardiovascular: Normal rate and regular rhythm.    Pulmonary/Chest: Effort normal and breath sounds normal. No respiratory distress.   Neurological: She is alert and oriented to person, place, and time.   Psychiatric: She has a normal mood and affect. Her behavior is normal.     General Musculoskeletal Exam   Gait: normal     Back (L-Spine & T-Spine) / Neck (C-Spine) Exam     Tenderness Right paramedian tenderness of the Lower C-Spine.     Neck (C-Spine) Range of Motion   Right Lateral Bend: abnormal  Right Rotation: abnormal    Spinal Sensation   Right Side Sensation  C-Spine Level: normal   L-Spine Level: normal  S-Spine Level: normal  T-Spine Level: normal  Left Side Sensation  C-Spine Level: normal  L-Spine Level: normal  S-Spine Level: normal  T-Spine Level: normal    Neck (C-Spine) Tests   Spurling's Test   Right: positive  Right Shoulder Exam     Inspection/Observation   Swelling: absent  Bruising: absent  Atrophy: absent    Tenderness   The patient is tender to palpation of the biceps tendon.    Range of Motion   Active abduction:  150 abnormal   Passive abduction: 160   Forward Flexion:  150 abnormal   External Rotation 0 degrees:  30 abnormal   Internal rotation 0 degrees:  T11 abnormal     Tests & Signs   Drop arm: negative  Corbett test: positive  Impingement: positive  Belly Press:  negative  Active Compression Test ([place]'s Sign): positive  Speed's Test: negative    Other   Sensation: normal    Comments:  Wang test- positive    Spurlings- positive    Left Shoulder Exam     Inspection/Observation   Swelling: absent  Bruising: absent  Atrophy: absent    Tenderness   The patient is experiencing no tenderness.     Range of Motion   Active abduction:  170 normal   Forward Flexion:  170 normal   External Rotation 0 degrees:  50 normal   Internal rotation 0 degrees:  T8 normal     Tests & Signs   Drop arm: negative  Corbett test: negative  Impingement: negative  Belly Press: negative  Active Compression test ([place]'s Sign): negative  Speed's Test: negative    Other   Sensation: normal     Comments:  Wang test- negative      Muscle Strength   Right Upper Extremity   Shoulder Abduction: 5/5 (painful)   Shoulder Internal Rotation: 5/5   Shoulder External Rotation: 5/5   Biceps: 5/5   Deltoid:  5/5  Wrist extension: 5/5   Wrist flexion: 5/5   Left Upper Extremity  Shoulder Abduction: 5/5   Shoulder Internal Rotation: 5/5   Shoulder External Rotation: 5/5   Biceps: 5/5     Reflexes     Left Side  Biceps:  2+  Triceps:  2+  Brachioradialis:  2+  Left Olvera's Sign:  Absent    Right Side   Biceps:  2+  Triceps:  2+  Brachioradialis:  2+  Right Olevra's Sign:  absent    Vascular Exam     Right Pulses      Radial:                    2+      Left Pulses      Radial:                    2+          Imaging:  Radiographs of the right shoulder taken 06/01/2022 were personally reviewed from the [facility].  Multiple views of the shoulder are available today for review, including an AP, scapular Y, axillary view.  The glenohumeral joint demonstrates mild degenerative changes .  The acromioclavicular joint demonstrates mild degenerative changes .  The glenohumeral joint is concentrically reduced.  There is no acute fracture or dislocation.      Radiographs of the cervical spine taken 06/01/2022 were  personally reviewed from the [facility].  Multiple views of the cervical spine are available today for review.  The cervical lordosis is maintained.  The intervertebral disc spaces show degenerative changes at C5-6, C6-7.  The facet joints demonstrate mild arthrosis at the distal cervical levels.  There is no fracture or subluxation noted.     Tendon Sheath    Date/Time: 6/1/2022 9:00 AM  Performed by: Olman Pillai IV, MD  Authorized by: Olman Pillai IV, MD     Consent Done?:  Yes (Verbal)  Indications:  Pain  Timeout: prior to procedure the correct patient, procedure, and site was verified    Prep: patient was prepped and draped in usual sterile fashion      Local anesthesia used?: Yes    Anesthesia:  Local infiltration  Local anesthetic:  Lidocaine 1% without epinephrine  Location:  Shoulder  Site:  R bicep tendon  Needle size:  22 G  Approach: anterior.  Medications:  40 mg triamcinolone acetonide 40 mg/mL (40mg Triamcinolone used)  Patient tolerance:  Patient tolerated the procedure well with no immediate complications            Assessment:       Celia Alford is a 51 y.o. female seen in the office today. The primary encounter diagnosis was Right cervical radiculopathy. A diagnosis of Biceps tendonitis on right was also pertinent to this visit.  Non-operative treatment is recommended at this time. I gave a CSI biceps groove today and recommended resuming therapy for the biceps tendon problem and the neck.  Will assess the patients improvement after these treatments.  Would consider working up neck with Mri if persistent pain to rule out this issue prior to pursuing any further surgical treatment of the shoulder. The natural history and expected course discussed with patient. Various treatment options were discussed, including their risks and benefits. All of the patient's questions were answered.     Plan:      1. Physical therapy and rehabilitation treatment.  2. Tylenol 650mg TID, PRN  pain.  3. Follow up in 6 weeks.   4. Corticosteroid injection given today (right biceps groove).         Olman Pillai IV, MD   of Clinical Orthopedics  Department of Orthopedic Surgery  [facility]  Office: [phone]  Website: [email or web address]      Orders Placed This Encounter    Tendon Sheath    X-Ray Cervical Spine Complete 5 view    Ambulatory referral/consult to Physical/Occupational Therapy

## 2022-06-23 ENCOUNTER — CLINICAL SUPPORT (OUTPATIENT)
Dept: REHABILITATION | Facility: HOSPITAL | Age: 52
End: 2022-06-23
Payer: OTHER GOVERNMENT

## 2022-06-23 DIAGNOSIS — M25.9 SHOULDER JOINT DYSFUNCTION: ICD-10-CM

## 2022-06-23 DIAGNOSIS — R29.898 SHOULDER WEAKNESS: Primary | ICD-10-CM

## 2022-06-23 PROCEDURE — 97140 MANUAL THERAPY 1/> REGIONS: CPT | Mod: PO

## 2022-06-23 PROCEDURE — 97161 PT EVAL LOW COMPLEX 20 MIN: CPT | Mod: PO

## 2022-06-24 PROBLEM — R29.898 SHOULDER WEAKNESS: Status: ACTIVE | Noted: 2022-06-24

## 2022-06-24 NOTE — PLAN OF CARE
[facility]   Physical Therapy Initial Evaluation     Date: 6/23/2022   Name: Celia Hernandez shawn  Clinic Number: [id number]    Therapy Diagnosis:   Encounter Diagnoses   Name Primary?    Shoulder weakness Yes    Shoulder joint dysfunction      Physician: Olman Pillai IV, MD    Physician Orders: PT Eval and Treat   Medical Diagnosis from Referral: M75.21 (ICD-10-CM) - Biceps tendonitis on right M54.12 (ICD-10-CM) - Right cervical radiculopathy   Evaluation Date: 6/23/2022  Authorization Period Expiration: 06/01/2023   Plan of Care Expiration: 8/31/2022  Progress Note Due: 7/23/2022  Visit # / Visits authorized: 1/ 1   FOTO: 0/3    Precautions: Standard     Time In: 227 (arrived late)  Time Out: 328  Total Appointment Time (timed & untimed codes): 61 minutes      SUBJECTIVE     Date of onset: work related injury in 2019 and surgery in 2020. She has received 3-4 injections since surgery. She reports that the pain is constant and is at least a 6/10.      History of current condition - Celia reports: that she sustained a work injury in 2019 after fall to shoulder. She underwent rotator cuff surgery in 2020, but did not complete physical therapy plan of care secondary to COVID pandemic. She reports occasional tingling in right arm if she sleeps on it wrong, otherwise no tingling/numbness. She reports limitations reaching behind her back and any lifting.     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)  Prior Level of Function: limited 2/2 to post-operative restrictions   Current Level of Function: difficulty reaching behind back to hook bra and belt, difficulty lifting objects waist height and overhead, unable to work out      Pain:  Current 6/10, worst 10/10, best 4/10   Location: right shoulder -  superior to anterior shoulder   Description: Aching and Burning  Aggravating Factors: Sleeping, LIfting  Easing Factors:  Medication, Hot Pack     Patients goals: to improve shoulder mobility to be able to reach behind back, and improve strength       Medical History:   No past medical history on file.    Surgical History:   Celia Alford  has a past surgical history that includes Tonsillectomy; Arthroscopic repair of rotator cuff of shoulder (Right, 2020); and Rotator cuff repair (2020).    Medications:   Celia has a current medication list which includes the following prescription(s): cyclobenzaprine, diazepam, hydrocortisone, hydrocortisone butyrate, ibuprofen, ketoconazole, medroxyprogesterone, methylprednisolone, oxycodone-acetaminophen, and pimecrolimus.    Allergies:   Review of patient's allergies indicates:   Allergen Reactions    Pcn [penicillins] Hives and Rash          OBJECTIVE     Observation: upper crossed posture syndrome; scapular dyskinesis; elevation and downward rotation of right scapula     Hand Dominance: right    Sensation: intact to light touch in right upper extremity     Shoulder ROM (measured in degrees):  AROM Right Left Comment Normal   Shoulder Flexion: 115 degrees * 155 degrees  180 deg   Shoulder Extension: 42 degrees * 70 degrees  60 deg   Shoulder Abduction: 112 degrees * 160 degrees    180 deg   Shoulder ER at 0 and 90 degrees of abduction: 65, 100 degrees WNL at 0 and 90 degrees of shoulder abduction   90 deg   Shoulder IR 90 50 degrees * Normal   90 deg   PROM Right Left Comment Normal   Shoulder Flexion: 135 degrees Not Tested    180 deg   Shoulder Abduction: 130 degrees Not Tested    180 deg   Shoulder ER, 90°ABD: Not Tested   Not Tested    90 deg   Shoulder IR, 90° ABD: 53 degrees Not Tested    90 deg     *denotes pain      Cervical AROM: Pain/Dysfunction with Movement:   Flexion: 70 degrees    Extension: 40 degrees    Right side bendin degrees     Left side bendin degrees Stretch on right   Right rotation: 60 degrees    Left rotation: 65 degrees        MMT   Right Left Comment   Shoulder flexion: 4-/5 * 4+/5    Shoulder Abduction: 3+/5 * 5/5    Shoulder ER: 3+/5 * 4+/5    Shoulder IR: 4+/5 * 5/5    Lower Trap: Not tested secondary to increased pain Not Tested     Middle Trap: Not tested secondary to increased pain Not Tested       Special Tests  - Neers: right positive  - Corbett-Héctor: right negative  - Lift-off: right positive  - Painful Arc Sign: right, positive  - Drop Arm: right negative  - Leung's: right, negative  - Speeds: right positive      Functional Shoulder IR/ER  - Left Functional IR: T10  - Left Functional ER: C7  - Right Functional IR: ipsilateral iliac crest  - Right Functional ER: C7      Joint Mobility:  · Right glenohumeral joint: posterior and inferior glide - normal    Palpation: tender to palpation to right upper trapezius, right levator scapulae, and right biceps tendon    FOTO Survey = not administered          TREATMENT     Total Treatment time (time-based codes) separate from Evaluation: 08 minutes      Celia received the treatments listed below:      manual therapy techniques: Soft tissue Mobilization and Dry Needling were applied to the: right upper trapezius for 08 minutes, including:  Informed consent obtained after thorough explanation of risks and benefits. Signed consent form will be scanned into medical record. Pre-procedure time out performed which included verification of name, , and site of treatment. 70% isopropyl alcohol wipe down performed to treatment site with single use sterile prep pads.    TDN without electrical stimulation to right upper trapezius using 0.30x40 Seirin J Type Needle. Treatment performed in prone position. TDN performed to reduce pain and muscle tension, promote blood flow, and improve ROM and function. Pt tolerated tx well without adverse effects. She was educated on what to  expect following the procedure and verbalized understanding.      PATIENT EDUCATION AND HOME EXERCISES     Education provided:   - Role of PT, POC, answered any patient questions, and dry needling risks and benefits     Written Home Exercises Provided: no, she will be instructed in home exercise progran next treatment session.     ASSESSMENT     Celia is a 51 y.o. female referred to outpatient Physical Therapy with a medical diagnosis of biceps tendonitis on right and right cervical radiculopathy. Patient presents with a history of chronic right shoulder pain after sustaining work injury in 2019 requiring surgical intervention in 2020. She has limited and painful shoulder P/AROM, scapular dyskinesis, and tenderness to biceps tendon. She tolerated dry needling well with a decreased in muscle tension post treatment session. She would benefit from physical therapy to improve pain free shoulder ROM, scapular and shoulder strength, and ergonomic work training to return patient to job related duty and recreational activities.       Patient prognosis is Good.   Patient will benefit from skilled outpatient Physical Therapy to address the deficits stated above and in the chart below, provide patient /family education, and to maximize patientt's level of independence.     Plan of care discussed with patient: Yes  Patient's spiritual, cultural and educational needs considered and patient is agreeable to the plan of care and goals as stated below:     Anticipated Barriers for therapy: chronicity of condition and scheduling     Medical Necessity is demonstrated by the following  History  Co-morbidities and personal factors that may impact the plan of care Co-morbidities:   difficulty sleeping, high BMI and prior right rotator cuff repair    Personal Factors:   no deficits     moderate   Examination  Body Structures and Functions, activity limitations and participation restrictions that may impact the plan of care Body  Regions:   upper extremities    Body Systems:    gross symmetry  ROM  strength  motor control  motor learning    Participation Restrictions:   none    Activity limitations:   Learning and applying knowledge  no deficits    General Tasks and Commands  no deficits    Communication  no deficits    Mobility  lifting and carrying objects    Self care  dressing    Domestic Life  Shopping - reaching overhead   doing house work (cleaning house, washing dishes, laundry)    Interactions/Relationships  no deficits    Life Areas  employment - transferring objects at work    Community and Social Life  recreation and leisure         moderate   Clinical Presentation stable and uncomplicated low   Decision Making/ Complexity Score: low     Goals:  Short Term Goals: 4 weeks   1. Patient will demonstrate proper performance of home exercise program of active exercises to improve carryover between sessions.  2. The patient will demonstrate increased shoulder flexion AROM to > 130 degrees in order to demonstrate improved functional shoulder mobility and self care.  3. The patient will demonstrate normal functional shoulder internal and external AROM in order to improve patient's ability to reach behind her back to perform ADLs.  4. Pt will become compliant and independent with the initial HEP to promote decreased pain and improved mobility and strength  5. Pt will demonstrate an increase in shoulder strength by 1/3 MMT grade to improve functional stability and strength.        Long Term Goals: 8 weeks   1. Pt will become compliant and independent with an updated, progressed HEP to promote decreased pain and improved mobility and strength as well as prep for discharge from further PT intervention.   2. Patient will demonstrate the ability to lift >/= 45# object from floor to waist to progress with job duties.  3. Pt will demonstrate an increase in shoulder strength by 1/2 MMT grade to improve functional stability and strength.    PLAN      Administer FOTO next session    Plan of care Certification: 6/23/2022 to 8/31/2022.    Outpatient Physical Therapy 2 times weekly for 8 weeks to include the following interventions: Cervical/Lumbar Traction, Manual Therapy, Moist Heat/ Ice, Neuromuscular Re-ed, Patient Education, Therapeutic Activities, Therapeutic Exercise and Dry Needling.     Lynda Atwood, PT      I CERTIFY THE NEED FOR THESE SERVICES FURNISHED UNDER THIS PLAN OF TREATMENT AND WHILE UNDER MY CARE   Physician's comments:     Physician's Signature: ___________________________________________________

## 2022-07-01 ENCOUNTER — LAB VISIT (OUTPATIENT)
Dept: LAB | Facility: HOSPITAL | Age: 52
End: 2022-07-01
Payer: COMMERCIAL

## 2022-07-01 ENCOUNTER — OFFICE VISIT (OUTPATIENT)
Dept: INTERNAL MEDICINE | Facility: CLINIC | Age: 52
End: 2022-07-01
Payer: COMMERCIAL

## 2022-07-01 VITALS
WEIGHT: 198.44 LBS | DIASTOLIC BLOOD PRESSURE: 98 MMHG | HEIGHT: 69 IN | HEART RATE: 88 BPM | BODY MASS INDEX: 29.39 KG/M2 | SYSTOLIC BLOOD PRESSURE: 146 MMHG | OXYGEN SATURATION: 97 %

## 2022-07-01 DIAGNOSIS — Z12.11 COLON CANCER SCREENING: ICD-10-CM

## 2022-07-01 DIAGNOSIS — R00.2 PALPITATIONS: ICD-10-CM

## 2022-07-01 DIAGNOSIS — I10 ESSENTIAL HYPERTENSION: ICD-10-CM

## 2022-07-01 DIAGNOSIS — R00.2 PALPITATIONS: Primary | ICD-10-CM

## 2022-07-01 DIAGNOSIS — F41.9 EPISODE OF ANXIETY: ICD-10-CM

## 2022-07-01 DIAGNOSIS — G47.9 SLEEP DISTURBANCE: ICD-10-CM

## 2022-07-01 LAB
ALBUMIN SERPL BCP-MCNC: 4.3 G/DL (ref 3.5–5.2)
ALP SERPL-CCNC: 54 U/L (ref 55–135)
ALT SERPL W/O P-5'-P-CCNC: 47 U/L (ref 10–44)
ANION GAP SERPL CALC-SCNC: 9 MMOL/L (ref 8–16)
AST SERPL-CCNC: 24 U/L (ref 10–40)
BASOPHILS # BLD AUTO: 0.02 K/UL (ref 0–0.2)
BASOPHILS NFR BLD: 0.3 % (ref 0–1.9)
BILIRUB SERPL-MCNC: 0.7 MG/DL (ref 0.1–1)
BUN SERPL-MCNC: 12 MG/DL (ref 6–20)
CALCIUM SERPL-MCNC: 10.1 MG/DL (ref 8.7–10.5)
CHLORIDE SERPL-SCNC: 105 MMOL/L (ref 95–110)
CHOLEST SERPL-MCNC: 295 MG/DL (ref 120–199)
CHOLEST/HDLC SERPL: 4.1 {RATIO} (ref 2–5)
CO2 SERPL-SCNC: 26 MMOL/L (ref 23–29)
CREAT SERPL-MCNC: 0.8 MG/DL (ref 0.5–1.4)
DIFFERENTIAL METHOD: ABNORMAL
EOSINOPHIL # BLD AUTO: 0.1 K/UL (ref 0–0.5)
EOSINOPHIL NFR BLD: 1.4 % (ref 0–8)
ERYTHROCYTE [DISTWIDTH] IN BLOOD BY AUTOMATED COUNT: 12.6 % (ref 11.5–14.5)
EST. GFR  (AFRICAN AMERICAN): >60 ML/MIN/1.73 M^2
EST. GFR  (NON AFRICAN AMERICAN): >60 ML/MIN/1.73 M^2
GLUCOSE SERPL-MCNC: 87 MG/DL (ref 70–110)
HCT VFR BLD AUTO: 36.3 % (ref 37–48.5)
HDLC SERPL-MCNC: 72 MG/DL (ref 40–75)
HDLC SERPL: 24.4 % (ref 20–50)
HGB BLD-MCNC: 11.4 G/DL (ref 12–16)
IMM GRANULOCYTES # BLD AUTO: 0.01 K/UL (ref 0–0.04)
IMM GRANULOCYTES NFR BLD AUTO: 0.2 % (ref 0–0.5)
IRON SERPL-MCNC: 115 UG/DL (ref 30–160)
LDLC SERPL CALC-MCNC: 193.8 MG/DL (ref 63–159)
LYMPHOCYTES # BLD AUTO: 2.6 K/UL (ref 1–4.8)
LYMPHOCYTES NFR BLD: 39.5 % (ref 18–48)
MCH RBC QN AUTO: 33.6 PG (ref 27–31)
MCHC RBC AUTO-ENTMCNC: 31.4 G/DL (ref 32–36)
MCV RBC AUTO: 107 FL (ref 82–98)
MONOCYTES # BLD AUTO: 0.4 K/UL (ref 0.3–1)
MONOCYTES NFR BLD: 5.6 % (ref 4–15)
NEUTROPHILS # BLD AUTO: 3.5 K/UL (ref 1.8–7.7)
NEUTROPHILS NFR BLD: 53 % (ref 38–73)
NONHDLC SERPL-MCNC: 223 MG/DL
NRBC BLD-RTO: 0 /100 WBC
PLATELET # BLD AUTO: 415 K/UL (ref 150–450)
PMV BLD AUTO: 10 FL (ref 9.2–12.9)
POTASSIUM SERPL-SCNC: 4 MMOL/L (ref 3.5–5.1)
PROT SERPL-MCNC: 8 G/DL (ref 6–8.4)
RBC # BLD AUTO: 3.39 M/UL (ref 4–5.4)
SATURATED IRON: 27 % (ref 20–50)
SODIUM SERPL-SCNC: 140 MMOL/L (ref 136–145)
TOTAL IRON BINDING CAPACITY: 429 UG/DL (ref 250–450)
TRANSFERRIN SERPL-MCNC: 290 MG/DL (ref 200–375)
TRIGL SERPL-MCNC: 146 MG/DL (ref 30–150)
TSH SERPL DL<=0.005 MIU/L-ACNC: 0.95 UIU/ML (ref 0.4–4)
WBC # BLD AUTO: 6.63 K/UL (ref 3.9–12.7)

## 2022-07-01 PROCEDURE — 93005 EKG 12-LEAD: ICD-10-PCS | Mod: S$GLB,,, | Performed by: NURSE PRACTITIONER

## 2022-07-01 PROCEDURE — 80061 LIPID PANEL: CPT | Performed by: NURSE PRACTITIONER

## 2022-07-01 PROCEDURE — 3080F DIAST BP >= 90 MM HG: CPT | Mod: CPTII,S$GLB,, | Performed by: NURSE PRACTITIONER

## 2022-07-01 PROCEDURE — 1160F RVW MEDS BY RX/DR IN RCRD: CPT | Mod: CPTII,S$GLB,, | Performed by: NURSE PRACTITIONER

## 2022-07-01 PROCEDURE — 3008F BODY MASS INDEX DOCD: CPT | Mod: CPTII,S$GLB,, | Performed by: NURSE PRACTITIONER

## 2022-07-01 PROCEDURE — 3077F PR MOST RECENT SYSTOLIC BLOOD PRESSURE >= 140 MM HG: ICD-10-PCS | Mod: CPTII,S$GLB,, | Performed by: NURSE PRACTITIONER

## 2022-07-01 PROCEDURE — 36415 COLL VENOUS BLD VENIPUNCTURE: CPT | Performed by: NURSE PRACTITIONER

## 2022-07-01 PROCEDURE — 85025 COMPLETE CBC W/AUTO DIFF WBC: CPT | Performed by: NURSE PRACTITIONER

## 2022-07-01 PROCEDURE — 93010 ELECTROCARDIOGRAM REPORT: CPT | Mod: S$GLB,,, | Performed by: INTERNAL MEDICINE

## 2022-07-01 PROCEDURE — 93010 EKG 12-LEAD: ICD-10-PCS | Mod: S$GLB,,, | Performed by: INTERNAL MEDICINE

## 2022-07-01 PROCEDURE — 3008F PR BODY MASS INDEX (BMI) DOCUMENTED: ICD-10-PCS | Mod: CPTII,S$GLB,, | Performed by: NURSE PRACTITIONER

## 2022-07-01 PROCEDURE — 1160F PR REVIEW ALL MEDS BY PRESCRIBER/CLIN PHARMACIST DOCUMENTED: ICD-10-PCS | Mod: CPTII,S$GLB,, | Performed by: NURSE PRACTITIONER

## 2022-07-01 PROCEDURE — 1159F MED LIST DOCD IN RCRD: CPT | Mod: CPTII,S$GLB,, | Performed by: NURSE PRACTITIONER

## 2022-07-01 PROCEDURE — 84443 ASSAY THYROID STIM HORMONE: CPT | Performed by: NURSE PRACTITIONER

## 2022-07-01 PROCEDURE — 3080F PR MOST RECENT DIASTOLIC BLOOD PRESSURE >= 90 MM HG: ICD-10-PCS | Mod: CPTII,S$GLB,, | Performed by: NURSE PRACTITIONER

## 2022-07-01 PROCEDURE — 99214 PR OFFICE/OUTPT VISIT, EST, LEVL IV, 30-39 MIN: ICD-10-PCS | Mod: S$GLB,,, | Performed by: NURSE PRACTITIONER

## 2022-07-01 PROCEDURE — 93005 ELECTROCARDIOGRAM TRACING: CPT | Mod: S$GLB,,, | Performed by: NURSE PRACTITIONER

## 2022-07-01 PROCEDURE — 3077F SYST BP >= 140 MM HG: CPT | Mod: CPTII,S$GLB,, | Performed by: NURSE PRACTITIONER

## 2022-07-01 PROCEDURE — 99999 PR PBB SHADOW E&M-EST. PATIENT-LVL V: ICD-10-PCS | Mod: PBBFAC,,, | Performed by: NURSE PRACTITIONER

## 2022-07-01 PROCEDURE — 80053 COMPREHEN METABOLIC PANEL: CPT | Performed by: NURSE PRACTITIONER

## 2022-07-01 PROCEDURE — 99999 PR PBB SHADOW E&M-EST. PATIENT-LVL V: CPT | Mod: PBBFAC,,, | Performed by: NURSE PRACTITIONER

## 2022-07-01 PROCEDURE — 84466 ASSAY OF TRANSFERRIN: CPT | Performed by: NURSE PRACTITIONER

## 2022-07-01 PROCEDURE — 99214 OFFICE O/P EST MOD 30 MIN: CPT | Mod: S$GLB,,, | Performed by: NURSE PRACTITIONER

## 2022-07-01 PROCEDURE — 1159F PR MEDICATION LIST DOCUMENTED IN MEDICAL RECORD: ICD-10-PCS | Mod: CPTII,S$GLB,, | Performed by: NURSE PRACTITIONER

## 2022-07-01 RX ORDER — HYDROXYZINE HYDROCHLORIDE 25 MG/1
25 TABLET, FILM COATED ORAL NIGHTLY PRN
Qty: 15 TABLET | Refills: 0 | Status: SHIPPED | OUTPATIENT
Start: 2022-07-01 | End: 2022-07-29

## 2022-07-01 RX ORDER — AMLODIPINE BESYLATE 5 MG/1
5 TABLET ORAL DAILY
Qty: 90 TABLET | Refills: 1 | Status: SHIPPED | OUTPATIENT
Start: 2022-07-01 | End: 2022-07-29

## 2022-07-01 NOTE — PROGRESS NOTES
INTERNAL MEDICINE PROGRESS/URGENT CARE NOTE    CHIEF COMPLAINT     Chief Complaint   Patient presents with    Anxiety     2 WEEKS    Palpitations     FOR A WHILE (2 WEEKS OR LONGER)       NICOLÁS Alford is a 51 y.o. female who presents for an urgent/follow up visit today. She is a current patient of Dr. Roblero, last seen in clinic on 09/09/2021 for elevated blood pressure.     She states she has had some new interpersonal/family issues: arguments with her daughters and was informed that her uncle was diagnosed with prostate cancer. She learned about this news just prior to her panic attack.    Today she presents to clinic with complaints of possible panic attack. She has not experienced this problem in the past. She states she believes she had a panic attack, lasting approximately 5 minutes. She felt like her heart was racing, started crying and breathing heavily. The next day, she felt fatigued and drained. She states this was a single episode and has not occurred since.     She also reports palpitations for multiple years. She states she was supposed to see a cardiologist for this. This problem has gotten worse. She did see cardiologist about 6-7 years ago at [facility] for this issue, had a stress test done and she was told she had palpitations.     She denies aggravating and alleviating factors. She denies associated symptoms.     She denies family history of cardiac issues.     She states she was diagnosed with hypertension and previously took hydrochlorothiazide. She has not been on that medication for at least 15 years or more.      Past Medical History:  Past Medical History:   Diagnosis Date    Essential (primary) hypertension        Home Medications:  Prior to Admission medications    Medication Sig Start Date End Date Taking? Authorizing Provider   cyclobenzaprine (FLEXERIL) 10 MG tablet Take 1 tablet (10 mg total) by mouth 3 (three) times daily as needed for Muscle spasms.  Patient not  taking: Reported on 6/1/2022 9/9/21   Jeb Roblero MD   diazePAM (VALIUM) 5 MG tablet Take 1 tablet (5 mg total) by mouth daily as needed for Anxiety. 11/22/21 5/10/22  Jens Han Jr., MD   hydrocortisone 2.5 % ointment Apply topically once daily. 8/24/21   Historical Provider   hydrocortisone butyrate 0.1 % Crea cream 1 APPLICATION 2 TIMES A WEEK TOPICALLY 30 DAYS 4/28/20   Historical Provider   ibuprofen (ADVIL,MOTRIN) 600 MG tablet Take 1 tablet (600 mg total) by mouth 2 (two) times daily with meals.  Patient not taking: Reported on 6/1/2022 8/24/21   Jens Han Jr., MD   ketoconazole (NIZORAL) 2 % cream 1 APPLICATION ONCE A DAY TOPICALLY 30 DAYS 4/28/20   Historical Provider   medroxyPROGESTERone (DEPO-PROVERA) 150 mg/mL Syrg INJECT 1 ML (150 MG TOTAL) INTO THE MUSCLE EVERY 3 (THREE) MONTHS. 7/27/21   Faiza Fernando MD   methylPREDNISolone (MEDROL, SHAN,) 4 mg tablet use as directed  Patient not taking: Reported on 6/1/2022 8/6/21   Jeb Roblero MD   oxyCODONE-acetaminophen (PERCOCET) 7.5-325 mg per tablet Take 1 tablet by mouth every 4 (four) hours as needed for Pain.  Patient not taking: Reported on 6/1/2022 11/20/20   Jens Han Jr., MD   pimecrolimus (ELIDEL) 1 % cream 1 APPLICATION TWICE A DAY TOPICALLY 30 DAYS 4/28/20   Historical Provider       Review of Systems:  Review of Systems   Constitutional: Positive for unexpected weight change (lost apprxoiamtely 5 lbs in past month). Negative for appetite change and fatigue.   Eyes: Negative for photophobia, pain, redness and visual disturbance.   Respiratory: Negative for cough and shortness of breath.    Cardiovascular: Positive for palpitations. Negative for chest pain and leg swelling.   Gastrointestinal: Negative for constipation, diarrhea, nausea and vomiting.   Endocrine: Positive for cold intolerance and polydipsia. Negative for polyphagia and polyuria.   Neurological: Positive for headaches (daily, above right eye,  "currently denies). Negative for dizziness, weakness, light-headedness and numbness.   Psychiatric/Behavioral: Positive for sleep disturbance (wakes up once a night nightly, stays awake for a couple hours). Negative for hallucinations, self-injury and suicidal ideas.       Health Maintainence:   Immunizations:  Health Maintenance       Date Due Completion Date    Colorectal Cancer Screening Never done ---    Mammogram 07/10/2021 7/10/2020    HIV Screening 09/09/2027 (Originally 10/10/1985) ---    Influenza Vaccine (1) 09/01/2022 10/15/2019    Cervical Cancer Screening 07/09/2025 7/9/2020    Lipid Panel 09/09/2026 9/9/2021    TETANUS VACCINE 05/08/2029 5/8/2019           PHYSICAL EXAM     BP (!) 146/98   Pulse 88   Ht 5' 9" (1.753 m)   Wt 90 kg (198 lb 6.6 oz)   SpO2 97%   BMI 29.30 kg/m²     Physical Exam  Vitals and nursing note reviewed.   Constitutional:       General: She is not in acute distress.     Appearance: Normal appearance. She is well-developed. She is not toxic-appearing or diaphoretic.   HENT:      Head: Normocephalic and atraumatic.      Right Ear: External ear normal.      Left Ear: External ear normal.   Eyes:      General: No scleral icterus.     Extraocular Movements: Extraocular movements intact.      Conjunctiva/sclera: Conjunctivae normal.   Cardiovascular:      Rate and Rhythm: Normal rate and regular rhythm.      Pulses: Normal pulses.           Radial pulses are 2+ on the right side and 2+ on the left side.      Heart sounds: Normal heart sounds. No murmur heard.  Pulmonary:      Effort: Pulmonary effort is normal.      Breath sounds: Normal breath sounds. No wheezing, rhonchi or rales.   Musculoskeletal:         General: Normal range of motion.      Cervical back: Normal range of motion and neck supple.      Right lower leg: No edema.      Left lower leg: No edema.   Skin:     General: Skin is warm and dry.   Neurological:      General: No focal deficit present.      Mental Status: She " is alert and oriented to person, place, and time.      Coordination: Coordination normal.      Gait: Gait normal.   Psychiatric:         Mood and Affect: Mood normal. Affect is flat.         Speech: Speech normal.         Behavior: Behavior normal. Behavior is cooperative.         Thought Content: Thought content does not include homicidal or suicidal ideation.         EKG Interpretation: I independently reviewed and interpreted EKG which shows normal sinus rhythm at 78 beats per minute, no STEMI, no significant acute ST/T abnormalities.  No prior tracings for comparison.    LABS     Lab Results   Component Value Date    HGBA1C 5.1 09/09/2021     CMP  Sodium   Date Value Ref Range Status   07/01/2022 140 136 - 145 mmol/L Final     Potassium   Date Value Ref Range Status   07/01/2022 4.0 3.5 - 5.1 mmol/L Final     Chloride   Date Value Ref Range Status   07/01/2022 105 95 - 110 mmol/L Final     CO2   Date Value Ref Range Status   07/01/2022 26 23 - 29 mmol/L Final     Glucose   Date Value Ref Range Status   07/01/2022 87 70 - 110 mg/dL Final     BUN   Date Value Ref Range Status   07/01/2022 12 6 - 20 mg/dL Final     Creatinine   Date Value Ref Range Status   07/01/2022 0.8 0.5 - 1.4 mg/dL Final     Calcium   Date Value Ref Range Status   07/01/2022 10.1 8.7 - 10.5 mg/dL Final     Total Protein   Date Value Ref Range Status   07/01/2022 8.0 6.0 - 8.4 g/dL Final     Albumin   Date Value Ref Range Status   07/01/2022 4.3 3.5 - 5.2 g/dL Final     Total Bilirubin   Date Value Ref Range Status   07/01/2022 0.7 0.1 - 1.0 mg/dL Final     Comment:     For infants and newborns, interpretation of results should be based  on gestational age, weight and in agreement with clinical  observations.    Premature Infant recommended reference ranges:  Up to 24 hours.............<8.0 mg/dL  Up to 48 hours............<12.0 mg/dL  3-5 days..................<15.0 mg/dL  6-29 days.................<15.0 mg/dL       Alkaline Phosphatase    Date Value Ref Range Status   07/01/2022 54 (L) 55 - 135 U/L Final     AST   Date Value Ref Range Status   07/01/2022 24 10 - 40 U/L Final     ALT   Date Value Ref Range Status   07/01/2022 47 (H) 10 - 44 U/L Final     Anion Gap   Date Value Ref Range Status   07/01/2022 9 8 - 16 mmol/L Final     eGFR if    Date Value Ref Range Status   07/01/2022 >60.0 >60 mL/min/1.73 m^2 Final     eGFR if non    Date Value Ref Range Status   07/01/2022 >60.0 >60 mL/min/1.73 m^2 Final     Comment:     Calculation used to obtain the estimated glomerular filtration  rate (eGFR) is the CKD-EPI equation.        Lab Results   Component Value Date    WBC 6.63 07/01/2022    HGB 11.4 (L) 07/01/2022    HCT 36.3 (L) 07/01/2022     (H) 07/01/2022     07/01/2022     Lab Results   Component Value Date    CHOL 295 (H) 07/01/2022    CHOL 260 (H) 09/09/2021     Lab Results   Component Value Date    HDL 72 07/01/2022    HDL 62 09/09/2021     Lab Results   Component Value Date    LDLCALC 193.8 (H) 07/01/2022    LDLCALC 168.4 (H) 09/09/2021     Lab Results   Component Value Date    TRIG 146 07/01/2022    TRIG 148 09/09/2021     Lab Results   Component Value Date    CHOLHDL 24.4 07/01/2022    CHOLHDL 23.8 09/09/2021     Lab Results   Component Value Date    TSH 0.948 07/01/2022       ASSESSMENT/PLAN     Celia Alford is a 51 y.o. female     Celia was seen today for anxiety and palpitations.    Diagnoses and all orders for this visit:    Palpitations  -     IN OFFICE EKG 12-LEAD (to Muse)  -     TSH; Future  -     Comprehensive Metabolic Panel; Future  -     CBC Auto Differential; Future  -     Iron and TIBC; Future  -     Ambulatory referral/consult to Cardiology; Future    Essential hypertension  -     TSH; Future  -     Comprehensive Metabolic Panel; Future  -     CBC Auto Differential; Future  -     Iron and TIBC; Future  -     Ambulatory referral/consult to Cardiology; Future  -     Lipid  Panel; Future  -     amLODIPine (NORVASC) 5 MG tablet; Take 1 tablet (5 mg total) by mouth once daily.    Episode of anxiety  -     hydrOXYzine HCL (ATARAX) 25 MG tablet; Take 1 tablet (25 mg total) by mouth nightly as needed for Anxiety. This medication may make you drowsy.    Sleep disturbance  -     hydrOXYzine HCL (ATARAX) 25 MG tablet; Take 1 tablet (25 mg total) by mouth nightly as needed for Anxiety. This medication may make you drowsy.    Colon cancer screening  -     Case Request Endoscopy: COLONOSCOPY      Blood pressure noted to be elevated during this visit- given patient's history of HTN and that she is not on antihypertensive medication, will start amlodipine 5 mg daily. Advised she take her blood pressure at least 3 times weekly and to keep a record for her follow up appointment in 1 month.    Will try hydroxyzine PRN sleep/anxiety and reassess in a month.    Referral placed to cardiology for further evaluation of palpitations. Discussed that if this reoccurs before her appointment, she should seek ED evaluation.    Follow up with PCP or myself in 1 sabrina for blood pressure check and to discuss anxiety/sleep.    Patient education provided from [facility]. Patient was counseled on when and how to seek emergent care.     This note is dictated using the M*Modal Fluency Direct word recognition program. There are word recognition mistakes that are occasionally missed on review.     Department of Internal Medicine - [facility]  9:40 AM

## 2022-07-01 NOTE — PATIENT INSTRUCTIONS
Start taking Amlodipine 5 mg daily for hypertension. Please check your BP regularly. Try hydroxyzine at night for sleep disturbances. May also use OTC melatonin (5 mg) 1 hour prior to going to sleep.

## 2022-07-06 ENCOUNTER — CLINICAL SUPPORT (OUTPATIENT)
Dept: REHABILITATION | Facility: HOSPITAL | Age: 52
End: 2022-07-06
Payer: OTHER GOVERNMENT

## 2022-07-06 ENCOUNTER — PATIENT MESSAGE (OUTPATIENT)
Dept: REHABILITATION | Facility: HOSPITAL | Age: 52
End: 2022-07-06

## 2022-07-06 DIAGNOSIS — R29.898 SHOULDER WEAKNESS: Primary | ICD-10-CM

## 2022-07-06 PROCEDURE — 97140 MANUAL THERAPY 1/> REGIONS: CPT | Mod: PO

## 2022-07-06 PROCEDURE — 97110 THERAPEUTIC EXERCISES: CPT | Mod: PO

## 2022-07-06 NOTE — PROGRESS NOTES
[facility]   Physical Therapy Treatment Note     Name: Celia [facility]  Clinic Number: [id number]    Therapy Diagnosis:   Encounter Diagnosis   Name Primary?    Shoulder weakness Yes     Physician: Olman Pillai IV, MD    Visit Date: 7/6/2022    Physician Orders: PT Eval and Treat   Medical Diagnosis from Referral: M75.21 (ICD-10-CM) - Biceps tendonitis on right M54.12 (ICD-10-CM) - Right cervical radiculopathy   Evaluation Date: 6/23/2022  Authorization Period Expiration: 06/01/2023   Plan of Care Expiration: 8/31/2022  Next Progress Note Due: 7/23/2022  Visit # / Visits authorized: 1/ 20  FOTO: 1/3     Precautions: Standard      PTA Visit #: 0/5     Time In: 936  Time Out: 1015  Total Billable Time: 39 minutes    SUBJECTIVE     Pt reports: that she was sore for about ~1 day after dry needling. Constant achy pain 4/10 in front of shoulder with occasional sharp pain with certain movements.    She was compliant with home exercise program.  Response to previous treatment: soreness  Functional change: ongoing    Pain: 4/10  Location: right shoulder      OBJECTIVE     Objective Measures updated at progress report unless specified.     FOTO (1/3): 55% ; goal = 40%    Treatment     Celia received the treatments listed below:      therapeutic exercises to develop strength, endurance and posture for 25 minutes including:    Prone row, no weight: 2x10  Prone shoulder extension, no weight: 2x10  AAROM shoulder flexion with 3# dowel: 3x10  Side lying shoulder ER with towel: 3x10  Side lying shoulder abduction: 2x10        manual therapy techniques: Joint mobilizations, Manual traction and Soft tissue Mobilization were applied to the: right shoulder for 14 minutes, including:  Grade III posterior and inferior glenohumeral mobilizations   Cross friction massage to right biceps tendon  Soft tissue mobilization to right upper trapezius  Gentle inferior glide to right 1st rib      neuromuscular  re-education activities to improve: Coordination, Proprioception and Posture for 00 minutes. The following activities were included:    therapeutic activities to improve functional performance for 00  minutes, including:    hot pack for 00 minutes to right shoulder.    cold pack for 00 minutes to right shoulder.         Patient Education and Home Exercises     Home Exercises Provided and Patient Education Provided     Education provided:   - HEP    Written Home Exercises Provided: yes. Exercises were reviewed and Celia was able to demonstrate them prior to the end of the session.  Celia demonstrated good  understanding of the education provided. See EMR under Patient Instructions for exercises provided during therapy sessions    ASSESSMENT     Patient responded well to initial treatment session. Did report discomfort in front of shoulder during eccentric lowering when performing AAROM shoulder flexion. Consider using wand without weight instead of 3# thera wand next treating session. She was given handout of home exercise program with instruction to perform majority of exercises 5x per week.      Celia Is progressing well towards her goals.   Pt prognosis is Good.     Pt will continue to benefit from skilled outpatient physical therapy to address the deficits listed in the problem list box on initial evaluation, provide pt/family education and to maximize pt's level of independence in the home and community environment.     Pt's spiritual, cultural and educational needs considered and pt agreeable to plan of care and goals.     Anticipated barriers to physical therapy: none anticipated     Goals:   Short Term Goals: 4 weeks   1. Patient will demonstrate proper performance of home exercise program of active exercises to improve carryover between sessions.  2. The patient will demonstrate increased shoulder flexion AROM to > 130 degrees in order to demonstrate improved functional shoulder mobility and self  care.  3. The patient will demonstrate normal functional shoulder internal and external AROM in order to improve patient's ability to reach behind her back to perform ADLs.  4. Pt will become compliant and independent with the initial HEP to promote decreased pain and improved mobility and strength  5. Pt will demonstrate an increase in shoulder strength by 1/3 MMT grade to improve functional stability and strength.           Long Term Goals: 8 weeks   1. Pt will become compliant and independent with an updated, progressed HEP to promote decreased pain and improved mobility and strength as well as prep for discharge from further PT intervention.   2. Patient will demonstrate the ability to lift >/= 45# object from floor to waist to progress with job duties.  3. Pt will demonstrate an increase in shoulder strength by 1/2 MMT grade to improve functional stability and strength.    PLAN     Nahum, 2#: 3x10    Lynda Atwood, PT

## 2022-07-11 ENCOUNTER — PATIENT MESSAGE (OUTPATIENT)
Dept: ADMINISTRATIVE | Facility: HOSPITAL | Age: 52
End: 2022-07-11
Payer: COMMERCIAL

## 2022-07-13 ENCOUNTER — PATIENT MESSAGE (OUTPATIENT)
Dept: REHABILITATION | Facility: HOSPITAL | Age: 52
End: 2022-07-13

## 2022-07-13 ENCOUNTER — PATIENT MESSAGE (OUTPATIENT)
Dept: ORTHOPEDICS | Facility: CLINIC | Age: 52
End: 2022-07-13
Payer: COMMERCIAL

## 2022-07-15 ENCOUNTER — DOCUMENTATION ONLY (OUTPATIENT)
Dept: REHABILITATION | Facility: HOSPITAL | Age: 52
End: 2022-07-15
Payer: COMMERCIAL

## 2022-07-15 NOTE — PROGRESS NOTES
PT called pt regarding her missed appointment at 9:30 today. PT left voicemail informing her that this was her second missed appointment in a row and that her next appointment will be on Wednesday July 20 at 9:30 AM.    Stu Chou, DPT, PT

## 2022-07-20 ENCOUNTER — CLINICAL SUPPORT (OUTPATIENT)
Dept: REHABILITATION | Facility: HOSPITAL | Age: 52
End: 2022-07-20
Payer: OTHER GOVERNMENT

## 2022-07-20 DIAGNOSIS — R29.898 SHOULDER WEAKNESS: Primary | ICD-10-CM

## 2022-07-20 PROCEDURE — 97110 THERAPEUTIC EXERCISES: CPT | Mod: PO

## 2022-07-20 NOTE — PROGRESS NOTES
[facility]   Physical Therapy Treatment Note / Reassessment     Name: Celia Alford  Clinic Number: [id number]    Therapy Diagnosis:   Encounter Diagnosis   Name Primary?    Shoulder weakness Yes     Physician: Olman Pillai IV, MD    Visit Date: 7/20/2022    Physician Orders: PT Eval and Treat   Medical Diagnosis from Referral: M75.21 (ICD-10-CM) - Biceps tendonitis on right M54.12 (ICD-10-CM) - Right cervical radiculopathy   Evaluation Date: 6/23/2022  Authorization Period Expiration: 06/01/2023   Plan of Care Expiration: 8/31/2022  Next Progress Note Due: 8/20/2022  Visit # / Visits authorized: 2 / 20 + initial evaluation   FOTO: 1/3     Precautions: Standard      PTA Visit #: 0/5     Time In: 939 (arrived late)  Time Out: 1015  Total Billable Time: 36 minutes    SUBJECTIVE     Pt reports: that her shoulder is getting better, but still having pain with pain 4/10 today.       She was compliant with home exercise program.  Response to previous treatment: soreness  Functional change: ongoing    Pain: 4/10  Location: right shoulder      OBJECTIVE     Shoulder ROM (measured in degrees):  AROM Right Left Comment Normal   Shoulder Flexion: 115 degrees * now 130 155 degrees, now 145   180 deg   Shoulder Extension: 42 degrees * 70 degrees   60 deg   Shoulder Abduction: 112 degrees * now 86 160 degrees     180 deg   Shoulder ER at 0 and 90 degrees of abduction: 65, 100 degrees WNL at 0 and 90 degrees of shoulder abduction    90 deg   Shoulder IR 90 50 degrees * Normal    90 deg   PROM Right Left Comment Normal   Shoulder Flexion: 135 degrees Not Tested     180 deg   Shoulder Abduction: 130 degrees now 90 Not Tested     180 deg   Shoulder ER, 90°ABD: WNL Not Tested     90 deg   Shoulder IR, 90° ABD: 53 degrees Not Tested     90 deg      *denotes pain    Functional Shoulder IR/ER  - Left Functional IR: T10  - Left Functional ER: C7  - Right Functional IR: ipsilateral iliac crest  - Right  "Functional ER: top of ipsilateral shoulder        MMT    Right Left Comment   Shoulder flexion: 4-/5 * now 4/5 4+/5     Shoulder Abduction: 3+/5 * same 5/5     Shoulder ER: 3+/5 * now 4+/5 4+/5     Shoulder IR: 4+/5 * now 5/5 5/5     Lower Trap: Not tested  Not Tested      Middle Trap: Not tested  Not Tested             Treatment     Celia received the treatments listed below:      therapeutic exercises to develop strength, endurance and posture for 36 minutes including:  Reassessment and discussion of POC  Prone row, no weight: 2x10  Prone shoulder extension, no weight: 2x10  AAROM shoulder flexion with 3# dowel: 3x10, 5" hold  +Pulleys - scaption: 3 minutes  +Wall slides - flexion and abduction: 15x/each  Shoulder IR YTB: 2x10  Shoulder ER isometric walkouts, YTB: 20x3"  Side lying shoulder ER with towel: 3x10  Side lying shoulder abduction: 2x10        manual therapy techniques: Joint mobilizations, Manual traction and Soft tissue Mobilization were applied to the: right shoulder for 00 minutes, including:        neuromuscular re-education activities to improve: Coordination, Proprioception and Posture for 00 minutes. The following activities were included:    therapeutic activities to improve functional performance for 00  minutes, including:    hot pack for 00 minutes to right shoulder.    cold pack for 00 minutes to right shoulder.         Patient Education and Home Exercises     Home Exercises Provided and Patient Education Provided     Education provided:   -to continue HEP and will possibly update HEP dependent on how patient feels after today's treatment session    Written Home Exercises Provided: Patient instructed to cont prior HEP. Exercises were reviewed and Celia was able to demonstrate them prior to the end of the session.  Celia demonstrated good  understanding of the education provided. See EMR under Patient Instructions for exercises provided during therapy sessions    ASSESSMENT "     Celia has attended a total of 2 visits since initial evaluation on 6/23/22 with regression in right shoulder flexion and abduction AROM with increased guarding and apprehension to PROM. She reports fear avoidance behaviors and disuse of right upper extremity to perform ADLs at home. Progressed patient today to closed kinetic chain shoulder AROM exercises with pt achieving normal shoulder flexion and scaption ROM. She will continue to benefit from physical therapy to improve shoulder strength for functional activities.         Celia Is progressing well towards her goals.   Pt prognosis is Good.     Pt will continue to benefit from skilled outpatient physical therapy to address the deficits listed in the problem list box on initial evaluation, provide pt/family education and to maximize pt's level of independence in the home and community environment.     Pt's spiritual, cultural and educational needs considered and pt agreeable to plan of care and goals.     Anticipated barriers to physical therapy: none anticipated     Goals:   Short Term Goals: 4 weeks   1. Patient will demonstrate proper performance of home exercise program of active exercises to improve carryover between sessions. Met 07/20/2022  2. The patient will demonstrate increased shoulder flexion AROM to > 130 degrees in order to demonstrate improved functional shoulder mobility and self care. Progressing   3. The patient will demonstrate normal functional shoulder internal and external AROM in order to improve patient's ability to reach behind her back to perform ADLs.Progressing   4. Pt will become compliant and independent with the initial HEP to promote decreased pain and improved mobility and strength. Redundant goal  5. Pt will demonstrate an increase in shoulder strength by 1/3 MMT grade to improve functional stability and strength. Met 07/20/2022           Long Term Goals: 8 weeks   1. Pt will become compliant and independent with an  updated, progressed HEP to promote decreased pain and improved mobility and strength as well as prep for discharge from further PT intervention.   2. Patient will demonstrate the ability to lift >/= 45# object from floor to waist to progress with job duties.  3. Pt will demonstrate an increase in shoulder strength by 1/2 MMT grade to improve functional stability and strength.    PLAN     Add Nahum, 2#: 3x10    Lynda Atwood, PT      33 yo male with 3 weeks of abdominal pain and CT concerning for early acute appendicitis and unspecified colitis   - admit to surgery under care of Dr. Dong   - NPO   - IVF   - Cipro/Flagyl   - labs in am   - regional level of care   - discussed with chief resident and Dr. Dong

## 2022-07-21 ENCOUNTER — OFFICE VISIT (OUTPATIENT)
Dept: ORTHOPEDICS | Facility: CLINIC | Age: 52
End: 2022-07-21
Payer: OTHER GOVERNMENT

## 2022-07-21 VITALS
HEART RATE: 98 BPM | HEIGHT: 69 IN | SYSTOLIC BLOOD PRESSURE: 124 MMHG | BODY MASS INDEX: 29.65 KG/M2 | DIASTOLIC BLOOD PRESSURE: 89 MMHG | WEIGHT: 200.19 LBS

## 2022-07-21 DIAGNOSIS — M54.12 RIGHT CERVICAL RADICULOPATHY: Primary | ICD-10-CM

## 2022-07-21 DIAGNOSIS — M75.21 BICEPS TENDONITIS ON RIGHT: ICD-10-CM

## 2022-07-21 PROCEDURE — 99999 PR PBB SHADOW E&M-EST. PATIENT-LVL III: CPT | Mod: PBBFAC,,, | Performed by: ORTHOPAEDIC SURGERY

## 2022-07-21 PROCEDURE — 99213 PR OFFICE/OUTPT VISIT, EST, LEVL III, 20-29 MIN: ICD-10-PCS | Mod: S$GLB,,, | Performed by: ORTHOPAEDIC SURGERY

## 2022-07-21 PROCEDURE — 99213 OFFICE O/P EST LOW 20 MIN: CPT | Mod: S$GLB,,, | Performed by: ORTHOPAEDIC SURGERY

## 2022-07-21 PROCEDURE — 99999 PR PBB SHADOW E&M-EST. PATIENT-LVL III: ICD-10-PCS | Mod: PBBFAC,,, | Performed by: ORTHOPAEDIC SURGERY

## 2022-07-21 NOTE — PROGRESS NOTES
[facility], Orthopedics and Sports Medicine  [facility]    Established Patient Shoulder Office Visit  07/22/2022     Diagnosis:  Right biceps tendonitis   Right cervical radiculopathy    Procedure:   Bicipital groove CSI 6/1/22    Subjective:      Celia Alford is a 51 y.o. female who returns for follow up treatment of the right shoulder problem. Referred from Dr Han for 2nd opinion for shoulder pain 6/1/22.    At the last office visit the patient was prescribed physical therapy as well as given CSI of right bicipital groove. She did not get any relief from the injection. Has been going to therapy for about 2 weeks and feels her ROM is improved but pain unchanged. Still complaining of radicular type pain shooting from right neck into anterior shoulder.  The patient has not had advanced imaging of the shoulder.     Previously had right shoulder arthroscopy for rotator cuff tear, no biceps tenodesis or tenotomy at that time.    Outside reports reviewed: office notes, operative reports and x-ray reports.    Past Medical History:   Diagnosis Date    Essential (primary) hypertension        Patient Active Problem List   Diagnosis    Chronic right shoulder pain    Shoulder joint dysfunction    Neck pain on right side    Traumatic incomplete tear of right rotator cuff    Decreased range of motion of shoulder, right    Decreased strength of upper extremity    COVID-19 virus infection    Elevated blood pressure reading without diagnosis of hypertension    Neck muscle spasm    Encounter for long-term (current) use of other medications    Class 1 obesity due to excess calories without serious comorbidity with body mass index (BMI) of 30.0 to 30.9 in adult    Palpitations    Right cervical radiculopathy    Shoulder weakness       Past Surgical History:   Procedure Laterality Date    ARTHROSCOPIC REPAIR OF ROTATOR CUFF OF SHOULDER Right 11/20/2020    Procedure: REPAIR, ROTATOR  CUFF, ARTHROSCOPIC;  Surgeon: Jens Han Jr., MD;  Location: [facility];  Service: Orthopedics;  Laterality: Right;  need opus system- Muslim notified tori-11/10  video Yarsani confirmed 11/19/2020 KB [id number]    ROTATOR CUFF REPAIR  11/20/2020    TONSILLECTOMY          Current Outpatient Medications   Medication Instructions    amLODIPine (NORVASC) 5 mg, Oral, Daily    hydrocortisone 2.5 % ointment Topical (Top), Daily    hydrocortisone butyrate 0.1 % Crea cream 1 APPLICATION 2 TIMES A WEEK TOPICALLY 30 DAYS    hydrOXYzine HCL (ATARAX) 25 mg, Oral, Nightly PRN, This medication may make you drowsy.    ibuprofen (ADVIL,MOTRIN) 600 mg, Oral, 2 times daily with meals    ketoconazole (NIZORAL) 2 % cream 1 APPLICATION ONCE A DAY TOPICALLY 30 DAYS    medroxyPROGESTERone (DEPO-PROVERA) 150 mg, Intramuscular, Every 3 months    pimecrolimus (ELIDEL) 1 % cream 1 APPLICATION TWICE A DAY TOPICALLY 30 DAYS        Review of patient's allergies indicates:   Allergen Reactions    Pcn [penicillins] Hives and Rash       Social History     Socioeconomic History    Marital status: Single   Tobacco Use    Smoking status: Never Smoker    Smokeless tobacco: Never Used   Substance and Sexual Activity    Alcohol use: Yes     Comment: seldom    Drug use: No    Sexual activity: Not Currently     Partners: Male     Birth control/protection: Injection       Family History   Problem Relation Age of Onset    Diabetes Maternal Grandmother     Hypertension Maternal Grandmother     No Known Problems Mother     Breast cancer Neg Hx     Colon cancer Neg Hx     Ovarian cancer Neg Hx          Review of Systems   Skin: Negative for color change and rash.   Musculoskeletal: Positive for joint pain, muscle weakness and neck pain. Negative for joint swelling, muscle cramps and myalgias.   Neurological: Negative for numbness and paresthesias.        Objective:      General    Constitutional: She is oriented to person, place, and  time. She appears well-developed. No distress.   HENT:   Head: Normocephalic and atraumatic.   Eyes: EOM are normal. Pupils are equal, round, and reactive to light.   Cardiovascular: Normal rate.    Pulmonary/Chest: Effort normal. She has no wheezes.   Abdominal: Soft. She exhibits no distension.   Neurological: She is alert and oriented to person, place, and time.   Psychiatric: She has a normal mood and affect. Her behavior is normal.         Right Shoulder Exam     Inspection/Observation   Swelling: absent  Bruising: absent  Deformity: absent    Tenderness   The patient is tender to palpation of the biceps tendon.    Range of Motion   Active abduction:  120 abnormal   Forward Flexion:  120 abnormal   External Rotation 0 degrees: 60   Internal rotation 0 degrees:  Sacrum abnormal     Tests & Signs   Yergason's Test: positive  Speed's Test: positive    Other   Sensation: normal    Left Shoulder Exam   Left shoulder exam is normal.    Range of Motion   Active abduction: 160   Forward Flexion: 160   External Rotation 0 degrees:  70 normal   Internal rotation 0 degrees:  T8 normal     Other   Sensation: normal       Muscle Strength   Right Upper Extremity   Shoulder Abduction: 5/5   Shoulder Internal Rotation: 5/5   Shoulder External Rotation: 5/5   Supraspinatus: 5/5   Biceps: 5/5   Left Upper Extremity  Shoulder Abduction: 5/5   Shoulder Internal Rotation: 5/5   Shoulder External Rotation: 5/5   Supraspinatus: 5/5   Biceps: 5/5       Imaging:  No new imaging        Assessment:       Celia Alford is a 51 y.o. female seen in the office today. The primary encounter diagnosis was Right cervical radiculopathy. A diagnosis of Biceps tendonitis on right was also pertinent to this visit.  Non-operative treatment is recommended at this time. Patient has seen improvement with physical therapy, still very early in treatment course. She did not feel benefit from CSI of biceps tendon, raising concern for referred pain  from neck. Patient will need physical therapy involving both right shoulder and neck to assess likely multi-factorial cause of pain. Will re-assess in 6 weeks after completion of therapy, anticipate repeat right shoulder MRI if symptoms not improved.     Plan:      1. Physical therapy and rehabilitation treatment.  2. Tylenol 650mg TID, PRN pain.  3. Follow up in 6 weeks.          Olman Pillai IV, MD   of Clinical Orthopedics  Department of Orthopedic Surgery  [facility]  Office: [phone]  Website: [email or web address]      Orders Placed This Encounter    Ambulatory referral/consult to Physical/Occupational Therapy

## 2022-07-22 ENCOUNTER — CLINICAL SUPPORT (OUTPATIENT)
Dept: REHABILITATION | Facility: HOSPITAL | Age: 52
End: 2022-07-22
Payer: OTHER GOVERNMENT

## 2022-07-22 DIAGNOSIS — R29.898 SHOULDER WEAKNESS: Primary | ICD-10-CM

## 2022-07-22 PROBLEM — M75.21 BICEPS TENDONITIS ON RIGHT: Status: ACTIVE | Noted: 2022-07-22

## 2022-07-22 PROCEDURE — 97140 MANUAL THERAPY 1/> REGIONS: CPT | Mod: PO

## 2022-07-22 NOTE — PROGRESS NOTES
"[facility]   Physical Therapy Treatment Note      Name: Celia Hernandez [facility]  Clinic Number: [id number]    Therapy Diagnosis:   Encounter Diagnosis   Name Primary?    Shoulder weakness Yes     Physician: Olman Pillai IV, MD    Visit Date: 7/22/2022    Physician Orders: PT Eval and Treat   Medical Diagnosis from Referral: M75.21 (ICD-10-CM) - Biceps tendonitis on right M54.12 (ICD-10-CM) - Right cervical radiculopathy   Evaluation Date: 6/23/2022  Authorization Period Expiration: 06/01/2023   Plan of Care Expiration: 8/31/2022  Next Progress Note Due: 8/20/2022  Visit # / Visits authorized: 3 / 20 + initial evaluation   FOTO: 1/3     Precautions: Standard      PTA Visit #: 0/5     Time In: 938 (arrived late)  Time Out: 1002  Total Billable Time: 24 minutes    SUBJECTIVE     Pt reports: that her shoulder is getting better, but still having pain with pain 4/10 today.       She was compliant with home exercise program.  Response to previous treatment: soreness  Functional change: ongoing    Pain: 4/10  Location: right shoulder      OBJECTIVE     No objective data.     Treatment     Celia received the treatments listed below:      therapeutic exercises to develop strength, endurance and posture for 00 minutes including:  Prone row, no weight: 2x10  Prone shoulder extension, no weight: 2x10  AAROM shoulder flexion with 3# dowel: 3x10, 5" hold  +Pulleys - scaption: 3 minutes  +Wall slides - flexion and abduction: 15x/each  Shoulder IR YTB: 2x10  Shoulder ER isometric walkouts, YTB: 20x3"  Side lying shoulder ER with towel: 3x10  Side lying shoulder abduction: 2x10        manual therapy techniques: Joint mobilizations, Manual traction and Soft tissue Mobilization were applied to the: right shoulder for 24 minutes, including:  Lateral scapular distraction   Soft tissue mobilization to right levator scapulae, right upper trapezius, right anterior/middle/posterior scalenes  Right scapular mobs " in all planes         neuromuscular re-education activities to improve: Coordination, Proprioception and Posture for 00 minutes. The following activities were included:    therapeutic activities to improve functional performance for 00  minutes, including:    hot pack for 00 minutes to right shoulder.    cold pack for 00 minutes to right shoulder.         Patient Education and Home Exercises     Home Exercises Provided and Patient Education Provided     Education provided:   -to continue HEP and will possibly update HEP dependent on how patient feels after today's treatment session    Written Home Exercises Provided: yes. Exercises were reviewed and Celia was able to demonstrate them prior to the end of the session.  Celia demonstrated good  understanding of the education provided. See EMR under Patient Instructions for exercises provided during therapy sessions    ASSESSMENT     Celia with late arrival so only manual therapy performed today. Per patient, MD would like for her cervical spine to be addressed to will include that to plan of care. She was shown levator scapulae, upper trapezius, and scalene stretch with handout provided. Also, discussed sleeping positions with proper pillow support to maintain neutral spine.         Celia Is progressing well towards her goals.   Pt prognosis is Good.     Pt will continue to benefit from skilled outpatient physical therapy to address the deficits listed in the problem list box on initial evaluation, provide pt/family education and to maximize pt's level of independence in the home and community environment.     Pt's spiritual, cultural and educational needs considered and pt agreeable to plan of care and goals.     Anticipated barriers to physical therapy: none anticipated     Goals:   Short Term Goals: 4 weeks   1. Patient will demonstrate proper performance of home exercise program of active exercises to improve carryover between sessions. Met  07/22/2022  2. The patient will demonstrate increased shoulder flexion AROM to > 130 degrees in order to demonstrate improved functional shoulder mobility and self care. Progressing   3. The patient will demonstrate normal functional shoulder internal and external AROM in order to improve patient's ability to reach behind her back to perform ADLs.Progressing   4. Pt will become compliant and independent with the initial HEP to promote decreased pain and improved mobility and strength. Redundant goal  5. Pt will demonstrate an increase in shoulder strength by 1/3 MMT grade to improve functional stability and strength. Met 07/22/2022           Long Term Goals: 8 weeks   1. Pt will become compliant and independent with an updated, progressed HEP to promote decreased pain and improved mobility and strength as well as prep for discharge from further PT intervention.   2. Patient will demonstrate the ability to lift >/= 45# object from floor to waist to progress with job duties.  3. Pt will demonstrate an increase in shoulder strength by 1/2 MMT grade to improve functional stability and strength.    PLAN     Add Jose Sidhu#: 3x10 next    Lynda Atwood, PT

## 2022-07-29 ENCOUNTER — OFFICE VISIT (OUTPATIENT)
Dept: CARDIOLOGY | Facility: CLINIC | Age: 52
End: 2022-07-29
Payer: COMMERCIAL

## 2022-07-29 VITALS
SYSTOLIC BLOOD PRESSURE: 122 MMHG | WEIGHT: 202.19 LBS | HEART RATE: 109 BPM | BODY MASS INDEX: 29.95 KG/M2 | HEIGHT: 69 IN | DIASTOLIC BLOOD PRESSURE: 84 MMHG

## 2022-07-29 DIAGNOSIS — R03.0 ELEVATED BLOOD PRESSURE READING WITHOUT DIAGNOSIS OF HYPERTENSION: ICD-10-CM

## 2022-07-29 DIAGNOSIS — R00.2 PALPITATIONS: Primary | ICD-10-CM

## 2022-07-29 PROCEDURE — 1159F MED LIST DOCD IN RCRD: CPT | Mod: CPTII,S$GLB,, | Performed by: INTERNAL MEDICINE

## 2022-07-29 PROCEDURE — 3079F PR MOST RECENT DIASTOLIC BLOOD PRESSURE 80-89 MM HG: ICD-10-PCS | Mod: CPTII,S$GLB,, | Performed by: INTERNAL MEDICINE

## 2022-07-29 PROCEDURE — 99204 OFFICE O/P NEW MOD 45 MIN: CPT | Mod: S$GLB,,, | Performed by: INTERNAL MEDICINE

## 2022-07-29 PROCEDURE — 3074F PR MOST RECENT SYSTOLIC BLOOD PRESSURE < 130 MM HG: ICD-10-PCS | Mod: CPTII,S$GLB,, | Performed by: INTERNAL MEDICINE

## 2022-07-29 PROCEDURE — 3079F DIAST BP 80-89 MM HG: CPT | Mod: CPTII,S$GLB,, | Performed by: INTERNAL MEDICINE

## 2022-07-29 PROCEDURE — 1159F PR MEDICATION LIST DOCUMENTED IN MEDICAL RECORD: ICD-10-PCS | Mod: CPTII,S$GLB,, | Performed by: INTERNAL MEDICINE

## 2022-07-29 PROCEDURE — 3074F SYST BP LT 130 MM HG: CPT | Mod: CPTII,S$GLB,, | Performed by: INTERNAL MEDICINE

## 2022-07-29 PROCEDURE — 99204 PR OFFICE/OUTPT VISIT, NEW, LEVL IV, 45-59 MIN: ICD-10-PCS | Mod: S$GLB,,, | Performed by: INTERNAL MEDICINE

## 2022-07-29 PROCEDURE — 3008F PR BODY MASS INDEX (BMI) DOCUMENTED: ICD-10-PCS | Mod: CPTII,S$GLB,, | Performed by: INTERNAL MEDICINE

## 2022-07-29 PROCEDURE — 3008F BODY MASS INDEX DOCD: CPT | Mod: CPTII,S$GLB,, | Performed by: INTERNAL MEDICINE

## 2022-08-01 ENCOUNTER — PATIENT MESSAGE (OUTPATIENT)
Dept: REHABILITATION | Facility: HOSPITAL | Age: 52
End: 2022-08-01
Payer: COMMERCIAL

## 2022-08-01 NOTE — PROGRESS NOTES
Subjective:   Chief Complaint: Palpitations  Last Clinic Visit: New Patient    History of Present Illness: Celia Alford is a 51 y.o. lady with elevated blood pressure reading palpitations who presents for cardiology evaluation.  She reports that she has had progressive palpitations happening daily, waking her from sleep, mildly lightheaded, describes skipped heartbeats, and feelings of pauses.  Previously had been evaluated by Dr. Cabrera at [facility], negative stress test.  Occasionally lightheaded, denies any chest pain associated with palpitations.  Family history of atrial fibrillation in her mother, denies any family history of sudden cardiac death.  She does endorse some situational stressors, no syncope.  No orthopnea, no PND, no precipitous weight gain, no lower extremity edema.    Dx:  Elevated blood pressure reading  Palpitations    Medications:  Outpatient Encounter Medications as of 7/29/2022   Medication Sig Dispense Refill    medroxyPROGESTERone (DEPO-PROVERA) 150 mg/mL Syrg INJECT 1 ML (150 MG TOTAL) INTO THE MUSCLE EVERY 3 (THREE) MONTHS. 1 Syringe 3    [DISCONTINUED] amLODIPine (NORVASC) 5 MG tablet Take 1 tablet (5 mg total) by mouth once daily. 90 tablet 1    [DISCONTINUED] hydrocortisone 2.5 % ointment Apply topically once daily.      [DISCONTINUED] hydrocortisone butyrate 0.1 % Crea cream 1 APPLICATION 2 TIMES A WEEK TOPICALLY 30 DAYS      [DISCONTINUED] hydrOXYzine HCL (ATARAX) 25 MG tablet Take 1 tablet (25 mg total) by mouth nightly as needed for Anxiety. This medication may make you drowsy. 15 tablet 0    [DISCONTINUED] ibuprofen (ADVIL,MOTRIN) 600 MG tablet Take 1 tablet (600 mg total) by mouth 2 (two) times daily with meals. 60 tablet 1    [DISCONTINUED] ketoconazole (NIZORAL) 2 % cream 1 APPLICATION ONCE A DAY TOPICALLY 30 DAYS      [DISCONTINUED] pimecrolimus (ELIDEL) 1 % cream 1 APPLICATION TWICE A DAY TOPICALLY 30 DAYS       No facility-administered encounter medications  "on file as of 7/29/2022.     Social History:  Celia reports that she has never smoked. She has never used smokeless tobacco. She reports current alcohol use. She reports that she does not use drugs.    Objective:   /84   Pulse 109   Ht 5' 9" (1.753 m)   Wt 91.7 kg (202 lb 3.2 oz)   BMI 29.86 kg/m²     Physical Exam   HENT:   Head: Normocephalic and atraumatic.   Mouth/Throat: Mucous membranes are moist.   Cardiovascular: Normal rate, regular rhythm and normal pulses. Exam reveals no gallop and no friction rub.   No murmur heard.  Pulmonary/Chest: Effort normal and breath sounds normal. No stridor. No respiratory distress. She has no rhonchi. She has no rales. She exhibits no tenderness.   Abdominal: Normal appearance. She exhibits no distension.   Musculoskeletal:      Right lower leg: No edema.      Left lower leg: No edema.   Neurological: She is alert.   Skin: Skin is warm. Capillary refill takes less than 2 seconds.      EKG:  My independent visualization of most recent EKG is normal sinus rhythm, nonspecific ST changes    TTE:  Ordered pending    Lipids:  Recent Labs   Lab 07/01/22  1115   LDL Cholesterol 193.8 H   HDL 72      Renal:  Recent Labs   Lab 07/01/22  1115   Creatinine 0.8   Potassium 4.0   CO2 26   BUN 12     Liver:  Recent Labs   Lab 07/01/22  1115   AST 24   ALT 47 H     Assessment:     1. Palpitations    2. Elevated blood pressure reading without diagnosis of hypertension      Plan:   1. Palpitations  Discussed pathophysiology of PACs and PVCs, this is what I suspect they are.  Will risk stratify with echocardiogram, and quantify type in severity with echocardiogram, call her and offer her different options when we have these results given they are waking her from sleep  - Ambulatory referral/consult to Cardiology  - Echo; Future  - Holter monitor - 24 hour; Future    2. Elevated blood pressure reading without diagnosis of hypertension  Continue to monitor at home    Follow up in 3-6 " months      Tristian Lobo MD FACC

## 2022-08-03 ENCOUNTER — PATIENT MESSAGE (OUTPATIENT)
Dept: ORTHOPEDICS | Facility: CLINIC | Age: 52
End: 2022-08-03
Payer: COMMERCIAL

## 2022-08-04 DIAGNOSIS — M75.21 BICEPS TENDONITIS ON RIGHT: Primary | ICD-10-CM

## 2022-08-09 ENCOUNTER — OFFICE VISIT (OUTPATIENT)
Dept: OBSTETRICS AND GYNECOLOGY | Facility: CLINIC | Age: 52
End: 2022-08-09
Payer: COMMERCIAL

## 2022-08-09 VITALS
DIASTOLIC BLOOD PRESSURE: 88 MMHG | WEIGHT: 198.44 LBS | BODY MASS INDEX: 29.39 KG/M2 | HEIGHT: 69 IN | SYSTOLIC BLOOD PRESSURE: 146 MMHG

## 2022-08-09 DIAGNOSIS — Z30.013 ENCOUNTER FOR PRESCRIPTION FOR DEPO-PROVERA: Primary | ICD-10-CM

## 2022-08-09 PROBLEM — Z79.899 ENCOUNTER FOR LONG-TERM (CURRENT) USE OF OTHER MEDICATIONS: Status: RESOLVED | Noted: 2021-09-09 | Resolved: 2022-08-09

## 2022-08-09 PROCEDURE — 3077F SYST BP >= 140 MM HG: CPT | Mod: CPTII,S$GLB,, | Performed by: OBSTETRICS & GYNECOLOGY

## 2022-08-09 PROCEDURE — 99212 PR OFFICE/OUTPT VISIT, EST, LEVL II, 10-19 MIN: ICD-10-PCS | Mod: S$GLB,,, | Performed by: OBSTETRICS & GYNECOLOGY

## 2022-08-09 PROCEDURE — 1160F RVW MEDS BY RX/DR IN RCRD: CPT | Mod: CPTII,S$GLB,, | Performed by: OBSTETRICS & GYNECOLOGY

## 2022-08-09 PROCEDURE — 3079F PR MOST RECENT DIASTOLIC BLOOD PRESSURE 80-89 MM HG: ICD-10-PCS | Mod: CPTII,S$GLB,, | Performed by: OBSTETRICS & GYNECOLOGY

## 2022-08-09 PROCEDURE — 1159F PR MEDICATION LIST DOCUMENTED IN MEDICAL RECORD: ICD-10-PCS | Mod: CPTII,S$GLB,, | Performed by: OBSTETRICS & GYNECOLOGY

## 2022-08-09 PROCEDURE — 1160F PR REVIEW ALL MEDS BY PRESCRIBER/CLIN PHARMACIST DOCUMENTED: ICD-10-PCS | Mod: CPTII,S$GLB,, | Performed by: OBSTETRICS & GYNECOLOGY

## 2022-08-09 PROCEDURE — 99212 OFFICE O/P EST SF 10 MIN: CPT | Mod: S$GLB,,, | Performed by: OBSTETRICS & GYNECOLOGY

## 2022-08-09 PROCEDURE — 3079F DIAST BP 80-89 MM HG: CPT | Mod: CPTII,S$GLB,, | Performed by: OBSTETRICS & GYNECOLOGY

## 2022-08-09 PROCEDURE — 99999 PR PBB SHADOW E&M-EST. PATIENT-LVL III: ICD-10-PCS | Mod: PBBFAC,,, | Performed by: OBSTETRICS & GYNECOLOGY

## 2022-08-09 PROCEDURE — 99999 PR PBB SHADOW E&M-EST. PATIENT-LVL III: CPT | Mod: PBBFAC,,, | Performed by: OBSTETRICS & GYNECOLOGY

## 2022-08-09 PROCEDURE — 1159F MED LIST DOCD IN RCRD: CPT | Mod: CPTII,S$GLB,, | Performed by: OBSTETRICS & GYNECOLOGY

## 2022-08-09 PROCEDURE — 3008F PR BODY MASS INDEX (BMI) DOCUMENTED: ICD-10-PCS | Mod: CPTII,S$GLB,, | Performed by: OBSTETRICS & GYNECOLOGY

## 2022-08-09 PROCEDURE — 3008F BODY MASS INDEX DOCD: CPT | Mod: CPTII,S$GLB,, | Performed by: OBSTETRICS & GYNECOLOGY

## 2022-08-09 PROCEDURE — 3077F PR MOST RECENT SYSTOLIC BLOOD PRESSURE >= 140 MM HG: ICD-10-PCS | Mod: CPTII,S$GLB,, | Performed by: OBSTETRICS & GYNECOLOGY

## 2022-08-09 RX ORDER — MEDROXYPROGESTERONE ACETATE 150 MG/ML
150 INJECTION, SUSPENSION INTRAMUSCULAR
Qty: 1 EACH | Refills: 3 | Status: SHIPPED | OUTPATIENT
Start: 2022-08-09 | End: 2023-05-24 | Stop reason: SDUPTHER

## 2022-08-09 NOTE — PROGRESS NOTES
PT HERE FOR DEPO PROVERA REFILL.  DOES NOT WANT EXAM.    ROS:  GENERAL: No fever, chills, fatigability or weight loss.  VULVAR: No pain, no lesions and no itching.  VAGINAL: No relaxation, no itching, no discharge, no abnormal bleeding and no lesions.  ABDOMEN: No abdominal pain. Denies nausea. Denies vomiting. No diarrhea. No constipation  BREAST: Denies pain. No lumps. No discharge.  URINARY: No incontinence, no nocturia, no frequency and no dysuria.  CARDIOVASCULAR: No chest pain. No shortness of breath. No leg cramps.  NEUROLOGICAL: No headaches. No vision changes.  The remainder of the review of systems was negative.    PE:  General Appearance: overweight And Well developed. Well nourished. In no acute distress.    PROCEDURES:    PLAN:     DIAGNOSIS:  1. Encounter for prescription for depo-Provera        MEDICATIONS & ORDERS:  Orders Placed This Encounter    medroxyPROGESTERone (DEPO-PROVERA) 150 mg/mL Syrg     D/W PT ON TYPES OF RELIABLE BIRTH CONTROL AND RISKS AND BENEFITS  The use of hormonal contraception has been fully discussed with the patient. We discussed all options including OCPs, transdermal patches, vaginal ring, Depo Provera injections, Implanon, and IUD. Warnings about anticipated minor side effects such as breakthrough spotting, nausea, breast tenderness, weight changes, acne, headaches, etc were given. She has been told of the more serious potential side effects such as MI, stroke, and deep vein thrombosis, all of which are very unlikely. She has been asked to report any signs of such serious problems immediately. The need for additional protection, such as a condom, to prevent exposure to sexually transmitted diseases has also been discussed- the patient has been clearly reminded that no hormonal contraceptive method can protect her against diseases such as HIV and others. She understands and wishes to take the medication as prescribed. She wishes to begin oral contraceptives  (estrogen/progesterone)      FOLLOW-UP: With me PRN. NEEDS APPOINTMENT FOR ANNUAL.    Jmimie Ricardo Jr, MD, FACOG

## 2022-09-07 ENCOUNTER — OFFICE VISIT (OUTPATIENT)
Dept: ORTHOPEDICS | Facility: CLINIC | Age: 52
End: 2022-09-07
Payer: OTHER GOVERNMENT

## 2022-09-07 VITALS
WEIGHT: 201.25 LBS | BODY MASS INDEX: 29.81 KG/M2 | DIASTOLIC BLOOD PRESSURE: 89 MMHG | HEART RATE: 87 BPM | SYSTOLIC BLOOD PRESSURE: 146 MMHG | HEIGHT: 69 IN

## 2022-09-07 DIAGNOSIS — G89.29 CHRONIC RIGHT SHOULDER PAIN: ICD-10-CM

## 2022-09-07 DIAGNOSIS — M75.21 BICEPS TENDONITIS ON RIGHT: ICD-10-CM

## 2022-09-07 DIAGNOSIS — Z98.890 S/P ROTATOR CUFF SURGERY: Primary | ICD-10-CM

## 2022-09-07 DIAGNOSIS — M25.511 CHRONIC RIGHT SHOULDER PAIN: ICD-10-CM

## 2022-09-07 PROCEDURE — 99999 PR PBB SHADOW E&M-EST. PATIENT-LVL III: ICD-10-PCS | Mod: PBBFAC,,, | Performed by: ORTHOPAEDIC SURGERY

## 2022-09-07 PROCEDURE — 99213 OFFICE O/P EST LOW 20 MIN: CPT | Mod: S$GLB,,, | Performed by: ORTHOPAEDIC SURGERY

## 2022-09-07 PROCEDURE — 99999 PR PBB SHADOW E&M-EST. PATIENT-LVL III: CPT | Mod: PBBFAC,,, | Performed by: ORTHOPAEDIC SURGERY

## 2022-09-07 PROCEDURE — 99213 PR OFFICE/OUTPT VISIT, EST, LEVL III, 20-29 MIN: ICD-10-PCS | Mod: S$GLB,,, | Performed by: ORTHOPAEDIC SURGERY

## 2022-09-07 RX ORDER — IBUPROFEN 200 MG
200 TABLET ORAL EVERY 6 HOURS PRN
COMMUNITY
End: 2023-04-25

## 2022-09-07 NOTE — PROGRESS NOTES
"[facility], Orthopedics and Sports Medicine  [facility]    Established Patient Office Visit  09/07/2022     Diagnosis:  ***    Procedure:   (***) ***    Date of Injury:   ***    Subjective:      Celia Alford is a 51 y.o. female who presents for follow up treatment of the above mentioned diagnosis.  Overall the patient's symptoms are {Blank Single:70148::"resolved","improving","worsening","unchanged"}.  The patient {IS / IS NOT:79452} currently in physical therapy, which {IS / IS NOT:96360} improving the overall condition.     {history:773399}    ***    Outside reports reviewed: {Outside review:76504}.    Past Medical History:   Diagnosis Date    Essential (primary) hypertension        Patient Active Problem List   Diagnosis    Chronic right shoulder pain    Shoulder joint dysfunction    Neck pain on right side    Traumatic incomplete tear of right rotator cuff    Decreased range of motion of shoulder, right    Decreased strength of upper extremity    COVID-19 virus infection    Elevated blood pressure reading without diagnosis of hypertension    Neck muscle spasm    Class 1 obesity due to excess calories without serious comorbidity with body mass index (BMI) of 30.0 to 30.9 in adult    Palpitations    Right cervical radiculopathy    Shoulder weakness    Biceps tendonitis on right       Past Surgical History:   Procedure Laterality Date    ARTHROSCOPIC REPAIR OF ROTATOR CUFF OF SHOULDER Right 11/20/2020    Procedure: REPAIR, ROTATOR CUFF, ARTHROSCOPIC;  Surgeon: Jens Han Jr., MD;  Location: [facility];  Service: Orthopedics;  Laterality: Right;  need opus system- rufino notified tori-11/10  video Rufino confirmed 11/19/2020 KB 0915    ROTATOR CUFF REPAIR  11/20/2020    TONSILLECTOMY          Current Outpatient Medications   Medication Instructions    ibuprofen (ADVIL,MOTRIN) 200 mg, Oral, Every 6 hours PRN    medroxyPROGESTERone (DEPO-PROVERA) 150 mg, Intramuscular, Every " "3 months        Review of patient's allergies indicates:   Allergen Reactions    Pcn [penicillins] Hives and Rash       Social History     Socioeconomic History    Marital status: Single   Tobacco Use    Smoking status: Never    Smokeless tobacco: Never   Substance and Sexual Activity    Alcohol use: Yes     Comment: seldom    Drug use: No    Sexual activity: Not Currently     Partners: Male     Birth control/protection: Injection       Family History   Problem Relation Age of Onset    Diabetes Maternal Grandmother     Hypertension Maternal Grandmother     No Known Problems Mother     Breast cancer Neg Hx     Colon cancer Neg Hx     Ovarian cancer Neg Hx          ROS       Objective:      Ortho/SPM Exam    Imaging:  ***    Procedures  ***      Assessment:       Celia Alford is a 51 y.o. female seen in the office today. There were no encounter diagnoses.  {Blank Single:61365::"Referal to another provider (***)","Non-operative treatment","Operative treatment with ***","Further work-up"} is recommended at this time. ***. The natural history and expected course discussed with patient. Various treatment options were discussed, including their risks and benefits. All of the patient's questions were answered.     Plan:      {ppivOV general plan:86138}         Olman Pillai IV, MD   of Clinical Orthopedics  Department of Orthopedic Surgery  [facility]  Office: [phone]  Website: [email or web address]    ---------------------------------------        "

## 2022-09-13 ENCOUNTER — DOCUMENTATION ONLY (OUTPATIENT)
Dept: REHABILITATION | Facility: HOSPITAL | Age: 52
End: 2022-09-13
Payer: COMMERCIAL

## 2022-09-13 NOTE — PLAN OF CARE
[facility]   Discharge Note    Name: Celia Hernandez [facility]  Clinic Number: [id number]    Therapy Diagnosis:        Encounter Diagnosis   Name Primary?    Shoulder weakness Yes      Physician: Olman Pillai IV, MD     Visit Date: 7/22/2022     Physician Orders: PT Eval and Treat   Medical Diagnosis from Referral: M75.21 (ICD-10-CM) - Biceps tendonitis on right M54.12 (ICD-10-CM) - Right cervical radiculopathy   Evaluation Date: 6/23/2022      Date of Last visit: 7/22/2022  Total Visits Received: 4    ASSESSMENT        Discharge reason: Patient has completed allowable visits authorized by insurance    Discharge FOTO Score: NA     Goals: not met secondary to authorization     PLAN   This patient is discharged from Physical Therapy      Lynda Atwood, PT

## 2022-09-20 ENCOUNTER — OFFICE VISIT (OUTPATIENT)
Dept: INTERNAL MEDICINE | Facility: CLINIC | Age: 52
End: 2022-09-20
Payer: COMMERCIAL

## 2022-09-20 ENCOUNTER — PATIENT MESSAGE (OUTPATIENT)
Dept: ORTHOPEDICS | Facility: CLINIC | Age: 52
End: 2022-09-20
Payer: COMMERCIAL

## 2022-09-20 ENCOUNTER — IMMUNIZATION (OUTPATIENT)
Dept: INTERNAL MEDICINE | Facility: CLINIC | Age: 52
End: 2022-09-20
Payer: COMMERCIAL

## 2022-09-20 ENCOUNTER — LAB VISIT (OUTPATIENT)
Dept: LAB | Facility: HOSPITAL | Age: 52
End: 2022-09-20
Attending: FAMILY MEDICINE
Payer: COMMERCIAL

## 2022-09-20 VITALS
SYSTOLIC BLOOD PRESSURE: 142 MMHG | HEART RATE: 96 BPM | BODY MASS INDEX: 30.01 KG/M2 | DIASTOLIC BLOOD PRESSURE: 92 MMHG | HEIGHT: 69 IN | OXYGEN SATURATION: 97 % | WEIGHT: 202.63 LBS

## 2022-09-20 DIAGNOSIS — F41.9 ANXIETY: ICD-10-CM

## 2022-09-20 DIAGNOSIS — E78.2 MIXED HYPERLIPIDEMIA: ICD-10-CM

## 2022-09-20 DIAGNOSIS — Z00.00 ANNUAL PHYSICAL EXAM: Primary | ICD-10-CM

## 2022-09-20 DIAGNOSIS — D53.9 MACROCYTIC ANEMIA: ICD-10-CM

## 2022-09-20 DIAGNOSIS — G47.00 INSOMNIA, UNSPECIFIED TYPE: ICD-10-CM

## 2022-09-20 DIAGNOSIS — Z12.31 ENCOUNTER FOR SCREENING MAMMOGRAM FOR BREAST CANCER: ICD-10-CM

## 2022-09-20 DIAGNOSIS — Z12.12 ENCOUNTER FOR COLORECTAL CANCER SCREENING: ICD-10-CM

## 2022-09-20 DIAGNOSIS — Z12.11 ENCOUNTER FOR COLORECTAL CANCER SCREENING: ICD-10-CM

## 2022-09-20 DIAGNOSIS — R03.0 ELEVATED BLOOD PRESSURE READING WITHOUT DIAGNOSIS OF HYPERTENSION: ICD-10-CM

## 2022-09-20 PROBLEM — U07.1 COVID-19 VIRUS INFECTION: Status: RESOLVED | Noted: 2021-08-06 | Resolved: 2022-09-20

## 2022-09-20 PROBLEM — E66.3 OVERWEIGHT (BMI 25.0-29.9): Status: ACTIVE | Noted: 2021-09-09

## 2022-09-20 LAB
CHOLEST SERPL-MCNC: 248 MG/DL (ref 120–199)
CHOLEST/HDLC SERPL: 4.3 {RATIO} (ref 2–5)
ERYTHROCYTE [DISTWIDTH] IN BLOOD BY AUTOMATED COUNT: 12.9 % (ref 11.5–14.5)
FOLATE SERPL-MCNC: 4.6 NG/ML (ref 4–24)
HCT VFR BLD AUTO: 33.6 % (ref 37–48.5)
HDLC SERPL-MCNC: 58 MG/DL (ref 40–75)
HDLC SERPL: 23.4 % (ref 20–50)
HGB BLD-MCNC: 11.1 G/DL (ref 12–16)
LDLC SERPL CALC-MCNC: 162.6 MG/DL (ref 63–159)
MCH RBC QN AUTO: 33.9 PG (ref 27–31)
MCHC RBC AUTO-ENTMCNC: 33 G/DL (ref 32–36)
MCV RBC AUTO: 103 FL (ref 82–98)
NONHDLC SERPL-MCNC: 190 MG/DL
PLATELET # BLD AUTO: 400 K/UL (ref 150–450)
PMV BLD AUTO: 9.8 FL (ref 9.2–12.9)
RBC # BLD AUTO: 3.27 M/UL (ref 4–5.4)
TRIGL SERPL-MCNC: 137 MG/DL (ref 30–150)
TSH SERPL DL<=0.005 MIU/L-ACNC: 0.94 UIU/ML (ref 0.4–4)
VIT B12 SERPL-MCNC: 387 PG/ML (ref 210–950)
WBC # BLD AUTO: 5.56 K/UL (ref 3.9–12.7)

## 2022-09-20 PROCEDURE — 99396 PREV VISIT EST AGE 40-64: CPT | Mod: S$GLB,,, | Performed by: FAMILY MEDICINE

## 2022-09-20 PROCEDURE — 3008F BODY MASS INDEX DOCD: CPT | Mod: CPTII,S$GLB,, | Performed by: FAMILY MEDICINE

## 2022-09-20 PROCEDURE — 82746 ASSAY OF FOLIC ACID SERUM: CPT | Performed by: FAMILY MEDICINE

## 2022-09-20 PROCEDURE — 1159F PR MEDICATION LIST DOCUMENTED IN MEDICAL RECORD: ICD-10-PCS | Mod: CPTII,S$GLB,, | Performed by: FAMILY MEDICINE

## 2022-09-20 PROCEDURE — 99214 PR OFFICE/OUTPT VISIT, EST, LEVL IV, 30-39 MIN: ICD-10-PCS | Mod: 25,S$GLB,, | Performed by: FAMILY MEDICINE

## 2022-09-20 PROCEDURE — 99999 PR PBB SHADOW E&M-EST. PATIENT-LVL V: ICD-10-PCS | Mod: PBBFAC,,, | Performed by: FAMILY MEDICINE

## 2022-09-20 PROCEDURE — 82607 VITAMIN B-12: CPT | Performed by: FAMILY MEDICINE

## 2022-09-20 PROCEDURE — 36415 COLL VENOUS BLD VENIPUNCTURE: CPT | Performed by: FAMILY MEDICINE

## 2022-09-20 PROCEDURE — 90471 IMMUNIZATION ADMIN: CPT | Mod: S$GLB,,, | Performed by: INTERNAL MEDICINE

## 2022-09-20 PROCEDURE — 90686 IIV4 VACC NO PRSV 0.5 ML IM: CPT | Mod: S$GLB,,, | Performed by: INTERNAL MEDICINE

## 2022-09-20 PROCEDURE — 1159F MED LIST DOCD IN RCRD: CPT | Mod: CPTII,S$GLB,, | Performed by: FAMILY MEDICINE

## 2022-09-20 PROCEDURE — 3077F PR MOST RECENT SYSTOLIC BLOOD PRESSURE >= 140 MM HG: ICD-10-PCS | Mod: CPTII,S$GLB,, | Performed by: FAMILY MEDICINE

## 2022-09-20 PROCEDURE — 3077F SYST BP >= 140 MM HG: CPT | Mod: CPTII,S$GLB,, | Performed by: FAMILY MEDICINE

## 2022-09-20 PROCEDURE — 3080F DIAST BP >= 90 MM HG: CPT | Mod: CPTII,S$GLB,, | Performed by: FAMILY MEDICINE

## 2022-09-20 PROCEDURE — 85027 COMPLETE CBC AUTOMATED: CPT | Performed by: FAMILY MEDICINE

## 2022-09-20 PROCEDURE — 3080F PR MOST RECENT DIASTOLIC BLOOD PRESSURE >= 90 MM HG: ICD-10-PCS | Mod: CPTII,S$GLB,, | Performed by: FAMILY MEDICINE

## 2022-09-20 PROCEDURE — 80061 LIPID PANEL: CPT | Performed by: FAMILY MEDICINE

## 2022-09-20 PROCEDURE — 99214 OFFICE O/P EST MOD 30 MIN: CPT | Mod: 25,S$GLB,, | Performed by: FAMILY MEDICINE

## 2022-09-20 PROCEDURE — 99396 PR PREVENTIVE VISIT,EST,40-64: ICD-10-PCS | Mod: S$GLB,,, | Performed by: FAMILY MEDICINE

## 2022-09-20 PROCEDURE — 90471 FLU VACCINE (QUAD) GREATER THAN OR EQUAL TO 3YO PRESERVATIVE FREE IM: ICD-10-PCS | Mod: S$GLB,,, | Performed by: INTERNAL MEDICINE

## 2022-09-20 PROCEDURE — 3008F PR BODY MASS INDEX (BMI) DOCUMENTED: ICD-10-PCS | Mod: CPTII,S$GLB,, | Performed by: FAMILY MEDICINE

## 2022-09-20 PROCEDURE — 99999 PR PBB SHADOW E&M-EST. PATIENT-LVL V: CPT | Mod: PBBFAC,,, | Performed by: FAMILY MEDICINE

## 2022-09-20 PROCEDURE — 84443 ASSAY THYROID STIM HORMONE: CPT | Performed by: FAMILY MEDICINE

## 2022-09-20 PROCEDURE — 90686 FLU VACCINE (QUAD) GREATER THAN OR EQUAL TO 3YO PRESERVATIVE FREE IM: ICD-10-PCS | Mod: S$GLB,,, | Performed by: INTERNAL MEDICINE

## 2022-09-20 RX ORDER — TRAZODONE HYDROCHLORIDE 50 MG/1
50 TABLET ORAL NIGHTLY
Qty: 30 TABLET | Refills: 11 | Status: SHIPPED | OUTPATIENT
Start: 2022-09-20 | End: 2023-04-25

## 2022-09-20 RX ORDER — HYDROCHLOROTHIAZIDE 12.5 MG/1
12.5 TABLET ORAL DAILY
Qty: 30 TABLET | Refills: 11 | Status: SHIPPED | OUTPATIENT
Start: 2022-09-20 | End: 2023-08-31

## 2022-09-20 NOTE — PROGRESS NOTES
Subjective:       Patient ID: Celia Alford is a 51 y.o. female.    Chief Complaint: Follow-up    HPI    Celia Alford is a 51 y.o. female for checkup.    Elev bp readings  Will try hctz (has been on in past)    Anxiety, insomnia  When asked about specific stressors, pt does not know. Has trouble staying asleep. Has tried melatonin without relief. Has tried hydroxyzine without relief.     #Cards: HTN, HLD, Palps  - est w/ Dr. Lobo,  7/2022 - ordered echo, holter  - adverse effects w/ amlodipine    #Heme: Macrocytic anemia     #Ortho: S/p Rt shldr rotator cuff repair 11/2020  - est w/ Dr. Pillai,  9/2022 -- MRI, Phys therapy     #Obgyn:  - est w/ Dr. Ricardo,  8/2022    #Psych: Anxiety, Insomnia     #BMI 29    Review of Systems   Constitutional:  Negative for appetite change, fatigue and fever.   HENT:  Negative for congestion.    Respiratory:  Negative for cough and shortness of breath.    Cardiovascular:  Negative for chest pain and palpitations.   Gastrointestinal:  Negative for abdominal pain, constipation, diarrhea, nausea and vomiting.   Genitourinary:  Negative for dysuria.   Musculoskeletal:  Negative for back pain.   Skin:  Negative for rash.   Neurological:  Negative for weakness, numbness and headaches.   Psychiatric/Behavioral:  Positive for sleep disturbance. The patient is nervous/anxious.        Past Medical History:   Diagnosis Date    COVID-19 virus infection 8/6/2021 7/2021    Essential (primary) hypertension         Prior to Admission medications    Medication Sig Start Date End Date Taking? Authorizing Provider   ibuprofen (ADVIL,MOTRIN) 200 MG tablet Take 200 mg by mouth every 6 (six) hours as needed for Pain.    Historical Provider   medroxyPROGESTERone (DEPO-PROVERA) 150 mg/mL Syrg Inject 1 mL (150 mg total) into the muscle every 3 (three) months. 8/9/22   Jimmie Ricardo Jr., MD        Past medical history, surgical history, and family medical history reviewed and  "updated as appropriate.    Medications and allergies reviewed.     Objective:          Vitals:    09/20/22 0922   BP: (!) 142/92   BP Location: Right arm   Patient Position: Sitting   BP Method: Medium (Manual)   Pulse: 96   SpO2: 97%   Weight: 91.9 kg (202 lb 9.6 oz)   Height: 5' 9" (1.753 m)     Body mass index is 29.92 kg/m².  Physical Exam  Vitals and nursing note reviewed.   Constitutional:       General: She is not in acute distress.     Appearance: Normal appearance.   Eyes:      Extraocular Movements: Extraocular movements intact.   Cardiovascular:      Rate and Rhythm: Normal rate and regular rhythm.      Pulses: Normal pulses.      Heart sounds: Normal heart sounds. No murmur heard.  Pulmonary:      Effort: Pulmonary effort is normal. No respiratory distress.      Breath sounds: Normal breath sounds. No wheezing.   Abdominal:      Palpations: Abdomen is soft.   Neurological:      Mental Status: She is alert and oriented to person, place, and time.   Psychiatric:         Mood and Affect: Mood normal.         Behavior: Behavior normal.       Lab Results   Component Value Date    WBC 6.63 07/01/2022    HGB 11.4 (L) 07/01/2022    HCT 36.3 (L) 07/01/2022     07/01/2022    CHOL 295 (H) 07/01/2022    TRIG 146 07/01/2022    HDL 72 07/01/2022    ALT 47 (H) 07/01/2022    AST 24 07/01/2022     07/01/2022    K 4.0 07/01/2022     07/01/2022    CREATININE 0.8 07/01/2022    BUN 12 07/01/2022    CO2 26 07/01/2022    TSH 0.948 07/01/2022    HGBA1C 5.1 09/09/2021       Assessment:       1. Annual physical exam    2. Elevated blood pressure reading without diagnosis of hypertension    3. Mixed hyperlipidemia    4. Insomnia, unspecified type    5. Macrocytic anemia    6. Anxiety    7. Encounter for screening mammogram for breast cancer    8. Encounter for colorectal cancer screening          Plan:   1. Annual physical exam    2. Elevated blood pressure reading without diagnosis of " hypertension  Overview:  Track bp at home over next month, keep log, and will go over next visit    Orders:  -     hydroCHLOROthiazide (HYDRODIURIL) 12.5 MG Tab    3. Mixed hyperlipidemia  -     Lipid Panel    4. Insomnia, unspecified type  -     traZODone (DESYREL) 50 MG tablet  -     Ambulatory referral/consult to Sleep Disorders    5. Macrocytic anemia  -     Vitamin B12  -     Folate  -     CBC Without Differential    6. Anxiety  -     TSH    7. Encounter for screening mammogram for breast cancer  -     Mammo Digital Screening Bilat w/ Remberto    8. Encounter for colorectal cancer screening  -     Ambulatory referral/consult to Endo Procedure       Health maintenance reviewed with patient.     Prob:  Anxiety, insomnia  When asked about specific stressors, pt does not know. Has trouble staying asleep. Has tried melatonin without relief. Has tried hydroxyzine without relief.   Ros: +anxious, sleep disturbance  Pe: normal mood and appropriate behavior today during exam  Plan: sleep med referral, try trazodone and communicate with me on effects    Follow up in about 6 months (around 3/20/2023) for Checkup.    Jeb Roblero MD  Family Medicine / Primary Care  [facility]  9/20/2022

## 2022-09-21 PROBLEM — I10 ESSENTIAL HYPERTENSION: Status: ACTIVE | Noted: 2021-09-09

## 2022-10-10 ENCOUNTER — PATIENT MESSAGE (OUTPATIENT)
Dept: ADMINISTRATIVE | Facility: HOSPITAL | Age: 52
End: 2022-10-10
Payer: COMMERCIAL

## 2022-10-14 ENCOUNTER — PATIENT OUTREACH (OUTPATIENT)
Dept: ADMINISTRATIVE | Facility: HOSPITAL | Age: 52
End: 2022-10-14
Payer: COMMERCIAL

## 2022-10-17 ENCOUNTER — PATIENT MESSAGE (OUTPATIENT)
Dept: ORTHOPEDICS | Facility: CLINIC | Age: 52
End: 2022-10-17
Payer: COMMERCIAL

## 2022-10-26 ENCOUNTER — PATIENT MESSAGE (OUTPATIENT)
Dept: ORTHOPEDICS | Facility: CLINIC | Age: 52
End: 2022-10-26
Payer: COMMERCIAL

## 2022-11-16 ENCOUNTER — PATIENT MESSAGE (OUTPATIENT)
Dept: ORTHOPEDICS | Facility: CLINIC | Age: 52
End: 2022-11-16
Payer: COMMERCIAL

## 2022-11-17 ENCOUNTER — PATIENT MESSAGE (OUTPATIENT)
Dept: ORTHOPEDICS | Facility: CLINIC | Age: 52
End: 2022-11-17
Payer: COMMERCIAL

## 2022-12-13 ENCOUNTER — PATIENT MESSAGE (OUTPATIENT)
Dept: ORTHOPEDICS | Facility: CLINIC | Age: 52
End: 2022-12-13
Payer: COMMERCIAL

## 2022-12-20 ENCOUNTER — PATIENT MESSAGE (OUTPATIENT)
Dept: ORTHOPEDICS | Facility: CLINIC | Age: 52
End: 2022-12-20
Payer: COMMERCIAL

## 2022-12-21 ENCOUNTER — TELEPHONE (OUTPATIENT)
Dept: ORTHOPEDICS | Facility: CLINIC | Age: 52
End: 2022-12-21
Payer: COMMERCIAL

## 2022-12-21 ENCOUNTER — OFFICE VISIT (OUTPATIENT)
Dept: ORTHOPEDICS | Facility: CLINIC | Age: 52
End: 2022-12-21
Payer: COMMERCIAL

## 2022-12-21 DIAGNOSIS — M75.41 SHOULDER IMPINGEMENT SYNDROME, RIGHT: ICD-10-CM

## 2022-12-21 DIAGNOSIS — M75.111 INCOMPLETE TEAR OF RIGHT ROTATOR CUFF, UNSPECIFIED WHETHER TRAUMATIC: Primary | ICD-10-CM

## 2022-12-21 PROCEDURE — 99441 PR PHYSICIAN TELEPHONE EVALUATION 5-10 MIN: ICD-10-PCS | Mod: 95,,, | Performed by: PHYSICIAN ASSISTANT

## 2022-12-21 PROCEDURE — 99441 PR PHYSICIAN TELEPHONE EVALUATION 5-10 MIN: CPT | Mod: 95,,, | Performed by: PHYSICIAN ASSISTANT

## 2022-12-21 NOTE — TELEPHONE ENCOUNTER
Call patient with MRI results of the right shoulder.  Patient is post have a phone call audio visit with Dr. Pillai but he has left the clinic after falling ill.  The MRI reveals a glenoid labral tear, rotator cuff tendinitis and partial thickness tear as well as AC joint arthritis and impingement.  Patient had previous surgery on her right shoulder for similar findings.  I am unsure with Dr. Oviedo's plan of action is at this time.  I recommend she follow back up with him when he is back in clinic.

## 2022-12-21 NOTE — PROGRESS NOTES
Established Patient - Audio Only Telehealth Visit     The patient location is: home   The chief complaint leading to consultation is: MRI results right shoulder  Visit type: Virtual visit with audio only (telephone)  Total time spent with patient: 5 mins       The reason for the audio only service rather than synchronous audio and video virtual visit was related to technical difficulties or patient preference/necessity.     Each patient to whom I provide medical services by telemedicine is:  (1) informed of the relationship between the physician and patient and the respective role of any other health care provider with respect to management of the patient; and (2) notified that they may decline to receive medical services by telemedicine and may withdraw from such care at any time. Patient verbally consented to receive this service via voice-only telephone call.       HPI: Phone visit for right shoulder pain MRI results.  We had an appointment with Dr. Pillai but he had to cancel due to illness.  MRI at [facility].  Reveals glenoid labrum tear, rotator cuff tear and partial thickness tear. AC  joint arthritis and impingement.      Assessment and plan:  Unsure Dr. Pillai plan for this patient.  She has had surgery previously on this shoulder for similar complaints.  I recommend she follow-up with Dr. Pillai to discuss her options further.                        This service was not originating from a related E/M service provided within the previous 7 days nor will  to an E/M service or procedure within the next 24 hours or my soonest available appointment.  Prevailing standard of care was able to be met in this audio-only visit.

## 2022-12-28 ENCOUNTER — PATIENT MESSAGE (OUTPATIENT)
Dept: ORTHOPEDICS | Facility: CLINIC | Age: 52
End: 2022-12-28
Payer: COMMERCIAL

## 2022-12-30 ENCOUNTER — PATIENT MESSAGE (OUTPATIENT)
Dept: INTERNAL MEDICINE | Facility: CLINIC | Age: 52
End: 2022-12-30
Payer: COMMERCIAL

## 2023-01-09 ENCOUNTER — PATIENT MESSAGE (OUTPATIENT)
Dept: ORTHOPEDICS | Facility: CLINIC | Age: 53
End: 2023-01-09
Payer: COMMERCIAL

## 2023-01-11 ENCOUNTER — OFFICE VISIT (OUTPATIENT)
Dept: ORTHOPEDICS | Facility: CLINIC | Age: 53
End: 2023-01-11
Payer: COMMERCIAL

## 2023-01-11 DIAGNOSIS — M75.41 SHOULDER IMPINGEMENT SYNDROME, RIGHT: ICD-10-CM

## 2023-01-11 DIAGNOSIS — Z98.890 S/P ROTATOR CUFF SURGERY: ICD-10-CM

## 2023-01-11 DIAGNOSIS — M75.111 INCOMPLETE TEAR OF RIGHT ROTATOR CUFF, UNSPECIFIED WHETHER TRAUMATIC: Primary | ICD-10-CM

## 2023-01-11 DIAGNOSIS — M75.21 BICEPS TENDONITIS ON RIGHT: ICD-10-CM

## 2023-01-11 PROCEDURE — 99213 PR OFFICE/OUTPT VISIT, EST, LEVL III, 20-29 MIN: ICD-10-PCS | Mod: 95,,, | Performed by: ORTHOPAEDIC SURGERY

## 2023-01-11 PROCEDURE — 99213 OFFICE O/P EST LOW 20 MIN: CPT | Mod: 95,,, | Performed by: ORTHOPAEDIC SURGERY

## 2023-01-11 NOTE — PROGRESS NOTES
[facility], Orthopedics and Sports Medicine  [facility]    Audio Only Telehealth Visit  01/11/2023     Diagnosis: right partial rotator cuff tear, biceps tendonitis, rotator cuff impingement syndrome, prior rotator cuff surgery       The patient location is: [place]   The chief complaint leading to consultation is: right shoulder pain   Visit type: Virtual visit with audio only (telephone)  Total time spent with patient: 18 minutes     The reason for the audio only service rather than synchronous audio and video virtual visit was related to technical difficulties or patient preference/necessity.     Each patient to whom I provide medical services by telemedicine is:  (1) informed of the relationship between the physician and patient and the respective role of any other health care provider with respect to management of the patient; and (2) notified that they may decline to receive medical services by telemedicine and may withdraw from such care at any time. Patient verbally consented to receive this service via voice-only telephone call.    Subjective:      Celia Alford is a 52 y.o. female who follows up for shoulder pain right side.  She continues with activity related shoulder pain.  Symptoms are similar to prior office notes.  She recently had MRI shoulder due to persistent symptoms.      She had biceps groove injection and minimal relief.     Of note she is nearly 18 months s/p prior rotator cuff surgery by another provider.          Objective:      Imaging:  MRI of the right shoulder taken taken  12/16/2022  was personally reviewed from [facility] imaging online viewer.  Multiple T1 and T2 sequences including axial, coronal, and sagittal views were reviewed.  No acute fractures or dislocations are noted in these images.  There is post surgical changes noted at rotator cuff footprint.  There is partial tear of posterior superior rotator cuff with articular component.  There is  flattening of the biceps tendon.  There is tendinosis of subscapularis and superior rotator cuff tendons.  There are degenerative changes of the labral tissue.          Assessment:       Celia Alford is a 52 y.o. female seen today via a telemedicine visit. The primary encounter diagnosis was Incomplete tear of right rotator cuff, unspecified whether traumatic. Diagnoses of Shoulder impingement syndrome, right, Biceps tendonitis on right, and S/P rotator cuff surgery were also pertinent to this visit.  The patient was offered surgical treatment for this issue as well as continued nonsurgical treatment.  She will take time to review her options.  Surgery would involve revision rotator cuff repair and likely biceps tenodesis but I explained the nature of revision surgery is unpredictable and may not resolve all of her pain.  The patient will reach out to us and let us know how she wishes to proceed.  The natural history and expected course discussed with patient. Various treatment options were discussed, including their risks and benefits. All of the patient's questions were answered.     Plan:      Tylenol 650mg TID, PRN pain.  Follow up as needed          Olman Pillai IV, MD   of Clinical Orthopedics  Department of Orthopedic Surgery  [facility]  Office: [phone]  Website: [email or web address]    ---------------------------------------  No orders of the defined types were placed in this encounter.       This service was not originating from a related E/M service provided within the previous 7 days nor will  to an E/M service or procedure within the next 24 hours or my soonest available appointment.  Prevailing standard of care was able to be met in this audio-only visit.

## 2023-02-14 ENCOUNTER — PATIENT OUTREACH (OUTPATIENT)
Dept: ADMINISTRATIVE | Facility: HOSPITAL | Age: 53
End: 2023-02-14
Payer: COMMERCIAL

## 2023-02-27 ENCOUNTER — PATIENT MESSAGE (OUTPATIENT)
Dept: ORTHOPEDICS | Facility: CLINIC | Age: 53
End: 2023-02-27
Payer: COMMERCIAL

## 2023-03-29 ENCOUNTER — OFFICE VISIT (OUTPATIENT)
Dept: ORTHOPEDICS | Facility: CLINIC | Age: 53
End: 2023-03-29
Payer: OTHER GOVERNMENT

## 2023-03-29 VITALS
BODY MASS INDEX: 30.04 KG/M2 | HEART RATE: 111 BPM | SYSTOLIC BLOOD PRESSURE: 140 MMHG | DIASTOLIC BLOOD PRESSURE: 97 MMHG | WEIGHT: 202.81 LBS | HEIGHT: 69 IN

## 2023-03-29 DIAGNOSIS — M75.21 BICEPS TENDONITIS ON RIGHT: ICD-10-CM

## 2023-03-29 DIAGNOSIS — Z98.890 S/P ROTATOR CUFF SURGERY: ICD-10-CM

## 2023-03-29 DIAGNOSIS — M75.111 INCOMPLETE TEAR OF RIGHT ROTATOR CUFF, UNSPECIFIED WHETHER TRAUMATIC: Primary | ICD-10-CM

## 2023-03-29 DIAGNOSIS — Z01.818 PREOP EXAMINATION: ICD-10-CM

## 2023-03-29 DIAGNOSIS — M75.41 SHOULDER IMPINGEMENT SYNDROME, RIGHT: ICD-10-CM

## 2023-03-29 PROCEDURE — 99999 PR PBB SHADOW E&M-EST. PATIENT-LVL V: CPT | Mod: PBBFAC,,, | Performed by: ORTHOPAEDIC SURGERY

## 2023-03-29 PROCEDURE — 99214 OFFICE O/P EST MOD 30 MIN: CPT | Mod: S$GLB,,, | Performed by: ORTHOPAEDIC SURGERY

## 2023-03-29 PROCEDURE — 99999 PR PBB SHADOW E&M-EST. PATIENT-LVL V: ICD-10-PCS | Mod: PBBFAC,,, | Performed by: ORTHOPAEDIC SURGERY

## 2023-03-29 PROCEDURE — 99214 PR OFFICE/OUTPT VISIT, EST, LEVL IV, 30-39 MIN: ICD-10-PCS | Mod: S$GLB,,, | Performed by: ORTHOPAEDIC SURGERY

## 2023-03-29 NOTE — PROGRESS NOTES
[facility], Orthopedics and Sports Medicine  [facility]    Established Patient Shoulder Office Visit  03/29/2023     Diagnosis:  right partial rotator cuff tear, biceps tendonitis, rotator cuff impingement syndrome, prior rotator cuff surgery        Procedure:   Bicipital groove CSI 6/1/22    Subjective:      Celia Alford is a 52 y.o. female who returns for follow up treatment of the right shoulder problem.    At the last office visit the patient was prescribed physical therapy, which has not improved the symptoms.   The patient had CSI which gave minimal symptom relief.  The patient had MRI showing rotator cuff pathology and biceps tendon pathology.     The patient is frustrated by her shoulder condition and continues with overhead pain and pain at night reducing sleep quality.      The patient continues with the following symptoms: pain and weakness.  The symptoms are worsening. Symptoms are located anterior and lateral shoulder.    The patient had prior rotator cuff surgery by another provider 11/20/2020 and has had no pain relief after that operation.      This case is workers compensation, initial injury tripped over something at work.     Outside reports reviewed: historical medical records, office notes, and operative reports.    Past Medical History:   Diagnosis Date    COVID-19 virus infection 8/6/2021 7/2021    Essential (primary) hypertension        Patient Active Problem List   Diagnosis    Chronic right shoulder pain    Shoulder joint dysfunction    Neck pain on right side    Traumatic incomplete tear of right rotator cuff    Decreased range of motion of shoulder, right    Decreased strength of upper extremity    Essential hypertension    Neck muscle spasm    Overweight (BMI 25.0-29.9)    Palpitations    Right cervical radiculopathy    Shoulder weakness    Biceps tendonitis on right    S/P rotator cuff surgery    Mixed hyperlipidemia    Insomnia    Macrocytic anemia     Anxiety       Past Surgical History:   Procedure Laterality Date    ARTHROSCOPIC REPAIR OF ROTATOR CUFF OF SHOULDER Right 11/20/2020    Procedure: REPAIR, ROTATOR CUFF, ARTHROSCOPIC;  Surgeon: Jens Han Jr., MD;  Location: [facility];  Service: Orthopedics;  Laterality: Right;  need opus system- Scientologist notified tori-11/10  video Latter day confirmed 11/19/2020 KB 0915    ROTATOR CUFF REPAIR  11/20/2020    TONSILLECTOMY          Current Outpatient Medications   Medication Instructions    hydroCHLOROthiazide (HYDRODIURIL) 12.5 mg, Oral, Daily    ibuprofen (ADVIL,MOTRIN) 200 mg, Oral, Every 6 hours PRN    medroxyPROGESTERone (DEPO-PROVERA) 150 mg, Intramuscular, Every 3 months    traZODone (DESYREL) 50 mg, Oral, Nightly        Review of patient's allergies indicates:   Allergen Reactions    Pcn [penicillins] Hives and Rash       Social History     Socioeconomic History    Marital status: Single   Tobacco Use    Smoking status: Never    Smokeless tobacco: Never   Substance and Sexual Activity    Alcohol use: Yes     Comment: seldom    Drug use: No    Sexual activity: Not Currently     Partners: Male     Birth control/protection: Injection       Family History   Problem Relation Age of Onset    Diabetes Maternal Grandmother     Hypertension Maternal Grandmother     No Known Problems Mother     Breast cancer Neg Hx     Colon cancer Neg Hx     Ovarian cancer Neg Hx          Review of Systems   Constitutional: Negative for chills and fever.   HENT:  Negative for hearing loss.    Eyes:  Negative for blurred vision.   Cardiovascular:  Negative for chest pain.   Respiratory:  Negative for shortness of breath.    Gastrointestinal:  Negative for abdominal pain.   Neurological:  Negative for light-headedness.      Objective:      General    Nursing note and vitals reviewed.  Constitutional: She is oriented to person, place, and time. She appears well-developed and well-nourished.   HENT:   Head: Normocephalic and  atraumatic.   Eyes: EOM are normal. Pupils are equal, round, and reactive to light.   Neck: Neck supple.   Cardiovascular:  Normal rate.            Pulmonary/Chest: Effort normal.   Neurological: She is alert and oriented to person, place, and time.   Psychiatric: She has a normal mood and affect.         Right Shoulder Exam     Inspection/Observation   Swelling: absent  Bruising: absent  Deformity: absent    Tenderness   The patient is tender to palpation of the acromioclavicular joint and biceps tendon.    Range of Motion   Active abduction:  140   Forward Flexion:  120   External Rotation 0 degrees:  60     Tests & Signs   Drop arm: negative  Corbett test: positive  Impingement: positive  Rotator Cuff Painful Arc/Range: moderate    Other   Sensation: normal    Left Shoulder Exam   Left shoulder exam is normal.    Range of Motion   Active abduction:  170   Passive abduction:  170   Forward Flexion:  180   External Rotation 0 degrees:  80   Internal rotation 0 degrees:  T5     Other   Sensation: normal       Muscle Strength   Right Upper Extremity   Shoulder Abduction: 4/5   Shoulder Internal Rotation: 4/5   Shoulder External Rotation: 4/5   Biceps: 5/5   Left Upper Extremity  Shoulder Abduction: 5/5   Shoulder Internal Rotation: 5/5   Shoulder External Rotation: 5/5   Biceps: 5/5     Vascular Exam     Right Pulses      Radial:                    2+      Left Pulses      Radial:                    2+      Capillary Refill  Right Hand: normal capillary refill  Left Hand: normal capillary refill        Imaging:  Radiographs of the right shoulder taken 06/01/2022 were personally reviewed from the [facility].  Multiple views of the shoulder are available today for review, including an AP, scapular Y, axillary view.  The glenohumeral joint demonstrates mild degenerative changes .  The acromioclavicular joint demonstrates mild degenerative changes .  The glenohumeral joint is concentrically reduced.  There is no  acute fracture or dislocation.     MRI of the right shoulder taken taken  12/16/2022  was personally reviewed from [facility] imaging online viewer.  Multiple T1 and T2 sequences including axial, coronal, and sagittal views were reviewed.  No acute fractures or dislocations are noted in these images.  There is post surgical changes noted at rotator cuff footprint.  There is partial tear of posterior superior rotator cuff with articular component.  There is flattening of the biceps tendon.  There is tendinosis of subscapularis and superior rotator cuff tendons.  There are degenerative changes of the labral tissue.        Procedures      Assessment:       Celia Alford is a 52 y.o. female seen in the office today. The primary encounter diagnosis was Incomplete tear of right rotator cuff, unspecified whether traumatic. Diagnoses of Shoulder impingement syndrome, right, Biceps tendonitis on right, S/P rotator cuff surgery, and Preop examination were also pertinent to this visit.  Operative treatment with right shoulder surgery  is recommended at this time. The patient will have revision rotator cuff repair.  I discussed the possibility her shoulder still hurting after surgery given the revision nature of this operation.  The patient has exhausted nonsurgical treatment and is unhappy with the shoulder function at this time and would like to proceed with surgery.  The natural history and expected course discussed with patient. Various treatment options were discussed, including their risks and benefits. All of the patient's questions were answered.     Plan:      Tylenol 650mg TID, PRN pain.  Surgical treatment right arthroscopic rotator cuff repair REVISION, biceps tenodesis, extensive debridement, possible SAD.  Surgical consent for surgery obtained during office visit.  Expected date of surgery: 5/2/2023.  Patient will require preoperative medical evaluation prior to surgery.  Post operative physical therapy  ordered.         Olman Pillai IV, MD   of Clinical Orthopedics  Department of Orthopedic Surgery  [facility]  Office: [phone]  Website: [email or web address]      Orders Placed This Encounter    X-Ray Chest 1 View Pre-OP    CBC Auto Differential    Basic Metabolic Panel    Protime-INR    APTT    Ambulatory referral/consult to Occupational Medicine    Ambulatory referral/consult to Physical/Occupational Therapy    EKG 12-lead    Case Request Operating Room: ARTHROSCOPY, SHOULDER; rotator cuff repair, subacromial decompression, possible mini-open biceps tenodesis

## 2023-04-04 ENCOUNTER — TELEPHONE (OUTPATIENT)
Dept: ORTHOPEDICS | Facility: CLINIC | Age: 53
End: 2023-04-04
Payer: COMMERCIAL

## 2023-04-04 NOTE — TELEPHONE ENCOUNTER
Work Comp [employer] approved 24 therapy vts @ [facility] Only   24 vts approved  Start [id number] exp [id number] after Surgery [id number]

## 2023-04-06 ENCOUNTER — TELEPHONE (OUTPATIENT)
Dept: ORTHOPEDICS | Facility: CLINIC | Age: 53
End: 2023-04-06
Payer: COMMERCIAL

## 2023-04-06 DIAGNOSIS — M25.511 CHRONIC RIGHT SHOULDER PAIN: Primary | ICD-10-CM

## 2023-04-06 DIAGNOSIS — G89.29 CHRONIC RIGHT SHOULDER PAIN: Primary | ICD-10-CM

## 2023-04-13 ENCOUNTER — PATIENT MESSAGE (OUTPATIENT)
Dept: SURGERY | Facility: HOSPITAL | Age: 53
End: 2023-04-13
Payer: COMMERCIAL

## 2023-04-20 ENCOUNTER — CLINICAL SUPPORT (OUTPATIENT)
Dept: LAB | Facility: HOSPITAL | Age: 53
End: 2023-04-20
Attending: ORTHOPAEDIC SURGERY
Payer: OTHER GOVERNMENT

## 2023-04-20 ENCOUNTER — HOSPITAL ENCOUNTER (OUTPATIENT)
Dept: RADIOLOGY | Facility: HOSPITAL | Age: 53
Discharge: HOME | End: 2023-04-20
Attending: ORTHOPAEDIC SURGERY
Payer: OTHER GOVERNMENT

## 2023-04-20 ENCOUNTER — OFFICE VISIT (OUTPATIENT)
Dept: FAMILY MEDICINE | Facility: HOSPITAL | Age: 53
End: 2023-04-20
Attending: ORTHOPAEDIC SURGERY
Payer: OTHER GOVERNMENT

## 2023-04-20 VITALS
DIASTOLIC BLOOD PRESSURE: 84 MMHG | WEIGHT: 200.19 LBS | HEART RATE: 81 BPM | SYSTOLIC BLOOD PRESSURE: 123 MMHG | HEIGHT: 69 IN | BODY MASS INDEX: 29.65 KG/M2

## 2023-04-20 DIAGNOSIS — Z01.818 PREOP EXAMINATION: ICD-10-CM

## 2023-04-20 DIAGNOSIS — Z01.818 PRE-OP EXAMINATION: Primary | ICD-10-CM

## 2023-04-20 PROCEDURE — 71045 X-RAY EXAM CHEST 1 VIEW: CPT | Mod: TC,FY

## 2023-04-20 PROCEDURE — 99213 OFFICE O/P EST LOW 20 MIN: CPT | Mod: 25 | Performed by: STUDENT IN AN ORGANIZED HEALTH CARE EDUCATION/TRAINING PROGRAM

## 2023-04-20 PROCEDURE — 93005 ELECTROCARDIOGRAM TRACING: CPT

## 2023-04-20 PROCEDURE — 93010 EKG 12-LEAD: ICD-10-PCS | Mod: ,,, | Performed by: INTERNAL MEDICINE

## 2023-04-20 PROCEDURE — 71045 XR CHEST 1 VIEW PRE-OP: ICD-10-PCS | Mod: 26,,, | Performed by: RADIOLOGY

## 2023-04-20 PROCEDURE — 71045 X-RAY EXAM CHEST 1 VIEW: CPT | Mod: 26,,, | Performed by: RADIOLOGY

## 2023-04-20 PROCEDURE — 93010 ELECTROCARDIOGRAM REPORT: CPT | Mod: ,,, | Performed by: INTERNAL MEDICINE

## 2023-04-20 NOTE — PROGRESS NOTES
Progress Note  [facility]    Subjective:     Celia Alford is a 52 y.o. year old female with PMHx of HTN, HLD, macrocytic anemia who presents to clinic for pre-op clearance.     Scheduled for R shoulder rotator cuff repair on 5/2/23. Pre-op H/H from today was 11/34.1. EKG NSR. Tobacco use: none. Not taking ASA or other blood thinners. BP well controlled at baseline on HCTZ. Has not taken BP med this morning. Typically runs 130/80's at baseline. Started treatment for HTN in the last year.     Patient Active Problem List    Diagnosis Date Noted    Mixed hyperlipidemia 09/20/2022    Insomnia 09/20/2022    Macrocytic anemia 09/20/2022    Anxiety 09/20/2022    S/P rotator cuff surgery 09/07/2022    Biceps tendonitis on right 07/22/2022    Shoulder weakness 06/24/2022    Right cervical radiculopathy 06/01/2022    Essential hypertension 09/09/2021    Neck muscle spasm 09/09/2021    Overweight (BMI 25.0-29.9) 09/09/2021    Palpitations 09/09/2021    Decreased range of motion of shoulder, right 03/31/2021    Decreased strength of upper extremity 03/31/2021    Neck pain on right side 11/20/2020    Traumatic incomplete tear of right rotator cuff 11/20/2020    Shoulder joint dysfunction 03/09/2020    Chronic right shoulder pain 11/06/2019        Review of patient's allergies indicates:   Allergen Reactions    Pcn [penicillins] Hives and Rash        Past Medical History:   Diagnosis Date    COVID-19 virus infection 8/6/2021 7/2021    Essential (primary) hypertension       Past Surgical History:   Procedure Laterality Date    ARTHROSCOPIC REPAIR OF ROTATOR CUFF OF SHOULDER Right 11/20/2020    Procedure: REPAIR, ROTATOR CUFF, ARTHROSCOPIC;  Surgeon: Jens Han Jr., MD;  Location: [facility] OR;  Service: Orthopedics;  Laterality: Right;  need opus system- rufino notified tori-11/10  video Rufino confirmed 11/19/2020 KB 0915    ROTATOR CUFF REPAIR  11/20/2020    TONSILLECTOMY        Family History  "  Problem Relation Age of Onset    Diabetes Maternal Grandmother     Hypertension Maternal Grandmother     No Known Problems Mother     Breast cancer Neg Hx     Colon cancer Neg Hx     Ovarian cancer Neg Hx       Social History     Tobacco Use    Smoking status: Never    Smokeless tobacco: Never   Substance Use Topics    Alcohol use: Yes     Comment: seldom        Objective:     Vitals:    04/20/23 0831   BP: (!) 151/98   Pulse: 81   Weight: 90.8 kg (200 lb 2.8 oz)   Height: 5' 9" (1.753 m)     BMI: 29.56    Gen: No apparent distress, well nourished and developed, appears stated age  CV: RRR, S1 and S2 present, no LE edema  Resp: CTAB, normal respiratory effort      Assessment/Plan:     Celia Alford is a 52 y.o. year old female who presents to clinic for pre-op.     1. Pre-op examination  Medically optimized for surgery at this time. Non-smoker and not on blood thinners. Instructed to hold BP medication morning of surgery if home BP <150/100.   - Revised cardiac risk index: 0 class I risk  - NSQIP risk: below average risk for all adverse outcomes      Follow-up: None    A total of 35 minutes was spent on patient care during this encounter which included chart review, examining the patient, formulating a treatment plan and documentation.     Medical decision making straight forward and not complex during this visit.     Case discussed with staff: Dr. Domingo Mcintyre, DO  [facility], PGY-3          "

## 2023-04-21 ENCOUNTER — PATIENT MESSAGE (OUTPATIENT)
Dept: ADMINISTRATIVE | Facility: HOSPITAL | Age: 53
End: 2023-04-21
Payer: COMMERCIAL

## 2023-04-25 ENCOUNTER — HOSPITAL ENCOUNTER (OUTPATIENT)
Dept: PREADMISSION TESTING | Facility: HOSPITAL | Age: 53
Discharge: HOME | End: 2023-04-25
Attending: ORTHOPAEDIC SURGERY
Payer: COMMERCIAL

## 2023-04-25 ENCOUNTER — ANESTHESIA EVENT (OUTPATIENT)
Dept: SURGERY | Facility: HOSPITAL | Age: 53
End: 2023-04-25
Payer: OTHER GOVERNMENT

## 2023-04-25 NOTE — PRE-PROCEDURE INSTRUCTIONS
Phone Visit     Nura Daley    Allergies, medical, surgical, family and psychosocial histories reviewed with patient. Periop plan of care reviewed. Preop instructions given, including medications to take and to hold. Hibiclens soap and instructions on use given. Time allotted for questions to be addressed.  Patient verbalized understanding.

## 2023-04-25 NOTE — ANESTHESIA PREPROCEDURE EVALUATION
04/25/2023     Celia Alford is a 52 y.o., female scheduled for ARTHROSCOPY, SHOULDER; rotator cuff repair, subacromial decompression, possible mini-open biceps tenodesis (Right: Shoulder) on 5/2/23.    Past Medical History:   Diagnosis Date    COVID-19 virus infection 8/6/2021 7/2021    Essential (primary) hypertension      Past Surgical History:   Procedure Laterality Date    ARTHROSCOPIC REPAIR OF ROTATOR CUFF OF SHOULDER Right 11/20/2020    Procedure: REPAIR, ROTATOR CUFF, ARTHROSCOPIC;  Surgeon: Jens Han Jr., MD;  Location: [facility];  Service: Orthopedics;  Laterality: Right;  need opus system- Confucianist notified tori-11/10  video Latter-day confirmed 11/19/2020 KB 0915    ROTATOR CUFF REPAIR  11/20/2020    TONSILLECTOMY       Current Outpatient Medications   Medication Instructions    hydroCHLOROthiazide (HYDRODIURIL) 12.5 mg, Oral, Daily    medroxyPROGESTERone (DEPO-PROVERA) 150 mg, Intramuscular, Every 3 months       Pre-op Assessment    I have reviewed the Patient Summary Reports.     I have reviewed the Nursing Notes.    I have reviewed the Medications.     Review of Systems  Anesthesia Hx:  No problems with previous Anesthesia   Denies Personal Hx of Anesthesia complications.   Social:  Non-Smoker, Social Alcohol Use    Hematology/Oncology:         -- Anemia:   Cardiovascular:   Exercise tolerance: good Hypertension  Denies Angina. hyperlipidemia    Pulmonary:  Pulmonary Normal  Denies Shortness of breath.    Renal/:  Renal/ Normal     Hepatic/GI:  Hepatic/GI Normal    Musculoskeletal:  Spine Disorders: cervical    Neurological:  Neurology Normal Denies TIA.  Denies CVA. Denies Seizures.    Endocrine:  Endocrine Normal    Psych:   anxiety          Physical Exam  General: Oriented and Cooperative    Airway:  Neck ROM: Normal ROM    Dental:  Intact      Lab Results    Component Value Date    WBC 5.64 04/20/2023    HGB 11.0 (L) 04/20/2023    HCT 34.1 (L) 04/20/2023     04/20/2023    CHOL 248 (H) 09/20/2022    TRIG 137 09/20/2022    HDL 58 09/20/2022    ALT 47 (H) 07/01/2022    AST 24 07/01/2022     04/20/2023    K 4.2 04/20/2023     04/20/2023    CREATININE 0.9 04/20/2023    BUN 11 04/20/2023    CO2 25 04/20/2023    TSH 0.944 09/20/2022    INR 1.0 04/20/2023    HGBA1C 5.1 09/09/2021     Results for orders placed or performed in visit on 04/20/23   EKG 12-lead    Collection Time: 04/20/23  7:42 AM    Narrative    Test Reason : Z01.818,    Vent. Rate : 074 BPM     Atrial Rate : 074 BPM     P-R Int : 154 ms          QRS Dur : 090 ms      QT Int : 416 ms       P-R-T Axes : 037 018 003 degrees     QTc Int : 461 ms    Normal sinus rhythm  Moderate voltage criteria for LVH, may be normal variant  Borderline Abnormal ECG  When compared with ECG of 01-JUL-2022 10:37,  Nonspecific T wave abnormality no longer evident in Lateral leads  Confirmed by Bertha Vargas MD ([id number]) on 4/21/2023 12:52:29 PM    Referred By: ROWDY DEL ROSARIO IV           Confirmed By:Bertha Vargas MD         Anesthesia Plan  Type of Anesthesia, risks & benefits discussed:    Anesthesia Type: Regional, Gen ETT  Intra-op Monitoring Plan: Standard ASA Monitors  Post Op Pain Control Plan: multimodal analgesia  Induction:  IV  Informed Consent: Informed consent signed with the Patient and all parties understand the risks and agree with anesthesia plan.  All questions answered.   ASA Score: 2  Anesthesia Plan Notes: Anesthesia consent to be signed prior to surgery on 5/2/23      Ready For Surgery From Anesthesia Perspective.     .

## 2023-04-25 NOTE — DISCHARGE INSTRUCTIONS
Your surgery is scheduled for 5/2/23.    Please report to Hospital Front Lobby on the 1st Floor at 0530 a.m.    THIS TIME IS SUBJECT TO CHANGE.  YOU WILL RECEIVE A PHONE CALL THE DAY BEFORE SURGERY BY 3:30 PM TO CONFIRM YOUR TIME OF ARRIVAL.  IF YOU HAVE NOT RECEIVED A PHONE CALL BY 3:30 PM THE DAY BEFORE YOUR SURGERY PLEASE CALL [phone]     INSTRUCTIONS IMPORTANT!!!  ¨ Do not eat or drink after 12 midnight-including water, candy, gum, & mints. OK to brush teeth.      ____  Proceed to [facility] on *** for additional testing.        ____  Do not wear makeup, including mascara.  ____  No powder, lotions or creams to surgical area.  ____  Please remove all jewelry, including piercings and leave at home.  ____  No money or valuables needed. Please leave at home.  ____  Please bring any documents given by your doctor.  ____  If going home the same day, arrange for a ride home. You will not be able to             drive if Anesthesia was used.  ____  Children under 18 years require a parent / guardian present the entire time             they are in surgery / recovery.  ____  Wear loose fitting clothing. Allow for dressings, bandages.  ____  Stop Aspirin, Ibuprofen, Motrin, Aleve, Goody's/BC powders, Excedrine and Naproxen (NSAIDS) at least 3-5 days before surgery, unless otherwise instructed by your doctor, or the nurse.   You MAY use Tylenol/acetaminophen until day of surgery.  ____  Wash the surgical area with Hibiclens the night before surgery, and again the             morning of surgery.  Be sure to rinse hibiclens off completely (if instructed by   nurse).  ____  If you take diabetic medication, do not take am of surgery unless instructed by Doctor.  ____  Call MD for temperature above 101 degrees or any other signs of infection such as Urinary (bladder) infection, Upper respiratory infection, skin boils, etc.  ____ Stop taking any Fish Oil supplement or any Vitamins that contain Vitamin E at  least 5 days prior to surgery.  ____ Do Not wear your contact lenses the day of your procedure.  You may wear your glasses.      ____Do not shave surgical site for 3 days prior to surgery.  ____ Practice Good hand washing before, during, and after procedure.      I have read or had read and explained to me, and understand the above information.  Additional comments or instructions:  For additional questions call [phone]      ANESTHESIA SIDE EFFECTS  -For the first 24 hours after surgery:  Do not drive, use heavy equipment, make important decisions, or drink alcohol  -It is normal to feel sleepy for several hours.  Rest until you are more awake.  -Have someone stay with you, if needed.  They can watch for problems and help keep you safe.  -Some possible post anesthesia side effects include: nausea and vomiting, sore throat and hoarseness, sleepiness, and dizziness.        Pre-Op Bathing Instructions    Before surgery, you can play an important role in your own health.    Because skin is not sterile, we need to be sure that your skin is as free of germs as possible. By following the instructions below, you can reduce the number of germs on your skin before surgery.    IMPORTANT: You will need to shower with a special soap called Hibiclens*, available at any pharmacy.  If you are allergic to Chlorhexidine (the antiseptic in Hibiclens), use an antibacterial soap such as Dial Soap for your preoperative shower.  You will shower with Hibiclens both the night before your surgery and the morning of your surgery.  Do not use Hibiclens on the head, face or genitals to avoid injury to those areas.    STEP #1: THE NIGHT BEFORE YOUR SURGERY     Do not shave the area of your body where your surgery will be performed.  Shower and wash your hair and body as usual with your normal soap and shampoo.  Rinse your hair and body thoroughly after you shower to remove all soap residue.  With your hand, apply one packet of Hibiclens soap to  the surgical site.   Wash the site gently for five (5) minutes. Do not scrub your skin too hard.   Do not wash with your regular soap after Hibiclens is used.  Rinse your body thoroughly.  Pat yourself dry with a clean, soft towel.  Do not use lotion, cream, or powder.  Wear clean clothes.    STEP #2: THE MORNING OF YOUR SURGERY     Repeat Step #1.    * Not to be used by people allergic to Chlorhexidine.     59

## 2023-04-26 ENCOUNTER — PATIENT MESSAGE (OUTPATIENT)
Dept: SURGERY | Facility: HOSPITAL | Age: 53
End: 2023-04-26
Payer: COMMERCIAL

## 2023-04-26 DIAGNOSIS — M75.111 INCOMPLETE TEAR OF RIGHT ROTATOR CUFF, UNSPECIFIED WHETHER TRAUMATIC: Primary | ICD-10-CM

## 2023-04-26 DIAGNOSIS — M75.41 SHOULDER IMPINGEMENT SYNDROME, RIGHT: ICD-10-CM

## 2023-04-26 DIAGNOSIS — M75.21 BICEPS TENDONITIS ON RIGHT: ICD-10-CM

## 2023-05-01 ENCOUNTER — PATIENT MESSAGE (OUTPATIENT)
Dept: ORTHOPEDICS | Facility: CLINIC | Age: 53
End: 2023-05-01
Payer: COMMERCIAL

## 2023-05-02 ENCOUNTER — HOSPITAL ENCOUNTER (OUTPATIENT)
Facility: HOSPITAL | Age: 53
Discharge: HOME | End: 2023-05-02
Attending: ORTHOPAEDIC SURGERY | Admitting: ORTHOPAEDIC SURGERY
Payer: OTHER GOVERNMENT

## 2023-05-02 ENCOUNTER — ANESTHESIA (OUTPATIENT)
Dept: SURGERY | Facility: HOSPITAL | Age: 53
End: 2023-05-02
Payer: OTHER GOVERNMENT

## 2023-05-02 VITALS
RESPIRATION RATE: 18 BRPM | HEIGHT: 69 IN | HEART RATE: 108 BPM | OXYGEN SATURATION: 100 % | BODY MASS INDEX: 29.33 KG/M2 | WEIGHT: 198 LBS | SYSTOLIC BLOOD PRESSURE: 131 MMHG | DIASTOLIC BLOOD PRESSURE: 78 MMHG | TEMPERATURE: 98 F

## 2023-05-02 DIAGNOSIS — M75.21 BICEPS TENDINITIS OF RIGHT UPPER EXTREMITY: ICD-10-CM

## 2023-05-02 DIAGNOSIS — S46.011D TRAUMATIC INCOMPLETE TEAR OF RIGHT ROTATOR CUFF, SUBSEQUENT ENCOUNTER: Primary | ICD-10-CM

## 2023-05-02 DIAGNOSIS — M75.100 ROTATOR CUFF TEAR: ICD-10-CM

## 2023-05-02 DIAGNOSIS — M75.41 SUBACROMIAL IMPINGEMENT OF RIGHT SHOULDER: ICD-10-CM

## 2023-05-02 PROCEDURE — 63600175 PHARM REV CODE 636 W HCPCS: Performed by: NURSE ANESTHETIST, CERTIFIED REGISTERED

## 2023-05-02 PROCEDURE — D9220A PRA ANESTHESIA: Mod: ANES,,, | Performed by: ANESTHESIOLOGY

## 2023-05-02 PROCEDURE — C1713 ANCHOR/SCREW BN/BN,TIS/BN: HCPCS | Performed by: ORTHOPAEDIC SURGERY

## 2023-05-02 PROCEDURE — 64415 NJX AA&/STRD BRCH PLXS IMG: CPT | Performed by: ANESTHESIOLOGY

## 2023-05-02 PROCEDURE — 23430 PR REPAIR BICEPS LONG TENDON: ICD-10-PCS | Mod: 51,RT,, | Performed by: ORTHOPAEDIC SURGERY

## 2023-05-02 PROCEDURE — D9220A PRA ANESTHESIA: ICD-10-PCS | Mod: CRNA,,, | Performed by: NURSE ANESTHETIST, CERTIFIED REGISTERED

## 2023-05-02 PROCEDURE — 25000003 PHARM REV CODE 250: Performed by: ORTHOPAEDIC SURGERY

## 2023-05-02 PROCEDURE — D9220A PRA ANESTHESIA: Mod: CRNA,,, | Performed by: NURSE ANESTHETIST, CERTIFIED REGISTERED

## 2023-05-02 PROCEDURE — 29827 SHO ARTHRS SRG RT8TR CUF RPR: CPT | Mod: RT,,, | Performed by: ORTHOPAEDIC SURGERY

## 2023-05-02 PROCEDURE — 63600175 PHARM REV CODE 636 W HCPCS: Performed by: ORTHOPAEDIC SURGERY

## 2023-05-02 PROCEDURE — 27201423 OPTIME MED/SURG SUP & DEVICES STERILE SUPPLY: Performed by: ORTHOPAEDIC SURGERY

## 2023-05-02 PROCEDURE — 71000015 HC POSTOP RECOV 1ST HR: Performed by: ORTHOPAEDIC SURGERY

## 2023-05-02 PROCEDURE — 25000003 PHARM REV CODE 250: Performed by: ANESTHESIOLOGY

## 2023-05-02 PROCEDURE — 27200671 HC STIMUCATH NEEDLE/ CATHETER: Performed by: ANESTHESIOLOGY

## 2023-05-02 PROCEDURE — 29823 SHO ARTHRS SRG XTNSV DBRDMT: CPT | Mod: 51,RT,, | Performed by: ORTHOPAEDIC SURGERY

## 2023-05-02 PROCEDURE — 36000711: Performed by: ORTHOPAEDIC SURGERY

## 2023-05-02 PROCEDURE — 63600175 PHARM REV CODE 636 W HCPCS: Performed by: ANESTHESIOLOGY

## 2023-05-02 PROCEDURE — 29823 PR SHLDR ARTHROSCOP,EXTEN DEBRIDE: ICD-10-PCS | Mod: 51,RT,, | Performed by: ORTHOPAEDIC SURGERY

## 2023-05-02 PROCEDURE — 25000003 PHARM REV CODE 250: Performed by: STUDENT IN AN ORGANIZED HEALTH CARE EDUCATION/TRAINING PROGRAM

## 2023-05-02 PROCEDURE — 37000009 HC ANESTHESIA EA ADD 15 MINS: Performed by: ORTHOPAEDIC SURGERY

## 2023-05-02 PROCEDURE — 25000003 PHARM REV CODE 250: Performed by: NURSE ANESTHETIST, CERTIFIED REGISTERED

## 2023-05-02 PROCEDURE — 37000008 HC ANESTHESIA 1ST 15 MINUTES: Performed by: ORTHOPAEDIC SURGERY

## 2023-05-02 PROCEDURE — D9220A PRA ANESTHESIA: ICD-10-PCS | Mod: ANES,,, | Performed by: ANESTHESIOLOGY

## 2023-05-02 PROCEDURE — 71000033 HC RECOVERY, INTIAL HOUR: Performed by: ORTHOPAEDIC SURGERY

## 2023-05-02 PROCEDURE — 71000016 HC POSTOP RECOV ADDL HR: Performed by: ORTHOPAEDIC SURGERY

## 2023-05-02 PROCEDURE — 64415 PERIPHERAL BLOCK: ICD-10-PCS | Mod: 59,RT,, | Performed by: ANESTHESIOLOGY

## 2023-05-02 PROCEDURE — 29827 PR SHLDR ARTHROSCOP,SURG,W/ROTAT CUFF REPR: ICD-10-PCS | Mod: RT,,, | Performed by: ORTHOPAEDIC SURGERY

## 2023-05-02 PROCEDURE — 36000710: Performed by: ORTHOPAEDIC SURGERY

## 2023-05-02 PROCEDURE — 23430 REPAIR BICEPS TENDON: CPT | Mod: 51,RT,, | Performed by: ORTHOPAEDIC SURGERY

## 2023-05-02 DEVICE — PROXIMAL TENODESIS IMPLANT SYSTEM REV: 0
Type: IMPLANTABLE DEVICE | Site: SHOULDER | Status: FUNCTIONAL
Brand: ARTHREX®

## 2023-05-02 DEVICE — 4.75MM BC KNOTLESS SWIVELOCK W/ TAPE
Type: IMPLANTABLE DEVICE | Site: SHOULDER | Status: FUNCTIONAL
Brand: ARTHREX®

## 2023-05-02 DEVICE — BIO-COMP SWVLK SP, 4.75X24.5MM
Type: IMPLANTABLE DEVICE | Site: SHOULDER | Status: FUNCTIONAL
Brand: ARTHREX®

## 2023-05-02 RX ORDER — ONDANSETRON 2 MG/ML
INJECTION INTRAMUSCULAR; INTRAVENOUS
Status: DISCONTINUED | OUTPATIENT
Start: 2023-05-02 | End: 2023-05-02

## 2023-05-02 RX ORDER — DEXAMETHASONE SODIUM PHOSPHATE 4 MG/ML
INJECTION, SOLUTION INTRA-ARTICULAR; INTRALESIONAL; INTRAMUSCULAR; INTRAVENOUS; SOFT TISSUE
Status: DISCONTINUED | OUTPATIENT
Start: 2023-05-02 | End: 2023-05-02

## 2023-05-02 RX ORDER — ACETAMINOPHEN 500 MG
1000 TABLET ORAL ONCE
Status: COMPLETED | OUTPATIENT
Start: 2023-05-02 | End: 2023-05-02

## 2023-05-02 RX ORDER — CELECOXIB 200 MG/1
200 CAPSULE ORAL 2 TIMES DAILY
Qty: 28 CAPSULE | Refills: 0 | Status: SHIPPED | OUTPATIENT
Start: 2023-05-02 | End: 2023-05-17

## 2023-05-02 RX ORDER — GABAPENTIN 300 MG/1
300 CAPSULE ORAL NIGHTLY
Qty: 14 CAPSULE | Refills: 0 | Status: SHIPPED | OUTPATIENT
Start: 2023-05-02 | End: 2023-11-24

## 2023-05-02 RX ORDER — CLINDAMYCIN PHOSPHATE 900 MG/50ML
INJECTION, SOLUTION INTRAVENOUS
Status: DISCONTINUED | OUTPATIENT
Start: 2023-05-02 | End: 2023-05-02

## 2023-05-02 RX ORDER — OXYCODONE HYDROCHLORIDE 5 MG/1
5 TABLET ORAL EVERY 6 HOURS PRN
Qty: 28 TABLET | Refills: 0 | Status: SHIPPED | OUTPATIENT
Start: 2023-05-02 | End: 2023-05-09

## 2023-05-02 RX ORDER — ROCURONIUM BROMIDE 10 MG/ML
INJECTION, SOLUTION INTRAVENOUS
Status: DISCONTINUED | OUTPATIENT
Start: 2023-05-02 | End: 2023-05-02

## 2023-05-02 RX ORDER — LIDOCAINE HCL/EPINEPHRINE/PF 2%-1:200K
VIAL (ML) INJECTION
Status: DISCONTINUED | OUTPATIENT
Start: 2023-05-02 | End: 2023-05-02 | Stop reason: HOSPADM

## 2023-05-02 RX ORDER — CELECOXIB 100 MG/1
200 CAPSULE ORAL ONCE
Status: COMPLETED | OUTPATIENT
Start: 2023-05-02 | End: 2023-05-02

## 2023-05-02 RX ORDER — PREGABALIN 75 MG/1
300 CAPSULE ORAL ONCE
Status: COMPLETED | OUTPATIENT
Start: 2023-05-02 | End: 2023-05-02

## 2023-05-02 RX ORDER — LIDOCAINE HYDROCHLORIDE 20 MG/ML
INJECTION INTRAVENOUS
Status: DISCONTINUED | OUTPATIENT
Start: 2023-05-02 | End: 2023-05-02

## 2023-05-02 RX ORDER — EPINEPHRINE 1 MG/ML
INJECTION, SOLUTION, CONCENTRATE INTRAVENOUS
Status: DISCONTINUED | OUTPATIENT
Start: 2023-05-02 | End: 2023-05-02 | Stop reason: HOSPADM

## 2023-05-02 RX ORDER — BUPIVACAINE HYDROCHLORIDE 2.5 MG/ML
INJECTION, SOLUTION EPIDURAL; INFILTRATION; INTRACAUDAL
Status: COMPLETED | OUTPATIENT
Start: 2023-05-02 | End: 2023-05-02

## 2023-05-02 RX ORDER — OXYCODONE HYDROCHLORIDE 5 MG/1
5 TABLET ORAL
Status: DISCONTINUED | OUTPATIENT
Start: 2023-05-02 | End: 2023-05-02 | Stop reason: HOSPADM

## 2023-05-02 RX ORDER — CLINDAMYCIN PHOSPHATE 900 MG/50ML
900 INJECTION, SOLUTION INTRAVENOUS ONCE
Status: DISCONTINUED | OUTPATIENT
Start: 2023-05-02 | End: 2023-05-02 | Stop reason: HOSPADM

## 2023-05-02 RX ORDER — HYDROMORPHONE HYDROCHLORIDE 2 MG/ML
0.5 INJECTION, SOLUTION INTRAMUSCULAR; INTRAVENOUS; SUBCUTANEOUS EVERY 5 MIN PRN
Status: DISCONTINUED | OUTPATIENT
Start: 2023-05-02 | End: 2023-05-02 | Stop reason: HOSPADM

## 2023-05-02 RX ORDER — ONDANSETRON 2 MG/ML
4 INJECTION INTRAMUSCULAR; INTRAVENOUS DAILY PRN
Status: DISCONTINUED | OUTPATIENT
Start: 2023-05-02 | End: 2023-05-02 | Stop reason: HOSPADM

## 2023-05-02 RX ORDER — FENTANYL CITRATE 50 UG/ML
INJECTION, SOLUTION INTRAMUSCULAR; INTRAVENOUS
Status: DISCONTINUED | OUTPATIENT
Start: 2023-05-02 | End: 2023-05-02

## 2023-05-02 RX ORDER — MIDAZOLAM HYDROCHLORIDE 1 MG/ML
INJECTION INTRAMUSCULAR; INTRAVENOUS
Status: DISCONTINUED | OUTPATIENT
Start: 2023-05-02 | End: 2023-05-02

## 2023-05-02 RX ORDER — PHENYLEPHRINE HYDROCHLORIDE 10 MG/ML
INJECTION INTRAVENOUS
Status: DISCONTINUED | OUTPATIENT
Start: 2023-05-02 | End: 2023-05-02

## 2023-05-02 RX ORDER — LIDOCAINE HYDROCHLORIDE 10 MG/ML
1 INJECTION, SOLUTION EPIDURAL; INFILTRATION; INTRACAUDAL; PERINEURAL ONCE
Status: ACTIVE | OUTPATIENT
Start: 2023-05-02

## 2023-05-02 RX ORDER — ACETAMINOPHEN 500 MG
1000 TABLET ORAL 3 TIMES DAILY
Qty: 180 TABLET | Refills: 0 | Status: SHIPPED | OUTPATIENT
Start: 2023-05-02 | End: 2023-06-01

## 2023-05-02 RX ORDER — EPHEDRINE SULFATE 50 MG/ML
INJECTION, SOLUTION INTRAVENOUS
Status: DISCONTINUED | OUTPATIENT
Start: 2023-05-02 | End: 2023-05-02

## 2023-05-02 RX ORDER — SODIUM CHLORIDE, SODIUM LACTATE, POTASSIUM CHLORIDE, CALCIUM CHLORIDE 600; 310; 30; 20 MG/100ML; MG/100ML; MG/100ML; MG/100ML
INJECTION, SOLUTION INTRAVENOUS CONTINUOUS
Status: ACTIVE | OUTPATIENT
Start: 2023-05-02

## 2023-05-02 RX ORDER — PROPOFOL 10 MG/ML
VIAL (ML) INTRAVENOUS
Status: DISCONTINUED | OUTPATIENT
Start: 2023-05-02 | End: 2023-05-02

## 2023-05-02 RX ORDER — ASPIRIN 81 MG/1
81 TABLET ORAL DAILY
Qty: 30 TABLET | Refills: 0 | Status: SHIPPED | OUTPATIENT
Start: 2023-05-02 | End: 2023-11-24

## 2023-05-02 RX ADMIN — PHENYLEPHRINE HYDROCHLORIDE 0.5 MCG/KG/MIN: 10 INJECTION INTRAVENOUS at 08:05

## 2023-05-02 RX ADMIN — PHENYLEPHRINE HYDROCHLORIDE 100 MCG: 10 INJECTION INTRAVENOUS at 07:05

## 2023-05-02 RX ADMIN — PHENYLEPHRINE HYDROCHLORIDE 200 MCG: 10 INJECTION INTRAVENOUS at 07:05

## 2023-05-02 RX ADMIN — BUPIVACAINE HYDROCHLORIDE 10 ML: 2.5 INJECTION, SOLUTION EPIDURAL; INFILTRATION; INTRACAUDAL; PERINEURAL at 06:05

## 2023-05-02 RX ADMIN — DEXAMETHASONE SODIUM PHOSPHATE 4 MG: 4 INJECTION, SOLUTION INTRA-ARTICULAR; INTRALESIONAL; INTRAMUSCULAR; INTRAVENOUS; SOFT TISSUE at 07:05

## 2023-05-02 RX ADMIN — PREGABALIN 300 MG: 75 CAPSULE ORAL at 06:05

## 2023-05-02 RX ADMIN — EPHEDRINE SULFATE 20 MG: 50 INJECTION, SOLUTION INTRAMUSCULAR; INTRAVENOUS; SUBCUTANEOUS at 08:05

## 2023-05-02 RX ADMIN — ROCURONIUM BROMIDE 50 MG: 10 INJECTION, SOLUTION INTRAVENOUS at 07:05

## 2023-05-02 RX ADMIN — ONDANSETRON 4 MG: 2 INJECTION, SOLUTION INTRAMUSCULAR; INTRAVENOUS at 09:05

## 2023-05-02 RX ADMIN — SODIUM CHLORIDE, SODIUM LACTATE, POTASSIUM CHLORIDE, AND CALCIUM CHLORIDE: .6; .31; .03; .02 INJECTION, SOLUTION INTRAVENOUS at 07:05

## 2023-05-02 RX ADMIN — PROPOFOL 200 MG: 10 INJECTION, EMULSION INTRAVENOUS at 07:05

## 2023-05-02 RX ADMIN — PHENYLEPHRINE HYDROCHLORIDE 200 MCG: 10 INJECTION INTRAVENOUS at 08:05

## 2023-05-02 RX ADMIN — MIDAZOLAM HYDROCHLORIDE 2 MG: 1 INJECTION, SOLUTION INTRAMUSCULAR; INTRAVENOUS at 06:05

## 2023-05-02 RX ADMIN — GLYCOPYRROLATE 0.2 MG: 0.2 INJECTION, SOLUTION INTRAMUSCULAR; INTRAVITREAL at 07:05

## 2023-05-02 RX ADMIN — SODIUM CHLORIDE, SODIUM LACTATE, POTASSIUM CHLORIDE, AND CALCIUM CHLORIDE: .6; .31; .03; .02 INJECTION, SOLUTION INTRAVENOUS at 09:05

## 2023-05-02 RX ADMIN — CLINDAMYCIN IN 5 PERCENT DEXTROSE 900 MG: 18 INJECTION, SOLUTION INTRAVENOUS at 07:05

## 2023-05-02 RX ADMIN — ACETAMINOPHEN 1000 MG: 500 TABLET ORAL at 06:05

## 2023-05-02 RX ADMIN — CELECOXIB 200 MG: 100 CAPSULE ORAL at 06:05

## 2023-05-02 RX ADMIN — SUGAMMADEX 200 MG: 100 INJECTION, SOLUTION INTRAVENOUS at 09:05

## 2023-05-02 RX ADMIN — ROCURONIUM BROMIDE 10 MG: 10 INJECTION, SOLUTION INTRAVENOUS at 09:05

## 2023-05-02 RX ADMIN — LIDOCAINE HYDROCHLORIDE 100 MG: 20 INJECTION, SOLUTION INTRAVENOUS at 07:05

## 2023-05-02 RX ADMIN — FENTANYL CITRATE 100 MCG: 50 INJECTION, SOLUTION INTRAMUSCULAR; INTRAVENOUS at 07:05

## 2023-05-02 RX ADMIN — ROCURONIUM BROMIDE 10 MG: 10 INJECTION, SOLUTION INTRAVENOUS at 08:05

## 2023-05-02 NOTE — BRIEF OP NOTE
[facility] - Surgery (Hospital)  Brief Operative Note    Surgery Date: 5/2/2023     Surgeon(s) and Role:     * Olman Pillai IV, MD - Primary    Assisting Surgeon: Humberto Gudino MD (resident)    Pre-op Diagnosis:  Incomplete tear of right rotator cuff, unspecified whether traumatic [M75.111]  Shoulder impingement syndrome, right [M75.41]  Biceps tendonitis on right [M75.21]  S/P rotator cuff surgery [Z98.890]    Post-op Diagnosis:  Post-Op Diagnosis Codes:     * Incomplete tear of right rotator cuff, unspecified whether traumatic [M75.111]     * Shoulder impingement syndrome, right [M75.41]     * Biceps tendonitis on right [M75.21]     * S/P rotator cuff surgery [Z98.890]    Procedure(s) (LRB):  ARTHROSCOPY, SHOULDER; rotator cuff repair, subacromial decompression, possible mini-open biceps tenodesis (Right)    Anesthesia: General/Regional    Operative Findings: See operative report     Estimated Blood Loss: * No values recorded between 5/2/2023  7:51 AM and 5/2/2023 10:01 AM *         Specimens:   Specimen (24h ago, onward)      None              Discharge Note    OUTCOME: Patient tolerated treatment/procedure well without complication and is now ready for discharge.    DISPOSITION: Home or Self Care    FINAL DIAGNOSIS:  <principal problem not specified>    FOLLOWUP: In clinic    DISCHARGE INSTRUCTIONS:    Discharge Procedure Orders   Diet Adult Regular     Ice to affected area     Keep surgical extremity elevated     Notify your health care provider if you experience any of the following:  temperature >100.4     Notify your health care provider if you experience any of the following:  severe uncontrolled pain     Leave dressing on - Keep it clean, dry, and intact until clinic visit     Weight bearing restrictions (specify):   Order Comments: ZAHRA PEREZ

## 2023-05-02 NOTE — ANESTHESIA PROCEDURE NOTES
Peripheral Block    Patient location during procedure: pre-op   Block not for primary anesthetic.  Reason for block: at surgeon's request and post-op pain management   Post-op Pain Location: Right Shoulder Pain   Start time: 5/2/2023 6:50 AM  Timeout: 5/2/2023 6:44 AM   End time: 5/2/2023 6:53 AM    Staffing  Authorizing Provider: Olman Werner MD  Performing Provider: Olman Werner MD    Preanesthetic Checklist  Completed: patient identified, IV checked, site marked, risks and benefits discussed, surgical consent, monitors and equipment checked, pre-op evaluation and timeout performed  Peripheral Block  Patient position: sitting  Prep: ChloraPrep  Patient monitoring: heart rate, cardiac monitor, continuous pulse ox, continuous capnometry and frequent blood pressure checks  Block type: interscalene  Laterality: right  Injection technique: single shot  Needle  Needle type: Stimuplex   Needle gauge: 22 G  Needle length: 2 in  Needle localization: anatomical landmarks and ultrasound guidance   -ultrasound image captured on disc.  Assessment  Injection assessment: negative aspiration, negative parasthesia and local visualized surrounding nerve  Paresthesia pain: none  Heart rate change: no  Slow fractionated injection: yes    Medications:    Medications: bupivacaine (pf) (MARCAINE) injection 0.25% - Perineural   10 mL - 5/2/2023 6:49:00 AM    Additional Notes  VSS.  [facility] RN monitoring vitals throughout procedure.  Patient tolerated procedure well.     10 mL of 0.25% bupivacaine + 10 mL of Exparel

## 2023-05-02 NOTE — DISCHARGE INSTRUCTIONS
Keep your dressing in place, clean and dry until your post op visit with Dr. Pillai.  Do not bear any weight to your right arm until cleared to do so by Dr. Pillai.  Wear your sling as instructed.  Use an ice pack to your shoulder as instructed.  Call Dr. Pillai for any questions or concerns regarding your surgery.    ANESTHESIA  -For the first 24 hours after surgery:  Do not drive, use heavy equipment, make important decisions, or drink alcohol  -It is normal to feel sleepy for several hours.  Rest until you are more awake.  -Have someone stay with you, if needed.  They can watch for problems and help keep you safe.  -Some possible post anesthesia side effects include: nausea and vomiting, sore throat and hoarseness, sleepiness, and dizziness.    PAIN  -If you have pain after surgery, pain medicine will help you feel better.  Take it as directed, before pain becomes severe.  Most pain relievers taken by mouth need at least 20-30 minutes to start working.  -Do not drive or drink alcohol while taking pain medicine.  -Pain medication can upset your stomach.  Taking them with a little food may help.  -Other ways to help control pain: elevation, ice, and relaxation  -Call your surgeon if still having unmanageable pain an hour after taking pain medicine.  -Pain medicine can cause constipation.  Taking an over-the counter stool softener while on prescription pain medicine and drinking plenty of fluids can prevent this side effect.  -Call your surgeon if you have severe side effects like: breathing problems, trouble waking up, dizziness, confusion, or severe constipation.    NAUSEA  -Some people have nausea after surgery.  This is often because of anesthesia, pain, pain medicine, or the stress of surgery.  -Do not push yourself to eat.  Start off with clear liquids and soup.  Slowly move to solid foods.  Don't eat fatty, rich, spicy foods at first.  Eat smaller amounts.  -If you develop persistent nausea and vomiting  please notify your surgeon immediately.    BLEEDING  -Different types of surgery require different types of care and dressing changes.  It is important to follow all instructions and advice from your surgeon.  Change dressing as directed.  Call your surgeon for any concerns regarding postop bleeding.    SIGNS OF INFECTION  -Signs of infection include: fever, swelling, drainage, and redness  -Notify your surgeon if you have a fever of 100.4 F (38.0 C) or higher.  -Notify your surgeon if you notice redness, swelling, increased pain, pus, or a foul smell at the incision site.

## 2023-05-02 NOTE — ANESTHESIA PROCEDURE NOTES
Intubation    Date/Time: 5/2/2023 7:18 AM  Performed by: Mallika Bueno CRNA  Authorized by: Olman Werner MD     Intubation:     Induction:  Intravenous    Intubated:  Postinduction    Mask Ventilation:  Easy mask    Attempts:  1    Attempted By:  CRNA    Method of Intubation:  Video laryngoscopy    Blade:  Schuster 3    Laryngeal View Grade: Grade I - full view of cords      Difficult Airway Encountered?: No      Complications:  None    Airway Device:  Oral endotracheal tube    Airway Device Size:  7.0    Style/Cuff Inflation:  Cuffed    Inflation Amount (mL):  6    Tube secured:  21    Secured at:  The lips    Placement Verified By:  Capnometry    Complicating Factors:  None    Findings Post-Intubation:  BS equal bilateral and atraumatic/condition of teeth unchanged

## 2023-05-02 NOTE — H&P
[facility], Orthopedics and Sports Medicine  [facility]     Established Patient Shoulder Office Visit  03/29/2023      Diagnosis:  right partial rotator cuff tear, biceps tendonitis, rotator cuff impingement syndrome, prior rotator cuff surgery         Procedure:   Bicipital groove CSI 6/1/22     Subjective:      Subjective      Celia Alford is a 52 y.o. female who returns for follow up treatment of the right shoulder problem.     At the last office visit the patient was prescribed physical therapy, which has not improved the symptoms.   The patient had CSI which gave minimal symptom relief.  The patient had MRI showing rotator cuff pathology and biceps tendon pathology.      The patient is frustrated by her shoulder condition and continues with overhead pain and pain at night reducing sleep quality.       The patient continues with the following symptoms: pain and weakness.  The symptoms are worsening. Symptoms are located anterior and lateral shoulder.     The patient had prior rotator cuff surgery by another provider 11/20/2020 and has had no pain relief after that operation.       This case is workers compensation, initial injury tripped over something at work.      Outside reports reviewed: historical medical records, office notes, and operative reports.          Past Medical History:   Diagnosis Date    COVID-19 virus infection 8/6/2021 7/2021    Essential (primary) hypertension               Patient Active Problem List   Diagnosis    Chronic right shoulder pain    Shoulder joint dysfunction    Neck pain on right side    Traumatic incomplete tear of right rotator cuff    Decreased range of motion of shoulder, right    Decreased strength of upper extremity    Essential hypertension    Neck muscle spasm    Overweight (BMI 25.0-29.9)    Palpitations    Right cervical radiculopathy    Shoulder weakness    Biceps tendonitis on right    S/P rotator cuff surgery    Mixed  hyperlipidemia    Insomnia    Macrocytic anemia    Anxiety               Past Surgical History:   Procedure Laterality Date    ARTHROSCOPIC REPAIR OF ROTATOR CUFF OF SHOULDER Right 11/20/2020     Procedure: REPAIR, ROTATOR CUFF, ARTHROSCOPIC;  Surgeon: Jens Han Jr., MD;  Location: [facility] OR;  Service: Orthopedics;  Laterality: Right;  need opus system- Zoroastrian notified tori-11/10  video Holiness confirmed 11/19/2020 KB [id number]    ROTATOR CUFF REPAIR   11/20/2020    TONSILLECTOMY                  Current Outpatient Medications   Medication Instructions    hydroCHLOROthiazide (HYDRODIURIL) 12.5 mg, Oral, Daily    ibuprofen (ADVIL,MOTRIN) 200 mg, Oral, Every 6 hours PRN    medroxyPROGESTERone (DEPO-PROVERA) 150 mg, Intramuscular, Every 3 months    traZODone (DESYREL) 50 mg, Oral, Nightly              Review of patient's allergies indicates:   Allergen Reactions    Pcn [penicillins] Hives and Rash         Social History               Socioeconomic History    Marital status: Single   Tobacco Use    Smoking status: Never    Smokeless tobacco: Never   Substance and Sexual Activity    Alcohol use: Yes       Comment: seldom    Drug use: No    Sexual activity: Not Currently       Partners: Male       Birth control/protection: Injection                  Family History   Problem Relation Age of Onset    Diabetes Maternal Grandmother      Hypertension Maternal Grandmother      No Known Problems Mother      Breast cancer Neg Hx      Colon cancer Neg Hx      Ovarian cancer Neg Hx            Review of Systems   Constitutional: Negative for chills and fever.   HENT:  Negative for hearing loss.    Eyes:  Negative for blurred vision.   Cardiovascular:  Negative for chest pain.   Respiratory:  Negative for shortness of breath.    Gastrointestinal:  Negative for abdominal pain.   Neurological:  Negative for light-headedness.         Objective:      Objective      General     Nursing note and vitals reviewed.  Constitutional: She  is oriented to person, place, and time. She appears well-developed and well-nourished.   HENT:   Head: Normocephalic and atraumatic.   Eyes: EOM are normal. Pupils are equal, round, and reactive to light.   Neck: Neck supple.   Cardiovascular:  Normal rate.            Pulmonary/Chest: Effort normal.   Neurological: She is alert and oriented to person, place, and time.   Psychiatric: She has a normal mood and affect.            Right Shoulder Exam      Inspection/Observation   Swelling: absent  Bruising: absent  Deformity: absent     Tenderness   The patient is tender to palpation of the acromioclavicular joint and biceps tendon.     Range of Motion   Active abduction:  140   Forward Flexion:  120   External Rotation 0 degrees:  60      Tests & Signs   Drop arm: negative  Corbett test: positive  Impingement: positive  Rotator Cuff Painful Arc/Range: moderate     Other   Sensation: normal     Left Shoulder Exam   Left shoulder exam is normal.     Range of Motion   Active abduction:  170   Passive abduction:  170   Forward Flexion:  180   External Rotation 0 degrees:  80   Internal rotation 0 degrees:  T5      Other   Sensation: normal         Muscle Strength   Right Upper Extremity   Shoulder Abduction: 4/5   Shoulder Internal Rotation: 4/5   Shoulder External Rotation: 4/5   Biceps: 5/5   Left Upper Extremity  Shoulder Abduction: 5/5   Shoulder Internal Rotation: 5/5   Shoulder External Rotation: 5/5   Biceps: 5/5      Vascular Exam      Right Pulses        Radial:                    2+        Left Pulses        Radial:                    2+        Capillary Refill  Right Hand: normal capillary refill  Left Hand: normal capillary refill           Imaging:  Radiographs of the right shoulder taken 06/01/2022 were personally reviewed from the [facility] EMR.  Multiple views of the shoulder are available today for review, including an AP, scapular Y, axillary view.  The glenohumeral joint demonstrates mild degenerative  changes .  The acromioclavicular joint demonstrates mild degenerative changes .  The glenohumeral joint is concentrically reduced.  There is no acute fracture or dislocation.      MRI of the right shoulder taken taken  12/16/2022  was personally reviewed from [facility] imaging online viewer.  Multiple T1 and T2 sequences including axial, coronal, and sagittal views were reviewed.  No acute fractures or dislocations are noted in these images.  There is post surgical changes noted at rotator cuff footprint.  There is partial tear of posterior superior rotator cuff with articular component.  There is flattening of the biceps tendon.  There is tendinosis of subscapularis and superior rotator cuff tendons.  There are degenerative changes of the labral tissue.          Procedures        Assessment:      Assessment       Celia Alford is a 52 y.o. female seen in the office today. The primary encounter diagnosis was Incomplete tear of right rotator cuff, unspecified whether traumatic. Diagnoses of Shoulder impingement syndrome, right, Biceps tendonitis on right, S/P rotator cuff surgery, and Preop examination were also pertinent to this visit.  Operative treatment with right shoulder surgery  is recommended at this time. The patient will have revision rotator cuff repair.  I discussed the possibility her shoulder still hurting after surgery given the revision nature of this operation.  The patient has exhausted nonsurgical treatment and is unhappy with the shoulder function at this time and would like to proceed with surgery.  The natural history and expected course discussed with patient. Various treatment options were discussed, including their risks and benefits. All of the patient's questions were answered.        Plan:      Plan      Tylenol 650mg TID, PRN pain.  Surgical treatment right arthroscopic rotator cuff repair REVISION, biceps tenodesis, extensive debridement, possible SAD.  Surgical consent for surgery  obtained during office visit.  Expected date of surgery: 5/2/2023.  Patient will require preoperative medical evaluation prior to surgery.  Post operative physical therapy ordered.              Olman Pillai IV, MD   of Clinical Orthopedics  Department of Orthopedic Surgery  [facility]  Office: [phone]  Website: [email or web address]            Orders Placed This Encounter    X-Ray Chest 1 View Pre-OP    CBC Auto Differential    Basic Metabolic Panel    Protime-INR    APTT    Ambulatory referral/consult to Occupational Medicine    Ambulatory referral/consult to Physical/Occupational Therapy    EKG 12-lead    Case Request Operating Room: ARTHROSCOPY, SHOULDER; rotator cuff repair, subacromial decompression, possible mini-open biceps tenodesis     ----------------------------------------------------------------------------------------  There are no changes to the above history and physical examination     NPO  OR today for R shoulder ATS, right rotator cuff revision, biceps tenodesis, extensive debridement, possible SAD   Consent complete and scanned into the media tab of chart   RUE marked this AM   Pre-operative abx ordered     Reviewed risk, benefit, anticipated outcome of surgery. Patient elects to continue with surgical intervention     Please contact with any questions or concerns     Humberto Gudino MD

## 2023-05-02 NOTE — DISCHARGE SUMMARY
[facility] - Surgery (Hospital)  Discharge Note  Short Stay    Procedure(s) (LRB):  ARTHROSCOPY, SHOULDER; rotator cuff repair, extensive debridement, subpectoral open biceps tenodesis (Right)      OUTCOME: Patient tolerated treatment/procedure well without complication and is now ready for discharge.    DISPOSITION: Home or Self Care    FINAL DIAGNOSIS:  <principal problem not specified>    FOLLOWUP: In clinic    DISCHARGE INSTRUCTIONS:    Discharge Procedure Orders   Diet Adult Regular     Ice to affected area     Keep surgical extremity elevated     Notify your health care provider if you experience any of the following:  temperature >100.4     Notify your health care provider if you experience any of the following:  severe uncontrolled pain     Leave dressing on - Keep it clean, dry, and intact until clinic visit     Weight bearing restrictions (specify):   Order Comments: ZAHRA PEREZ        TIME SPENT ON DISCHARGE: 30 minutes

## 2023-05-02 NOTE — ANESTHESIA POSTPROCEDURE EVALUATION
Anesthesia Post Evaluation    Patient: Celia Alford    Procedure(s) Performed: Procedure(s) (LRB):  ARTHROSCOPY, SHOULDER; rotator cuff repair, subacromial decompression, possible mini-open biceps tenodesis (Right)    Final Anesthesia Type: general      Patient location during evaluation: PACU  Patient participation: Yes- Able to Participate  Level of consciousness: awake and alert  Post-procedure vital signs: reviewed and stable  Pain management: adequate  Airway patency: patent    PONV status at discharge: No PONV  Anesthetic complications: no      Cardiovascular status: blood pressure returned to baseline  Respiratory status: unassisted  Hydration status: euvolemic            Vitals Value Taken Time   /79 05/02/23 1045   Temp 36.4 °C (97.5 °F) 05/02/23 1005   Pulse 112 05/02/23 1049   Resp 22 05/02/23 1048   SpO2 100 % 05/02/23 1049   Vitals shown include unvalidated device data.      Event Time   Out of Recovery 10:52:03         Pain/Maria Teresa Score: Pain Rating Prior to Med Admin: 6 (5/2/2023  6:16 AM)  Maria Teresa Score: 10 (5/2/2023 10:45 AM)

## 2023-05-02 NOTE — TRANSFER OF CARE
"Anesthesia Transfer of Care Note    Patient: Celia Alford    Procedure(s) Performed: Procedure(s) (LRB):  ARTHROSCOPY, SHOULDER; rotator cuff repair, subacromial decompression, possible mini-open biceps tenodesis (Right)    Patient location: PACU    Anesthesia Type: general    Transport from OR: Transported from OR on 6-10 L/min O2 by face mask with adequate spontaneous ventilation    Post pain: adequate analgesia    Post assessment: no apparent anesthetic complications and tolerated procedure well    Post vital signs: stable    Level of consciousness: awake and alert    Nausea/Vomiting: no nausea/vomiting    Complications: none    Transfer of care protocol was followed      Last vitals:   Visit Vitals  /64 (BP Location: Left arm, Patient Position: Lying)   Pulse 107   Temp 36.4 °C (97.5 °F) (Temporal)   Resp (!) 22   Ht 5' 9" (1.753 m)   Wt 89.8 kg (198 lb)   SpO2 99%   Breastfeeding No   BMI 29.24 kg/m²     "

## 2023-05-04 PROBLEM — M75.41 SUBACROMIAL IMPINGEMENT OF RIGHT SHOULDER: Status: ACTIVE | Noted: 2023-05-04

## 2023-05-04 NOTE — OP NOTE
Operative Report    PAT ALFORD  : 1970  MRN: [id number]    Date of Procedure: 2023     Pre-op Diagnosis:    Right shoulder pain  High Grade Partial Rotator Cuff Tear  Biceps Tendonitis  Subacromial Impingement  Prior rotator cuff repair    Post-op Diagnosis:    Same as preoperative    Procedure:   The following procedures were performed in the Right Shoulder:  Arthroscopic Rotator Cuff Repair (39487)  Open Biceps Tenodesis (35108)  Arthroscopic Shoulder Debridement - Extensive (17132)      Surgeon: Olman Pillai IV, MD     Assisting Surgeon:    Humberto Gudino MD     Anesthesiologist:  See Record    Anesthesia:    General Endotracheal Intubation  Regional Block - Interscalene    Estimated Blood Loss: 5cc         Specimens: none    Findings:   Synovium: Erythematous   Loose bodies:  None   Humeral head: Normal   Glenoid:  Normal   Superior labrum: Type I tear (fraying)   Anterior labrum: Fraying   Posterior labrum: Fraying   Inferior pouch: Normal    Biceps tendon: Erythematous    Superior Rotator cuff: Partial bursal-sided tear    Subscapularis Rotator cuff: Normal   Other: prior anterior superior rotator cuff repair healed    Indications for surgery:   Pat Alford is a 52 y.o. female who presented with pain and weakness in the shoulder, which was not responsive to extensive non-operative treatments.  The history, examination, and imaging are consistent with a torn rotator cuff.  She had prior rotator cuff tear in same shoulder repaired surgically by another provider but had continued pain.  We discussed surgical treatment options after exhausting nonsurgical treatment.   The risks and benefits of surgery were discussed including, but not limited to, infection, bleeding, nerve and vessel injury, re-tear and ongoing pain.  Additionally, we discussed the possibility of failure of the rotator cuff repair requiring further surgery or conversion to arthroplasty.  We also discussed  possibility of partial repair should a full repair not be possible.  After this discussion,  the patient elected to proceed.    Description of procedure:   Prior to the surgical procedure, the patient was identified in the preoperative holding area.  We had a thorough discussion about the risks and benefits of surgery including the expected post operative protocol.  The patient signed an informed consent and the operative extremity was marked.  A regional anesthesia block (interscalene) was performed by the Anesthesiology team prior to surgery.  Preoperative antibiotics were given prior to incision for infection prophylaxis. The patient was taken to the operating room and positioned on the table in the beach chair position.  All bony prominences were well padded.  An exam was performed, which showed normal passive range of motion.  The patient was subsequently prepped and draped in the usual orthopaedic sterile fashion.  The arm was fixed in a sterile mechanical arm isaac.  A surgical timeout was performed confirming the surgical site and procedure prior to proceeding.  Local anesthetic was injected into the proposed portals.      A standard posterosuperior portal was made, and the arthroscope was introduced.  Using an outside in technique an anterior interval cannula was established, and a probe was placed in the shoulder.  A thorough diagnostic arthroscopy was performed, and the above findings were seen.      (99513) The shaver was used to perform an extensive debridement of three or more areas. The debridement was performed on frayed anterior labral tissue, frayed posterior labral tissue, torn biceps anchor, extensive synovitis in the subacromial space (described below), and the biceps tendon was detached with arthroscopic scissors (seen to retract into the groove).  The probe was used to demonstrate that the remaining tissue was stable.  The scope was transitioned to the subacromial space.  The shaver was used  to perform a subacromial decompression with an extensive bursectomy, which was required due to excessive thickened, inflamed bursal tissue and preoperative symptoms of subacromial bursitis.     (37631)  The rotator cuff tear was closely inspected.  A bursal sided tear was noted at the posterior superior rotator cuff near transition between supraspinatus and infraspinatus.  The tear was therefore debrided and completed because the remaining intact tissue was poor quality.  The torn edges of the tendon were debrided.  A rahat was used to create a healing footprint.  Ar[employer]rex [employer] suture anchors were placed adjacent to the articular surface.  Using a suture shuttle, the sutures were passed through the rotator cuff tissue where they were tensioned arthroscopically.  Two pairs of sutures were crossed over the lateral edge of the tendon and fixed to 2 lateral anchors to create a suture bridge construct.  The arm was taken through range of motion, and the rotator cuff tissue was found to move with the humerus without gap formation.      (72200) Attention was then turned to the anterior axillary fold where a 2-cm incision was made over the lower border of the pectoralis.  Blunt dissection was carried out and reflected laterally to expose the biceps tendon.  This was pulled free from the wound, and the diseased portion was resected.  A FiberWire whipstitch was placed at the end of the tendon near the musculotendinous junction.  This was then reduced to the bone and fixed with an intracortical button.  The sutures were then tied over this to create an anchor.  The wound was then copiously irrigated.       The arthroscopic fluid and instruments were removed.  The arthroscopic portals were closed with 3-0 Nylon and xeroform gauze and 3.0 absorbable monofilament suture was used to close the biceps tenodesis wound over the anterior shoulder.  A dry, sterile dressing followed by an UltraSling was then applied.  The sponge,  needle, and instrument counts were correct at the end of the case.  The patient tolerated the procedure well, was extubated, and was taken to recovery in stable condition.     Post-Operative Management:  The patient will be NWB on the operative extremity and is to wear the shoulder immobilizer at all times.  After discharge, the patient will follow up in my office ([phone]) in 14 days after surgery.  The patient will be treated with DVT prophylaxis in the post operative period.     Complications: No     Condition: Good     Disposition: PACU - hemodynamically stable.     Attestation: I was present and scrubbed for the entire procedure.    Implants:   Implant Name Type Inv. Item Serial No.  Lot No. LRB No. Used Action   ANCHOR SUT BIO COMP 4.75X24 - [id number]  ANCHOR SUT BIO COMP 4.75X24  [employer] [id number] Right 1 Implanted   ANCHOR SUT BIO COMP 4.75X24 - [id number]  ANCHOR SUT BIO COMP 4.75X24  [employer] [id number] Right 1 Implanted   SYS IMPL PROXIMAL TENODESIS - [id number]  SYS IMPL PROXIMAL TENODESIS  [employer] [id number] Right 1 Implanted   ANCHOR SWIVELOCK C TIGERTAPE - [id number]  ANCHOR SWIVELOCK C TIGERTAPE  [employer] [id number] Right 1 Implanted   ANCHOR SWIVELOCK C EMMANUEL FIBERTP - [id number]  ANCHOR SWIVELOCK C EMMANUEL FIBERTP  [employer] [id number] Right 1 Implanted

## 2023-05-11 ENCOUNTER — CLINICAL SUPPORT (OUTPATIENT)
Dept: REHABILITATION | Facility: HOSPITAL | Age: 53
End: 2023-05-11
Attending: ORTHOPAEDIC SURGERY
Payer: OTHER GOVERNMENT

## 2023-05-11 DIAGNOSIS — M25.611 DECREASED ROM OF RIGHT SHOULDER: ICD-10-CM

## 2023-05-11 DIAGNOSIS — M75.111 INCOMPLETE TEAR OF RIGHT ROTATOR CUFF, UNSPECIFIED WHETHER TRAUMATIC: ICD-10-CM

## 2023-05-11 DIAGNOSIS — M75.41 SHOULDER IMPINGEMENT SYNDROME, RIGHT: ICD-10-CM

## 2023-05-11 DIAGNOSIS — R29.898 DECREASED STRENGTH OF UPPER EXTREMITY: Primary | ICD-10-CM

## 2023-05-11 DIAGNOSIS — M75.21 BICEPS TENDONITIS ON RIGHT: ICD-10-CM

## 2023-05-11 PROCEDURE — 97530 THERAPEUTIC ACTIVITIES: CPT | Mod: PN | Performed by: PHYSICAL THERAPIST

## 2023-05-11 PROCEDURE — 97161 PT EVAL LOW COMPLEX 20 MIN: CPT | Mod: PN | Performed by: PHYSICAL THERAPIST

## 2023-05-11 PROCEDURE — 97140 MANUAL THERAPY 1/> REGIONS: CPT | Mod: PN | Performed by: PHYSICAL THERAPIST

## 2023-05-12 NOTE — PLAN OF CARE
[facility]  Physical Therapy Initial Evaluation    Name: Celia Hernandez shawn  Clinic Number: [id number]    Therapy Diagnosis:   Encounter Diagnoses   Name Primary?    Incomplete tear of right rotator cuff, unspecified whether traumatic     Shoulder impingement syndrome, right     Biceps tendonitis on right     Decreased ROM of right shoulder     Decreased strength of upper extremity Yes     Physician: Olman Pillai IV, MD    Physician Orders: PT Eval and Treat as per rotator cuff and biceps tenodesis protocol  Medical Diagnosis from Referral: Incomplete tear of right rotator cuff, unspecified whether traumatic, Shoulder impingement syndrome, right & Biceps tendonitis on right  Evaluation Date: 5/11/2023  Authorization Period Expiration: 8/18/2023  Plan of Care Expiration: 8/25/2023  Visit # / Visits authorized: 1/ 24 (POC: 1/24/)    Time In: 1500  Time Out: 1600  Total Appointment Time (timed & untimed codes): 60 minutes    Precautions: Standard and as per protocol    Subjective   Date of injury: June 2019  History of current condition - Celia reports: she fell at work and attempted to break her fall by grabbing a counter. She underwent a rotator cuff repair in November 2020 and attended physical therapy but received no decrease in symptoms. She sought a second opinion and an MRI was performed which found rotator cuff and biceps tendon pathologies. She underwent a second rotator cuff repair with biceps tenodesis on 5/2/2023. She presents to PT wearing an upper extremity immobilizer with abduction pad. She reports generalized right shoulder pain. She notes increased pain with movements such as transfers     Occupation (including job description if provided):  at [employer]  Job Demands: Standing, walking, lifting, carrying  Prior Medical Treatment: Therapy  and Surgery  Current Work Restrictions: Off duty    Medical History:   Past Medical History:   Diagnosis Date    COVID-19 virus  infection 8/6/2021 7/2021    Essential (primary) hypertension        Surgical History:   Celia Alford  has a past surgical history that includes Tonsillectomy; Arthroscopic repair of rotator cuff of shoulder (Right, 11/20/2020); Rotator cuff repair (11/20/2020); Shoulder arthroscopy (Right, 5/2/2023); Rotator cuff repair (Right, 5/2/2023); and Arthroscopy of shoulder with decompression of subacromial space (Right, 5/2/2023).    Medications:   Celia has a current medication list which includes the following prescription(s): acetaminophen, aspirin, celecoxib, gabapentin, hydrochlorothiazide, and medroxyprogesterone, and the following Facility-Administered Medications: lactated ringers and lidocaine (pf) 10 mg/ml (1%).    Allergies:   Review of patient's allergies indicates:   Allergen Reactions    Pcn [penicillins] Hives and Rash        Imaging, MRI studies: unavailable in [facility]    Social History: Single lives with their family    Pain:  Current 4/10, worst 10/10, best 4/10   Location: right shoulder(s)  Description: Aching and Burning  Aggravating Factors: Getting out of bed/chair  Alleviating Factors: ice    Pt's goals: Return to gainful employment             Objective     Observation: The patient presents to PT wearing an upper extremity immobilizer with abduction pad  Posture: Decreased lumbar lordosis and forward head in standing   Palpation: Severe point tenderness noted with palpation of the right shoulder   Sensation: Intact    Range of Motion/Strength:     Shoulder Left Right Pain/Dysfunction with Movement   AROM      flexion  168*  NT    abduction  160* NT    Internal rotation  L1  NT    ER at 90° abd  84* NT        Shoulder Left Right Pain/Dysfunction with Movement   PROM      flexion  170*  68*    abduction  164*  62*    Internal rotation  L1  NT    ER at 45° abd  80*  28*        UE MMT Left Right Pain/Dysfunction with Movement   Shoulder Flexion 5/5. Not tested.    Shoulder Extension 5/5.  Not tested.    Shoulder Abduction 5/5. Not tested.    Shoulder Adduction 5/5. Not tested.    Shoulder IR 5/5. Not tested.    Shoulder ER  @ 0* Abduction 4/5. Not tested.        Functional Job Specific Testing:     Job Specific Task Job Demands Current Ability Deficit? (Yes or No)   1. Lifting Occasionally Unable yes   2. Carrying Occasionally Unable yes   3. Standing/Walking Frequently Limited yes       Limitation/Restriction for FOTO  Survey    Therapist reviewed FOTO scores for Celia Alford on 5/11/2023.   FOTO documents entered into EPIC - see Media section.    Limitation Score: 64%       TREATMENT   Treatment Time In: 1530  Treatment Time Out: 1600  Total Treatment time (time-based codes) separate from Evaluation: 30 minutes    Celia received the following manual therapy techniques: Joint mobilizations sort axis distraction &  PROM were applied to the: right shoulder for 10 minutes, including:    Grade I A/P glides for pain relief  Grade I inferior glides for pain relief    Celia participated in dynamic functional therapeutic activities to improve functional performance for 20  minutes, including:  Shoulder shrugs 3 x 10  Scapular retraction 3 x 10  Cervical range of motion in all plane 3 x 10  Wrist flexion/extension 3 x 10   pronation/supination 3 x 10  Gripping 3 min     Home Exercises and Patient Education Provided    Education provided:   - Ice for pain management as needed (use frozen small water bottles for ice machine)    Written Home Exercises Provided: yes.  Exercises were reviewed and Celia was able to demonstrate them prior to the end of the session.  Celia demonstrated good  understanding of the education provided.     See EMR under Patient Instructions for exercises provided 5/11/2023.    Assessment   Celia is a 52 y.o. female referred to outpatient Physical Therapy with a medical diagnosis of Incomplete tear of right rotator cuff, unspecified whether traumatic, Shoulder  impingement syndrome, right & Biceps tendonitis on right. Pt presents with :  Right shoulder pain  Decreased right shoulder ROM  Decreased right shoulder strength  Decreased ability to perform activity of daily living with right upper extremity     The patient's current job specific task deficits include the following: standing, walking, lifting, carrying    Pt prognosis is Good.     Skilled Physical Therapy intervention is required at this time for the injured worker to address the musculoskeletal limitations and work-related functional deficits for their job as a .    Plan of care discussed with patient: Yes  Pt's spiritual, cultural and educational needs considered and patient is agreeable to the plan of care and goals as stated below:     Anticipated Barriers for therapy: fear of re-injury      Medical Necessity is demonstrated by the following  History  Co-morbidities and personal factors that may impact the plan of care [x] LOW: no personal factors / co-morbidities  [] MODERATE: 1-2 personal factors / co-morbidities  [] HIGH: 3+ personal factors / co-morbidities    Moderate / High Support Documentation:      Examination  Body Structures and Functions, activity limitations and participation restrictions that may impact the plan of care [x] LOW: addressing 1-2 elements  [] MODERATE: 3+ elements  [] HIGH: 4+ elements (please support below)    Moderate / High Support Documentation:      Clinical Presentation [x] LOW: stable  [] MODERATE: Evolving  [] HIGH: Unstable     Decision Making/ Complexity Score: low       Goals:  Short Term Goals (STG) # weeks Goal Review Date Reviewed Date Met   1. The patient will begin a written HEP 1 Initial @ED@    2. Increase passive flexion and abduction to 160* 6 Initial @ED@    3. Increase passive external rotation to 80* 6 Initial @ED@    4. Decrease pain at worst to 4/10 6 Initial @ED@      Long Term Goals (LTG) # weeks Goal Review Date Reviewed Date Met   1. The  patient will be independent with a HEP for maintenace 12 Initial @ED@    2. Decrease soft tissue tenderness to mild 12 Initial @ED@    3. Decrease FOTO score to 41% 12 Initial @ED@    4. Increase UE strength to 4+/5 12 Initial @ED@    5. The patient will be able to lift 25# from floor to waist 12 Initial @ED@      Pt will return to work full duty, full time.    Plan   Plan of care Certification: 5/11/2023 to 8/25/2023.    Outpatient Physical Therapy 3 times weekly for 12 weeks to include the following interventions: Manual Therapy, Neuromuscular Re-ed, Patient Education, Therapeutic Activities, and Therapeutic Exercise.     Upon discharge from further skilled PT intervention it will determined if the need for a work conditioning program or Functional Capacity Evaluation is required to allow the injured worker to return to work with full potential achieved, continued improvement with body mechanics with advanced functional activities, and further prevention of future work-related injuries.     Chava Marquis, PT  5/11/2023

## 2023-05-14 NOTE — PROGRESS NOTES
I assume primary medical responsibility for this patient. I have reviewed the history, physical, and assessement & treatment plan with the resident and agree that the care is reasonable and necessary. This service has been performed by a resident without the presence of a teaching physician under the primary care exception. If necessary, an addendum of additional findings or evaluation beyond the resident documentation will be noted below.     Colleen Lane MD

## 2023-05-15 ENCOUNTER — CLINICAL SUPPORT (OUTPATIENT)
Dept: REHABILITATION | Facility: HOSPITAL | Age: 53
End: 2023-05-15
Attending: ORTHOPAEDIC SURGERY
Payer: OTHER GOVERNMENT

## 2023-05-15 DIAGNOSIS — M25.611 DECREASED ROM OF RIGHT SHOULDER: ICD-10-CM

## 2023-05-15 DIAGNOSIS — R29.898 DECREASED STRENGTH OF UPPER EXTREMITY: ICD-10-CM

## 2023-05-15 DIAGNOSIS — M75.21 BICEPS TENDONITIS ON RIGHT: ICD-10-CM

## 2023-05-15 DIAGNOSIS — M75.111 INCOMPLETE TEAR OF RIGHT ROTATOR CUFF, UNSPECIFIED WHETHER TRAUMATIC: Primary | ICD-10-CM

## 2023-05-15 DIAGNOSIS — M75.41 SHOULDER IMPINGEMENT SYNDROME, RIGHT: ICD-10-CM

## 2023-05-15 PROCEDURE — 97140 MANUAL THERAPY 1/> REGIONS: CPT | Mod: PN,CQ

## 2023-05-15 PROCEDURE — 97110 THERAPEUTIC EXERCISES: CPT | Mod: PN,CQ

## 2023-05-15 NOTE — PROGRESS NOTES
Physical Therapy Daily Treatment Note     Name: Celia Hernandez [facility]  Clinic Number: [id number]    Therapy Diagnosis:   Encounter Diagnoses   Name Primary?    Incomplete tear of right rotator cuff, unspecified whether traumatic Yes    Shoulder impingement syndrome, right     Biceps tendonitis on right     Decreased ROM of right shoulder     Decreased strength of upper extremity      Physician: Olman Pillai IV, MD    Visit Date: 5/15/2023    Physician Orders: PT Eval and Treat as per rotator cuff and biceps tenodesis protocol  Medical Diagnosis from Referral: Incomplete tear of right rotator cuff, unspecified whether traumatic, Shoulder impingement syndrome, right & Biceps tendonitis on right  Evaluation Date: 5/11/2023  Authorization Period Expiration: 8/18/2023  Plan of Care Expiration: 8/25/2023  Visit # / Visits authorized: 1/ 24 (POC: 1/24/)    Visit #/Visits authorized: 2/ 24   (# of No Show Appts 0 / Number of Cancelled Appts 0)    Time In: 200p  Time Out: 242p  Total Appointment Time (timed & untimed codes): 30 minutes    Precautions: Standard and as per protocol       Occupation (including job description if provided):  at [employer]   Job Demands: Standing, walking, lifting, carrying  Prior Medical Treatment: Therapy  and Surgery  Current Work Restrictions: Off duty      Subjective     Pt reports: no complaints today   She was compliant with home exercise program.  Response to previous treatment: first visit after eval   Functional change: none today    Pain: 4/10  Location: right shoulder(s)    Objective/Treatment   DOS 5/2/23  Celia received the following treatment:  Therapeutic exercises x 32 minutes to improve Range of Motion, posture, strength:    Shoulder shrugs 3 x 10  Scapular retraction 3 x 10  Cervical range of motion in all planes 3 x 10  Wrist flexion/extension 3 x 10   Pronation/supination 3 x 10  Gripping - Red - x 3 min     Manual therapy: x 10 minutes  Grade I A/P glides for pain  relief  Grade I inferior glides for pain relief  Gentle oscillations  Passive Range of Motion within MD protocol limitations     Home Exercises Provided and Patient Education Provided     Education provided:   - cont HOME EXERCISE PROGRAM     Written Home Exercises Provided: Patient instructed to cont prior HEP.  Exercises were reviewed and Celia was able to demonstrate them prior to the end of the session.  Celia demonstrated good  understanding of the education provided.     See EMR under Patient Instructions for exercises provided prior visit.    Assessment     Celia arrived with R arm sling and pillow.  She presents with R UE pain and dysfunction.  Guarding with Passive Range of Motion, but this did decrease with continued Passive Range of Motion.       The patient's current job specific task deficits include the following:  at [employer]    Celia Is progressing well towards her goals.   Pt prognosis is Good.     Pt will continue to benefit from skilled Physical Therapy interventions in order to address the deficits listed in the problem list box on initial evaluation, provide education, and to address the musculoskeletal limitations and work-related functional deficits for their job as a  at [employer].    Pt's spiritual, cultural and educational needs considered and pt agreeable to plan of care and goals.     Anticipated barriers to physical therapy: fear of re-injury    Goals:  Short Term Goals (STG) # weeks Goal Review Date Reviewed Date Met   1. The patient will begin a written HEP 1 ongoing 5/15/2023       2. Increase passive flexion and abduction to 160* 6 ongoing 5/15/2023       3. Increase passive external rotation to 80* 6 ongoing 5/15/2023       4. Decrease pain at worst to 4/10 6 ongoing 5/15/2023          Long Term Goals (LTG) # weeks Goal Review Date Reviewed Date Met   1. The patient will be independent with a HEP for maintenace 12 ongoing 5/15/2023       2. Decrease soft tissue  tenderness to mild 12 ongoing 5/15/2023       3. Decrease FOTO score to 41% 12 ongoing 5/15/2023     4. Increase UE strength to 4+/5 12 ongoing 5/15/2023     5. The patient will be able to lift 25# from floor to waist 12 ongoing 5/15/2023        Plan     Cont per Plan of Care, Range of Motion, posture, strengthening per MD protocol.      Yuliana Longoria, PTA   5/15/2023       HPI:  89y F bedbound with 24 hr home care  h/o dementia, depression was sent from the urgent care for cough x 1.5 weeks and fever since 1 day. Pt had fever of 100.2 F at urgent care with O2 sat not going above 93% on Room air which improved to 95% on 2 L NC. Portable CXR was done at urgent care which didnt showed any evidence of PNA or acute disease, unchanged from 2016 xray. Family deny recent hospitalizations, recent abx use,travel ,rashes, vomiting ,diarrhea.History limited by dementia.  In ED pt was afebrile with tachycardia 100-120s. (09 Jan 2019 04:14)      PAST MEDICAL & SURGICAL HISTORY:  Depression  Hypertension  Urinary tract infection  Mild dementia  Anxiety  S/P cataract extraction, right  History of total hip replacement, right      SOCIAL HISTORY:    Admitted from:  home assisted living [facility]   Substance abuse history:              Tobacco hx:                  Alcohol hx:              Home Opioid hx:  Jew:                                    Preferred Language:    Surrogate/HCP/Guardian:            Phone#:    FAMILY HISTORY:  No pertinent family history in first degree relatives    Baseline ADLs (prior to admission):    Allergies    No Known Allergies    Intolerances      Present Symptoms:   Dyspnea:   Nausea/Vomiting:   Anxiety:  Depressed   Fatigue:  Loss of appetite:   Pain:                                location:          Review of Systems: [All others negative or Unable to obtain due to poor mentation]    MEDICATIONS  (STANDING):  ALBUTerol/ipratropium for Nebulization 3 milliLiter(s) Nebulizer every 6 hours  donepezil 5 milliGRAM(s) Oral at bedtime  enoxaparin Injectable 40 milliGRAM(s) SubCutaneous every 24 hours    MEDICATIONS  (PRN):  acetaminophen   Tablet .. 325 milliGRAM(s) Oral every 4 hours PRN Temp greater or equal to 38C (100.4F), Moderate Pain (4 - 6)      PHYSICAL EXAM:    Vital Signs Last 24 Hrs  T(C): 36.9 (15 Miguel 2019 05:03), Max: 36.9 (15 Miguel 2019 05:03)  T(F): 98.5 (15 Miguel 2019 05:03), Max: 98.5 (15 Miguel 2019 05:03)  HR: 107 (15 Miguel 2019 05:03) (98 - 117)  BP: 156/88 (15 Miguel 2019 05:03) (136/82 - 185/85)  BP(mean): --  RR: 18 (15 Miguel 2019 05:03) (17 - 20)  SpO2: 98% (15 Miguel 2019 05:03) (98% - 100%)    General: alert  oriented x ____    [lethargic distressed cachexia  nonverbal  unarousable verbal]  Karnofsky Performance Score/Palliative Performance Status Version2:     %    HEENT: normal  dry mouth  ET tube/trach oral lesions:  Lungs: comfortable tachypnea/labored breathing  excessive secretions  CV: normal  tachycardia  GI: normal  distended  tender  incontinent               PEG/NG/OG tube  constipation  last BM:   : normal  incontinent  oliguria/anuria  patten  Musculoskeletal: normal  weakness  edema             ambulatory  bedbound/wheelchair bound  Skin: normal  pressure ulcers: stage: edema: other:  Neuro: no deficits cognitive impairment dsyphagia/dysarthria paresis: other:  Oral intake ability: unable/only mouth care [minimal moderate full capability]  Diet: [NPO]    LABS:              RADIOLOGY & ADDITIONAL STUDIES:    ADVANCE DIRECTIVES: HPI:  89y F bedbound with 24 hr home care  h/o dementia, depression was sent from the urgent care for cough x 1.5 weeks and fever since 1 day. Pt had fever of 100.2 F at urgent care with O2 sat not going above 93% on Room air which improved to 95% on 2 L NC. Portable CXR was done at urgent care which didnt showed any evidence of PNA or acute disease, unchanged from 2016 xray. Family deny recent hospitalizations, recent abx use,travel ,rashes, vomiting ,diarrhea.History limited by dementia.  In ED pt was afebrile with tachycardia 100-120s. (09 Jan 2019 04:14)      PAST MEDICAL & SURGICAL HISTORY:  Depression  Hypertension  Urinary tract infection  Mild dementia  Anxiety  S/P cataract extraction, right  History of total hip replacement, right      SOCIAL HISTORY:    Admitted from:  home with one son who is disabled; has HHA 24/7 daily  Surrogate/HCP/Guardian:    Cam Levy (son)        Phone#:[phone]    FAMILY HISTORY:  Pt unable given mentation    Baseline ADLs (prior to admission): bedbound, AOx1    Allergies    No Known Allergies    Intolerances      Present Symptoms:   Unable to obtain due to poor mentation]    MEDICATIONS  (STANDING):  ALBUTerol/ipratropium for Nebulization 3 milliLiter(s) Nebulizer every 6 hours  donepezil 5 milliGRAM(s) Oral at bedtime  enoxaparin Injectable 40 milliGRAM(s) SubCutaneous every 24 hours    MEDICATIONS  (PRN):  acetaminophen   Tablet .. 325 milliGRAM(s) Oral every 4 hours PRN Temp greater or equal to 38C (100.4F), Moderate Pain (4 - 6)      PHYSICAL EXAM:    Vital Signs Last 24 Hrs  T(C): 36.9 (15 Miguel 2019 05:03), Max: 36.9 (15 Miguel 2019 05:03)  T(F): 98.5 (15 Miguel 2019 05:03), Max: 98.5 (15 Miguel 2019 05:03)  HR: 107 (15 Miguel 2019 05:03) (98 - 117)  BP: 156/88 (15 Miguel 2019 05:03) (136/82 - 185/85)  BP(mean): --  RR: 18 (15 Miguel 2019 05:03) (17 - 20)  SpO2: 98% (15 Miguel 2019 05:03) (98% - 100%)    General: alert  oriented x _1___   Karnofsky Performance Score/Palliative Performance Status Version2:  30   %    HEENT: oropharynx clear  Lungs: CTA  CV: S1S2, RRR  GI: soft, non tender, on distended  : urinating  Musculoskeletal: bedbound  Skin: no rash or lesion noted  Neuro: unable to follow commands  Oral intake ability: moderate po intake  Diet: Pureed    LABS:      Albumin 3.2        RADIOLOGY & ADDITIONAL STUDIES:  Reviewed    ADVANCE DIRECTIVES: Fullcode

## 2023-05-16 NOTE — PROGRESS NOTES
"Physical Therapy Daily Treatment Note     Name: Celia Hernandez shawn  Clinic Number: [id number]    Therapy Diagnosis:   Encounter Diagnoses   Name Primary?    Incomplete tear of right rotator cuff, unspecified whether traumatic Yes    Shoulder impingement syndrome, right     Biceps tendonitis on right     Decreased ROM of right shoulder     Decreased strength of upper extremity        Physician: Olman Pillai IV, MD    Visit Date: 5/17/2023    Physician Orders: PT Eval and Treat as per rotator cuff and biceps tenodesis protocol  Medical Diagnosis from Referral: Incomplete tear of right rotator cuff, unspecified whether traumatic, Shoulder impingement syndrome, right & Biceps tendonitis on right  Evaluation Date: 5/11/2023  Authorization Period Expiration: 8/18/2023  Plan of Care Expiration: 8/25/2023  Visit # / Visits authorized: / 24 (POC: 1/24/)    Visit #/Visits authorized: / 24   (# of No Show Appts 0 / Number of Cancelled Appts 0)    Time In: 1408  Time Out: 1447  Total Appointment Time (timed & untimed codes): 39 minutes    Precautions: Standard and as per protocol       Occupation (including job description if provided):  at [employer]   Job Demands: Standing, walking, lifting, carrying  Prior Medical Treatment: Therapy  and Surgery  Current Work Restrictions: Off duty      Subjective     Pt reports: "It's ok.  It's getting there"  She was compliant with home exercise program.  Response to previous treatment: first visit after eval   Functional change: none today    Pain: 4/10  Location: right shoulder(s)    Objective/Treatment     DOS 5/2/23    Celia received the following treatment:  Therapeutic exercises x 31 minutes to improve Range of Motion, posture, strength:    Shoulder shrugs 3 x 10 reps   Scapular retraction 3 x 10 reps   Cervical range of motion in all planes 3 x 10 reps   Wrist flexion/extension 3 x 10 reps   Pronation/supination 3 x 10 reps   Gripping - Red - x 3 min     Manual therapy: x 8 " minutes  Grade I A/P glides for pain relief  Grade I inferior glides for pain relief  Gentle oscillations  Passive Range of Motion within MD protocol limitations     Home Exercises Provided and Patient Education Provided     Education provided:   - cont HOME EXERCISE PROGRAM     Written Home Exercises Provided: Patient instructed to cont prior HEP.  Exercises were reviewed and Celia was able to demonstrate them prior to the end of the session.  Celia demonstrated good  understanding of the education provided.     See EMR under Patient Instructions for exercises provided prior visit.    Assessment     Celia presents to PT with reports of some pain in her shoulder and her R arm in a sling with pillow.  Pt did well with exercises and did not report pain or other adverse reactions.  Patient guards her Right upper extremity and has difficulty relaxing her arm with Passive Range of Motion/joint mobs.    Celia Is progressing well towards her goals.   Pt prognosis is Good.     Pt will continue to benefit from skilled Physical Therapy interventions in order to address the deficits listed in the problem list box on initial evaluation, provide education, and to address the musculoskeletal limitations and work-related functional deficits for their job as a  at [employer].    Pt's spiritual, cultural and educational needs considered and pt agreeable to plan of care and goals.     Anticipated barriers to physical therapy: fear of re-injury    Goals:  Short Term Goals (STG) # weeks Goal Review Date Reviewed Date Met   1. The patient will begin a written HEP 1 ongoing 5/17/2023       2. Increase passive flexion and abduction to 160* 6 ongoing 5/17/2023       3. Increase passive external rotation to 80* 6 ongoing 5/17/2023       4. Decrease pain at worst to 4/10 6 ongoing 5/17/2023          Long Term Goals (LTG) # weeks Goal Review Date Reviewed Date Met   1. The patient will be independent with a HEP for maintenace 12  ongoing 5/17/2023       2. Decrease soft tissue tenderness to mild 12 ongoing 5/17/2023       3. Decrease FOTO score to 41% 12 ongoing 5/17/2023     4. Increase UE strength to 4+/5 12 ongoing 5/17/2023     5. The patient will be able to lift 25# from floor to waist 12 ongoing 5/17/2023        Plan     Plan of care Certification: 5/11/2023 to 8/25/2023.     Outpatient Physical Therapy 3 times weekly for 12 weeks to include the following interventions: Manual Therapy, Neuromuscular Re-ed, Patient Education, Therapeutic Activities, and Therapeutic Exercise.        Cont per Plan of Care, Range of Motion, posture, strengthening per MD protocol.      Mariola Castro, PTA   5/17/2023

## 2023-05-17 ENCOUNTER — CLINICAL SUPPORT (OUTPATIENT)
Dept: REHABILITATION | Facility: HOSPITAL | Age: 53
End: 2023-05-17
Attending: ORTHOPAEDIC SURGERY
Payer: OTHER GOVERNMENT

## 2023-05-17 ENCOUNTER — TELEPHONE (OUTPATIENT)
Dept: ORTHOPEDICS | Facility: CLINIC | Age: 53
End: 2023-05-17
Payer: COMMERCIAL

## 2023-05-17 ENCOUNTER — OFFICE VISIT (OUTPATIENT)
Dept: ORTHOPEDICS | Facility: CLINIC | Age: 53
End: 2023-05-17
Payer: OTHER GOVERNMENT

## 2023-05-17 ENCOUNTER — HOSPITAL ENCOUNTER (OUTPATIENT)
Dept: RADIOLOGY | Facility: HOSPITAL | Age: 53
Discharge: HOME | End: 2023-05-17
Attending: PHYSICIAN ASSISTANT
Payer: OTHER GOVERNMENT

## 2023-05-17 VITALS
BODY MASS INDEX: 29.36 KG/M2 | HEART RATE: 98 BPM | WEIGHT: 198.19 LBS | HEIGHT: 69 IN | SYSTOLIC BLOOD PRESSURE: 131 MMHG | DIASTOLIC BLOOD PRESSURE: 78 MMHG

## 2023-05-17 DIAGNOSIS — M75.21 BICEPS TENDONITIS ON RIGHT: Primary | ICD-10-CM

## 2023-05-17 DIAGNOSIS — M75.41 SHOULDER IMPINGEMENT SYNDROME, RIGHT: ICD-10-CM

## 2023-05-17 DIAGNOSIS — G89.29 CHRONIC RIGHT SHOULDER PAIN: ICD-10-CM

## 2023-05-17 DIAGNOSIS — R29.898 DECREASED STRENGTH OF UPPER EXTREMITY: ICD-10-CM

## 2023-05-17 DIAGNOSIS — M75.41 SHOULDER IMPINGEMENT SYNDROME, RIGHT: Primary | ICD-10-CM

## 2023-05-17 DIAGNOSIS — M25.511 CHRONIC RIGHT SHOULDER PAIN: ICD-10-CM

## 2023-05-17 DIAGNOSIS — M25.611 DECREASED ROM OF RIGHT SHOULDER: ICD-10-CM

## 2023-05-17 DIAGNOSIS — M75.111 INCOMPLETE TEAR OF RIGHT ROTATOR CUFF, UNSPECIFIED WHETHER TRAUMATIC: ICD-10-CM

## 2023-05-17 DIAGNOSIS — M75.21 BICEPS TENDONITIS ON RIGHT: ICD-10-CM

## 2023-05-17 DIAGNOSIS — M75.111 INCOMPLETE TEAR OF RIGHT ROTATOR CUFF, UNSPECIFIED WHETHER TRAUMATIC: Primary | ICD-10-CM

## 2023-05-17 DIAGNOSIS — Z98.890 S/P ROTATOR CUFF SURGERY: ICD-10-CM

## 2023-05-17 PROCEDURE — 99024 PR POST-OP FOLLOW-UP VISIT: ICD-10-PCS | Mod: S$GLB,,, | Performed by: PHYSICIAN ASSISTANT

## 2023-05-17 PROCEDURE — 99999 PR PBB SHADOW E&M-EST. PATIENT-LVL III: ICD-10-PCS | Mod: PBBFAC,,, | Performed by: PHYSICIAN ASSISTANT

## 2023-05-17 PROCEDURE — 73030 X-RAY EXAM OF SHOULDER: CPT | Mod: 26,RT,, | Performed by: RADIOLOGY

## 2023-05-17 PROCEDURE — 97110 THERAPEUTIC EXERCISES: CPT | Mod: PN,CQ

## 2023-05-17 PROCEDURE — 73030 XR SHOULDER COMPLETE 2 OR MORE VIEWS RIGHT: ICD-10-PCS | Mod: 26,RT,, | Performed by: RADIOLOGY

## 2023-05-17 PROCEDURE — 97140 MANUAL THERAPY 1/> REGIONS: CPT | Mod: PN,CQ

## 2023-05-17 PROCEDURE — 99999 PR PBB SHADOW E&M-EST. PATIENT-LVL III: CPT | Mod: PBBFAC,,, | Performed by: PHYSICIAN ASSISTANT

## 2023-05-17 PROCEDURE — 73030 X-RAY EXAM OF SHOULDER: CPT | Mod: TC,PN,RT

## 2023-05-17 PROCEDURE — 99024 POSTOP FOLLOW-UP VISIT: CPT | Mod: S$GLB,,, | Performed by: PHYSICIAN ASSISTANT

## 2023-05-17 NOTE — PROGRESS NOTES
[facility], Orthopedics and Sports Medicine  [facility]    Shoulder Post-op Visit #1  05/17/2023     Diagnosis:  Right shoulder pain  High Grade Partial Rotator Cuff Tear  Biceps Tendonitis  Subacromial Impingement  Prior rotator cuff repair    Procedure: 5/2/23  Arthroscopic Rotator Cuff Repair   Open Biceps Tenodesis  Arthroscopic Shoulder Debridement - Extensive        Subjective:      Celia Alford is a 52 y.o. female who is now 2 weeks status post right shoulder surgery. Pain is controlled without any medications. The patient denies none, fever, wound drainage, increasing redness, pus, increasing pain, increasing swelling. Post op problems reported: none.    In Pt. Doing well. Not taking meds for pain.      Objective:      Ortho/SPM Exam  General: alert, appears stated age, and cooperative   Sutures: Sutures in place and will be removed today.  Incision: healing well, no significant drainage, no dehiscence, no significant erythema  Most lateral and distal incision opened up after stitch removal. Able to dermabond and place steri strips  Tenderness: none  Forward Flexion: 20 degrees  External Rotation: 30 degrees  Elbow/Wrist/Hand: Full ROM  Neuro: Intact to light touch Ax/R/U/M  Vascular: CR<2s. Palpable radial pulse      Imaging:  X-ray Shoulder 2 or More Views Right  Narrative: EXAMINATION:  XR SHOULDER COMPLETE 2 OR MORE VIEWS RIGHT    CLINICAL HISTORY:  Impingement syndrome of right shoulder    TECHNIQUE:  Two or three views of the right shoulder were performed.    COMPARISON:  6/1/2022.    FINDINGS:  There are postoperative changes in the right humerus.The bone mineralization within orbits  normal limits.    The joint spaces are maintained.  The soft tissues are unremarkable.    No evidence of a fracture or dislocation.  Impression: As above.    Electronically signed by: Brannon Rai MD  Date:    05/17/2023  Time:    12:21  Hardware in place. No evidence of loosening.   I  have reviewed the above radiograph and agree with the findings stated by the radiologist.         Assessment:       The patient is status post right shoulder surgery. The primary encounter diagnosis was Biceps tendonitis on right. Diagnoses of Shoulder impingement syndrome, right, Incomplete tear of right rotator cuff, unspecified whether traumatic, Chronic right shoulder pain, and S/P rotator cuff surgery were also pertinent to this visit.  Doing well postoperatively. Continuation of post-op rehab course is recommended at this time. All of the patient's questions were answered.    Dermabond placed after stitch removal opened up scope portal. Continue NWB RUE and correct sling usage. Continue physical therapy. Follow up in 4 weeks.      Plan:      Tylenol 650mg TID PRN for pain.  Continue physical therapy.  Nonweightbearing on the operative extremity until 6 weeks after surgery.  Follow up at 6 weeks after surgery.       Sun Lawson PA-C  Department of Orthopedic Surgery  [facility]  Office: [phone]  Website: [email or web address]        No orders of the defined types were placed in this encounter.

## 2023-05-19 ENCOUNTER — CLINICAL SUPPORT (OUTPATIENT)
Dept: REHABILITATION | Facility: HOSPITAL | Age: 53
End: 2023-05-19
Attending: ORTHOPAEDIC SURGERY
Payer: OTHER GOVERNMENT

## 2023-05-19 DIAGNOSIS — M75.41 SHOULDER IMPINGEMENT SYNDROME, RIGHT: ICD-10-CM

## 2023-05-19 DIAGNOSIS — M25.611 DECREASED ROM OF RIGHT SHOULDER: ICD-10-CM

## 2023-05-19 DIAGNOSIS — R29.898 DECREASED STRENGTH OF UPPER EXTREMITY: ICD-10-CM

## 2023-05-19 DIAGNOSIS — M75.21 BICEPS TENDONITIS ON RIGHT: ICD-10-CM

## 2023-05-19 DIAGNOSIS — M75.111 INCOMPLETE TEAR OF RIGHT ROTATOR CUFF, UNSPECIFIED WHETHER TRAUMATIC: Primary | ICD-10-CM

## 2023-05-19 PROCEDURE — 97110 THERAPEUTIC EXERCISES: CPT | Mod: PN,CQ

## 2023-05-19 PROCEDURE — 97140 MANUAL THERAPY 1/> REGIONS: CPT | Mod: PN,CQ

## 2023-05-19 NOTE — PROGRESS NOTES
Physical Therapy Daily Treatment Note     Name: Celia Hernandez [facility]  Clinic Number: [id number]    Therapy Diagnosis:   Encounter Diagnoses   Name Primary?    Incomplete tear of right rotator cuff, unspecified whether traumatic Yes    Shoulder impingement syndrome, right     Biceps tendonitis on right     Decreased ROM of right shoulder     Decreased strength of upper extremity        Physician: Olman Pillai IV, MD    Visit Date: 5/19/2023    Physician Orders: PT Eval and Treat as per rotator cuff and biceps tenodesis protocol  Medical Diagnosis from Referral: Incomplete tear of right rotator cuff, unspecified whether traumatic, Shoulder impingement syndrome, right & Biceps tendonitis on right  Evaluation Date: 5/11/2023  Authorization Period Expiration: 8/18/2023  Plan of Care Expiration: 8/25/2023  Visit # / Visits authorized: 4 / 24 (POC: 3/24)    Visit #/Visits authorized: 4 / 24   (# of No Show Appts 0 / Number of Cancelled Appts 0)    Time In: 1101  Time Out: 1140  Total Appointment Time (timed & untimed codes): 39 minutes    Precautions: Standard and as per protocol       Occupation (including job description if provided):  at [employer]   Job Demands: Standing, walking, lifting, carrying  Prior Medical Treatment: Therapy  and Surgery  Current Work Restrictions: Off duty      Subjective     Pt reports: no complaints today, pain remains in her R shoulder, trouble sleeping  She was compliant with home exercise program.  Response to previous treatment: no complaints   Functional change: decreased pain    Pain: 3/10  Location: right shoulder(s)    Objective/Treatment     DOS 5/2/23    Celia received the following treatment:  Therapeutic exercises x 27 minutes to improve Range of Motion, posture, strength:    Shoulder shrugs 3 x 10   Scapular retraction 3 x 10   Cervical range of motion in all planes 3 x 10   Wrist flexion/extension 3 x 10 each  Pronation/supination 3 x 10  Gripping - Red - x 3 min     Manual  therapy: x 12 minutes  Grade I A/P glides for pain relief  Grade I inferior glides for pain relief  Gentle oscillations  Passive Range of Motion within MD protocol limitations     Home Exercises Provided and Patient Education Provided     Education provided:   - cont HOME EXERCISE PROGRAM     Written Home Exercises Provided: Patient instructed to cont prior HEP.  Exercises were reviewed and Celia was able to demonstrate them prior to the end of the session.  Celia demonstrated good  understanding of the education provided.     See EMR under Patient Instructions for exercises provided prior visit.    Assessment     Celia continues to report pain in her R shoulder.  She continues to guard with Passive Range of Motion, but this has decreased and she is more relaxed during passive Range of Motion.  She reports she is compliant with wearing her sling and not utilizing her R UE.     Celia Is progressing well towards her goals.   Pt prognosis is Good.     Pt will continue to benefit from skilled Physical Therapy interventions in order to address the deficits listed in the problem list box on initial evaluation, provide education, and to address the musculoskeletal limitations and work-related functional deficits for their job as a  at [employer].    Pt's spiritual, cultural and educational needs considered and pt agreeable to plan of care and goals.     Anticipated barriers to physical therapy: fear of re-injury    Goals:  Short Term Goals (STG) # weeks Goal Review Date Reviewed Date Met   1. The patient will begin a written HEP 1 ongoing 5/19/2023       2. Increase passive flexion and abduction to 160* 6 ongoing 5/19/2023       3. Increase passive external rotation to 80* 6 ongoing 5/19/2023       4. Decrease pain at worst to 4/10 6 ongoing 5/19/2023          Long Term Goals (LTG) # weeks Goal Review Date Reviewed Date Met   1. The patient will be independent with a HEP for maintenace 12 ongoing 5/19/2023        2. Decrease soft tissue tenderness to mild 12 ongoing 5/19/2023       3. Decrease FOTO score to 41% 12 ongoing 5/19/2023     4. Increase UE strength to 4+/5 12 ongoing 5/19/2023     5. The patient will be able to lift 25# from floor to waist 12 ongoing 5/19/2023        Plan     Plan of care Certification: 5/11/2023 to 8/25/2023.     Outpatient Physical Therapy 3 times weekly for 12 weeks to include the following interventions: Manual Therapy, Neuromuscular Re-ed, Patient Education, Therapeutic Activities, and Therapeutic Exercise.        Cont per Plan of Care, Range of Motion, posture, strengthening per MD protocol.      Yuliana Longoria, PTA   5/19/2023

## 2023-05-22 ENCOUNTER — CLINICAL SUPPORT (OUTPATIENT)
Dept: REHABILITATION | Facility: HOSPITAL | Age: 53
End: 2023-05-22
Attending: ORTHOPAEDIC SURGERY
Payer: OTHER GOVERNMENT

## 2023-05-22 ENCOUNTER — DOCUMENTATION ONLY (OUTPATIENT)
Dept: REHABILITATION | Facility: HOSPITAL | Age: 53
End: 2023-05-22
Payer: COMMERCIAL

## 2023-05-22 ENCOUNTER — TELEPHONE (OUTPATIENT)
Dept: OBSTETRICS AND GYNECOLOGY | Facility: CLINIC | Age: 53
End: 2023-05-22
Payer: COMMERCIAL

## 2023-05-22 DIAGNOSIS — R29.898 DECREASED STRENGTH OF UPPER EXTREMITY: ICD-10-CM

## 2023-05-22 DIAGNOSIS — M25.611 DECREASED ROM OF RIGHT SHOULDER: ICD-10-CM

## 2023-05-22 DIAGNOSIS — M75.41 SHOULDER IMPINGEMENT SYNDROME, RIGHT: Primary | ICD-10-CM

## 2023-05-22 DIAGNOSIS — M75.21 BICEPS TENDONITIS ON RIGHT: ICD-10-CM

## 2023-05-22 PROCEDURE — 97110 THERAPEUTIC EXERCISES: CPT | Mod: PN | Performed by: PHYSICAL THERAPIST

## 2023-05-22 PROCEDURE — 97140 MANUAL THERAPY 1/> REGIONS: CPT | Mod: PN | Performed by: PHYSICAL THERAPIST

## 2023-05-22 NOTE — PROGRESS NOTES
PT/PTA met face to face to discuss pt's treatment plan and progress towards established goals. Pt will be seen by a physical therapist minimally every 6th visit or every 30 days.    Yuliana Longoria PTA

## 2023-05-22 NOTE — PROGRESS NOTES
Physical Therapy Daily Treatment Note     Name: Celia Hernandez [facility]  Clinic Number: [id number]    Therapy Diagnosis:   Encounter Diagnoses   Name Primary?    Shoulder impingement syndrome, right Yes    Biceps tendonitis on right     Decreased ROM of right shoulder     Decreased strength of upper extremity        Physician: Olman Pillai IV, MD    Visit Date: 5/22/2023    Physician Orders: PT Eval and Treat as per rotator cuff and biceps tenodesis protocol  Medical Diagnosis from Referral: Incomplete tear of right rotator cuff, unspecified whether traumatic, Shoulder impingement syndrome, right & Biceps tendonitis on right  Evaluation Date: 5/11/2023  Authorization Period Expiration: 8/18/2023  Plan of Care Expiration: 8/25/2023  Visit # / Visits authorized: 4 / 24 (POC: 5/24)    Visit #/Visits authorized: 4 / 24   (# of No Show Appts 0 / Number of Cancelled Appts 0)    Time In: 1100  Time Out: 1145  Total Appointment Time (timed & untimed codes): 45 minutes    Precautions: Standard and as per protocol       Occupation (including job description if provided):  at [employer]   Job Demands: Standing, walking, lifting, carrying  Prior Medical Treatment: Therapy  and Surgery  Current Work Restrictions: Off duty      Subjective     Pt reports: no complaints today, pain remains in her R shoulder, trouble sleeping  She was compliant with home exercise program.  Response to previous treatment: no complaints   Functional change: decreased pain    Pain: 3/10  Location: right shoulder(s)    Objective/Treatment     DOS 5/2/23    Celia received the following treatment:  Therapeutic exercises x 30 minutes to improve Range of Motion, posture, strength:    Shoulder shrugs 3 x 10   Scapular retraction 3 x 10   Cervical range of motion in all planes 3 x 10   Wrist flexion/extension 3 x 10 each  Pronation/supination 3 x 10  Gripping - Red - x 3 min   Digiflex 3 min  Flexbar wrist extension 3 x 10  Flexbar wrist flexion 3 x  10  Flexbar pronation 3 x 10  Flexbar supination 3 x 10    Manual therapy: x 15 minutes  Grade I A/P glides for pain relief  Grade I inferior glides for pain relief  Gentle oscillations  Passive Range of Motion within MD protocol limitations     Home Exercises Provided and Patient Education Provided     Education provided:   - cont HOME EXERCISE PROGRAM     Written Home Exercises Provided: Patient instructed to cont prior HEP.  Exercises were reviewed and Celia was able to demonstrate them prior to the end of the session.  Celia demonstrated good  understanding of the education provided.     See EMR under Patient Instructions for exercises provided prior visit.    Assessment     Celia presents to PT with upper extremity immobilizer in place. She displays good PROM with the restriction of rehab protocol. Patient tolerated treatment well without report of adverse effects.    Celia Is progressing well towards her goals.   Pt prognosis is Good.     Pt will continue to benefit from skilled Physical Therapy interventions in order to address the deficits listed in the problem list box on initial evaluation, provide education, and to address the musculoskeletal limitations and work-related functional deficits for their job as a  at [employer].    Pt's spiritual, cultural and educational needs considered and pt agreeable to plan of care and goals.     Anticipated barriers to physical therapy: fear of re-injury    Goals:  Short Term Goals (STG) # weeks Goal Review Date Reviewed Date Met   1. The patient will begin a written HEP 1 ongoing 5/22/2023       2. Increase passive flexion and abduction to 160* 6 ongoing 5/22/2023       3. Increase passive external rotation to 80* 6 ongoing 5/22/2023       4. Decrease pain at worst to 4/10 6 ongoing 5/22/2023          Long Term Goals (LTG) # weeks Goal Review Date Reviewed Date Met   1. The patient will be independent with a HEP for maintenace 12 ongoing 5/22/2023       2.  Decrease soft tissue tenderness to mild 12 ongoing 5/22/2023       3. Decrease FOTO score to 41% 12 ongoing 5/22/2023     4. Increase UE strength to 4+/5 12 ongoing 5/22/2023     5. The patient will be able to lift 25# from floor to waist 12 ongoing 5/22/2023        Plan     Plan of care Certification: 5/11/2023 to 8/25/2023.     Outpatient Physical Therapy 3 times weekly for 12 weeks to include the following interventions: Manual Therapy, Neuromuscular Re-ed, Patient Education, Therapeutic Activities, and Therapeutic Exercise.        Cont per Plan of Care, Range of Motion, posture, strengthening per MD protocol.      Chava Marquis, PT   5/22/2023

## 2023-05-22 NOTE — TELEPHONE ENCOUNTER
Returned pts call. Discussed appt time and day. Informed pt if a earlier time becomes available we could notify her. Pt vu and would like to keep appt. No further questions     ----- Message from Rossy Garcia sent at 5/22/2023  2:52 PM CDT -----  Regarding: Appointment  Name of Who is Calling:  Patient          What is the request in detail:  Patient would like to know if she can keep her same day appointment but an earlier time            Can the clinic reply by [facility]: No            What Number to Call Back if not in [facility]: [phone]

## 2023-05-24 ENCOUNTER — OFFICE VISIT (OUTPATIENT)
Dept: OBSTETRICS AND GYNECOLOGY | Facility: CLINIC | Age: 53
End: 2023-05-24
Payer: COMMERCIAL

## 2023-05-24 VITALS
WEIGHT: 198.19 LBS | HEIGHT: 69 IN | SYSTOLIC BLOOD PRESSURE: 136 MMHG | DIASTOLIC BLOOD PRESSURE: 95 MMHG | BODY MASS INDEX: 29.36 KG/M2

## 2023-05-24 DIAGNOSIS — Z01.419 WELL WOMAN EXAM WITH ROUTINE GYNECOLOGICAL EXAM: Primary | ICD-10-CM

## 2023-05-24 DIAGNOSIS — Z12.11 COLON CANCER SCREENING: ICD-10-CM

## 2023-05-24 DIAGNOSIS — Z30.42 SURVEILLANCE FOR DEPO-PROVERA CONTRACEPTION: ICD-10-CM

## 2023-05-24 DIAGNOSIS — Z13.820 OSTEOPOROSIS SCREENING: ICD-10-CM

## 2023-05-24 DIAGNOSIS — N30.00 ACUTE CYSTITIS WITHOUT HEMATURIA: ICD-10-CM

## 2023-05-24 DIAGNOSIS — Z91.89 AT RISK FOR OSTEOPOROSIS: ICD-10-CM

## 2023-05-24 PROCEDURE — 87086 URINE CULTURE/COLONY COUNT: CPT | Performed by: STUDENT IN AN ORGANIZED HEALTH CARE EDUCATION/TRAINING PROGRAM

## 2023-05-24 PROCEDURE — 3080F DIAST BP >= 90 MM HG: CPT | Mod: CPTII,S$GLB,, | Performed by: STUDENT IN AN ORGANIZED HEALTH CARE EDUCATION/TRAINING PROGRAM

## 2023-05-24 PROCEDURE — 1160F RVW MEDS BY RX/DR IN RCRD: CPT | Mod: CPTII,S$GLB,, | Performed by: STUDENT IN AN ORGANIZED HEALTH CARE EDUCATION/TRAINING PROGRAM

## 2023-05-24 PROCEDURE — 1159F PR MEDICATION LIST DOCUMENTED IN MEDICAL RECORD: ICD-10-PCS | Mod: CPTII,S$GLB,, | Performed by: STUDENT IN AN ORGANIZED HEALTH CARE EDUCATION/TRAINING PROGRAM

## 2023-05-24 PROCEDURE — 99396 PR PREVENTIVE VISIT,EST,40-64: ICD-10-PCS | Mod: S$GLB,,, | Performed by: STUDENT IN AN ORGANIZED HEALTH CARE EDUCATION/TRAINING PROGRAM

## 2023-05-24 PROCEDURE — 1160F PR REVIEW ALL MEDS BY PRESCRIBER/CLIN PHARMACIST DOCUMENTED: ICD-10-PCS | Mod: CPTII,S$GLB,, | Performed by: STUDENT IN AN ORGANIZED HEALTH CARE EDUCATION/TRAINING PROGRAM

## 2023-05-24 PROCEDURE — 99396 PREV VISIT EST AGE 40-64: CPT | Mod: S$GLB,,, | Performed by: STUDENT IN AN ORGANIZED HEALTH CARE EDUCATION/TRAINING PROGRAM

## 2023-05-24 PROCEDURE — 3008F PR BODY MASS INDEX (BMI) DOCUMENTED: ICD-10-PCS | Mod: CPTII,S$GLB,, | Performed by: STUDENT IN AN ORGANIZED HEALTH CARE EDUCATION/TRAINING PROGRAM

## 2023-05-24 PROCEDURE — 3075F SYST BP GE 130 - 139MM HG: CPT | Mod: CPTII,S$GLB,, | Performed by: STUDENT IN AN ORGANIZED HEALTH CARE EDUCATION/TRAINING PROGRAM

## 2023-05-24 PROCEDURE — 1159F MED LIST DOCD IN RCRD: CPT | Mod: CPTII,S$GLB,, | Performed by: STUDENT IN AN ORGANIZED HEALTH CARE EDUCATION/TRAINING PROGRAM

## 2023-05-24 PROCEDURE — 87186 SC STD MICRODIL/AGAR DIL: CPT | Performed by: STUDENT IN AN ORGANIZED HEALTH CARE EDUCATION/TRAINING PROGRAM

## 2023-05-24 PROCEDURE — 3075F PR MOST RECENT SYSTOLIC BLOOD PRESS GE 130-139MM HG: ICD-10-PCS | Mod: CPTII,S$GLB,, | Performed by: STUDENT IN AN ORGANIZED HEALTH CARE EDUCATION/TRAINING PROGRAM

## 2023-05-24 PROCEDURE — 99999 PR PBB SHADOW E&M-EST. PATIENT-LVL II: ICD-10-PCS | Mod: PBBFAC,,, | Performed by: STUDENT IN AN ORGANIZED HEALTH CARE EDUCATION/TRAINING PROGRAM

## 2023-05-24 PROCEDURE — 99999 PR PBB SHADOW E&M-EST. PATIENT-LVL II: CPT | Mod: PBBFAC,,, | Performed by: STUDENT IN AN ORGANIZED HEALTH CARE EDUCATION/TRAINING PROGRAM

## 2023-05-24 PROCEDURE — 87088 URINE BACTERIA CULTURE: CPT | Performed by: STUDENT IN AN ORGANIZED HEALTH CARE EDUCATION/TRAINING PROGRAM

## 2023-05-24 PROCEDURE — 87077 CULTURE AEROBIC IDENTIFY: CPT | Performed by: STUDENT IN AN ORGANIZED HEALTH CARE EDUCATION/TRAINING PROGRAM

## 2023-05-24 PROCEDURE — 3008F BODY MASS INDEX DOCD: CPT | Mod: CPTII,S$GLB,, | Performed by: STUDENT IN AN ORGANIZED HEALTH CARE EDUCATION/TRAINING PROGRAM

## 2023-05-24 PROCEDURE — 3080F PR MOST RECENT DIASTOLIC BLOOD PRESSURE >= 90 MM HG: ICD-10-PCS | Mod: CPTII,S$GLB,, | Performed by: STUDENT IN AN ORGANIZED HEALTH CARE EDUCATION/TRAINING PROGRAM

## 2023-05-24 RX ORDER — SULFAMETHOXAZOLE AND TRIMETHOPRIM 800; 160 MG/1; MG/1
1 TABLET ORAL 2 TIMES DAILY
Qty: 6 TABLET | Refills: 0 | Status: SHIPPED | OUTPATIENT
Start: 2023-05-24 | End: 2023-05-27

## 2023-05-24 RX ORDER — MEDROXYPROGESTERONE ACETATE 150 MG/ML
150 INJECTION, SUSPENSION INTRAMUSCULAR
Qty: 1 EACH | Refills: 3 | Status: SHIPPED | OUTPATIENT
Start: 2023-05-24

## 2023-05-26 ENCOUNTER — CLINICAL SUPPORT (OUTPATIENT)
Dept: REHABILITATION | Facility: HOSPITAL | Age: 53
End: 2023-05-26
Attending: ORTHOPAEDIC SURGERY
Payer: OTHER GOVERNMENT

## 2023-05-26 DIAGNOSIS — M75.41 SHOULDER IMPINGEMENT SYNDROME, RIGHT: Primary | ICD-10-CM

## 2023-05-26 DIAGNOSIS — M75.21 BICEPS TENDONITIS ON RIGHT: ICD-10-CM

## 2023-05-26 DIAGNOSIS — R29.898 DECREASED STRENGTH OF UPPER EXTREMITY: ICD-10-CM

## 2023-05-26 DIAGNOSIS — M25.611 DECREASED ROM OF RIGHT SHOULDER: ICD-10-CM

## 2023-05-26 PROCEDURE — 97110 THERAPEUTIC EXERCISES: CPT | Mod: PN | Performed by: PHYSICAL THERAPIST

## 2023-05-26 PROCEDURE — 97140 MANUAL THERAPY 1/> REGIONS: CPT | Mod: PN | Performed by: PHYSICAL THERAPIST

## 2023-05-26 NOTE — PROGRESS NOTES
Physical Therapy Daily Treatment Note     Name: Celia Hernandez [facility]  Clinic Number: [id number]    Therapy Diagnosis:   Encounter Diagnoses   Name Primary?    Shoulder impingement syndrome, right Yes    Biceps tendonitis on right     Decreased ROM of right shoulder     Decreased strength of upper extremity        Physician: Olman Pillai IV, MD    Visit Date: 5/26/2023    Physician Orders: PT Eval and Treat as per rotator cuff and biceps tenodesis protocol  Medical Diagnosis from Referral: Incomplete tear of right rotator cuff, unspecified whether traumatic, Shoulder impingement syndrome, right & Biceps tendonitis on right  Evaluation Date: 5/11/2023  Authorization Period Expiration: 8/18/2023  Plan of Care Expiration: 8/25/2023  Visit # / Visits authorized: 4 / 24 (POC: 5/24)    Visit #/Visits authorized: 5 / 24 FOTO next visit    (# of No Show Appts 0 / Number of Cancelled Appts 0)    Time In: 1400  Time Out: 1445  Total Appointment Time (timed & untimed codes): 45 minutes    Precautions: Standard and as per protocol       Occupation (including job description if provided):  at [employer]   Job Demands: Standing, walking, lifting, carrying  Prior Medical Treatment: Therapy  and Surgery  Current Work Restrictions: Off duty      Subjective     Pt reports: generalized right shoulder pain  She was compliant with home exercise program.  Response to previous treatment: no complaints   Functional change: decreased pain    Pain: 3/10  Location: right shoulder(s)    Objective/Treatment     DOS 5/2/23    Celia received the following treatment:  Therapeutic exercises x 30 minutes to improve Range of Motion, posture, strength:    Shoulder shrugs 3 x 10   Scapular retraction 3 x 10   Cervical range of motion in all planes 3 x 10   Wrist flexion/extension 3 x 10 each  Pronation/supination 3 x 10  Gripping - Red - x 3 min   Digiflex 3 min  Flexbar wrist extension 3 x 10  Flexbar wrist flexion 3 x 10  Flexbar pronation 3 x  10  Flexbar supination 3 x 10    Manual therapy: x 15 minutes  Grade I A/P glides for pain relief  Grade I inferior glides for pain relief  Gentle oscillations  Passive Range of Motion within MD protocol limitations     Home Exercises Provided and Patient Education Provided     Education provided:   - cont HOME EXERCISE PROGRAM     Written Home Exercises Provided: Patient instructed to cont prior HEP.  Exercises were reviewed and Celia was able to demonstrate them prior to the end of the session.  Celia demonstrated good  understanding of the education provided.     See EMR under Patient Instructions for exercises provided prior visit.    Assessment     Celia presents to PT with upper extremity immobilizer in place. Her PROM is to limitation of protocol with no end range tightness. Patient tolerated treatment well without report of adverse effects.    Celia Is progressing well towards her goals.   Pt prognosis is Good.     Pt will continue to benefit from skilled Physical Therapy interventions in order to address the deficits listed in the problem list box on initial evaluation, provide education, and to address the musculoskeletal limitations and work-related functional deficits for their job as a  at [employer].    Pt's spiritual, cultural and educational needs considered and pt agreeable to plan of care and goals.     Anticipated barriers to physical therapy: fear of re-injury    Goals:  Short Term Goals (STG) # weeks Goal Review Date Reviewed Date Met   1. The patient will begin a written HEP 1 ongoing 5/26/2023       2. Increase passive flexion and abduction to 160* 6 ongoing 5/26/2023       3. Increase passive external rotation to 80* 6 ongoing 5/26/2023       4. Decrease pain at worst to 4/10 6 ongoing 5/26/2023          Long Term Goals (LTG) # weeks Goal Review Date Reviewed Date Met   1. The patient will be independent with a HEP for maintenace 12 ongoing 5/26/2023       2. Decrease soft tissue  tenderness to mild 12 ongoing 5/26/2023       3. Decrease FOTO score to 41% 12 ongoing 5/26/2023     4. Increase UE strength to 4+/5 12 ongoing 5/26/2023     5. The patient will be able to lift 25# from floor to waist 12 ongoing 5/26/2023        Plan     Plan of care Certification: 5/11/2023 to 8/25/2023.     Outpatient Physical Therapy 3 times weekly for 12 weeks to include the following interventions: Manual Therapy, Neuromuscular Re-ed, Patient Education, Therapeutic Activities, and Therapeutic Exercise.        Cont per Plan of Care, Range of Motion, posture, strengthening per MD protocol.      Chava Marquis, PT   5/26/2023

## 2023-05-28 LAB — BACTERIA UR CULT: ABNORMAL

## 2023-05-29 ENCOUNTER — CLINICAL SUPPORT (OUTPATIENT)
Dept: REHABILITATION | Facility: HOSPITAL | Age: 53
End: 2023-05-29
Attending: ORTHOPAEDIC SURGERY
Payer: OTHER GOVERNMENT

## 2023-05-29 DIAGNOSIS — M75.111 INCOMPLETE TEAR OF RIGHT ROTATOR CUFF, UNSPECIFIED WHETHER TRAUMATIC: Primary | ICD-10-CM

## 2023-05-29 DIAGNOSIS — M25.611 DECREASED ROM OF RIGHT SHOULDER: ICD-10-CM

## 2023-05-29 DIAGNOSIS — R29.898 DECREASED STRENGTH OF UPPER EXTREMITY: ICD-10-CM

## 2023-05-29 DIAGNOSIS — M75.21 BICEPS TENDONITIS ON RIGHT: ICD-10-CM

## 2023-05-29 DIAGNOSIS — M75.41 SHOULDER IMPINGEMENT SYNDROME, RIGHT: ICD-10-CM

## 2023-05-29 PROCEDURE — 97140 MANUAL THERAPY 1/> REGIONS: CPT | Mod: PN,CQ

## 2023-05-29 PROCEDURE — 97110 THERAPEUTIC EXERCISES: CPT | Mod: PN,CQ

## 2023-05-29 NOTE — PROGRESS NOTES
Physical Therapy Daily Treatment Note     Name: Celia Hernandez [facility]  Clinic Number: [id number]    Therapy Diagnosis:   Encounter Diagnoses   Name Primary?    Incomplete tear of right rotator cuff, unspecified whether traumatic Yes    Shoulder impingement syndrome, right     Biceps tendonitis on right     Decreased ROM of right shoulder     Decreased strength of upper extremity        Physician: Olman Pillai IV, MD    Visit Date: 5/29/2023    Physician Orders: PT Eval and Treat as per rotator cuff and biceps tenodesis protocol  Medical Diagnosis from Referral: Incomplete tear of right rotator cuff, unspecified whether traumatic, Shoulder impingement syndrome, right & Biceps tendonitis on right  Evaluation Date: 5/11/2023  Authorization Period Expiration: 8/18/2023  Plan of Care Expiration: 8/25/2023  Visit #/Visits authorized: 7 / 24 FOTO next visit    (# of No Show Appts 0 / Number of Cancelled Appts 0)    Time In: 1105  Time Out: 1150  Total Appointment Time (timed & untimed codes): 45 minutes    Precautions: Standard and as per protocol       Occupation (including job description if provided):  at [employer]   Job Demands: Standing, walking, lifting, carrying  Prior Medical Treatment: Therapy  and Surgery  Current Work Restrictions: Off duty      Subjective     Pt reports: generalized shoulder pain  She was compliant with home exercise program.  Response to previous treatment: no complaints   Functional change: decreased pain    Pain: 3/10  Location: right shoulder(s)    Objective/Treatment     DOS 5/2/23    Celia received the following treatment:  Therapeutic exercises x 30 minutes to improve Range of Motion, posture, strength:    Shoulder shrugs 3 x 10   Scapular retraction 3 x 10   Cervical range of motion in all planes 3 x 10   Wrist flexion/extension 3 x 10 each  Pronation/supination 3 x 10  Gripping - Red - x 3 min   Digiflex 3 min  Flexbar wrist extension 3 x 10  Flexbar wrist flexion 3 x 10  Flexbar  pronation 3 x 10  Flexbar supination 3 x 10    Manual therapy: x 15 minutes  Grade I A/P glides for pain relief  Grade I inferior glides for pain relief  Gentle oscillations  Passive Range of Motion within MD protocol limitations     Home Exercises Provided and Patient Education Provided     Education provided:   -cont HOME EXERCISE PROGRAM   -apply ice as needed for delayed muscle soreness    Written Home Exercises Provided: Patient instructed to cont prior HEP.  Exercises were reviewed and Celia was able to demonstrate them prior to the end of the session.  Celia demonstrated good  understanding of the education provided.     See EMR under Patient Instructions for exercises provided prior visit.    Assessment     Celia presents to PT with upper extremity immobilizer in place. She provided good demonstration of therapeutic interventions. Her Passive Range of Motion is still limited due to protocol with no end range tightness. She tolerated treatment well without reports of adverse reactions.    Celia Is progressing well towards her goals.   Pt prognosis is Good.     Pt will continue to benefit from skilled Physical Therapy interventions in order to address the deficits listed in the problem list box on initial evaluation, provide education, and to address the musculoskeletal limitations and work-related functional deficits for their job as a  at [employer].    Pt's spiritual, cultural and educational needs considered and pt agreeable to plan of care and goals.     Anticipated barriers to physical therapy: fear of re-injury    Goals:  Short Term Goals (STG) # weeks Goal Review Date Reviewed Date Met   1. The patient will begin a written HEP 1 ongoing 5/29/2023       2. Increase passive flexion and abduction to 160* 6 ongoing 5/29/2023       3. Increase passive external rotation to 80* 6 ongoing 5/29/2023       4. Decrease pain at worst to 4/10 6 ongoing 5/29/2023          Long Term Goals (LTG) # weeks  Goal Review Date Reviewed Date Met   1. The patient will be independent with a HEP for maintenace 12 ongoing 5/29/2023       2. Decrease soft tissue tenderness to mild 12 ongoing 5/29/2023       3. Decrease FOTO score to 41% 12 ongoing 5/29/2023     4. Increase UE strength to 4+/5 12 ongoing 5/29/2023     5. The patient will be able to lift 25# from floor to waist 12 ongoing 5/29/2023        Plan     Plan of care Certification: 5/11/2023 to 8/25/2023.     Outpatient Physical Therapy 3 times weekly for 12 weeks to include the following interventions: Manual Therapy, Neuromuscular Re-ed, Patient Education, Therapeutic Activities, and Therapeutic Exercise.        Cont per Plan of Care, Range of Motion, posture, strengthening per MD protocol.       Student PTA participated in direct patient care of this patient with 1:1 supervision of PTA.   Deborah Boogie / Mariola Castro, PTA   5/29/2023

## 2023-05-31 ENCOUNTER — CLINICAL SUPPORT (OUTPATIENT)
Dept: REHABILITATION | Facility: HOSPITAL | Age: 53
End: 2023-05-31
Attending: ORTHOPAEDIC SURGERY
Payer: OTHER GOVERNMENT

## 2023-05-31 DIAGNOSIS — M75.41 SHOULDER IMPINGEMENT SYNDROME, RIGHT: Primary | ICD-10-CM

## 2023-05-31 DIAGNOSIS — M25.611 DECREASED ROM OF RIGHT SHOULDER: ICD-10-CM

## 2023-05-31 DIAGNOSIS — M75.21 BICEPS TENDONITIS ON RIGHT: ICD-10-CM

## 2023-05-31 DIAGNOSIS — R29.898 DECREASED STRENGTH OF UPPER EXTREMITY: ICD-10-CM

## 2023-05-31 PROCEDURE — 97140 MANUAL THERAPY 1/> REGIONS: CPT | Mod: PN | Performed by: PHYSICAL THERAPIST

## 2023-05-31 PROCEDURE — 97112 NEUROMUSCULAR REEDUCATION: CPT | Mod: PN | Performed by: PHYSICAL THERAPIST

## 2023-05-31 PROCEDURE — 97110 THERAPEUTIC EXERCISES: CPT | Mod: PN | Performed by: PHYSICAL THERAPIST

## 2023-05-31 NOTE — PROGRESS NOTES
Physical Therapy Daily Treatment Note     Name: [facility]  Clinic Number: [id number]    Therapy Diagnosis:   Encounter Diagnoses   Name Primary?    Shoulder impingement syndrome, right Yes    Biceps tendonitis on right     Decreased ROM of right shoulder     Decreased strength of upper extremity        Physician: Olman Pillai IV, MD    Visit Date: 5/31/2023    Physician Orders: PT Eval and Treat as per rotator cuff and biceps tenodesis protocol  Medical Diagnosis from Referral: Incomplete tear of right rotator cuff, unspecified whether traumatic, Shoulder impingement syndrome, right & Biceps tendonitis on right  Evaluation Date: 5/11/2023  Authorization Period Expiration: 8/18/2023  Plan of Care Expiration: 8/25/2023  Visit #/Visits authorized: 8 / 24 FOTO next visit    (# of No Show Appts 0 / Number of Cancelled Appts 0)    Time In: 1300  Time Out: 1400  Total Appointment Time (timed & untimed codes): 60 minutes    Precautions: Standard and as per protocol       Occupation (including job description if provided):  at [employer]   Job Demands: Standing, walking, lifting, carrying  Prior Medical Treatment: Therapy  and Surgery  Current Work Restrictions: Off duty      Subjective     Pt reports: continued soreness  She was compliant with home exercise program.  Response to previous treatment: no complaints   Functional change: decreased pain    Pain: 3/10  Location: right shoulder(s)    Objective/Treatment     DOS 5/2/23    Celia received the following treatment:  Therapeutic exercises x 30 minutes to improve Range of Motion, posture, strength:    Shoulder shrugs 3 x 10   Scapular retraction 3 x 10   Cervical range of motion in all planes 3 x 10   Wrist flexion/extension 3 x 10 each  Pronation/supination 3 x 10  Gripping - Red - x 3 min   Digiflex 3 min  Flexbar wrist extension 3 x 10  Flexbar wrist flexion 3 x 10  Flexbar pronation 3 x 10  Flexbar supination 3 x 10      Celia participated in  neuromuscular re-education activities to improve: Coordination, Kinesthetic, Sense, and Proprioception for 15 minutes. The following activities were included:     Seated table flexion stretch 3 x 10  Seated table ER stretch 3 x 10  T-Bar flexion 3 x 10  T-Bar external rotation 3 x 10  Wall wipes 3 x 10  Rail wipes 3 x 10  Elbow flexion 3 x 10    Manual therapy: x 15 minutes  Grade I A/P glides for pain relief  Grade I inferior glides for pain relief  Gentle oscillations  Passive Range of Motion within MD protocol limitations     Home Exercises Provided and Patient Education Provided     Education provided:   -cont HOME EXERCISE PROGRAM   -apply ice as needed for delayed muscle soreness    Written Home Exercises Provided: Patient instructed to cont prior HEP.  Exercises were reviewed and Celia was able to demonstrate them prior to the end of the session.  Celia demonstrated good  understanding of the education provided.     See EMR under Patient Instructions for exercises provided prior visit.    Assessment     Treatment was progressed to phase II as she is now ready for AAROM. Patient tolerated progression of treatment without increase in symptoms.    Celia Is progressing well towards her goals.   Pt prognosis is Good.     Pt will continue to benefit from skilled Physical Therapy interventions in order to address the deficits listed in the problem list box on initial evaluation, provide education, and to address the musculoskeletal limitations and work-related functional deficits for their job as a  at [employer].    Pt's spiritual, cultural and educational needs considered and pt agreeable to plan of care and goals.     Anticipated barriers to physical therapy: fear of re-injury    Goals:  Short Term Goals (STG) # weeks Goal Review Date Reviewed Date Met   1. The patient will begin a written HEP 1 ongoing 5/31/2023       2. Increase passive flexion and abduction to 160* 6 ongoing 5/31/2023       3. Increase  passive external rotation to 80* 6 ongoing 5/31/2023       4. Decrease pain at worst to 4/10 6 ongoing 5/31/2023          Long Term Goals (LTG) # weeks Goal Review Date Reviewed Date Met   1. The patient will be independent with a HEP for maintenace 12 ongoing 5/31/2023       2. Decrease soft tissue tenderness to mild 12 ongoing 5/31/2023       3. Decrease FOTO score to 41% 12 ongoing 5/31/2023     4. Increase UE strength to 4+/5 12 ongoing 5/31/2023     5. The patient will be able to lift 25# from floor to waist 12 ongoing 5/31/2023        Plan     Plan of care Certification: 5/11/2023 to 8/25/2023.     Outpatient Physical Therapy 3 times weekly for 12 weeks to include the following interventions: Manual Therapy, Neuromuscular Re-ed, Patient Education, Therapeutic Activities, and Therapeutic Exercise.        Cont per Plan of Care, Range of Motion, posture, strengthening per MD protocol.      Chava Marquis, PT   5/31/2023

## 2023-06-01 ENCOUNTER — PATIENT MESSAGE (OUTPATIENT)
Dept: ORTHOPEDICS | Facility: CLINIC | Age: 53
End: 2023-06-01
Payer: COMMERCIAL

## 2023-06-02 ENCOUNTER — CLINICAL SUPPORT (OUTPATIENT)
Dept: REHABILITATION | Facility: HOSPITAL | Age: 53
End: 2023-06-02
Attending: ORTHOPAEDIC SURGERY
Payer: OTHER GOVERNMENT

## 2023-06-02 DIAGNOSIS — M25.611 DECREASED ROM OF RIGHT SHOULDER: ICD-10-CM

## 2023-06-02 DIAGNOSIS — R29.898 DECREASED STRENGTH OF UPPER EXTREMITY: ICD-10-CM

## 2023-06-02 DIAGNOSIS — M75.41 SHOULDER IMPINGEMENT SYNDROME, RIGHT: Primary | ICD-10-CM

## 2023-06-02 DIAGNOSIS — M75.21 BICEPS TENDONITIS ON RIGHT: ICD-10-CM

## 2023-06-02 PROCEDURE — 97110 THERAPEUTIC EXERCISES: CPT | Mod: PN | Performed by: PHYSICAL THERAPIST

## 2023-06-02 PROCEDURE — 97112 NEUROMUSCULAR REEDUCATION: CPT | Mod: PN | Performed by: PHYSICAL THERAPIST

## 2023-06-02 PROCEDURE — 97140 MANUAL THERAPY 1/> REGIONS: CPT | Mod: PN | Performed by: PHYSICAL THERAPIST

## 2023-06-02 NOTE — PROGRESS NOTES
Physical Therapy Daily Treatment Note     Name: Celia Hernandez [facility]  Clinic Number: [id number]    Therapy Diagnosis:   Encounter Diagnoses   Name Primary?    Shoulder impingement syndrome, right Yes    Biceps tendonitis on right     Decreased ROM of right shoulder     Decreased strength of upper extremity          Physician: Olman Pillai IV, MD    Visit Date: 6/2/2023    Physician Orders: PT Eval and Treat as per rotator cuff and biceps tenodesis protocol  Medical Diagnosis from Referral: Incomplete tear of right rotator cuff, unspecified whether traumatic, Shoulder impingement syndrome, right & Biceps tendonitis on right  Evaluation Date: 5/11/2023  Authorization Period Expiration: 8/18/2023  Plan of Care Expiration: 8/25/2023  Visit #/Visits authorized: 9 / 24 FOTO next visit    (# of No Show Appts 0 / Number of Cancelled Appts 0)    Time In: 1300  Time Out: 1400  Total Appointment Time (timed & untimed codes): 60 minutes    Precautions: Standard and as per protocol       Occupation (including job description if provided):  at [employer]   Job Demands: Standing, walking, lifting, carrying  Prior Medical Treatment: Therapy  and Surgery  Current Work Restrictions: Off duty      Subjective     Pt reports: decreased pain and had the most difficulty with wall wipe exercises last visit  She was compliant with home exercise program.  Response to previous treatment: no complaints   Functional change: decreased pain    Pain: 2/10  Location: right shoulder(s)    Objective/Treatment     DOS 5/2/23    Celia received the following treatment:  Therapeutic exercises x 30 minutes to improve Range of Motion, posture, strength:    Shoulder shrugs 3 x 10   Scapular retraction 3 x 10   Cervical range of motion in all planes 3 x 10   Wrist flexion/extension 3 x 10 each  Pronation/supination 3 x 10  Gripping - Red - x 3 min   Digiflex 3 min  Flexbar wrist extension 3 x 10  Flexbar wrist flexion 3 x 10  Flexbar pronation 3 x  10  Flexbar supination 3 x 10      Celia participated in neuromuscular re-education activities to improve: Coordination, Kinesthetic, Sense, and Proprioception for 15 minutes. The following activities were included:     Seated table flexion stretch 3 x 10  Seated table ER stretch 3 x 10  T-Bar flexion 3 x 10  T-Bar external rotation 3 x 10  Wall wipes 3 x 10  Rail wipes 3 x 10  Elbow flexion 3 x 10    Manual therapy: x 15 minutes  Grade I A/P glides for pain relief  Grade I inferior glides for pain relief  Gentle oscillations  Passive Range of Motion within MD protocol limitations     Home Exercises Provided and Patient Education Provided     Education provided:   -cont HOME EXERCISE PROGRAM   -apply ice as needed for delayed muscle soreness    Written Home Exercises Provided: Patient instructed to cont prior HEP.  Exercises were reviewed and Celia was able to demonstrate them prior to the end of the session.  Celia demonstrated good  understanding of the education provided.     See EMR under Patient Instructions for exercises provided prior visit.    Assessment     The patient displays increased PROM in all planes within restrictions of protocol. No muscular guarding noted at end range. Patient tolerated treatment well without report of adverse effects.    Celia Is progressing well towards her goals.   Pt prognosis is Good.     Pt will continue to benefit from skilled Physical Therapy interventions in order to address the deficits listed in the problem list box on initial evaluation, provide education, and to address the musculoskeletal limitations and work-related functional deficits for their job as a  at [employer].    Pt's spiritual, cultural and educational needs considered and pt agreeable to plan of care and goals.     Anticipated barriers to physical therapy: fear of re-injury    Goals:  Short Term Goals (STG) # weeks Goal Review Date Reviewed Date Met   1. The patient will begin a written HEP 1  ongoing 6/2/2023       2. Increase passive flexion and abduction to 160* 6 ongoing 6/2/2023       3. Increase passive external rotation to 80* 6 ongoing 6/2/2023       4. Decrease pain at worst to 4/10 6 ongoing 6/2/2023          Long Term Goals (LTG) # weeks Goal Review Date Reviewed Date Met   1. The patient will be independent with a HEP for maintenace 12 ongoing 6/2/2023       2. Decrease soft tissue tenderness to mild 12 ongoing 6/2/2023       3. Decrease FOTO score to 41% 12 ongoing 6/2/2023     4. Increase UE strength to 4+/5 12 ongoing 6/2/2023     5. The patient will be able to lift 25# from floor to waist 12 ongoing 6/2/2023        Plan     Plan of care Certification: 5/11/2023 to 8/25/2023.     Outpatient Physical Therapy 3 times weekly for 12 weeks to include the following interventions: Manual Therapy, Neuromuscular Re-ed, Patient Education, Therapeutic Activities, and Therapeutic Exercise.        Cont per Plan of Care, Range of Motion, posture, strengthening per MD protocol.      Chava Marquis, PT   6/2/2023

## 2023-06-07 ENCOUNTER — CLINICAL SUPPORT (OUTPATIENT)
Dept: REHABILITATION | Facility: HOSPITAL | Age: 53
End: 2023-06-07
Attending: ORTHOPAEDIC SURGERY
Payer: OTHER GOVERNMENT

## 2023-06-07 DIAGNOSIS — M75.41 SHOULDER IMPINGEMENT SYNDROME, RIGHT: Primary | ICD-10-CM

## 2023-06-07 DIAGNOSIS — M25.611 DECREASED ROM OF RIGHT SHOULDER: ICD-10-CM

## 2023-06-07 DIAGNOSIS — M75.21 BICEPS TENDONITIS ON RIGHT: ICD-10-CM

## 2023-06-07 DIAGNOSIS — R29.898 DECREASED STRENGTH OF UPPER EXTREMITY: ICD-10-CM

## 2023-06-07 PROCEDURE — 97140 MANUAL THERAPY 1/> REGIONS: CPT | Mod: PN | Performed by: PHYSICAL THERAPIST

## 2023-06-07 PROCEDURE — 97110 THERAPEUTIC EXERCISES: CPT | Mod: PN | Performed by: PHYSICAL THERAPIST

## 2023-06-07 PROCEDURE — 97112 NEUROMUSCULAR REEDUCATION: CPT | Mod: PN | Performed by: PHYSICAL THERAPIST

## 2023-06-07 NOTE — PROGRESS NOTES
Physical Therapy Daily Treatment Note     Name: [facility]  Clinic Number: [id number]    Therapy Diagnosis:   Encounter Diagnoses   Name Primary?    Shoulder impingement syndrome, right Yes    Biceps tendonitis on right     Decreased ROM of right shoulder     Decreased strength of upper extremity          Physician: Olman Pillai IV, MD    Visit Date: 6/7/2023    Physician Orders: PT Eval and Treat as per rotator cuff and biceps tenodesis protocol  Medical Diagnosis from Referral: Incomplete tear of right rotator cuff, unspecified whether traumatic, Shoulder impingement syndrome, right & Biceps tendonitis on right  Evaluation Date: 5/11/2023  Authorization Period Expiration: 8/18/2023  Plan of Care Expiration: 8/25/2023  Visit #/Visits authorized: 9 / 24     (# of No Show Appts 0 / Number of Cancelled Appts 0)    Time In: 1300  Time Out: 1400  Total Appointment Time (timed & untimed codes): 60 minutes    Precautions: Standard and as per protocol       Occupation (including job description if provided):  at [employer]   Job Demands: Standing, walking, lifting, carrying  Prior Medical Treatment: Therapy  and Surgery  Current Work Restrictions: Off duty      Subjective     Pt reports: shoulder soreness after performing light household activity  She was compliant with home exercise program.  Response to previous treatment: no complaints   Functional change: decreased pain    Pain: 3/10  Location: right shoulder(s)    Objective/Treatment     DOS 5/2/23    FOTO: 50% impairment (Goal 41% impairment)    Celia received the following treatment:  Therapeutic exercises x 30 minutes to improve Range of Motion, posture, strength:    Shoulder shrugs 3 x 10   Scapular retraction 3 x 10   Cervical range of motion in all planes 3 x 10   Wrist flexion/extension 3 x 10 each  Pronation/supination 3 x 10  Gripping - Red - x 3 min   Digiflex 3 min  Flexbar wrist extension 3 x 10  Flexbar wrist flexion 3 x 10  Flexbar pronation  3 x 10  Flexbar supination 3 x 10  Overhead pulley  flexion 3 min  Overhead pulley  scaption 3 min       Celia participated in neuromuscular re-education activities to improve: Coordination, Kinesthetic, Sense, and Proprioception for 15 minutes. The following activities were included:     Seated table flexion stretch 3 x 10  Seated table ER stretch 3 x 10  T-Bar flexion 3 x 10  T-Bar external rotation 3 x 10  Wall wipes 3 x 10  Rail wipes 3 x 10  Elbow flexion 3 x 10    Manual therapy: x 15 minutes  Grade I A/P glides for pain relief  Grade I inferior glides for pain relief  Gentle oscillations  Passive Range of Motion within MD protocol limitations     Home Exercises Provided and Patient Education Provided     Education provided:   -cont HOME EXERCISE PROGRAM   -apply ice as needed for delayed muscle soreness    Written Home Exercises Provided: Patient instructed to cont prior HEP.  Exercises were reviewed and Celia was able to demonstrate them prior to the end of the session.  Celia demonstrated good  understanding of the education provided.     See EMR under Patient Instructions for exercises provided prior visit.    Assessment     Patient displays improved right shoulder PROM. She displays minimal guarding with PROM. Patient tolerated treatment well without report of adverse effects.    Celia Is progressing well towards her goals.   Pt prognosis is Good.     Pt will continue to benefit from skilled Physical Therapy interventions in order to address the deficits listed in the problem list box on initial evaluation, provide education, and to address the musculoskeletal limitations and work-related functional deficits for their job as a  at [employer].    Pt's spiritual, cultural and educational needs considered and pt agreeable to plan of care and goals.     Anticipated barriers to physical therapy: fear of re-injury    Goals:  Short Term Goals (STG) # weeks Goal Review Date Reviewed Date Met   1. The  patient will begin a written HEP 1 ongoing 6/7/2023       2. Increase passive flexion and abduction to 160* 6 ongoing 6/7/2023       3. Increase passive external rotation to 80* 6 ongoing 6/7/2023       4. Decrease pain at worst to 4/10 6 ongoing 6/7/2023          Long Term Goals (LTG) # weeks Goal Review Date Reviewed Date Met   1. The patient will be independent with a HEP for maintenace 12 ongoing 6/7/2023       2. Decrease soft tissue tenderness to mild 12 ongoing 6/7/2023       3. Decrease FOTO score to 41% 12 ongoing 6/7/2023     4. Increase UE strength to 4+/5 12 ongoing 6/7/2023     5. The patient will be able to lift 25# from floor to waist 12 ongoing 6/7/2023        Plan     Plan of care Certification: 5/11/2023 to 8/25/2023.     Outpatient Physical Therapy 3 times weekly for 12 weeks to include the following interventions: Manual Therapy, Neuromuscular Re-ed, Patient Education, Therapeutic Activities, and Therapeutic Exercise.        Cont per Plan of Care, Range of Motion, posture, strengthening per MD protocol.      Chava Marquis, PT   6/7/2023

## 2023-06-07 NOTE — PROGRESS NOTES
History & Physical  Gynecology      SUBJECTIVE:     Chief Complaint: WWE, depo refill        History of Present Illness:  52 y.o. female   here for WWE. No LMP recorded. Patient has had an injection..  She c/o urinary odor.  Desires depo provera refill. Has been on depo >15 years and does not desire to come off.       Cervical cancer screening: up to date   Most recent: 2020 NILM/HR HPV neg    History abnormal: no  Breast cancer screening: due, ordered per PCP  Colon cancer screening: due, declines colonoscopy     Family history breast cancer: no  Family history ovarian cancer: no  Family history colon cancer: no        Review of patient's allergies indicates:   Allergen Reactions    Pcn [penicillins] Hives and Rash       Past Medical History:   Diagnosis Date    COVID-19 virus infection 2021    Essential (primary) hypertension      Past Surgical History:   Procedure Laterality Date    ARTHROSCOPIC REPAIR OF ROTATOR CUFF OF SHOULDER Right 2020    Procedure: REPAIR, ROTATOR CUFF, ARTHROSCOPIC;  Surgeon: Jens Han Jr., MD;  Location: [facility] OR;  Service: Orthopedics;  Laterality: Right;  need opus system- Jehovah's witness notified tori-11/10  video [employer] confirmed 2020 KB [id number]    ARTHROSCOPY OF SHOULDER WITH DECOMPRESSION OF SUBACROMIAL SPACE Right 2023    Procedure: ARTHROSCOPY, SHOULDER, WITH SUBACROMIAL SPACE DECOMPRESSION;  Surgeon: Olman Pillai IV, MD;  Location: [facility] OR;  Service: Orthopedics;  Laterality: Right;    ROTATOR CUFF REPAIR  2020    ROTATOR CUFF REPAIR Right 2023    Procedure: REPAIR, ROTATOR CUFF;  Surgeon: Olman Pillai IV, MD;  Location: [facility] OR;  Service: Orthopedics;  Laterality: Right;    SHOULDER ARTHROSCOPY Right 2023    Procedure: ARTHROSCOPY, SHOULDER; rotator cuff repair, subacromial decompression, possible mini-open biceps tenodesis;  Surgeon: Olman Pillai IV, MD;  Location: [facility] OR;  Service: Orthopedics;  Laterality: Right;   Beachchair position, spider arm isaac, arthrex suture anchors, arthrex biceps tenodesis button, Dr. Pillai special Frank retractor set arthrex eleanor notified cc  Have available depu    TONSILLECTOMY       OB History          2    Para   2    Term   2            AB        Living   3         SAB        IAB        Ectopic        Multiple   1    Live Births   3               Family History   Problem Relation Age of Onset    Diabetes Maternal Grandmother     Hypertension Maternal Grandmother     No Known Problems Mother     Breast cancer Neg Hx     Colon cancer Neg Hx     Ovarian cancer Neg Hx      Social History     Tobacco Use    Smoking status: Never    Smokeless tobacco: Never   Substance Use Topics    Alcohol use: Yes     Comment: seldom    Drug use: No       Current Outpatient Medications   Medication Sig    aspirin (ECOTRIN) 81 MG EC tablet Take 1 tablet (81 mg total) by mouth once daily. (Patient not taking: Reported on 2023)    gabapentin (NEURONTIN) 300 MG capsule Take 1 capsule (300 mg total) by mouth every evening. for 14 days    hydroCHLOROthiazide (HYDRODIURIL) 12.5 MG Tab Take 1 tablet (12.5 mg total) by mouth once daily. (Patient not taking: Reported on 2023)    medroxyPROGESTERone (DEPO-PROVERA) 150 mg/mL Syrg Inject 1 mL (150 mg total) into the muscle every 3 (three) months.     No current facility-administered medications for this visit.     Facility-Administered Medications Ordered in Other Visits   Medication    lactated ringers infusion    LIDOcaine (PF) 10 mg/ml (1%) injection 10 mg         Review of Systems:  Review of Systems   Constitutional:  Negative for chills and fever.   HENT:  Negative for nasal congestion.    Respiratory:  Negative for cough and shortness of breath.    Cardiovascular:  Negative for chest pain, palpitations and leg swelling.   Gastrointestinal:  Negative for abdominal pain, blood in stool, constipation, diarrhea, nausea and vomiting.    Endocrine: Negative for diabetes, hyperthyroidism and hypothyroidism.   Genitourinary:  Negative for dysuria, menstrual problem, pelvic pain and vaginal discharge.   Musculoskeletal:  Negative for myalgias.   Integumentary:  Negative for rash, hair changes, breast mass, nipple discharge, breast skin changes and breast tenderness.   Neurological:  Negative for seizures, syncope and headaches.   Hematological:  Does not bruise/bleed easily.   Psychiatric/Behavioral:  Negative for depression. The patient is not nervous/anxious.    Breast: Negative for lump, mass, mastodynia, nipple discharge, skin changes and tenderness     OBJECTIVE:     Physical Exam:  Physical Exam  Exam conducted with a chaperone present.   Constitutional:       General: She is not in acute distress.     Appearance: Normal appearance. She is well-developed.   HENT:      Head: Normocephalic and atraumatic.      Nose: No epistaxis.   Eyes:      Conjunctiva/sclera: Conjunctivae normal.   Pulmonary:      Effort: Pulmonary effort is normal. No respiratory distress.   Chest:   Breasts:     Right: No inverted nipple, mass, nipple discharge, skin change or tenderness.      Left: No inverted nipple, mass, nipple discharge, skin change or tenderness.   Abdominal:      General: There is no distension.      Palpations: Abdomen is soft. There is no mass.      Tenderness: There is no abdominal tenderness. There is no guarding or rebound.      Hernia: No hernia is present.   Genitourinary:     General: Normal vulva.      Pubic Area: No rash.       Labia:         Right: No rash, tenderness or lesion.         Left: No rash, tenderness or lesion.       Urethra: No prolapse, urethral pain, urethral swelling or urethral lesion.      Vagina: Normal. No vaginal discharge, tenderness or bleeding.      Cervix: No cervical motion tenderness, discharge, friability or lesion.      Uterus: Normal. Not enlarged and not tender.       Adnexa: Right adnexa normal and left  adnexa normal.        Right: No mass, tenderness or fullness.          Left: No mass, tenderness or fullness.     Musculoskeletal:         General: No tenderness. Normal range of motion.      Right lower leg: No edema.      Left lower leg: No edema.   Skin:     General: Skin is warm and dry.      Findings: No erythema.   Neurological:      Mental Status: She is alert and oriented to person, place, and time.   Psychiatric:         Mood and Affect: Mood normal.         Behavior: Behavior normal.         ASSESSMENT:       ICD-10-CM ICD-9-CM    1. Well woman exam with routine gynecological exam  Z01.419 V72.31       2. Osteoporosis screening  Z13.820 V82.81 DXA Bone Density Axial Skeleton 1 or more sites      3. Surveillance for Depo-Provera contraception  Z30.42 V25.49 DXA Bone Density Axial Skeleton 1 or more sites      4. At risk for osteoporosis  Z91.89 V49.89 DXA Bone Density Axial Skeleton 1 or more sites      5. Colon cancer screening  Z12.11 V76.51 Cologuard Screening (Multitarget Stool DNA)      Cologuard Screening (Multitarget Stool DNA)      6. Acute cystitis without hematuria  N30.00 595.0 Urine culture             Plan:      Diagnoses and all orders for this visit:    Well woman exam with routine gynecological exam   Age appropriate ACS cervical cancer screening counseling reviewed. In absence of severe dysplasia history, next pap/HPV due 2025    Osteoporosis screening  -     DXA Bone Density Axial Skeleton 1 or more sites; Future    Surveillance for Depo-Provera contraception  -     DXA Bone Density Axial Skeleton 1 or more sites; Future      Discussed long term use of depo provera can decrease BMD and increase risk/leaed to osteoporosis or osteopenia. She is aware of this. I did recommend pausing with use of alternative contraception/AUB medication. Periods were reportedly unmanageable in the past and she strongly desires to continue after considering r/b/a.  I recommended DXA scan to eval BMD in the  setting of long term depo provera use     At risk for osteoporosis  -     DXA Bone Density Axial Skeleton 1 or more sites; Future    Colon cancer screening  -     Cologuard Screening (Multitarget Stool DNA); Future  -     Cologuard Screening (Multitarget Stool DNA)    Acute cystitis without hematuria  -     Urine culture    Other orders  -     medroxyPROGESTERone (DEPO-PROVERA) 150 mg/mL Syrg; Inject 1 mL (150 mg total) into the muscle every 3 (three) months.  -     sulfamethoxazole-trimethoprim 800-160mg (BACTRIM DS) 800-160 mg Tab; Take 1 tablet by mouth 2 (two) times daily. for 3 days        Orders Placed This Encounter   Procedures    Cologuard Screening (Multitarget Stool DNA)    Urine culture    DXA Bone Density Axial Skeleton 1 or more sites     RTC 1 year or sooner PRN    Mare Castaneda

## 2023-06-09 ENCOUNTER — CLINICAL SUPPORT (OUTPATIENT)
Dept: REHABILITATION | Facility: HOSPITAL | Age: 53
End: 2023-06-09
Attending: ORTHOPAEDIC SURGERY
Payer: OTHER GOVERNMENT

## 2023-06-09 DIAGNOSIS — R29.898 DECREASED STRENGTH OF UPPER EXTREMITY: ICD-10-CM

## 2023-06-09 DIAGNOSIS — M25.611 DECREASED ROM OF RIGHT SHOULDER: ICD-10-CM

## 2023-06-09 DIAGNOSIS — M75.21 BICEPS TENDONITIS ON RIGHT: ICD-10-CM

## 2023-06-09 DIAGNOSIS — M75.41 SHOULDER IMPINGEMENT SYNDROME, RIGHT: Primary | ICD-10-CM

## 2023-06-09 PROCEDURE — 97140 MANUAL THERAPY 1/> REGIONS: CPT | Mod: PN | Performed by: PHYSICAL THERAPIST

## 2023-06-09 PROCEDURE — 97112 NEUROMUSCULAR REEDUCATION: CPT | Mod: PN | Performed by: PHYSICAL THERAPIST

## 2023-06-09 PROCEDURE — 97110 THERAPEUTIC EXERCISES: CPT | Mod: PN | Performed by: PHYSICAL THERAPIST

## 2023-06-09 NOTE — PROGRESS NOTES
Physical Therapy Daily Treatment Note     Name: Celia Hernandez [facility]  Clinic Number: [id number]    Therapy Diagnosis:   Encounter Diagnoses   Name Primary?    Shoulder impingement syndrome, right Yes    Biceps tendonitis on right     Decreased ROM of right shoulder     Decreased strength of upper extremity          Physician: Olman Pillai IV, MD    Visit Date: 6/9/2023    Physician Orders: PT Eval and Treat as per rotator cuff and biceps tenodesis protocol  Medical Diagnosis from Referral: Incomplete tear of right rotator cuff, unspecified whether traumatic, Shoulder impingement syndrome, right & Biceps tendonitis on right  Evaluation Date: 5/11/2023  Authorization Period Expiration: 8/18/2023  Plan of Care Expiration: 8/25/2023  Visit #/Visits authorized: 11 / 24     (# of No Show Appts 0 / Number of Cancelled Appts 0)    Time In: 1300  Time Out: 1400  Total Appointment Time (timed & untimed codes): 60 minutes    Precautions: Standard and as per protocol       Occupation (including job description if provided):  at [employer]   Job Demands: Standing, walking, lifting, carrying  Prior Medical Treatment: Therapy  and Surgery  Current Work Restrictions: Off duty      Subjective     Pt reports: general soreness today  She was compliant with home exercise program.  Response to previous treatment: no complaints   Functional change: decreased pain    Pain: 3/10  Location: right shoulder(s)    Objective/Treatment     DOS 5/2/23    FOTO: 50% impairment (Goal 41% impairment)    Celia received the following treatment:  Therapeutic exercises x 30 minutes to improve Range of Motion, posture, strength:    Gripping - Red - x 3 min   Digiflex 3 min  Flexbar wrist extension 3 x 10  Flexbar wrist flexion 3 x 10  Flexbar pronation 3 x 10  Flexbar supination 3 x 10  Overhead pulley  flexion 3 min  Overhead pulley  scaption 3 min  Shoulder ladder x 15       Celia participated in neuromuscular re-education activities to improve:  Coordination, Kinesthetic, Sense, and Proprioception for 15 minutes. The following activities were included:     Seated table flexion stretch 3 x 10  Seated table ER stretch 3 x 10  T-Bar flexion 3 x 10  T-Bar external rotation 3 x 10  Wall wipes 3 x 10  Rail wipes 3 x 10  Elbow flexion 3 x 10    Manual therapy: x 15 minutes  Grade I A/P glides for pain relief  Grade I inferior glides for pain relief  Gentle oscillations  Passive Range of Motion within MD protocol limitations     Not performed seconary to time   Shoulder shrugs 3 x 10   Scapular retraction 3 x 10   Cervical range of motion in all planes 3 x 10   Wrist flexion/extension 3 x 10 each  Pronation/supination 3 x 10  Home Exercises Provided and Patient Education Provided     Education provided:   -cont HOME EXERCISE PROGRAM   -apply ice as needed for delayed muscle soreness    Written Home Exercises Provided: Patient instructed to cont prior HEP.  Exercises were reviewed and Celia was able to demonstrate them prior to the end of the session.  Celia demonstrated good  understanding of the education provided.     See EMR under Patient Instructions for exercises provided prior visit.    Assessment     Patient displays improved PROM following joint mobilization. PROM is progressing well with primary limitation being pain. Patient tolerated treatment well without report of adverse effects.    Celia Is progressing well towards her goals.   Pt prognosis is Good.     Pt will continue to benefit from skilled Physical Therapy interventions in order to address the deficits listed in the problem list box on initial evaluation, provide education, and to address the musculoskeletal limitations and work-related functional deficits for their job as a  at [employer].    Pt's spiritual, cultural and educational needs considered and pt agreeable to plan of care and goals.     Anticipated barriers to physical therapy: fear of re-injury    Goals:  Short Term Goals  (STG) # weeks Goal Review Date Reviewed Date Met   1. The patient will begin a written HEP 1 ongoing 6/9/2023       2. Increase passive flexion and abduction to 160* 6 ongoing 6/9/2023       3. Increase passive external rotation to 80* 6 ongoing 6/9/2023       4. Decrease pain at worst to 4/10 6 ongoing 6/9/2023          Long Term Goals (LTG) # weeks Goal Review Date Reviewed Date Met   1. The patient will be independent with a HEP for maintenace 12 ongoing 6/9/2023       2. Decrease soft tissue tenderness to mild 12 ongoing 6/9/2023       3. Decrease FOTO score to 41% 12 ongoing 6/9/2023     4. Increase UE strength to 4+/5 12 ongoing 6/9/2023     5. The patient will be able to lift 25# from floor to waist 12 ongoing 6/9/2023        Plan     Plan of care Certification: 5/11/2023 to 8/25/2023.     Outpatient Physical Therapy 3 times weekly for 12 weeks to include the following interventions: Manual Therapy, Neuromuscular Re-ed, Patient Education, Therapeutic Activities, and Therapeutic Exercise.        Cont per Plan of Care, Range of Motion, posture, strengthening per MD protocol.      Chava Marquis, PT   6/9/2023

## 2023-06-09 NOTE — PROGRESS NOTES
Physical Therapy Daily Treatment Note     Name: Celia Hernandez shawn  Clinic Number: [id number]    Therapy Diagnosis:   No diagnosis found.        Physician: Olman Pillai IV, MD    Visit Date: 6/9/2023    Physician Orders: PT Eval and Treat as per rotator cuff and biceps tenodesis protocol  Medical Diagnosis from Referral: Incomplete tear of right rotator cuff, unspecified whether traumatic, Shoulder impingement syndrome, right & Biceps tendonitis on right  Evaluation Date: 5/11/2023  Authorization Period Expiration: 8/18/2023  Plan of Care Expiration: 8/25/2023  Visit #/Visits authorized: 10 / 24     (# of No Show Appts 0 / Number of Cancelled Appts 0)    Time In:   Time Out:   Total Appointment Time (timed & untimed codes):  minutes    Precautions: Standard and as per protocol       Occupation (including job description if provided):  at [employer]   Job Demands: Standing, walking, lifting, carrying  Prior Medical Treatment: Therapy  and Surgery  Current Work Restrictions: Off duty      Subjective     Pt reports:   She was compliant with home exercise program.  Response to previous treatment: no complaints   Functional change: decreased pain    Pain: /10  Location: right shoulder(s)    Objective/Treatment     DOS 5/2/23    FOTO: 50% impairment (Goal 41% impairment)    Celia received the following treatment:  Therapeutic exercises x 30 minutes to improve Range of Motion, posture, strength:    Shoulder shrugs 3 x 10   Scapular retraction 3 x 10   Cervical range of motion in all planes 3 x 10   Wrist flexion/extension 3 x 10 each  Pronation/supination 3 x 10  Gripping - Red - x 3 min   Digiflex 3 min  Flexbar wrist extension 3 x 10  Flexbar wrist flexion 3 x 10  Flexbar pronation 3 x 10  Flexbar supination 3 x 10  Overhead pulley  flexion 3 min  Overhead pulley  scaption 3 min       Celia participated in neuromuscular re-education activities to improve: Coordination, Kinesthetic, Sense, and Proprioception for 15  minutes. The following activities were included:     Seated table flexion stretch 3 x 10  Seated table ER stretch 3 x 10  T-Bar flexion 3 x 10  T-Bar external rotation 3 x 10  Wall wipes 3 x 10  Rail wipes 3 x 10  Elbow flexion 3 x 10    Manual therapy: x 15 minutes  Grade I A/P glides for pain relief  Grade I inferior glides for pain relief  Gentle oscillations  Passive Range of Motion within MD protocol limitations     Home Exercises Provided and Patient Education Provided     Education provided:   -cont HOME EXERCISE PROGRAM   -apply ice as needed for delayed muscle soreness    Written Home Exercises Provided: Patient instructed to cont prior HEP.  Exercises were reviewed and Celia was able to demonstrate them prior to the end of the session.  Celia demonstrated good  understanding of the education provided.     See EMR under Patient Instructions for exercises provided prior visit.    Assessment     Patient displays improved right shoulder PROM. She displays minimal guarding with PROM. Patient tolerated treatment well without report of adverse effects.    Celia Is progressing well towards her goals.   Pt prognosis is Good.     Pt will continue to benefit from skilled Physical Therapy interventions in order to address the deficits listed in the problem list box on initial evaluation, provide education, and to address the musculoskeletal limitations and work-related functional deficits for their job as a  at [employer].    Pt's spiritual, cultural and educational needs considered and pt agreeable to plan of care and goals.     Anticipated barriers to physical therapy: fear of re-injury    Goals:  Short Term Goals (STG) # weeks Goal Review Date Reviewed Date Met   1. The patient will begin a written HEP 1 ongoing 6/9/2023       2. Increase passive flexion and abduction to 160* 6 ongoing 6/9/2023       3. Increase passive external rotation to 80* 6 ongoing 6/9/2023       4. Decrease pain at worst to 4/10 6  ongoing 6/9/2023          Long Term Goals (LTG) # weeks Goal Review Date Reviewed Date Met   1. The patient will be independent with a HEP for maintenace 12 ongoing 6/9/2023       2. Decrease soft tissue tenderness to mild 12 ongoing 6/9/2023       3. Decrease FOTO score to 41% 12 ongoing 6/9/2023     4. Increase UE strength to 4+/5 12 ongoing 6/9/2023     5. The patient will be able to lift 25# from floor to waist 12 ongoing 6/9/2023        Plan     Plan of care Certification: 5/11/2023 to 8/25/2023.     Outpatient Physical Therapy 3 times weekly for 12 weeks to include the following interventions: Manual Therapy, Neuromuscular Re-ed, Patient Education, Therapeutic Activities, and Therapeutic Exercise.        Cont per Plan of Care, Range of Motion, posture, strengthening per MD protocol.      Mariola Castro, PTA   6/9/2023

## 2023-06-12 ENCOUNTER — CLINICAL SUPPORT (OUTPATIENT)
Dept: REHABILITATION | Facility: HOSPITAL | Age: 53
End: 2023-06-12
Attending: ORTHOPAEDIC SURGERY
Payer: OTHER GOVERNMENT

## 2023-06-12 DIAGNOSIS — M75.21 BICEPS TENDONITIS ON RIGHT: ICD-10-CM

## 2023-06-12 DIAGNOSIS — M25.611 DECREASED ROM OF RIGHT SHOULDER: ICD-10-CM

## 2023-06-12 DIAGNOSIS — R29.898 DECREASED STRENGTH OF UPPER EXTREMITY: ICD-10-CM

## 2023-06-12 DIAGNOSIS — M75.111 INCOMPLETE TEAR OF RIGHT ROTATOR CUFF, UNSPECIFIED WHETHER TRAUMATIC: Primary | ICD-10-CM

## 2023-06-12 DIAGNOSIS — M75.41 SHOULDER IMPINGEMENT SYNDROME, RIGHT: ICD-10-CM

## 2023-06-12 PROCEDURE — 97112 NEUROMUSCULAR REEDUCATION: CPT | Mod: PN,CQ

## 2023-06-12 PROCEDURE — 97110 THERAPEUTIC EXERCISES: CPT | Mod: PN,CQ

## 2023-06-12 NOTE — PROGRESS NOTES
Physical Therapy Daily Treatment Note     Name: Celia Hernandez [facility]  Clinic Number: [id number]    Therapy Diagnosis:   Encounter Diagnoses   Name Primary?    Incomplete tear of right rotator cuff, unspecified whether traumatic Yes    Shoulder impingement syndrome, right     Biceps tendonitis on right     Decreased ROM of right shoulder     Decreased strength of upper extremity            Physician: Olman Pillai IV, MD    Visit Date: 6/12/2023    Physician Orders: PT Eval and Treat as per rotator cuff and biceps tenodesis protocol  Medical Diagnosis from Referral: Incomplete tear of right rotator cuff, unspecified whether traumatic, Shoulder impingement syndrome, right & Biceps tendonitis on right  Evaluation Date: 5/11/2023  Authorization Period Expiration: 8/18/2023  Plan of Care Expiration: 8/25/2023  Visit #/Visits authorized: 12 / 24     (# of No Show Appts 0 / Number of Cancelled Appts 0)    Time In: 1317 (pt arrived late for appointment)  Time Out: 1400  Total Appointment Time (timed & untimed codes): 43  minutes    Precautions: Standard and as per protocol       Occupation (including job description if provided):  at [employer]   Job Demands: Standing, walking, lifting, carrying  Prior Medical Treatment: Therapy  and Surgery  Current Work Restrictions: Off duty      Subjective     Pt reports: feeling good, but is having some shoulder pain   She was compliant with home exercise program.  Response to previous treatment: no complaints   Functional change: decreased pain    Pain: 3/10  Location: right shoulder(s)    Objective/Treatment     DOS 5/2/23    FOTO: 50% impairment (Goal 41% impairment)    Celia received the following treatment:  Therapeutic exercises x 30 minutes to improve Range of Motion, posture, strength:    Gripping - Red - x 3 min   Digiflex 3 min  Flexbar wrist extension 3 x 10  Flexbar wrist flexion 3 x 10  Flexbar pronation 3 x 10  Flexbar supination 3 x 10  Overhead pulley flexion 3  min  Overhead pulley scaption 3 min  Shoulder ladder x 15 reps,       Celia participated in neuromuscular re-education activities to improve: Coordination, Kinesthetic, Sense, and Proprioception for 13 minutes. The following activities were included:     Seated table flexion stretch 3 x 10 reps   Seated table external rotation stretch 3 x 10 reps   T-Bar flexion 3 x 10 reps (not performed today)  T-Bar external rotation 3 x 10 reps (not performed today)  Wall wipes 3 x 10 reps (flexion)  Rail wipes 3 x 10 reps   Elbow flexion 3 x 10 reps     Manual therapy:  minutes (not performed today)  Grade I A/P glides for pain relief  Grade I inferior glides for pain relief  Gentle oscillations  Passive Range of Motion within MD protocol limitations     Not performed seconary to time   Shoulder shrugs 3 x 10   Scapular retraction 3 x 10   Cervical range of motion in all planes 3 x 10   Wrist flexion/extension 3 x 10 each  Pronation/supination 3 x 10  Home Exercises Provided and Patient Education Provided     Education provided:   -cont HOME EXERCISE PROGRAM   -apply ice as needed for delayed muscle soreness    Written Home Exercises Provided: Patient instructed to cont prior HEP.  Exercises were reviewed and Celia was able to demonstrate them prior to the end of the session.  Celia demonstrated good  understanding of the education provided.     See EMR under Patient Instructions for exercises provided prior visit.    Assessment     Patient reports minimal pain in her Right shoulder this afternoon.  Pt is progressing with her range of motion within the RCR protocol/bicpes tenodesis.  Pt is scheduled to see her doctor this Wednesday.  No reports of pain or adverse reactions during her exercises today.    Celia Is progressing well towards her goals.   Pt prognosis is Good.     Pt will continue to benefit from skilled Physical Therapy interventions in order to address the deficits listed in the problem list box on  initial evaluation, provide education, and to address the musculoskeletal limitations and work-related functional deficits for their job as a  at [employer].    Pt's spiritual, cultural and educational needs considered and pt agreeable to plan of care and goals.     Anticipated barriers to physical therapy: fear of re-injury    Goals:  Short Term Goals (STG) # weeks Goal Review Date Reviewed Date Met   1. The patient will begin a written HEP 1 ongoing 6/12/2023       2. Increase passive flexion and abduction to 160* 6 ongoing 6/12/2023       3. Increase passive external rotation to 80* 6 ongoing 6/12/2023       4. Decrease pain at worst to 4/10 6 ongoing 6/12/2023          Long Term Goals (LTG) # weeks Goal Review Date Reviewed Date Met   1. The patient will be independent with a HEP for maintenace 12 ongoing 6/12/2023       2. Decrease soft tissue tenderness to mild 12 ongoing 6/12/2023       3. Decrease FOTO score to 41% 12 ongoing 6/12/2023     4. Increase UE strength to 4+/5 12 ongoing 6/12/2023     5. The patient will be able to lift 25# from floor to waist 12 ongoing 6/12/2023        Plan     Plan of care Certification: 5/11/2023 to 8/25/2023.     Outpatient Physical Therapy 3 times weekly for 12 weeks to include the following interventions: Manual Therapy, Neuromuscular Re-ed, Patient Education, Therapeutic Activities, and Therapeutic Exercise.     Cont per Plan of Care, Range of Motion, posture, strengthening per MD protocol.      Mariola Castro, PTA   6/12/2023

## 2023-06-14 ENCOUNTER — OFFICE VISIT (OUTPATIENT)
Dept: ORTHOPEDICS | Facility: CLINIC | Age: 53
End: 2023-06-14
Payer: OTHER GOVERNMENT

## 2023-06-14 VITALS
WEIGHT: 198 LBS | BODY MASS INDEX: 29.33 KG/M2 | SYSTOLIC BLOOD PRESSURE: 115 MMHG | HEIGHT: 69 IN | HEART RATE: 96 BPM | DIASTOLIC BLOOD PRESSURE: 72 MMHG

## 2023-06-14 DIAGNOSIS — M75.41 SHOULDER IMPINGEMENT SYNDROME, RIGHT: Primary | ICD-10-CM

## 2023-06-14 DIAGNOSIS — M75.111 INCOMPLETE TEAR OF RIGHT ROTATOR CUFF, UNSPECIFIED WHETHER TRAUMATIC: ICD-10-CM

## 2023-06-14 DIAGNOSIS — M75.21 BICEPS TENDONITIS ON RIGHT: ICD-10-CM

## 2023-06-14 PROCEDURE — 99024 POSTOP FOLLOW-UP VISIT: CPT | Mod: S$GLB,,, | Performed by: ORTHOPAEDIC SURGERY

## 2023-06-14 PROCEDURE — 99999 PR PBB SHADOW E&M-EST. PATIENT-LVL III: CPT | Mod: PBBFAC,,, | Performed by: ORTHOPAEDIC SURGERY

## 2023-06-14 PROCEDURE — 99024 PR POST-OP FOLLOW-UP VISIT: ICD-10-PCS | Mod: S$GLB,,, | Performed by: ORTHOPAEDIC SURGERY

## 2023-06-14 PROCEDURE — 99999 PR PBB SHADOW E&M-EST. PATIENT-LVL III: ICD-10-PCS | Mod: PBBFAC,,, | Performed by: ORTHOPAEDIC SURGERY

## 2023-06-14 NOTE — PROGRESS NOTES
[facility]  [facility]    Shoulder Post-op Visit  06/14/2023     Diagnosis:  Right shoulder pain  High Grade Partial Rotator Cuff Tear  Biceps Tendonitis  Subacromial Impingement  Prior rotator cuff repair     Procedure: 5/2/23  Arthroscopic Rotator Cuff Repair   Open Biceps Tenodesis  Arthroscopic Shoulder Debridement - Extensive     Subjective:      Celia Alford is a 52 y.o. female who is now 6 weeks status post right shoulder surgery. Tylenol occasionally for pain. The patient denies fever, wound drainage, increasing redness, pus, increasing pain, increasing swelling. Post op problems reported: none.    Doing well overall. In therapy. No issues noted.      Objective:      Ortho/SPM Exam  General: alert, appears stated age, and cooperative   Sutures: Sutures out.  Incision: healing well, no significant drainage, no dehiscence, no significant erythema  Tenderness: none  Forward Flexion: 90 degrees  External Rotation: 30 degrees  Elbow/Wrist/Hand: Full ROM  Neuro: Intact to light touch Ax/R/U/M  Vascular: CR<2s. Palpable radial pulse      Imaging:  Radiographs of the right shoulder taken  5/17/2023  were personally reviewed from the [facility] EMR.  Multiple views of the shoulder are available today for review, including an AP, scapular Y, axillary view.  The hardware placed in recent surgery is in appropriate position with no evidence of hardware failure or loosening.  There is no acute fracture or dislocation.         Assessment:       The patient is status post right shoulder surgery. The primary encounter diagnosis was Shoulder impingement syndrome, right. Diagnoses of Incomplete tear of right rotator cuff, unspecified whether traumatic and Biceps tendonitis on right were also pertinent to this visit.  Doing well postoperatively. Continuation of post-op rehab course is recommended at this time. All of the patient's questions were  answered.    Doing well.  Return to work form given for light duty only.      Plan:      Tylenol 650mg TID PRN for pain.  Continue physical therapy.  Discontinue use of shoulder immobilizer/sling.  Return to work light duty.        Olman Pillai IV, MD   of Clinical Orthopedics  Department of Orthopedic Surgery  [facility]  Office: [phone]  Website: [email or web address].        No orders of the defined types were placed in this encounter.

## 2023-06-16 ENCOUNTER — CLINICAL SUPPORT (OUTPATIENT)
Dept: REHABILITATION | Facility: HOSPITAL | Age: 53
End: 2023-06-16
Attending: ORTHOPAEDIC SURGERY
Payer: OTHER GOVERNMENT

## 2023-06-16 DIAGNOSIS — M75.41 SHOULDER IMPINGEMENT SYNDROME, RIGHT: ICD-10-CM

## 2023-06-16 DIAGNOSIS — M25.611 DECREASED ROM OF RIGHT SHOULDER: ICD-10-CM

## 2023-06-16 DIAGNOSIS — M75.111 INCOMPLETE TEAR OF RIGHT ROTATOR CUFF, UNSPECIFIED WHETHER TRAUMATIC: Primary | ICD-10-CM

## 2023-06-16 DIAGNOSIS — M75.21 BICEPS TENDONITIS ON RIGHT: ICD-10-CM

## 2023-06-16 DIAGNOSIS — R29.898 DECREASED STRENGTH OF UPPER EXTREMITY: ICD-10-CM

## 2023-06-16 PROCEDURE — 97140 MANUAL THERAPY 1/> REGIONS: CPT | Mod: PN

## 2023-06-16 PROCEDURE — 97110 THERAPEUTIC EXERCISES: CPT | Mod: PN

## 2023-06-16 PROCEDURE — 97112 NEUROMUSCULAR REEDUCATION: CPT | Mod: PN

## 2023-06-16 NOTE — PROGRESS NOTES
Physical Therapy Daily Treatment Note     Name: Celia Hernandez shawn  Clinic Number: [id number]    Therapy Diagnosis:   Encounter Diagnoses   Name Primary?    Incomplete tear of right rotator cuff, unspecified whether traumatic Yes    Shoulder impingement syndrome, right     Biceps tendonitis on right     Decreased ROM of right shoulder     Decreased strength of upper extremity            Physician: Olman Pillai IV, MD    Visit Date: 6/16/2023    Physician Orders: PT Eval and Treat as per rotator cuff and biceps tenodesis protocol  Medical Diagnosis from Referral: Incomplete tear of right rotator cuff, unspecified whether traumatic, Shoulder impingement syndrome, right & Biceps tendonitis on right  Evaluation Date: 5/11/2023  Authorization Period Expiration: 8/18/2023  Plan of Care Expiration: 8/25/2023  Visit #/Visits authorized: 13 / 24     (# of No Show Appts 0 / Number of Cancelled Appts 0)    Time In: 1 pm  Time Out: 2 pm  Total Appointment Time (timed & untimed codes): 60 minutes    Precautions: Standard and as per protocol       Occupation (including job description if provided):  at [employer]   Job Demands: Standing, walking, lifting, carrying  Prior Medical Treatment: Therapy  and Surgery  Current Work Restrictions: Off duty      Subjective     Pt reports: patient arriving to clinic without sling today. Says she saw the MD 2 days ago and he released her to go back to work light duty. She still has some pain and cannot lift the arm much but is improving  She was compliant with home exercise program.  Response to previous treatment: no complaints   Functional change: decreased pain    Pain: 3/10  Location: right shoulder(s)    Objective/Treatment     DOS 5/2/23    FOTO: 50% impairment (Goal 41% impairment)    Celia received the following treatment:  Therapeutic exercises x 30 minutes to improve Range of Motion, posture, strength:    Supine flexion to available pain free range 2 x 5  Supine horizontal  abduction to available pain free range 2 x 5  Side-lying ER 2 x 10  Prone extension 2 x 10  Prone Row 2 x 10  Prone horizontal abduction x 5    Overhead pulley flexion 3 min  Overhead pulley scaption 3 min  Shoulder ladder x 15 reps      Celia participated in neuromuscular re-education activities to improve: Coordination, Kinesthetic, Sense, and Proprioception for 13 minutes. The following activities were included:     T-Bar flexion 3 x 10 reps  T-Bar external rotation 3 x 10 reps   Wall wipes flexion and scaption x 15 ea  Elbow flexion 3 x 10 reps     Manual therapy:  17 mins  Grade III A/P glides for   Grade III inferior glides   Passive Range of Motion within MD protocol limitations   Scapular mobs in side-lying       Home Exercises Provided and Patient Education Provided     Education provided:   -cont HOME EXERCISE PROGRAM   -apply ice as needed for delayed muscle soreness    Written Home Exercises Provided: Patient instructed to cont prior HEP.  Exercises were reviewed and Celia was able to demonstrate them prior to the end of the session.  Celia demonstrated good  understanding of the education provided.     See EMR under Patient Instructions for exercises provided prior visit.    Assessment     Initiated AROM exercises this date, ensuring patient maintaining pain free ranges. Patient with good tolerance to today's treatment. She demonstrated improvement in AROM flexion as the session progressed, although she continues with end range pain and tightness limiting further progression. Patient's AROM ER is where we would expect it at this stage of recovery. Advised cold pack if she begins to feel pain/DOMS later. She is progressing well.     Celia Is progressing well towards her goals.   Pt prognosis is Good.     Pt will continue to benefit from skilled Physical Therapy interventions in order to address the deficits listed in the problem list box on initial evaluation, provide education, and to  address the musculoskeletal limitations and work-related functional deficits for their job as a  at [employer].    Pt's spiritual, cultural and educational needs considered and pt agreeable to plan of care and goals.     Anticipated barriers to physical therapy: fear of re-injury    Goals:  Short Term Goals (STG) # weeks Goal Review Date Reviewed Date Met   1. The patient will begin a written HEP 1 ongoing 6/16/2023       2. Increase passive flexion and abduction to 160* 6 ongoing 6/16/2023       3. Increase passive external rotation to 80* 6 ongoing 6/16/2023       4. Decrease pain at worst to 4/10 6 ongoing 6/16/2023          Long Term Goals (LTG) # weeks Goal Review Date Reviewed Date Met   1. The patient will be independent with a HEP for maintenace 12 ongoing 6/16/2023       2. Decrease soft tissue tenderness to mild 12 ongoing 6/16/2023       3. Decrease FOTO score to 41% 12 ongoing 6/16/2023     4. Increase UE strength to 4+/5 12 ongoing 6/16/2023     5. The patient will be able to lift 25# from floor to waist 12 ongoing 6/16/2023        Plan     Plan of care Certification: 5/11/2023 to 8/25/2023.     Outpatient Physical Therapy 3 times weekly for 12 weeks to include the following interventions: Manual Therapy, Neuromuscular Re-ed, Patient Education, Therapeutic Activities, and Therapeutic Exercise.     Cont per Plan of Care, Range of Motion, posture, strengthening per MD protocol.      Yamini Hoover, PT   6/16/2023

## 2023-06-21 ENCOUNTER — CLINICAL SUPPORT (OUTPATIENT)
Dept: REHABILITATION | Facility: HOSPITAL | Age: 53
End: 2023-06-21
Attending: ORTHOPAEDIC SURGERY
Payer: OTHER GOVERNMENT

## 2023-06-21 DIAGNOSIS — R29.898 DECREASED STRENGTH OF UPPER EXTREMITY: ICD-10-CM

## 2023-06-21 DIAGNOSIS — M75.21 BICEPS TENDONITIS ON RIGHT: ICD-10-CM

## 2023-06-21 DIAGNOSIS — M25.611 DECREASED ROM OF RIGHT SHOULDER: ICD-10-CM

## 2023-06-21 DIAGNOSIS — M75.41 SHOULDER IMPINGEMENT SYNDROME, RIGHT: Primary | ICD-10-CM

## 2023-06-21 PROCEDURE — 97112 NEUROMUSCULAR REEDUCATION: CPT | Mod: PN | Performed by: PHYSICAL THERAPIST

## 2023-06-21 PROCEDURE — 97110 THERAPEUTIC EXERCISES: CPT | Mod: PN | Performed by: PHYSICAL THERAPIST

## 2023-06-21 PROCEDURE — 97140 MANUAL THERAPY 1/> REGIONS: CPT | Mod: PN | Performed by: PHYSICAL THERAPIST

## 2023-06-21 NOTE — PROGRESS NOTES
Physical Therapy Daily Treatment Note     Name: Celia Hernandez [facility]  Clinic Number: [id number]    Therapy Diagnosis:   Encounter Diagnoses   Name Primary?    Shoulder impingement syndrome, right Yes    Biceps tendonitis on right     Decreased ROM of right shoulder     Decreased strength of upper extremity        Physician: Olman Pillai IV, MD    Visit Date: 6/21/2023    Physician Orders: PT Eval and Treat as per rotator cuff and biceps tenodesis protocol  Medical Diagnosis from Referral: Incomplete tear of right rotator cuff, unspecified whether traumatic, Shoulder impingement syndrome, right & Biceps tendonitis on right  Evaluation Date: 5/11/2023  Authorization Period Expiration: 8/18/2023  Plan of Care Expiration: 8/25/2023  Visit #/Visits authorized: 14 / 24     (# of No Show Appts 0 / Number of Cancelled Appts 0)    Time In: 800  Time Out: 900  Total Appointment Time (timed & untimed codes): 60 minutes    Precautions: Standard and as per protocol       Occupation (including job description if provided):  at [employer]   Job Demands: Standing, walking, lifting, carrying  Prior Medical Treatment: Therapy  and Surgery  Current Work Restrictions: Off duty      Subjective     Pt reports: she returned to work at light duty, sitting during the day and working on the computer during her shift.  She was compliant with home exercise program.  Response to previous treatment: no complaints   Functional change: decreased pain    Pain: 3/10  Location: right shoulder(s)    Objective/Treatment     DOS 5/2/23    FOTO: 50% impairment (Goal 41% impairment)    Celia received the following treatment:  Therapeutic exercises x 35 minutes to improve Range of Motion, posture, strength:    Supine flexion to available pain free range 2 x 5  Supine horizontal abduction to available pain free range 2 x 5  Side-lying ER 2 x 10  Prone extension 2 x 10  Prone Row 2 x 10  Prone horizontal abduction x 5  Side lying shoulder flexion with pole  3 x 10    Overhead pulley flexion 3 min  Overhead pulley scaption 3 min  Shoulder ladder x 15 reps      Celia participated in neuromuscular re-education activities to improve: Coordination, Kinesthetic, Sense, and Proprioception for 15 minutes. The following activities were included:     T-Bar flexion 3 x 10 reps  T-Bar external rotation 3 x 10 reps   Wall wipes flexion and scaption x 15 ea  Elbow flexion 3 x 10 reps     Manual therapy:  10 mins  Grade III A/P glides for   Grade III inferior glides   Passive Range of Motion within MD protocol limitations   Scapular mobs in side-lying       Home Exercises Provided and Patient Education Provided     Education provided:   -cont HOME EXERCISE PROGRAM   -apply ice as needed for delayed muscle soreness    Written Home Exercises Provided: Patient instructed to cont prior HEP.  Exercises were reviewed and Celia was able to demonstrate them prior to the end of the session.  Celia demonstrated good  understanding of the education provided.     See EMR under Patient Instructions for exercises provided prior visit.    Assessment     The patient is progressing with increased AAROM. She has returned to work at limited demand level.      Celia Is progressing well towards her goals.   Pt prognosis is Good.     Pt will continue to benefit from skilled Physical Therapy interventions in order to address the deficits listed in the problem list box on initial evaluation, provide education, and to address the musculoskeletal limitations and work-related functional deficits for their job as a  at [employer].    Pt's spiritual, cultural and educational needs considered and pt agreeable to plan of care and goals.     Anticipated barriers to physical therapy: fear of re-injury    Goals:  Short Term Goals (STG) # weeks Goal Review Date Reviewed Date Met   1. The patient will begin a written HEP 1 ongoing 6/21/2023       2. Increase passive flexion and abduction to 160* 6 ongoing  6/21/2023       3. Increase passive external rotation to 80* 6 ongoing 6/21/2023       4. Decrease pain at worst to 4/10 6 ongoing 6/21/2023          Long Term Goals (LTG) # weeks Goal Review Date Reviewed Date Met   1. The patient will be independent with a HEP for maintenace 12 ongoing 6/21/2023       2. Decrease soft tissue tenderness to mild 12 ongoing 6/21/2023       3. Decrease FOTO score to 41% 12 ongoing 6/21/2023     4. Increase UE strength to 4+/5 12 ongoing 6/21/2023     5. The patient will be able to lift 25# from floor to waist 12 ongoing 6/21/2023        Plan     Plan of care Certification: 5/11/2023 to 8/25/2023.     Outpatient Physical Therapy 3 times weekly for 12 weeks to include the following interventions: Manual Therapy, Neuromuscular Re-ed, Patient Education, Therapeutic Activities, and Therapeutic Exercise.     Cont per Plan of Care, Range of Motion, posture, strengthening per MD protocol.      Chava Marquis, PT   6/21/2023

## 2023-06-23 ENCOUNTER — CLINICAL SUPPORT (OUTPATIENT)
Dept: REHABILITATION | Facility: HOSPITAL | Age: 53
End: 2023-06-23
Attending: ORTHOPAEDIC SURGERY
Payer: OTHER GOVERNMENT

## 2023-06-23 DIAGNOSIS — M25.611 DECREASED ROM OF RIGHT SHOULDER: ICD-10-CM

## 2023-06-23 DIAGNOSIS — M75.21 BICEPS TENDONITIS ON RIGHT: ICD-10-CM

## 2023-06-23 DIAGNOSIS — R29.898 DECREASED STRENGTH OF UPPER EXTREMITY: ICD-10-CM

## 2023-06-23 DIAGNOSIS — M75.41 SHOULDER IMPINGEMENT SYNDROME, RIGHT: Primary | ICD-10-CM

## 2023-06-23 PROCEDURE — 97110 THERAPEUTIC EXERCISES: CPT | Mod: PN,CQ

## 2023-06-23 PROCEDURE — 97112 NEUROMUSCULAR REEDUCATION: CPT | Mod: PN,CQ

## 2023-06-23 NOTE — PROGRESS NOTES
Physical Therapy Daily Treatment Note     Name: Celia Hernandez [facility]awn  Clinic Number: [id number]    Therapy Diagnosis:   Encounter Diagnoses   Name Primary?    Shoulder impingement syndrome, right Yes    Biceps tendonitis on right     Decreased ROM of right shoulder     Decreased strength of upper extremity        Physician: Olman Pillai IV, MD    Visit Date: 6/23/2023    Physician Orders: PT Eval and Treat as per rotator cuff and biceps tenodesis protocol  Medical Diagnosis from Referral: Incomplete tear of right rotator cuff, unspecified whether traumatic, Shoulder impingement syndrome, right & Biceps tendonitis on right  Evaluation Date: 5/11/2023  Authorization Period Expiration: 8/18/2023  Plan of Care Expiration: 8/25/2023  Visit #/Visits authorized: 15 / 24     (# of No Show Appts 0 / Number of Cancelled Appts 0)    Time In: 110  Time Out: 200  Total Appointment Time (timed & untimed codes): 50 minutes    Precautions: Standard and as per protocol       Occupation (including job description if provided):  at [employer]   Job Demands: Standing, walking, lifting, carrying  Prior Medical Treatment: Therapy  and Surgery  Current Work Restrictions: Off duty      Subjective     Pt reports: the shoulder is getting there.   She was compliant with home exercise program.  Response to previous treatment: no complaints   Functional change: decreased pain    Pain: 3/10  Location: right shoulder(s)    Objective/Treatment     DOS 5/2/23      Celia received the following treatment:  Therapeutic exercises x 35 minutes to improve Range of Motion, posture, strength:    Side-lying ER 2 x 10  Prone extension 1# 2 x 10  Prone Row 1# 2 x 10  Prone scap retractions 1# 2 x 10     Overhead pulley flexion 3 min  Overhead pulley scaption 3 min  Finger ladder x 10       Celia participated in neuromuscular re-education activities to improve: Coordination, Kinesthetic, Sense, and Proprioception for 23 minutes. The following activities  were included:     T-Bar flexion 3 x 10 reps  T-Bar external rotation 3 x 10 reps   Wall wipes flexion and scaption x 15 ea  Red Theraband walkouts x 20 reps each direction   Shoulder extension/rows Red Theraband 20x each       Manual therapy:  0 mins  Grade III A/P glides for   Grade III inferior glides   Passive Range of Motion within MD protocol limitations   Scapular mobs in side-lying       Home Exercises Provided and Patient Education Provided     Education provided:   -cont HOME EXERCISE PROGRAM   -apply ice as needed for delayed muscle soreness    Written Home Exercises Provided: Patient instructed to cont prior HEP.  Exercises were reviewed and Celia was able to demonstrate them prior to the end of the session.  Celia demonstrated good  understanding of the education provided.     See EMR under Patient Instructions for exercises provided prior visit.    Assessment     The patient is progressing very well with range of motion, strength, and stability. Continue to progress within protocol.     Celia Is progressing well towards her goals.   Pt prognosis is Good.     Pt will continue to benefit from skilled Physical Therapy interventions in order to address the deficits listed in the problem list box on initial evaluation, provide education, and to address the musculoskeletal limitations and work-related functional deficits for their job as a  at [employer].    Pt's spiritual, cultural and educational needs considered and pt agreeable to plan of care and goals.     Anticipated barriers to physical therapy: fear of re-injury    Goals:  Short Term Goals (STG) # weeks Goal Review Date Reviewed Date Met   1. The patient will begin a written HEP 1 ongoing 6/23/2023       2. Increase passive flexion and abduction to 160* 6 ongoing 6/23/2023       3. Increase passive external rotation to 80* 6 ongoing 6/23/2023       4. Decrease pain at worst to 4/10 6 ongoing 6/23/2023          Long Term Goals (LTG) # weeks  Goal Review Date Reviewed Date Met   1. The patient will be independent with a HEP for maintenace 12 ongoing 6/23/2023       2. Decrease soft tissue tenderness to mild 12 ongoing 6/23/2023       3. Decrease FOTO score to 41% 12 ongoing 6/23/2023     4. Increase UE strength to 4+/5 12 ongoing 6/23/2023     5. The patient will be able to lift 25# from floor to waist 12 ongoing 6/23/2023        Plan     Plan of care Certification: 5/11/2023 to 8/25/2023.     Outpatient Physical Therapy 3 times weekly for 12 weeks to include the following interventions: Manual Therapy, Neuromuscular Re-ed, Patient Education, Therapeutic Activities, and Therapeutic Exercise.     Cont per Plan of Care, Range of Motion, posture, strengthening per MD protocol.      Jillian Reynoso, PTA   6/23/2023

## 2023-06-26 ENCOUNTER — CLINICAL SUPPORT (OUTPATIENT)
Dept: REHABILITATION | Facility: HOSPITAL | Age: 53
End: 2023-06-26
Attending: ORTHOPAEDIC SURGERY
Payer: OTHER GOVERNMENT

## 2023-06-26 DIAGNOSIS — M75.21 BICEPS TENDONITIS ON RIGHT: ICD-10-CM

## 2023-06-26 DIAGNOSIS — R29.898 DECREASED STRENGTH OF UPPER EXTREMITY: ICD-10-CM

## 2023-06-26 DIAGNOSIS — M25.611 DECREASED ROM OF RIGHT SHOULDER: ICD-10-CM

## 2023-06-26 DIAGNOSIS — M75.41 SHOULDER IMPINGEMENT SYNDROME, RIGHT: Primary | ICD-10-CM

## 2023-06-26 PROCEDURE — 97110 THERAPEUTIC EXERCISES: CPT | Mod: PN | Performed by: PHYSICAL THERAPIST

## 2023-06-26 PROCEDURE — 97112 NEUROMUSCULAR REEDUCATION: CPT | Mod: PN | Performed by: PHYSICAL THERAPIST

## 2023-06-26 PROCEDURE — 97140 MANUAL THERAPY 1/> REGIONS: CPT | Mod: PN | Performed by: PHYSICAL THERAPIST

## 2023-06-26 NOTE — PROGRESS NOTES
Physical Therapy Daily Treatment Note     Name: Celia Hernandez [facility]  Clinic Number: [id number]    Therapy Diagnosis:   Encounter Diagnoses   Name Primary?    Shoulder impingement syndrome, right Yes    Biceps tendonitis on right     Decreased ROM of right shoulder     Decreased strength of upper extremity          Physician: Olman Pillai IV, MD    Visit Date: 6/26/2023    Physician Orders: PT Eval and Treat as per rotator cuff and biceps tenodesis protocol  Medical Diagnosis from Referral: Incomplete tear of right rotator cuff, unspecified whether traumatic, Shoulder impingement syndrome, right & Biceps tendonitis on right  Evaluation Date: 5/11/2023  Authorization Period Expiration: 8/18/2023  Plan of Care Expiration: 8/25/2023  Visit #/Visits authorized: 16 / 24     (# of No Show Appts 0 / Number of Cancelled Appts 0)    Time In: 800  Time Out: 900  Total Appointment Time (timed & untimed codes): 60 minutes    Precautions: Standard and as per protocol       Occupation (including job description if provided):  at [employer]   Job Demands: Standing, walking, lifting, carrying  Prior Medical Treatment: Therapy  and Surgery  Current Work Restrictions: Off duty      Subjective     Pt reports: waking up with shoulder pain  She was compliant with home exercise program.  Response to previous treatment: no complaints   Functional change: decreased pain    Pain: 4/10  Location: right shoulder(s)    Objective/Treatment     DOS 5/2/23      Celia received the following treatment:  Therapeutic exercises x 20 minutes to improve Range of Motion, posture, strength:    Overhead pulley flexion 3 min  Overhead pulley scaption 3 min  Finger ladder x 10   Wall wipes flexion and scaption x 15 ea    Celia participated in neuromuscular re-education activities to improve: Coordination, Kinesthetic, Sense, and Proprioception for 30 minutes. The following activities were included:     T-Bar flexion 3 x 10 reps  T-Bar external rotation  3 x 10 reps     Red Theraband walkouts x 20 reps each direction   Shoulder extension/rows Red Theraband 20x each     Prone shoulder extension 3 x 10 1#  Prone shoulder high row 3 x 10 1#  Prone scapular retraction 3 x 10 1#  Side lying shoulder ER 3 x 10 1#      Side lying shoulder flexion with pole 3 x 10    AAROM on CC 3 x 10 3# flexion  AAROM on CC 3 x 10 3# scaption    Manual therapy:  10 mins  Grade III A/P glides for   Grade III inferior glides   Passive Range of Motion within MD protocol limitations   Scapular mobs in side-lying  Not performed seconary to time       Home Exercises Provided and Patient Education Provided     Education provided:   -cont HOME EXERCISE PROGRAM   -apply ice as needed for delayed muscle soreness    Written Home Exercises Provided: Patient instructed to cont prior HEP.  Exercises were reviewed and Celia was able to demonstrate them prior to the end of the session.  Celia demonstrated good  understanding of the education provided.     See EMR under Patient Instructions for exercises provided prior visit.    Assessment     The patient  presents with continued right shoulder pain. She displays improved PROM with minimal guarding at end range. Patient tolerated treatment well without report of adverse effects.    Celia Is progressing well towards her goals.   Pt prognosis is Good.     Pt will continue to benefit from skilled Physical Therapy interventions in order to address the deficits listed in the problem list box on initial evaluation, provide education, and to address the musculoskeletal limitations and work-related functional deficits for their job as a  at [employer].    Pt's spiritual, cultural and educational needs considered and pt agreeable to plan of care and goals.     Anticipated barriers to physical therapy: fear of re-injury    Goals:  Short Term Goals (STG) # weeks Goal Review Date Reviewed Date Met   1. The patient will begin a written HEP 1 ongoing  6/26/2023       2. Increase passive flexion and abduction to 160* 6 ongoing 6/26/2023       3. Increase passive external rotation to 80* 6 ongoing 6/26/2023       4. Decrease pain at worst to 4/10 6 ongoing 6/26/2023          Long Term Goals (LTG) # weeks Goal Review Date Reviewed Date Met   1. The patient will be independent with a HEP for maintenace 12 ongoing 6/26/2023       2. Decrease soft tissue tenderness to mild 12 ongoing 6/26/2023       3. Decrease FOTO score to 41% 12 ongoing 6/26/2023     4. Increase UE strength to 4+/5 12 ongoing 6/26/2023     5. The patient will be able to lift 25# from floor to waist 12 ongoing 6/26/2023        Plan     Plan of care Certification: 5/11/2023 to 8/25/2023.     Outpatient Physical Therapy 3 times weekly for 12 weeks to include the following interventions: Manual Therapy, Neuromuscular Re-ed, Patient Education, Therapeutic Activities, and Therapeutic Exercise.     Cont per Plan of Care, Range of Motion, posture, strengthening per MD protocol.      Chava Marquis, PT   6/26/2023

## 2023-06-27 ENCOUNTER — PATIENT MESSAGE (OUTPATIENT)
Dept: OBSTETRICS AND GYNECOLOGY | Facility: CLINIC | Age: 53
End: 2023-06-27
Payer: COMMERCIAL

## 2023-06-28 ENCOUNTER — LAB VISIT (OUTPATIENT)
Dept: LAB | Facility: OTHER | Age: 53
End: 2023-06-28
Attending: STUDENT IN AN ORGANIZED HEALTH CARE EDUCATION/TRAINING PROGRAM
Payer: COMMERCIAL

## 2023-06-28 ENCOUNTER — TELEPHONE (OUTPATIENT)
Dept: OBSTETRICS AND GYNECOLOGY | Facility: CLINIC | Age: 53
End: 2023-06-28
Payer: COMMERCIAL

## 2023-06-28 DIAGNOSIS — R35.0 URINARY FREQUENCY: ICD-10-CM

## 2023-06-28 DIAGNOSIS — R35.0 URINARY FREQUENCY: Primary | ICD-10-CM

## 2023-06-28 PROCEDURE — 87088 URINE BACTERIA CULTURE: CPT | Performed by: STUDENT IN AN ORGANIZED HEALTH CARE EDUCATION/TRAINING PROGRAM

## 2023-06-28 PROCEDURE — 87186 SC STD MICRODIL/AGAR DIL: CPT | Performed by: STUDENT IN AN ORGANIZED HEALTH CARE EDUCATION/TRAINING PROGRAM

## 2023-06-28 PROCEDURE — 87077 CULTURE AEROBIC IDENTIFY: CPT | Performed by: STUDENT IN AN ORGANIZED HEALTH CARE EDUCATION/TRAINING PROGRAM

## 2023-06-28 PROCEDURE — 87086 URINE CULTURE/COLONY COUNT: CPT | Performed by: STUDENT IN AN ORGANIZED HEALTH CARE EDUCATION/TRAINING PROGRAM

## 2023-06-30 LAB — BACTERIA UR CULT: ABNORMAL

## 2023-06-30 RX ORDER — CIPROFLOXACIN 250 MG/1
250 TABLET, FILM COATED ORAL 2 TIMES DAILY
Qty: 10 TABLET | Refills: 0 | Status: SHIPPED | OUTPATIENT
Start: 2023-06-30 | End: 2023-07-05

## 2023-07-03 ENCOUNTER — CLINICAL SUPPORT (OUTPATIENT)
Dept: REHABILITATION | Facility: HOSPITAL | Age: 53
End: 2023-07-03
Attending: ORTHOPAEDIC SURGERY
Payer: OTHER GOVERNMENT

## 2023-07-03 ENCOUNTER — PATIENT MESSAGE (OUTPATIENT)
Dept: REHABILITATION | Facility: HOSPITAL | Age: 53
End: 2023-07-03
Payer: COMMERCIAL

## 2023-07-03 DIAGNOSIS — M25.611 DECREASED ROM OF RIGHT SHOULDER: ICD-10-CM

## 2023-07-03 DIAGNOSIS — M75.21 BICEPS TENDONITIS ON RIGHT: ICD-10-CM

## 2023-07-03 DIAGNOSIS — M75.41 SHOULDER IMPINGEMENT SYNDROME, RIGHT: Primary | ICD-10-CM

## 2023-07-03 DIAGNOSIS — R29.898 DECREASED STRENGTH OF UPPER EXTREMITY: ICD-10-CM

## 2023-07-03 PROCEDURE — 97112 NEUROMUSCULAR REEDUCATION: CPT | Mod: PN | Performed by: PHYSICAL THERAPIST

## 2023-07-03 PROCEDURE — 97110 THERAPEUTIC EXERCISES: CPT | Mod: PN | Performed by: PHYSICAL THERAPIST

## 2023-07-03 NOTE — PROGRESS NOTES
Physical Therapy Daily Treatment Note     Name: Celia Hernandez [facility]  Clinic Number: [id number]    Therapy Diagnosis:   Encounter Diagnoses   Name Primary?    Shoulder impingement syndrome, right Yes    Biceps tendonitis on right     Decreased ROM of right shoulder     Decreased strength of upper extremity            Physician: Olman Pillai IV, MD    Visit Date: 7/3/2023    Physician Orders: PT Eval and Treat as per rotator cuff and biceps tenodesis protocol  Medical Diagnosis from Referral: Incomplete tear of right rotator cuff, unspecified whether traumatic, Shoulder impingement syndrome, right & Biceps tendonitis on right  Evaluation Date: 5/11/2023  Authorization Period Expiration: 8/18/2023  Plan of Care Expiration: 8/25/2023  Visit #/Visits authorized: 17 / 24     (# of No Show Appts 0 / Number of Cancelled Appts 2)    Time In: 1400  Time Out: 1500  Total Appointment Time (timed & untimed codes): 60 minutes    Precautions: Standard and as per protocol       Occupation (including job description if provided):  at [employer]   Job Demands: Standing, walking, lifting, carrying  Prior Medical Treatment: Therapy  and Surgery  Current Work Restrictions: Off duty      Subjective     Pt reports: she had to cancel a some appointments due to a death in the family  She was compliant with home exercise program.  Response to previous treatment: no complaints   Functional change: decreased pain    Pain: 4/10  Location: right shoulder(s)    Objective/Treatment     DOS 5/2/23      Celia received the following treatment:  Therapeutic exercises x 20 minutes to improve Range of Motion, posture, strength:    Overhead pulley flexion 3 min  Overhead pulley scaption 3 min  Finger ladder x 10   Wall wipes flexion and scaption x 15 ea    Celia participated in neuromuscular re-education activities to improve: Coordination, Kinesthetic, Sense, and Proprioception for 40 minutes. The following activities were included:     T-Bar  flexion 3 x 10 reps  T-Bar external rotation 3 x 10 reps     Red Theraband walkouts x 20 reps each direction   Shoulder extension/rows Red Theraband 20x each     Prone shoulder extension 3 x 10 1#  Prone shoulder high row 3 x 10 1#  Prone scapular retraction 3 x 10 1#  Side lying shoulder ER 3 x 10 1#      Side lying shoulder flexion with pole 3 x 10    AAROM on CC 3 x 10 3# flexion  AAROM on CC 3 x 10 3# scaption    Manual therapy:  0 mins  Grade III A/P glides for   Grade III inferior glides   Passive Range of Motion within MD protocol limitations   Scapular mobs in side-lying  Not performed seconary to time       Home Exercises Provided and Patient Education Provided     Education provided:   -cont HOME EXERCISE PROGRAM   -apply ice as needed for delayed muscle soreness    Written Home Exercises Provided: Patient instructed to cont prior HEP.  Exercises were reviewed and Celia was able to demonstrate them prior to the end of the session.  Celia demonstrated good  understanding of the education provided.     See EMR under Patient Instructions for exercises provided prior visit.    Assessment     Treatment focused on increasing active ROM. She displayed good effort with all exercises. Patient tolerated treatment well without report of adverse effects.    Celia Is progressing well towards her goals.   Pt prognosis is Good.     Pt will continue to benefit from skilled Physical Therapy interventions in order to address the deficits listed in the problem list box on initial evaluation, provide education, and to address the musculoskeletal limitations and work-related functional deficits for their job as a  at [employer].    Pt's spiritual, cultural and educational needs considered and pt agreeable to plan of care and goals.     Anticipated barriers to physical therapy: fear of re-injury    Goals:  Short Term Goals (STG) # weeks Goal Review Date Reviewed Date Met   1. The patient will begin a written HEP 1  ongoing 7/3/2023       2. Increase passive flexion and abduction to 160* 6 ongoing 7/3/2023       3. Increase passive external rotation to 80* 6 ongoing 7/3/2023       4. Decrease pain at worst to 4/10 6 ongoing 7/3/2023          Long Term Goals (LTG) # weeks Goal Review Date Reviewed Date Met   1. The patient will be independent with a HEP for maintenace 12 ongoing 7/3/2023       2. Decrease soft tissue tenderness to mild 12 ongoing 7/3/2023       3. Decrease FOTO score to 41% 12 ongoing 7/3/2023     4. Increase UE strength to 4+/5 12 ongoing 7/3/2023     5. The patient will be able to lift 25# from floor to waist 12 ongoing 7/3/2023        Plan     Plan of care Certification: 5/11/2023 to 8/25/2023.     Outpatient Physical Therapy 3 times weekly for 12 weeks to include the following interventions: Manual Therapy, Neuromuscular Re-ed, Patient Education, Therapeutic Activities, and Therapeutic Exercise.     Cont per Plan of Care, Range of Motion, posture, strengthening per MD protocol.      Cahva Marquis, PT   7/3/2023

## 2023-07-05 ENCOUNTER — CLINICAL SUPPORT (OUTPATIENT)
Dept: REHABILITATION | Facility: HOSPITAL | Age: 53
End: 2023-07-05
Attending: ORTHOPAEDIC SURGERY
Payer: OTHER GOVERNMENT

## 2023-07-05 DIAGNOSIS — M75.41 SHOULDER IMPINGEMENT SYNDROME, RIGHT: Primary | ICD-10-CM

## 2023-07-05 DIAGNOSIS — R29.898 DECREASED STRENGTH OF UPPER EXTREMITY: ICD-10-CM

## 2023-07-05 DIAGNOSIS — M25.611 DECREASED ROM OF RIGHT SHOULDER: ICD-10-CM

## 2023-07-05 DIAGNOSIS — M75.21 BICEPS TENDONITIS ON RIGHT: ICD-10-CM

## 2023-07-05 PROCEDURE — 97112 NEUROMUSCULAR REEDUCATION: CPT | Mod: PN | Performed by: PHYSICAL THERAPIST

## 2023-07-05 PROCEDURE — 97110 THERAPEUTIC EXERCISES: CPT | Mod: PN | Performed by: PHYSICAL THERAPIST

## 2023-07-05 NOTE — PROGRESS NOTES
Physical Therapy Daily Treatment Note     Name: Celia Hernandez [facility]  Clinic Number: [id number]    Therapy Diagnosis:   Encounter Diagnoses   Name Primary?    Shoulder impingement syndrome, right Yes    Biceps tendonitis on right     Decreased ROM of right shoulder     Decreased strength of upper extremity            Physician: Olman Pillai IV, MD    Visit Date: 7/5/2023    Physician Orders: PT Eval and Treat as per rotator cuff and biceps tenodesis protocol  Medical Diagnosis from Referral: Incomplete tear of right rotator cuff, unspecified whether traumatic, Shoulder impingement syndrome, right & Biceps tendonitis on right  Evaluation Date: 5/11/2023  Authorization Period Expiration: 8/18/2023  Plan of Care Expiration: 8/25/2023  Visit #/Visits authorized: 18 / 24     (# of No Show Appts 0 / Number of Cancelled Appts 2)    Time In: 1400  Time Out: 1500  Total Appointment Time (timed & untimed codes): 60 minutes    Precautions: Standard and as per protocol       Occupation (including job description if provided):  at [employer]   Job Demands: Standing, walking, lifting, carrying  Prior Medical Treatment: Therapy  and Surgery  Current Work Restrictions: Off duty      Subjective     Pt reports: she is returning to work for 4 hours tomorrow   She was compliant with home exercise program.  Response to previous treatment: no complaints   Functional change: decreased pain    Pain: 4/10  Location: right shoulder(s)    Objective/Treatment     DOS 5/2/23      Celia received the following treatment:  Therapeutic exercises x 20 minutes to improve Range of Motion, posture, strength:    Overhead pulley flexion 3 min  Overhead pulley scaption 3 min  Finger ladder x 10   Wall wipes flexion and scaption x 15 ea    Celia participated in neuromuscular re-education activities to improve: Coordination, Kinesthetic, Sense, and Proprioception for 40 minutes. The following activities were included:     T-Bar flexion 3 x 10  reps  T-Bar external rotation 3 x 10 reps     Red Theraband walkouts x 20 reps each direction   Shoulder extension/rows Red Theraband 20x each     Prone shoulder extension 3 x 10 1#  Prone shoulder high row 3 x 10 1#  Prone scapular retraction 3 x 10 1#  Side lying shoulder ER 3 x 10 1#      Side lying shoulder flexion with pole 3 x 10    Cable column extension 3 x 10  3#  B  Cable column adduction 3 x 10  3#  B  Cable column mid row 3 x 10 3#        AAROM on CC 3 x 10 3# flexion  AAROM on CC 3 x 10 3# scaption    Manual therapy:  0 mins  Grade III A/P glides for   Grade III inferior glides   Passive Range of Motion within MD protocol limitations   Scapular mobs in side-lying  Not performed seconary to time       Home Exercises Provided and Patient Education Provided     Education provided:   -cont HOME EXERCISE PROGRAM   -apply ice as needed for delayed muscle soreness    Written Home Exercises Provided: Patient instructed to cont prior HEP.  Exercises were reviewed and Celia was able to demonstrate them prior to the end of the session.  Celia demonstrated good  understanding of the education provided.     See EMR under Patient Instructions for exercises provided prior visit.    Assessment     Treatment progressed to rotator cuff strengthening. She displays good effort with all activities. Patient tolerated treatment well without report of adverse effects.    Celia Is progressing well towards her goals.   Pt prognosis is Good.     Pt will continue to benefit from skilled Physical Therapy interventions in order to address the deficits listed in the problem list box on initial evaluation, provide education, and to address the musculoskeletal limitations and work-related functional deficits for their job as a  at [employer].    Pt's spiritual, cultural and educational needs considered and pt agreeable to plan of care and goals.     Anticipated barriers to physical therapy: fear of re-injury    Goals:  Short  Term Goals (STG) # weeks Goal Review Date Reviewed Date Met   1. The patient will begin a written HEP 1 ongoing 7/5/2023       2. Increase passive flexion and abduction to 160* 6 ongoing 7/5/2023       3. Increase passive external rotation to 80* 6 ongoing 7/5/2023       4. Decrease pain at worst to 4/10 6 ongoing 7/5/2023          Long Term Goals (LTG) # weeks Goal Review Date Reviewed Date Met   1. The patient will be independent with a HEP for maintenace 12 ongoing 7/5/2023       2. Decrease soft tissue tenderness to mild 12 ongoing 7/5/2023       3. Decrease FOTO score to 41% 12 ongoing 7/5/2023     4. Increase UE strength to 4+/5 12 ongoing 7/5/2023     5. The patient will be able to lift 25# from floor to waist 12 ongoing 7/5/2023        Plan     Plan of care Certification: 5/11/2023 to 8/25/2023.     Outpatient Physical Therapy 3 times weekly for 12 weeks to include the following interventions: Manual Therapy, Neuromuscular Re-ed, Patient Education, Therapeutic Activities, and Therapeutic Exercise.     Cont per Plan of Care, Range of Motion, posture, strengthening per MD protocol.      Chava Marquis, PT   7/5/2023

## 2023-07-10 ENCOUNTER — PATIENT MESSAGE (OUTPATIENT)
Dept: ORTHOPEDICS | Facility: CLINIC | Age: 53
End: 2023-07-10
Payer: COMMERCIAL

## 2023-07-10 ENCOUNTER — CLINICAL SUPPORT (OUTPATIENT)
Dept: REHABILITATION | Facility: HOSPITAL | Age: 53
End: 2023-07-10
Attending: ORTHOPAEDIC SURGERY
Payer: OTHER GOVERNMENT

## 2023-07-10 DIAGNOSIS — M75.41 SHOULDER IMPINGEMENT SYNDROME, RIGHT: ICD-10-CM

## 2023-07-10 DIAGNOSIS — M75.111 INCOMPLETE TEAR OF RIGHT ROTATOR CUFF, UNSPECIFIED WHETHER TRAUMATIC: Primary | ICD-10-CM

## 2023-07-10 DIAGNOSIS — M25.611 DECREASED ROM OF RIGHT SHOULDER: ICD-10-CM

## 2023-07-10 DIAGNOSIS — M75.21 BICEPS TENDONITIS ON RIGHT: ICD-10-CM

## 2023-07-10 DIAGNOSIS — R29.898 DECREASED STRENGTH OF UPPER EXTREMITY: ICD-10-CM

## 2023-07-10 PROCEDURE — 97110 THERAPEUTIC EXERCISES: CPT | Mod: PN,CQ

## 2023-07-10 PROCEDURE — 97112 NEUROMUSCULAR REEDUCATION: CPT | Mod: PN,CQ

## 2023-07-10 NOTE — PROGRESS NOTES
"Physical Therapy Daily Treatment Note     Name: Celia Hernandez shawn  Clinic Number: [id number]    Therapy Diagnosis:   Encounter Diagnoses   Name Primary?    Incomplete tear of right rotator cuff, unspecified whether traumatic Yes    Shoulder impingement syndrome, right     Biceps tendonitis on right     Decreased ROM of right shoulder     Decreased strength of upper extremity        Physician: Olman Pillai IV, MD    Visit Date: 7/10/2023    Physician Orders: PT Eval and Treat as per rotator cuff and biceps tenodesis protocol  Medical Diagnosis from Referral: Incomplete tear of right rotator cuff, unspecified whether traumatic, Shoulder impingement syndrome, right & Biceps tendonitis on right  Evaluation Date: 5/11/2023  Authorization Period Expiration: 8/18/2023  Plan of Care Expiration: 8/25/2023  Visit #/Visits authorized: 19 / 24     (# of No Show Appts 0 / Number of Cancelled Appts 2)    Time In: 1310 (patient arrived early for appt)  Time Out: 1400  Total Appointment Time 50 minutes    Precautions: Standard and as per protocol       Occupation (including job description if provided):  at [employer]   Job Demands: Standing, walking, lifting, carrying  Prior Medical Treatment: Therapy  and Surgery  Current Work Restrictions: Off duty      Subjective     Pt reports: her pain is "up there".  Pt stated that she is hurting today and not sure why  She was compliant with home exercise program.  Response to previous treatment: no complaints   Functional change: decreased pain    Pain: 5/10  Location: right shoulder(s)    Objective/Treatment     DOS 5/2/23    Celia received the following treatment:  Therapeutic exercises x 20 minutes to improve Range of Motion, posture, strength:    Overhead pulley flexion 3 min  Overhead pulley scaption 3 min  Finger ladder x 10 reps, level 27  Wall wipes flexion and scaption x 15 ea    Celia participated in neuromuscular re-education activities to improve: Coordination, " Kinesthetic, Sense, and Proprioception for 30 minutes. The following activities were included:     T-Bar flexion 3 x 10 reps  T-Bar external rotation 3 x 10 reps     Red Theraband walkouts x 20 reps each direction   Shoulder extension/rows Red Theraband 20x each     Prone shoulder extension 3 x 10 1#  Prone shoulder high row 3 x 10 1#  Prone scapular retraction 3 x 10 1#  Side lying shoulder external rotation  3 x 10 1#    Side lying shoulder flexion with pole 3 x 10    Cable column extension 3 x 10 3# bilateral   Cable column adduction 3 x 10 3# bilateral   Cable column mid row 3 x 10 3# bilateral     Active assist range of motion on cable column  3 x 10 3# flexion  Active assist range of motion on cable column  3 x 10 3# scaption    Manual therapy:  0 mins  Grade III A/P glides for   Grade III inferior glides   Passive Range of Motion within MD protocol limitations   Scapular mobs in side-lying  Not performed seconary to time       Home Exercises Provided and Patient Education Provided     Education provided:   -cont HOME EXERCISE PROGRAM   -apply ice as needed for delayed muscle soreness    Written Home Exercises Provided: Patient instructed to cont prior HEP.  Exercises were reviewed and Celia was able to demonstrate them prior to the end of the session.  Celia demonstrated good  understanding of the education provided.     See EMR under Patient Instructions for exercises provided prior visit.    Assessment     Patient experiencing increased pain in her Right shoulder today and when she was doing her exercises.  Patient advised to apply ice at home tonight.    Celia Is progressing well towards her goals.   Pt prognosis is Good.     Pt will continue to benefit from skilled Physical Therapy interventions in order to address the deficits listed in the problem list box on initial evaluation, provide education, and to address the musculoskeletal limitations and work-related functional deficits for their job  as a  at [employer].    Pt's spiritual, cultural and educational needs considered and pt agreeable to plan of care and goals.     Anticipated barriers to physical therapy: fear of re-injury    Goals:  Short Term Goals (STG) # weeks Goal Review Date Reviewed Date Met   1. The patient will begin a written HEP 1 ongoing 7/10/2023       2. Increase passive flexion and abduction to 160* 6 ongoing 7/10/2023       3. Increase passive external rotation to 80* 6 ongoing 7/10/2023       4. Decrease pain at worst to 4/10 6 ongoing 7/10/2023          Long Term Goals (LTG) # weeks Goal Review Date Reviewed Date Met   1. The patient will be independent with a HEP for maintenace 12 ongoing 7/10/2023       2. Decrease soft tissue tenderness to mild 12 ongoing 7/10/2023       3. Decrease FOTO score to 41% 12 ongoing 7/10/2023     4. Increase UE strength to 4+/5 12 ongoing 7/10/2023     5. The patient will be able to lift 25# from floor to waist 12 ongoing 7/10/2023        Plan     Plan of care Certification: 5/11/2023 to 8/25/2023.     Outpatient Physical Therapy 3 times weekly for 12 weeks to include the following interventions: Manual Therapy, Neuromuscular Re-ed, Patient Education, Therapeutic Activities, and Therapeutic Exercise.     Cont per Plan of Care, Range of Motion, posture, strengthening per MD protocol.      Mariola Castro, PTA   7/10/2023

## 2023-07-12 ENCOUNTER — OFFICE VISIT (OUTPATIENT)
Dept: ORTHOPEDICS | Facility: CLINIC | Age: 53
End: 2023-07-12
Payer: OTHER GOVERNMENT

## 2023-07-12 ENCOUNTER — PATIENT MESSAGE (OUTPATIENT)
Dept: REHABILITATION | Facility: HOSPITAL | Age: 53
End: 2023-07-12
Payer: COMMERCIAL

## 2023-07-12 VITALS
BODY MASS INDEX: 29.95 KG/M2 | HEIGHT: 69 IN | WEIGHT: 202.19 LBS | SYSTOLIC BLOOD PRESSURE: 133 MMHG | DIASTOLIC BLOOD PRESSURE: 88 MMHG | HEART RATE: 103 BPM

## 2023-07-12 DIAGNOSIS — M75.21 BICEPS TENDONITIS ON RIGHT: ICD-10-CM

## 2023-07-12 DIAGNOSIS — M75.111 INCOMPLETE TEAR OF RIGHT ROTATOR CUFF, UNSPECIFIED WHETHER TRAUMATIC: Primary | ICD-10-CM

## 2023-07-12 DIAGNOSIS — M75.41 SHOULDER IMPINGEMENT SYNDROME, RIGHT: ICD-10-CM

## 2023-07-12 PROCEDURE — 99999 PR PBB SHADOW E&M-EST. PATIENT-LVL III: CPT | Mod: PBBFAC,,, | Performed by: ORTHOPAEDIC SURGERY

## 2023-07-12 PROCEDURE — 99024 PR POST-OP FOLLOW-UP VISIT: ICD-10-PCS | Mod: S$GLB,,, | Performed by: ORTHOPAEDIC SURGERY

## 2023-07-12 PROCEDURE — 99024 POSTOP FOLLOW-UP VISIT: CPT | Mod: S$GLB,,, | Performed by: ORTHOPAEDIC SURGERY

## 2023-07-12 PROCEDURE — 99999 PR PBB SHADOW E&M-EST. PATIENT-LVL III: ICD-10-PCS | Mod: PBBFAC,,, | Performed by: ORTHOPAEDIC SURGERY

## 2023-07-12 NOTE — LETTER
July 12, 2023      [facility]  [address]  [place] [address]  Phone: [phone]  Fax: [phone]       Patient: Celia Alford   YOB: 1970  Date of Visit: 07/12/2023    To Whom It May Concern:    Connie Alford  was at [facility] on 07/12/2023. The patient may return to work/school on 7/12/2023.  She should be excused from work for days missed on  7/8/23 and 7/10/23.  If you have any questions or concerns, or if I can be of further assistance, please do not hesitate to contact me.    Sincerely,        Olman Pillai IV, MD

## 2023-07-17 ENCOUNTER — CLINICAL SUPPORT (OUTPATIENT)
Dept: REHABILITATION | Facility: HOSPITAL | Age: 53
End: 2023-07-17
Attending: ORTHOPAEDIC SURGERY
Payer: OTHER GOVERNMENT

## 2023-07-17 DIAGNOSIS — R29.898 DECREASED STRENGTH OF UPPER EXTREMITY: ICD-10-CM

## 2023-07-17 DIAGNOSIS — M25.611 DECREASED ROM OF RIGHT SHOULDER: ICD-10-CM

## 2023-07-17 DIAGNOSIS — M75.21 BICEPS TENDONITIS ON RIGHT: ICD-10-CM

## 2023-07-17 DIAGNOSIS — M75.41 SHOULDER IMPINGEMENT SYNDROME, RIGHT: ICD-10-CM

## 2023-07-17 DIAGNOSIS — M75.111 INCOMPLETE TEAR OF RIGHT ROTATOR CUFF, UNSPECIFIED WHETHER TRAUMATIC: Primary | ICD-10-CM

## 2023-07-17 PROCEDURE — 97140 MANUAL THERAPY 1/> REGIONS: CPT | Mod: PN,CQ

## 2023-07-17 PROCEDURE — 97110 THERAPEUTIC EXERCISES: CPT | Mod: PN,CQ

## 2023-07-17 PROCEDURE — 97112 NEUROMUSCULAR REEDUCATION: CPT | Mod: PN,CQ

## 2023-07-17 NOTE — PROGRESS NOTES
"Physical Therapy Daily Treatment Note     Name: Celia Alford  Clinic Number: [id number]    Therapy Diagnosis:   No diagnosis found.      Physician: Olman Pillai IV, MD    Visit Date: 7/17/2023    Physician Orders: PT Eval and Treat as per rotator cuff and biceps tenodesis protocol  Medical Diagnosis from Referral: Incomplete tear of right rotator cuff, unspecified whether traumatic, Shoulder impingement syndrome, right & Biceps tendonitis on right  Evaluation Date: 5/11/2023  Authorization Period Expiration: 8/18/2023  Plan of Care Expiration: 8/25/2023  Visit #/Visits authorized: 19 / 24     (# of No Show Appts 0 / Number of Cancelled Appts 2)    Time In: 1310 (patient arrived early for appt)  Time Out: 1400  Total Appointment Time 50 minutes    Precautions: Standard and as per protocol       Occupation (including job description if provided):  at [employer]   Job Demands: Standing, walking, lifting, carrying  Prior Medical Treatment: Therapy  and Surgery  Current Work Restrictions: Off duty      Subjective     Pt reports: her pain is "up there".  Pt stated that she is hurting today and not sure why  She was compliant with home exercise program.  Response to previous treatment: no complaints   Functional change: decreased pain    Pain: 5/10  Location: right shoulder(s)    Objective/Treatment     DOS 5/2/23    Celia received the following treatment:  Therapeutic exercises x 20 minutes to improve Range of Motion, posture, strength:    Overhead pulley flexion 3 min  Overhead pulley scaption 3 min  Finger ladder x 10 reps, level 27  Wall wipes flexion and scaption x 15 ea    Celia participated in neuromuscular re-education activities to improve: Coordination, Kinesthetic, Sense, and Proprioception for 30 minutes. The following activities were included:     T-Bar flexion 3 x 10 reps  T-Bar external rotation 3 x 10 reps     Red Theraband walkouts x 20 reps each direction   Shoulder extension/rows Red " Theraband 20x each     Prone shoulder extension 3 x 10 1#  Prone shoulder high row 3 x 10 1#  Prone scapular retraction 3 x 10 1#  Side lying shoulder external rotation  3 x 10 1#    Side lying shoulder flexion with pole 3 x 10    Cable column extension 3 x 10 3# bilateral   Cable column adduction 3 x 10 3# bilateral   Cable column mid row 3 x 10 3# bilateral     Active assist range of motion on cable column  3 x 10 3# flexion  Active assist range of motion on cable column  3 x 10 3# scaption    Manual therapy:  0 mins  Grade III A/P glides for   Grade III inferior glides   Passive Range of Motion within MD protocol limitations   Scapular mobs in side-lying  Not performed seconary to time       Home Exercises Provided and Patient Education Provided     Education provided:   -cont HOME EXERCISE PROGRAM   -apply ice as needed for delayed muscle soreness    Written Home Exercises Provided: Patient instructed to cont prior HEP.  Exercises were reviewed and Celia was able to demonstrate them prior to the end of the session.  Celia demonstrated good  understanding of the education provided.     See EMR under Patient Instructions for exercises provided prior visit.    Assessment     Patient experiencing increased pain in her Right shoulder today and when she was doing her exercises.  Patient advised to apply ice at home tonight.    Celia Is progressing well towards her goals.   Pt prognosis is Good.     Pt will continue to benefit from skilled Physical Therapy interventions in order to address the deficits listed in the problem list box on initial evaluation, provide education, and to address the musculoskeletal limitations and work-related functional deficits for their job as a  at [employer].    Pt's spiritual, cultural and educational needs considered and pt agreeable to plan of care and goals.     Anticipated barriers to physical therapy: fear of re-injury    Goals:  Short Term Goals (STG) # weeks Goal Review  Date Reviewed Date Met   1. The patient will begin a written HEP 1 ongoing 7/17/2023       2. Increase passive flexion and abduction to 160* 6 ongoing 7/17/2023       3. Increase passive external rotation to 80* 6 ongoing 7/17/2023       4. Decrease pain at worst to 4/10 6 ongoing 7/17/2023          Long Term Goals (LTG) # weeks Goal Review Date Reviewed Date Met   1. The patient will be independent with a HEP for maintenace 12 ongoing 7/17/2023       2. Decrease soft tissue tenderness to mild 12 ongoing 7/17/2023       3. Decrease FOTO score to 41% 12 ongoing 7/17/2023     4. Increase UE strength to 4+/5 12 ongoing 7/17/2023     5. The patient will be able to lift 25# from floor to waist 12 ongoing 7/17/2023        Plan     Plan of care Certification: 5/11/2023 to 8/25/2023.     Outpatient Physical Therapy 3 times weekly for 12 weeks to include the following interventions: Manual Therapy, Neuromuscular Re-ed, Patient Education, Therapeutic Activities, and Therapeutic Exercise.     Cont per Plan of Care, Range of Motion, posture, strengthening per MD protocol.      Chava Marquis, PT   7/16/2023

## 2023-07-17 NOTE — PROGRESS NOTES
Physical Therapy Daily Treatment Note     Name: Celia Hernandez [facility]  Clinic Number: [id number]    Therapy Diagnosis:   Encounter Diagnoses   Name Primary?    Incomplete tear of right rotator cuff, unspecified whether traumatic Yes    Shoulder impingement syndrome, right     Biceps tendonitis on right     Decreased ROM of right shoulder     Decreased strength of upper extremity        Physician: Olman Pillai IV, MD    Visit Date: 7/17/2023    Physician Orders: PT Eval and Treat as per rotator cuff and biceps tenodesis protocol  Medical Diagnosis from Referral: Incomplete tear of right rotator cuff, unspecified whether traumatic, Shoulder impingement syndrome, right & Biceps tendonitis on right  Evaluation Date: 5/11/2023  Authorization Period Expiration: 8/18/2023  Plan of Care Expiration: 8/25/2023  Visit #/Visits authorized: 20 / 24     (# of No Show Appts 0 / Number of Cancelled Appts 2)    Time In: 0801  Time Out: 0854  Total Appointment Time 53 minutes    Precautions: Standard and as per protocol       Occupation (including job description if provided):  at [employer]   Job Demands: Standing, walking, lifting, carrying  Prior Medical Treatment: Therapy  and Surgery  Current Work Restrictions: Off duty      Subjective     Pt reports: saw MD last week due to inicreased anterior shoulder pain, pain is better today; gentle stretching 2x/week, strengthening 1x/wk per MD orders  She was compliant with home exercise program.  Response to previous treatment: no complaints   Functional change: decreased pain    Pain: 3/10  Location: right shoulder(s)    Objective/Treatment     7/12 MD visit: Likely overexertion with therapy.  Will decrease intensity of therapy.  Strengthening only 1x per week, but continue modalities and gentle stretching the remaining visits per week.   DOS 5/2/23    Celia received the following treatment:  Therapeutic exercises x 15 minutes to improve Range of Motion, posture, strength:    Overhead  pulley flexion 3 min  Overhead pulley scaption 3 min    Finger ladder x 10 reps, level 27  Wall wipes flexion and scaption x 15 ea    Celia participated in neuromuscular re-education activities to improve: Coordination, Kinesthetic, Sense, and Proprioception for 24 minutes. The following activities were included:     T-Bar flexion 3 x 10  T-Bar external rotation 3 x 10   T-Bar SA punch 3 x 10    Red Theraband walkouts x 20 reps each direction  NOT PERFORMED   Shoulder extension/rows Red Theraband 20x each   NOT PERFORMED     Prone shoulder extension 3 x 10 0#    Prone shoulder high row 3 x 10 0#    Prone scapular retraction 3 x 10 0#     Side lying shoulder external rotation  3 x 10 0#      Side lying shoulder flexion with pole 3 x 10   NOT PERFORMED     Cable column extension 3 x 10 3# bilateral   NOT PERFORMED   Cable column adduction 3 x 10 3# bilateral   NOT PERFORMED   Cable column mid row 3 x 10 3# bilateral   NOT PERFORMED      Active assist range of motion on cable column  3 x 10 3# flexion   NOT PERFORMED   Active assist range of motion on cable column  3 x 10 3# scaption   NOT PERFORMED     Manual therapy:  14 mins  Grade II A/P glides   Grade II inferior glides   Passive Range of Motion within MD protocol limitations   Scapular mobs in side-lying   NOT PERFORMED       Home Exercises Provided and Patient Education Provided     Education provided:   -cont HOME EXERCISE PROGRAM   -apply ice as needed for delayed muscle soreness    Written Home Exercises Provided: Patient instructed to cont prior HEP.  Exercises were reviewed and Celia was able to demonstrate them prior to the end of the session.  Celia demonstrated good  understanding of the education provided.     See EMR under Patient Instructions for exercises provided prior visit.    Assessment     Performed gentle stretching today per MD orders.  No weight with prone exercises today.  Strengthening next visit.  Pt reports stretching sensation  in her anterior shoulder with end range passive shoulder flexion.  Pt tolerated exercises with good form.    Celia Is progressing well towards her goals.   Pt prognosis is Good.     Pt will continue to benefit from skilled Physical Therapy interventions in order to address the deficits listed in the problem list box on initial evaluation, provide education, and to address the musculoskeletal limitations and work-related functional deficits for their job as a  at [employer].    Pt's spiritual, cultural and educational needs considered and pt agreeable to plan of care and goals.     Anticipated barriers to physical therapy: fear of re-injury    Goals:  Short Term Goals (STG) # weeks Goal Review Date Reviewed Date Met   1. The patient will begin a written HEP 1 ongoing 7/17/2023       2. Increase passive flexion and abduction to 160* 6 ongoing 7/17/2023       3. Increase passive external rotation to 80* 6 ongoing 7/17/2023       4. Decrease pain at worst to 4/10 6 ongoing 7/17/2023          Long Term Goals (LTG) # weeks Goal Review Date Reviewed Date Met   1. The patient will be independent with a HEP for maintenace 12 ongoing 7/17/2023       2. Decrease soft tissue tenderness to mild 12 ongoing 7/17/2023       3. Decrease FOTO score to 41% 12 ongoing 7/17/2023     4. Increase UE strength to 4+/5 12 ongoing 7/17/2023     5. The patient will be able to lift 25# from floor to waist 12 ongoing 7/17/2023        Plan     Plan of care Certification: 5/11/2023 to 8/25/2023.     Outpatient Physical Therapy 3 times weekly for 12 weeks to include the following interventions: Manual Therapy, Neuromuscular Re-ed, Patient Education, Therapeutic Activities, and Therapeutic Exercise.     Cont per Plan of Care, Range of Motion, posture, strengthening per MD protocol.      Yuliana Longoria, PTA   7/17/2023

## 2023-07-19 ENCOUNTER — CLINICAL SUPPORT (OUTPATIENT)
Dept: REHABILITATION | Facility: HOSPITAL | Age: 53
End: 2023-07-19
Attending: ORTHOPAEDIC SURGERY
Payer: OTHER GOVERNMENT

## 2023-07-19 ENCOUNTER — TELEPHONE (OUTPATIENT)
Dept: ORTHOPEDICS | Facility: CLINIC | Age: 53
End: 2023-07-19
Payer: COMMERCIAL

## 2023-07-19 DIAGNOSIS — M75.21 BICEPS TENDONITIS ON RIGHT: ICD-10-CM

## 2023-07-19 DIAGNOSIS — M75.41 SHOULDER IMPINGEMENT SYNDROME, RIGHT: Primary | ICD-10-CM

## 2023-07-19 DIAGNOSIS — R29.898 DECREASED STRENGTH OF UPPER EXTREMITY: ICD-10-CM

## 2023-07-19 DIAGNOSIS — M25.611 DECREASED ROM OF RIGHT SHOULDER: ICD-10-CM

## 2023-07-19 DIAGNOSIS — M75.111 INCOMPLETE TEAR OF RIGHT ROTATOR CUFF, UNSPECIFIED WHETHER TRAUMATIC: Primary | ICD-10-CM

## 2023-07-19 PROCEDURE — 97140 MANUAL THERAPY 1/> REGIONS: CPT | Mod: PN | Performed by: PHYSICAL THERAPIST

## 2023-07-19 PROCEDURE — 97112 NEUROMUSCULAR REEDUCATION: CPT | Mod: PN | Performed by: PHYSICAL THERAPIST

## 2023-07-19 PROCEDURE — 97110 THERAPEUTIC EXERCISES: CPT | Mod: PN | Performed by: PHYSICAL THERAPIST

## 2023-07-19 NOTE — PROGRESS NOTES
Physical Therapy Daily Treatment Note     Name: Celia Hernandez [facility]  Clinic Number: [id number]    Therapy Diagnosis:   Encounter Diagnoses   Name Primary?    Shoulder impingement syndrome, right Yes    Biceps tendonitis on right     Decreased ROM of right shoulder     Decreased strength of upper extremity          Physician: Olman Pillai IV, MD    Visit Date: 7/19/2023    Physician Orders: PT Eval and Treat as per rotator cuff and biceps tenodesis protocol  Medical Diagnosis from Referral: Incomplete tear of right rotator cuff, unspecified whether traumatic, Shoulder impingement syndrome, right & Biceps tendonitis on right  Evaluation Date: 5/11/2023  Authorization Period Expiration: 8/18/2023  Plan of Care Expiration: 8/25/2023  Visit #/Visits authorized: 21 / 24     (# of No Show Appts 0 / Number of Cancelled Appts 2)    Time In: 0803  Time Out: 0900  Total Appointment Time 57 minutes    Precautions: Standard and as per protocol       Occupation (including job description if provided):  at [employer]   Job Demands: Standing, walking, lifting, carrying  Prior Medical Treatment: Therapy  and Surgery  Current Work Restrictions: Off duty      Subjective     Pt reports: generalized soreness. Doing a little better  She was compliant with home exercise program.  Response to previous treatment: no complaints   Functional change: decreased pain    Pain: 3/10  Location: right shoulder(s)    Objective/Treatment     7/12 MD visit: Likely overexertion with therapy.  Will decrease intensity of therapy.  Strengthening only 1x per week, but continue modalities and gentle stretching the remaining visits per week.   DOS 5/2/23    Celia received the following treatment:  Therapeutic exercises x 15 minutes to improve Range of Motion, posture, strength:    Overhead pulley flexion 3 min  Overhead pulley scaption 3 min    Finger ladder x 10 reps, level 27  Wall wipes flexion and scaption x 15 ea  Physioball rolling on wall 3 x  10    Celia participated in neuromuscular re-education activities to improve: Coordination, Kinesthetic, Sense, and Proprioception for 27 minutes. The following activities were included:     T-Bar flexion 3 x 10  T-Bar external rotation 3 x 10   T-Bar SA punch 3 x 10    Red Theraband walkouts x 20 reps each direction  NOT PERFORMED   Shoulder extension/rows Red Theraband 20x each   NOT PERFORMED     Prone shoulder extension 3 x 10 0#    Prone shoulder high row 3 x 10 0#    Prone scapular retraction 3 x 10 0#     Side lying shoulder external rotation  3 x 10 0#      Side lying shoulder flexion with pole 3 x 10      Cable column extension 3 x 10 3# bilateral   NOT PERFORMED   Cable column adduction 3 x 10 3# bilateral   NOT PERFORMED   Cable column mid row 3 x 10 3# bilateral   NOT PERFORMED      Active assist range of motion on cable column  3 x 10 3# flexion      Active assist range of motion on cable column  3 x 10 3# scaption        Manual therapy:  15 mins  Grade II A/P glides   Grade II inferior glides   Passive Range of Motion within MD protocol limitations   Scapular mobs in side-lying   NOT PERFORMED       Home Exercises Provided and Patient Education Provided     Education provided:   -cont HOME EXERCISE PROGRAM   -apply ice as needed for delayed muscle soreness    Written Home Exercises Provided: Patient instructed to cont prior HEP.  Exercises were reviewed and Celia was able to demonstrate them prior to the end of the session.  Celia demonstrated good  understanding of the education provided.     See EMR under Patient Instructions for exercises provided prior visit.    Assessment     Treatment continued to focus on ROM this visit with the addition of AAROM on Cable column. She tolerated that without increase in symptoms. Next visit to focus on strengthening exercises per MD order.    Celia Is progressing well towards her goals.   Pt prognosis is Good.     Pt will continue to benefit from  skilled Physical Therapy interventions in order to address the deficits listed in the problem list box on initial evaluation, provide education, and to address the musculoskeletal limitations and work-related functional deficits for their job as a  at [employer].    Pt's spiritual, cultural and educational needs considered and pt agreeable to plan of care and goals.     Anticipated barriers to physical therapy: fear of re-injury    Goals:  Short Term Goals (STG) # weeks Goal Review Date Reviewed Date Met   1. The patient will begin a written HEP 1 ongoing 7/19/2023       2. Increase passive flexion and abduction to 160* 6 ongoing 7/19/2023       3. Increase passive external rotation to 80* 6 ongoing 7/19/2023       4. Decrease pain at worst to 4/10 6 ongoing 7/19/2023          Long Term Goals (LTG) # weeks Goal Review Date Reviewed Date Met   1. The patient will be independent with a HEP for maintenace 12 ongoing 7/19/2023       2. Decrease soft tissue tenderness to mild 12 ongoing 7/19/2023       3. Decrease FOTO score to 41% 12 ongoing 7/19/2023     4. Increase UE strength to 4+/5 12 ongoing 7/19/2023     5. The patient will be able to lift 25# from floor to waist 12 ongoing 7/19/2023        Plan     Plan of care Certification: 5/11/2023 to 8/25/2023.     Outpatient Physical Therapy 3 times weekly for 12 weeks to include the following interventions: Manual Therapy, Neuromuscular Re-ed, Patient Education, Therapeutic Activities, and Therapeutic Exercise.     Cont per Plan of Care, Range of Motion, posture, strengthening per MD protocol.      Chava Marquis, PT   7/19/2023

## 2023-07-19 NOTE — TELEPHONE ENCOUNTER
----- Message from Chava Marquis PT sent at 7/19/2023  1:09 PM CDT -----  Regarding: New referral  Good afternoon Dr. Pillai,    Thank you for referring this patient to [facility]. Her plan of care has been adjusted per your orders. Would you please write another order to continue with physical therapy so that pre-service can obtain addition authorization? If you have any questions, please contact me at anytime.    Chava Marquis PT

## 2023-07-21 ENCOUNTER — CLINICAL SUPPORT (OUTPATIENT)
Dept: REHABILITATION | Facility: HOSPITAL | Age: 53
End: 2023-07-21
Attending: ORTHOPAEDIC SURGERY
Payer: OTHER GOVERNMENT

## 2023-07-21 DIAGNOSIS — R29.898 DECREASED STRENGTH OF UPPER EXTREMITY: ICD-10-CM

## 2023-07-21 DIAGNOSIS — M75.21 BICEPS TENDONITIS ON RIGHT: ICD-10-CM

## 2023-07-21 DIAGNOSIS — M25.611 DECREASED ROM OF RIGHT SHOULDER: ICD-10-CM

## 2023-07-21 DIAGNOSIS — M75.41 SHOULDER IMPINGEMENT SYNDROME, RIGHT: ICD-10-CM

## 2023-07-21 DIAGNOSIS — M75.111 INCOMPLETE TEAR OF RIGHT ROTATOR CUFF, UNSPECIFIED WHETHER TRAUMATIC: Primary | ICD-10-CM

## 2023-07-21 PROCEDURE — 97110 THERAPEUTIC EXERCISES: CPT | Mod: PN

## 2023-07-21 PROCEDURE — 97112 NEUROMUSCULAR REEDUCATION: CPT | Mod: PN

## 2023-07-21 NOTE — PROGRESS NOTES
Physical Therapy Daily Treatment Note     Name: Celia Hernandez [facility]  Clinic Number: [id number]    Therapy Diagnosis:   Encounter Diagnoses   Name Primary?    Incomplete tear of right rotator cuff, unspecified whether traumatic Yes    Shoulder impingement syndrome, right     Biceps tendonitis on right     Decreased ROM of right shoulder     Decreased strength of upper extremity          Physician: Olman Pillai IV, MD    Visit Date: 7/21/2023    Physician Orders: PT Eval and Treat as per rotator cuff and biceps tenodesis protocol  Medical Diagnosis from Referral: Incomplete tear of right rotator cuff, unspecified whether traumatic, Shoulder impingement syndrome, right & Biceps tendonitis on right  Evaluation Date: 5/11/2023  Authorization Period Expiration: 8/18/2023  Plan of Care Expiration: 8/25/2023  Visit #/Visits authorized: 22 / 24     (# of No Show Appts 0 / Number of Cancelled Appts 2)    Time In: 202  Time Out: 257  Total Appointment Time 55 minutes    Precautions: Standard and as per protocol       Occupation (including job description if provided):  at [employer]   Job Demands: Standing, walking, lifting, carrying  Prior Medical Treatment: Therapy  and Surgery  Current Work Restrictions: Off duty      Subjective     Pt reports: she is a little achey.     She was compliant with home exercise program.  Response to previous treatment: no complaints   Functional change: decreased pain    Pain: 4/10  Location: right shoulder(s); anterior    Objective/Treatment     7/12 MD visit: Likely overexertion with therapy.  Will decrease intensity of therapy.  Strengthening only 1x per week, but continue modalities and gentle stretching the remaining visits per week.   DOS 5/2/23    Celia received the following treatment:  Therapeutic exercises x 27 minutes to improve Range of Motion, posture, strength:    Overhead pulley flexion 3 min  Overhead pulley scaption 3 min    Finger ladder x 10 reps, level 27 - NOT PERFORMED    Wall wipes flexion and scaption 2x10 ea  +Wall wipes circles, 2x10 ea direction  Physioball rolling on wall 3 x 10 - NOT PERFORMED     Celia participated in neuromuscular re-education activities to improve: Coordination, Kinesthetic, Sense, and Proprioception for 28 minutes. The following activities were included:     T-Bar flexion 3 x 10  T-Bar external rotation 3 x 10   T-Bar SA punch 3 x 10    Red Theraband walkouts x 20 reps each direction (Flx/External Rotation/Internal Rotation)  Shoulder extension/rows Red Theraband 20x each - NOT PERFORMED, Cable Column instead   +Bilateral shoulder External Rotation, palms up, [id number]  +Shoulder taps, 30x Alternate      Prone shoulder extension 3 x 10 2#    Prone shoulder high row 3 x 10 2#    Prone scapular retraction 3 x 10 0#  - NOT PERFORMED   Side lying shoulder external rotation  3 x 10 1#    Side lying shoulder flexion with pole 3 x 10 - NOT PERFORMED      Cable column extension 2 x 10 3# bilateral- not able to do 3     Cable column adduction 3 x 10 3# bilateral      Cable column mid row 3 x 10 3# bilateral        Active assist range of motion on cable column  3 x 10 3# flexion - NOT PERFORMED   Active assist range of motion on cable column  3 x 10 3# scaption - NOT PERFORMED        Manual therapy:  0 mins   Grade II A/P glides   Grade II inferior glides   Passive Range of Motion within MD protocol limitations   Scapular mobs in side-lying   NOT PERFORMED       Home Exercises Provided and Patient Education Provided     Education provided:   -cont HOME EXERCISE PROGRAM   -apply ice as needed for delayed muscle soreness    Written Home Exercises Provided: Patient instructed to cont prior HEP.  Exercises were reviewed and Celia was able to demonstrate them prior to the end of the session.  Celia demonstrated good  understanding of the education provided.     See EMR under Patient Instructions for exercises provided prior visit.    Assessment     Approx 11  weeks post surgery. Treatment focus on strength this visit; according to MD 1x/wk.  She tolerated that without increase in symptoms. Next visit to focus on stretching exercises per MD order.    Celia Is progressing well towards her goals.   Pt prognosis is Good.     Pt will continue to benefit from skilled Physical Therapy interventions in order to address the deficits listed in the problem list box on initial evaluation, provide education, and to address the musculoskeletal limitations and work-related functional deficits for their job as a  at [employer].    Pt's spiritual, cultural and educational needs considered and pt agreeable to plan of care and goals.     Anticipated barriers to physical therapy: fear of re-injury    Goals:  Short Term Goals (STG) # weeks Goal Review Date Reviewed Date Met   1. The patient will begin a written HEP 1 ongoing 7/21/2023       2. Increase passive flexion and abduction to 160* 6 ongoing 7/21/2023       3. Increase passive external rotation to 80* 6 ongoing 7/21/2023       4. Decrease pain at worst to 4/10 6 ongoing 7/21/2023          Long Term Goals (LTG) # weeks Goal Review Date Reviewed Date Met   1. The patient will be independent with a HEP for maintenace 12 ongoing 7/21/2023       2. Decrease soft tissue tenderness to mild 12 ongoing 7/21/2023       3. Decrease FOTO score to 41% 12 ongoing 7/21/2023     4. Increase UE strength to 4+/5 12 ongoing 7/21/2023     5. The patient will be able to lift 25# from floor to waist 12 ongoing 7/21/2023        Plan     Cont per Plan of Care, Range of Motion, posture, strengthening per MD protocol.    Plan of care Certification: 5/11/2023 to 8/25/2023.     Outpatient Physical Therapy 3 times weekly for 12 weeks to include the following interventions: Manual Therapy, Neuromuscular Re-ed, Patient Education, Therapeutic Activities, and Therapeutic Exercise.       Dasia Dockery, PT   7/21/2023

## 2023-07-26 ENCOUNTER — CLINICAL SUPPORT (OUTPATIENT)
Dept: REHABILITATION | Facility: HOSPITAL | Age: 53
End: 2023-07-26
Attending: ORTHOPAEDIC SURGERY
Payer: OTHER GOVERNMENT

## 2023-07-26 DIAGNOSIS — M25.611 DECREASED ROM OF RIGHT SHOULDER: ICD-10-CM

## 2023-07-26 DIAGNOSIS — M75.21 BICEPS TENDONITIS ON RIGHT: ICD-10-CM

## 2023-07-26 DIAGNOSIS — R29.898 DECREASED STRENGTH OF UPPER EXTREMITY: ICD-10-CM

## 2023-07-26 DIAGNOSIS — M75.41 SHOULDER IMPINGEMENT SYNDROME, RIGHT: Primary | ICD-10-CM

## 2023-07-26 DIAGNOSIS — M75.111 INCOMPLETE TEAR OF RIGHT ROTATOR CUFF, UNSPECIFIED WHETHER TRAUMATIC: ICD-10-CM

## 2023-07-26 PROCEDURE — 97110 THERAPEUTIC EXERCISES: CPT | Mod: PN,CQ

## 2023-07-26 PROCEDURE — 97112 NEUROMUSCULAR REEDUCATION: CPT | Mod: PN,CQ

## 2023-07-26 NOTE — PROGRESS NOTES
"Physical Therapy Daily Treatment Note     Name: Celia Hernandez [facility]  Clinic Number: [id number]    Therapy Diagnosis:   Encounter Diagnoses   Name Primary?    Incomplete tear of right rotator cuff, unspecified whether traumatic     Biceps tendonitis on right     Shoulder impingement syndrome, right Yes    Decreased ROM of right shoulder     Decreased strength of upper extremity            Physician: Olman Pillai IV, MD    Visit Date: 7/26/2023    Physician Orders: PT Eval and Treat as per rotator cuff and biceps tenodesis protocol  Medical Diagnosis from Referral: Incomplete tear of right rotator cuff, unspecified whether traumatic, Shoulder impingement syndrome, right & Biceps tendonitis on right  Evaluation Date: 5/11/2023  Authorization Period Expiration: 8/18/2023  Plan of Care Expiration: 8/25/2023  Visit #/Visits authorized:  23/ 24     (# of No Show Appts 1 / Number of Cancelled Appts 5)    Time In: 0807 (pt arrived late for appt)  Time Out: 0857  Total Appointment Time  50 minutes    Precautions: Standard and as per protocol       Occupation (including job description if provided):  at [employer]   Job Demands: Standing, walking, lifting, carrying  Prior Medical Treatment: Therapy  and Surgery  Current Work Restrictions: Off duty      Subjective     Pt reports: "It's getting better"    She was compliant with home exercise program.  Response to previous treatment: no complaints   Functional change: decreased pain    Pain: 3/10  Location: right shoulder(s); anterior    Objective/Treatment     7/12 MD visit: Likely overexertion with therapy.  Will decrease intensity of therapy.  Strengthening only 1x per week, but continue modalities and gentle stretching the remaining visits per week.   DOS 5/2/23    Celia received the following treatment:  Therapeutic exercises x 20 minutes to improve Range of Motion, posture, strength:    Overhead pulley flexion 3 min  Overhead pulley scaption 3 min    Finger ladder x 10 " reps, level 29 (discomfort when she comes down)  Wall wipes flexion and scaption 2 x 10 reps   Wall wipes circles 2 x 10 reps each direction  Physioball rolling on wall 3 x 10 reps     Celia participated in neuromuscular re-education activities to improve: Coordination, Kinesthetic, Sense, and Proprioception for 30 minutes. The following activities were included:     T-Bar flexion 3 x 10 reps   T-Bar external rotation 3 x 10 reps    T-Bar SA punch 3 x 10 reps     Red Theraband walkouts x 20 reps each direction (flexion/External Rotation/Internal Rotation)  Shoulder extension/rows Red Theraband 20x each - NOT PERFORMED  Bilateral shoulder External Rotation, palms up x 20 reps with 5 seconds hold yellow theraband (pain initially, pt adjusted posture with shoulders back and pain decreased)  Shoulder taps, x 30 reps  Alternate      Prone shoulder extension 3 x 10 reps 2#    Prone shoulder high row 3 x 10 reps 2#    Prone scapular retraction 3 x 10 reps 0#  - NOT PERFORMED   Side lying shoulder external rotation  3 x 10 reps 1#    Side lying shoulder flexion with pole 3 x 10 reps - NOT PERFORMED      Not performed today:  Cable column extension 2 x 10 reps 3# bilateral- not able to do 3     Cable column adduction 3 x 10 reps 3# bilateral      Cable column mid row 3 x 10 reps 3# bilateral     Active assist range of motion on cable column  3 x 10 3# flexion - NOT PERFORMED   Active assist range of motion on cable column  3 x 10 3# scaption - NOT PERFORMED        Manual therapy:  0 mins   Grade II A/P glides   Grade II inferior glides   Passive Range of Motion within MD protocol limitations   Scapular mobs in side-lying   NOT PERFORMED       Home Exercises Provided and Patient Education Provided     Education provided:   -cont HOME EXERCISE PROGRAM   -apply ice as needed for delayed muscle soreness    Written Home Exercises Provided: Patient instructed to cont prior HEP.  Exercises were reviewed and Celia was able to  demonstrate them prior to the end of the session.  Celia demonstrated good  understanding of the education provided.     See EMR under Patient Instructions for exercises provided prior visit.    Assessment     Approx 11 weeks post surgery. Treatment focus on strength this visit; according to MD 1x/wk.  She tolerated that without increase in symptoms. Next visit to focus on stretching exercises per MD order.    Celia Is progressing well towards her goals.   Pt prognosis is Good.     Pt will continue to benefit from skilled Physical Therapy interventions in order to address the deficits listed in the problem list box on initial evaluation, provide education, and to address the musculoskeletal limitations and work-related functional deficits for their job as a  at [employer].    Pt's spiritual, cultural and educational needs considered and pt agreeable to plan of care and goals.     Anticipated barriers to physical therapy: fear of re-injury    Goals:  Short Term Goals (STG) # weeks Goal Review Date Reviewed Date Met   1. The patient will begin a written HEP 1 ongoing 7/26/2023       2. Increase passive flexion and abduction to 160* 6 ongoing 7/26/2023       3. Increase passive external rotation to 80* 6 ongoing 7/26/2023       4. Decrease pain at worst to 4/10 6 ongoing 7/26/2023          Long Term Goals (LTG) # weeks Goal Review Date Reviewed Date Met   1. The patient will be independent with a HEP for maintenace 12 ongoing 7/26/2023       2. Decrease soft tissue tenderness to mild 12 ongoing 7/26/2023       3. Decrease FOTO score to 41% 12 ongoing 7/26/2023     4. Increase UE strength to 4+/5 12 ongoing 7/26/2023     5. The patient will be able to lift 25# from floor to waist 12 ongoing 7/26/2023        Plan     Cont per Plan of Care, Range of Motion, posture, strengthening per MD protocol.    Plan of care Certification: 5/11/2023 to 8/25/2023.     Outpatient Physical Therapy 3 times weekly for 12 weeks to  include the following interventions: Manual Therapy, Neuromuscular Re-ed, Patient Education, Therapeutic Activities, and Therapeutic Exercise.       Mariola Castro, PTA   7/26/2023

## 2023-07-28 ENCOUNTER — CLINICAL SUPPORT (OUTPATIENT)
Dept: REHABILITATION | Facility: HOSPITAL | Age: 53
End: 2023-07-28
Attending: ORTHOPAEDIC SURGERY
Payer: OTHER GOVERNMENT

## 2023-07-28 ENCOUNTER — DOCUMENTATION ONLY (OUTPATIENT)
Dept: REHABILITATION | Facility: HOSPITAL | Age: 53
End: 2023-07-28
Payer: COMMERCIAL

## 2023-07-28 DIAGNOSIS — M75.111 INCOMPLETE TEAR OF RIGHT ROTATOR CUFF, UNSPECIFIED WHETHER TRAUMATIC: Primary | ICD-10-CM

## 2023-07-28 DIAGNOSIS — M75.21 BICEPS TENDONITIS ON RIGHT: ICD-10-CM

## 2023-07-28 DIAGNOSIS — M25.611 DECREASED ROM OF RIGHT SHOULDER: ICD-10-CM

## 2023-07-28 DIAGNOSIS — M75.41 SHOULDER IMPINGEMENT SYNDROME, RIGHT: ICD-10-CM

## 2023-07-28 DIAGNOSIS — R29.898 DECREASED STRENGTH OF UPPER EXTREMITY: ICD-10-CM

## 2023-07-28 PROCEDURE — 97112 NEUROMUSCULAR REEDUCATION: CPT | Mod: PN,CQ

## 2023-07-28 PROCEDURE — 97140 MANUAL THERAPY 1/> REGIONS: CPT | Mod: PN,CQ

## 2023-07-28 PROCEDURE — 97110 THERAPEUTIC EXERCISES: CPT | Mod: PN,CQ

## 2023-07-28 NOTE — PROGRESS NOTES
Physical Therapy Daily Treatment Note     Name: Celia Hernandez [facility]  Clinic Number: [id number]    Therapy Diagnosis:   Encounter Diagnoses   Name Primary?    Incomplete tear of right rotator cuff, unspecified whether traumatic Yes    Shoulder impingement syndrome, right     Biceps tendonitis on right     Decreased ROM of right shoulder     Decreased strength of upper extremity        Physician: Olman Pillai IV, MD    Visit Date: 7/28/2023    Physician Orders: PT Eval and Treat as per rotator cuff and biceps tenodesis protocol  Medical Diagnosis from Referral: Incomplete tear of right rotator cuff, unspecified whether traumatic, Shoulder impingement syndrome, right & Biceps tendonitis on right  Evaluation Date: 5/11/2023  Authorization Period Expiration: 8/18/2023  Plan of Care Expiration: 8/25/2023  Visit #/Visits authorized:  24/ 24 + 12   ([id number])    (# of No Show Appts 1 / Number of Cancelled Appts 5)    Time In: 0805  Time Out: 0853  Total Appointment Time  48 minutes    Precautions: Standard and as per protocol       Occupation (including job description if provided):  at [employer]   Job Demands: Standing, walking, lifting, carrying  Prior Medical Treatment: Therapy  and Surgery  Current Work Restrictions: Off duty      Subjective     Pt reports: pain continues in shoulder joint    She was compliant with home exercise program.  Response to previous treatment: no complaints   Functional change: decreased pain    Pain: 3/10  Location: right shoulder(s); anterior    Objective/Treatment     7/12 MD visit: Likely overexertion with therapy.  Will decrease intensity of therapy.  Strengthening only 1x per week, but continue modalities and gentle stretching the remaining visits per week.   DOS 5/2/23    Celia received the following treatment:  Therapeutic exercises x 28 minutes to improve Range of Motion, posture, strength:    Overhead pulley flexion 3 min  Overhead pulley scaption 3 min    Active assist range  of motion on cable column  3 x 10 3# flexion     Wall wipes flexion and scaption 2 x 10   Wall wipes circles 3 x 10 each direction  Physioball rolling on wall 3 x 10    Celia participated in neuromuscular re-education activities to improve: Coordination, Kinesthetic, Sense, and Proprioception for 10 minutes. The following activities were included:     T-Bar flexion 3 x 10   T-Bar external rotation 3 x 10    T-Bar SA punch 3 x 10      NOT PERFORMED   Bilateral shoulder External Rotation, palms up x 20 reps with 5 seconds hold yellow theraband (pain initially, pt adjusted posture with shoulders back and pain decreased)      NOT PERFORMED below  Prone shoulder extension 3 x 10 reps 2#    Prone shoulder high row 3 x 10 reps 2#    Prone scapular retraction 3 x 10 reps 0#   Side lying shoulder external rotation  3 x 10 reps 1#    Side lying shoulder flexion with pole 3 x 10 reps     Not performed today:  Cable column extension 2 x 10 reps 3# bilateral- not able to do 3     Cable column adduction 3 x 10 reps 3# bilateral      Cable column mid row 3 x 10 reps 3# bilateral     Finger ladder x 10 reps, level 29 (discomfort when she comes down)  Active assist range of motion on cable column  3 x 10 3# scaption      Red Theraband walkouts x 20 reps each direction (flexion/External Rotation/Internal Rotation)   Shoulder extension/rows Red Theraband 20x each   Shoulder taps, x 30 reps  Alternate      Manual therapy:  10 mins   Grade II A/P glides   Grade II inferior glides   Passive Range of Motion within MD protocol limitations   Scapular mobs in side-lying   NOT PERFORMED       Home Exercises Provided and Patient Education Provided     Education provided:   -cont HOME EXERCISE PROGRAM   -apply ice as needed for delayed muscle soreness    Written Home Exercises Provided: Patient instructed to cont prior HEP.  Exercises were reviewed and Celia was able to demonstrate them prior to the end of the session.  Celia  demonstrated good  understanding of the education provided.     See EMR under Patient Instructions for exercises provided prior visit.    Assessment     Celia remains limited in A/P shoulder flexion. She reports pain at end range motions and resists increased Range of Motion.  Overall, pt is making good progress.      Celia Is progressing well towards her goals.   Pt prognosis is Good.     Pt will continue to benefit from skilled Physical Therapy interventions in order to address the deficits listed in the problem list box on initial evaluation, provide education, and to address the musculoskeletal limitations and work-related functional deficits for their job as a  at [employer].    Pt's spiritual, cultural and educational needs considered and pt agreeable to plan of care and goals.     Anticipated barriers to physical therapy: fear of re-injury    Goals:  Short Term Goals (STG) # weeks Goal Review Date Reviewed Date Met   1. The patient will begin a written HEP 1 ongoing 7/28/2023       2. Increase passive flexion and abduction to 160* 6 ongoing 7/28/2023       3. Increase passive external rotation to 80* 6 ongoing 7/28/2023       4. Decrease pain at worst to 4/10 6 ongoing 7/28/2023          Long Term Goals (LTG) # weeks Goal Review Date Reviewed Date Met   1. The patient will be independent with a HEP for maintenace 12 ongoing 7/28/2023       2. Decrease soft tissue tenderness to mild 12 ongoing 7/28/2023       3. Decrease FOTO score to 41% 12 ongoing 7/28/2023     4. Increase UE strength to 4+/5 12 ongoing 7/28/2023     5. The patient will be able to lift 25# from floor to waist 12 ongoing 7/28/2023        Plan     Cont per Plan of Care, Range of Motion, posture, strengthening per MD protocol.    Plan of care Certification: 5/11/2023 to 8/25/2023.     Outpatient Physical Therapy 3 times weekly for 12 weeks to include the following interventions: Manual Therapy, Neuromuscular Re-ed, Patient Education,  Therapeutic Activities, and Therapeutic Exercise.       Yuliana Longoria, PTA   7/28/2023

## 2023-07-31 ENCOUNTER — CLINICAL SUPPORT (OUTPATIENT)
Dept: REHABILITATION | Facility: HOSPITAL | Age: 53
End: 2023-07-31
Attending: ORTHOPAEDIC SURGERY
Payer: OTHER GOVERNMENT

## 2023-07-31 DIAGNOSIS — M25.611 DECREASED ROM OF RIGHT SHOULDER: ICD-10-CM

## 2023-07-31 DIAGNOSIS — M75.41 SHOULDER IMPINGEMENT SYNDROME, RIGHT: ICD-10-CM

## 2023-07-31 DIAGNOSIS — R29.898 DECREASED STRENGTH OF UPPER EXTREMITY: ICD-10-CM

## 2023-07-31 DIAGNOSIS — M75.111 INCOMPLETE TEAR OF RIGHT ROTATOR CUFF, UNSPECIFIED WHETHER TRAUMATIC: Primary | ICD-10-CM

## 2023-07-31 DIAGNOSIS — M75.21 BICEPS TENDONITIS ON RIGHT: ICD-10-CM

## 2023-07-31 PROCEDURE — 97112 NEUROMUSCULAR REEDUCATION: CPT | Mod: PN,CQ

## 2023-07-31 PROCEDURE — 97140 MANUAL THERAPY 1/> REGIONS: CPT | Mod: PN,CQ

## 2023-07-31 PROCEDURE — 97110 THERAPEUTIC EXERCISES: CPT | Mod: PN,CQ

## 2023-07-31 NOTE — PROGRESS NOTES
"Physical Therapy Daily Treatment Note     Name: Celia [facility]  Clinic Number: [id number]    Therapy Diagnosis:   Encounter Diagnoses   Name Primary?    Incomplete tear of right rotator cuff, unspecified whether traumatic Yes    Shoulder impingement syndrome, right     Biceps tendonitis on right     Decreased ROM of right shoulder     Decreased strength of upper extremity        Physician: Olman Pillai IV, MD    Visit Date: 7/31/2023    Physician Orders: PT Eval and Treat as per rotator cuff and biceps tenodesis protocol  Medical Diagnosis from Referral: Incomplete tear of right rotator cuff, unspecified whether traumatic, Shoulder impingement syndrome, right & Biceps tendonitis on right  Evaluation Date: 5/11/2023  Authorization Period Expiration: 8/18/2023  Plan of Care Expiration: 8/25/2023  Visit #/Visits authorized:  1/ 12 - (new [id number])    (# of No Show Appts 1 / Number of Cancelled Appts 5)    Time In: 0806  Time Out: 0900  Total Appointment Time  54 minutes    Precautions: Standard and as per protocol       Occupation (including job description if provided):  at [employer]   Job Demands: Standing, walking, lifting, carrying  Prior Medical Treatment: Therapy  and Surgery  Current Work Restrictions: Off duty      Subjective     Pt reports: "I think I slept on it"    She was compliant with home exercise program.  Response to previous treatment: no complaints   Functional change: decreased pain    Pain: 3/10  Location: right shoulder(s); anterior    Objective/Treatment     7/12 MD visit: Likely overexertion with therapy.  Will decrease intensity of therapy.  Strengthening only 1x per week, but continue modalities and gentle stretching the remaining visits per week.   DOS 5/2/23    Celia received the following treatment:  Therapeutic exercises x 21 minutes to improve Range of Motion, posture, strength:    Overhead pulley flexion 3 min  Overhead pulley scaption 3 min    Active assist range of motion on " cable column  3 x 10 3# flexion     Wall wipes flexion and scaption 3 x 10 each  Wall wipes circles 3 x 10 each direction     Celia participated in neuromuscular re-education activities to improve: Coordination, Kinesthetic, Sense, and Proprioception for 23 minutes.     T-Bar flexion 3 x 10   T-Bar external rotation 3 x 10    T-Bar SA punch 3 x 10    Sidelying External Rotation x 30    Bilateral shoulder External Rotation x 30  Anterior chest wall stretch 3 x 30 sec     NOT PERFORMED below  Prone shoulder extension 3 x 10 reps 2#    Prone shoulder high row 3 x 10 reps 2#    Prone scapular retraction 3 x 10 reps 0#   Side lying shoulder external rotation  3 x 10 reps 1#    Side lying shoulder flexion with pole 3 x 10 reps     Not performed today:  Cable column extension 2 x 10 reps 3# bilateral- not able to do 3     Cable column adduction 3 x 10 reps 3# bilateral      Cable column mid row 3 x 10 reps 3# bilateral     Finger ladder x 10 reps, level 29 (discomfort when she comes down)  Active assist range of motion on cable column  3 x 10 3# scaption      Red Theraband walkouts x 20 reps each direction (flexion/External Rotation/Internal Rotation)    Shoulder taps, x 30 reps  Alternate    Physioball rolling on wall 3 x 10     Manual therapy:  10 mins   Grade II A/P glides   Grade II inferior glides   Passive Range of Motion all planes  Scapular mobs in side-lying   NOT PERFORMED       Home Exercises Provided and Patient Education Provided     Education provided:   -cont HOME EXERCISE PROGRAM   -apply ice as needed for delayed muscle soreness    Written Home Exercises Provided: Patient instructed to cont prior HEP.  Exercises were reviewed and Celia was able to demonstrate them prior to the end of the session.  Celia demonstrated good  understanding of the education provided.     See EMR under Patient Instructions for exercises provided prior visit.    Assessment     Celia continues to report pain at end  range Range of Motion.  Encouragement needed to increase end range motion.  Therapy focused on strengthening and Range of Motion.  Pt is making slow but steady progress    Celia Is progressing well towards her goals.   Pt prognosis is Good.     Pt will continue to benefit from skilled Physical Therapy interventions in order to address the deficits listed in the problem list box on initial evaluation, provide education, and to address the musculoskeletal limitations and work-related functional deficits for their job as a  at [employer].    Pt's spiritual, cultural and educational needs considered and pt agreeable to plan of care and goals.     Anticipated barriers to physical therapy: fear of re-injury    Goals:  Short Term Goals (STG) # weeks Goal Review Date Reviewed Date Met   1. The patient will begin a written HEP 1 ongoing 7/31/2023       2. Increase passive flexion and abduction to 160* 6 ongoing 7/31/2023       3. Increase passive external rotation to 80* 6 ongoing 7/31/2023       4. Decrease pain at worst to 4/10 6 ongoing 7/31/2023          Long Term Goals (LTG) # weeks Goal Review Date Reviewed Date Met   1. The patient will be independent with a HEP for maintenace 12 ongoing 7/31/2023       2. Decrease soft tissue tenderness to mild 12 ongoing 7/31/2023       3. Decrease FOTO score to 41% 12 ongoing 7/31/2023     4. Increase UE strength to 4+/5 12 ongoing 7/31/2023     5. The patient will be able to lift 25# from floor to waist 12 ongoing 7/31/2023        Plan     Cont per Plan of Care, Range of Motion, posture, strengthening per MD protocol.    Plan of care Certification: 5/11/2023 to 8/25/2023.     Outpatient Physical Therapy 3 times weekly for 12 weeks to include the following interventions: Manual Therapy, Neuromuscular Re-ed, Patient Education, Therapeutic Activities, and Therapeutic Exercise.       Yuliana Longoria, PTA   7/31/2023

## 2023-08-02 ENCOUNTER — PATIENT MESSAGE (OUTPATIENT)
Dept: ORTHOPEDICS | Facility: CLINIC | Age: 53
End: 2023-08-02
Payer: COMMERCIAL

## 2023-08-02 ENCOUNTER — OFFICE VISIT (OUTPATIENT)
Dept: ORTHOPEDICS | Facility: CLINIC | Age: 53
End: 2023-08-02
Payer: OTHER GOVERNMENT

## 2023-08-02 ENCOUNTER — CLINICAL SUPPORT (OUTPATIENT)
Dept: REHABILITATION | Facility: HOSPITAL | Age: 53
End: 2023-08-02
Attending: ORTHOPAEDIC SURGERY
Payer: OTHER GOVERNMENT

## 2023-08-02 VITALS
DIASTOLIC BLOOD PRESSURE: 86 MMHG | HEIGHT: 69 IN | WEIGHT: 201.75 LBS | SYSTOLIC BLOOD PRESSURE: 131 MMHG | HEART RATE: 116 BPM | BODY MASS INDEX: 29.88 KG/M2

## 2023-08-02 DIAGNOSIS — M75.41 SHOULDER IMPINGEMENT SYNDROME, RIGHT: Primary | ICD-10-CM

## 2023-08-02 DIAGNOSIS — M75.21 BICEPS TENDONITIS ON RIGHT: ICD-10-CM

## 2023-08-02 DIAGNOSIS — M75.41 SHOULDER IMPINGEMENT SYNDROME, RIGHT: ICD-10-CM

## 2023-08-02 DIAGNOSIS — R29.898 DECREASED STRENGTH OF UPPER EXTREMITY: ICD-10-CM

## 2023-08-02 DIAGNOSIS — M25.611 DECREASED ROM OF RIGHT SHOULDER: ICD-10-CM

## 2023-08-02 DIAGNOSIS — M75.111 INCOMPLETE TEAR OF RIGHT ROTATOR CUFF, UNSPECIFIED WHETHER TRAUMATIC: Primary | ICD-10-CM

## 2023-08-02 PROCEDURE — 99999 PR PBB SHADOW E&M-EST. PATIENT-LVL III: CPT | Mod: PBBFAC,,, | Performed by: PHYSICIAN ASSISTANT

## 2023-08-02 PROCEDURE — 97110 THERAPEUTIC EXERCISES: CPT | Mod: PN | Performed by: PHYSICAL THERAPIST

## 2023-08-02 PROCEDURE — 97112 NEUROMUSCULAR REEDUCATION: CPT | Mod: PN | Performed by: PHYSICAL THERAPIST

## 2023-08-02 PROCEDURE — 99999 PR PBB SHADOW E&M-EST. PATIENT-LVL III: ICD-10-PCS | Mod: PBBFAC,,, | Performed by: PHYSICIAN ASSISTANT

## 2023-08-02 PROCEDURE — 99024 POSTOP FOLLOW-UP VISIT: CPT | Mod: S$GLB,,, | Performed by: PHYSICIAN ASSISTANT

## 2023-08-02 PROCEDURE — 99024 PR POST-OP FOLLOW-UP VISIT: ICD-10-PCS | Mod: S$GLB,,, | Performed by: PHYSICIAN ASSISTANT

## 2023-08-02 NOTE — PROGRESS NOTES
Physical Therapy Daily Treatment Note     Name: Celia Hernandez [facility]  Clinic Number: [id number]    Therapy Diagnosis:   Encounter Diagnoses   Name Primary?    Shoulder impingement syndrome, right Yes    Biceps tendonitis on right     Decreased ROM of right shoulder     Decreased strength of upper extremity        Physician: Olman Pillai IV, MD    Visit Date: 8/2/2023    Physician Orders: PT Eval and Treat as per rotator cuff and biceps tenodesis protocol  Medical Diagnosis from Referral: Incomplete tear of right rotator cuff, unspecified whether traumatic, Shoulder impingement syndrome, right & Biceps tendonitis on right  Evaluation Date: 5/11/2023  Authorization Period Expiration: 8/18/2023  Plan of Care Expiration: 8/25/2023  Visit #/Visits authorized:  1/ 12 - (new [id number])    (# of No Show Appts 1 / Number of Cancelled Appts 5)    Time In: 0800  Time Out: 0900  Total Appointment Time  60 minutes    Precautions: Standard and as per protocol       Occupation (including job description if provided):  at [employer]   Job Demands: Standing, walking, lifting, carrying  Prior Medical Treatment: Therapy  and Surgery  Current Work Restrictions: Light Duty      Subjective     Pt reports: her shoulder is doing ok but still notes lateral shoulder pain.    She was compliant with home exercise program.  Response to previous treatment: no complaints   Functional change: decreased pain    Pain: 3/10  Location: right shoulder(s); anterior    Objective/Treatment     7/12 MD visit: Likely overexertion with therapy.  Will decrease intensity of therapy.  Strengthening only 1x per week, but continue modalities and gentle stretching the remaining visits per week.   DOS 5/2/23    Celia received the following treatment:  Therapeutic exercises x 20 minutes to improve Range of Motion, posture, strength:    Overhead pulley flexion 3 min  Overhead pulley scaption 3 min    Active assist range of motion on cable column  3 x 10 3# flexion      Wall wipes flexion and scaption 3 x 10 each  Wall wipes circles 3 x 10 each direction     Celia participated in neuromuscular re-education activities to improve: Coordination, Kinesthetic, Sense, and Proprioception for 40 minutes.     T-Bar flexion 3 x 10   T-Bar external rotation 3 x 10    T-Bar SA punch 3 x 10    Sidelying External Rotation x 30    Bilateral shoulder External Rotation x 30  Anterior chest wall stretch 3 x 30 sec     Prone shoulder extension 3 x 10 reps 2#    Prone shoulder high row 3 x 10 reps 2#    Prone scapular retraction 3 x 10 reps 0#   Side lying shoulder external rotation  3 x 10 reps 1#    Side lying shoulder flexion with pole 3 x 10 reps     Cable column extension 2 x 10 reps 3# bilateral- not able to do 3     Cable column adduction 3 x 10 reps 3# bilateral      Cable column mid row 3 x 10 reps 3# bilateral     Finger ladder x 10 reps, level 29 (discomfort when she comes down)  Active assist range of motion on cable column  3 x 10 3# scaption      Red Theraband walkouts x 20 reps each direction (flexion/External Rotation/Internal Rotation)    Shoulder taps, x 30 reps  Alternate    Physioball rolling on wall 3 x 10     Manual therapy:  0 mins   Grade II A/P glides   Grade II inferior glides   Passive Range of Motion all planes  Scapular mobs in side-lying   NOT PERFORMED       Home Exercises Provided and Patient Education Provided     Education provided:   -cont HOME EXERCISE PROGRAM   -apply ice as needed for delayed muscle soreness    Written Home Exercises Provided: Patient instructed to cont prior HEP.  Exercises were reviewed and Celia was able to demonstrate them prior to the end of the session.  Celia demonstrated good  understanding of the education provided.     See EMR under Patient Instructions for exercises provided prior visit.    Assessment     The patient continues to report lateral shoulder pain. She displays overall improvement in ROM but has end range pain.      Celia Is progressing well towards her goals.   Pt prognosis is Good.     Pt will continue to benefit from skilled Physical Therapy interventions in order to address the deficits listed in the problem list box on initial evaluation, provide education, and to address the musculoskeletal limitations and work-related functional deficits for their job as a  at [employer].    Pt's spiritual, cultural and educational needs considered and pt agreeable to plan of care and goals.     Anticipated barriers to physical therapy: fear of re-injury    Goals:  Short Term Goals (STG) # weeks Goal Review Date Reviewed Date Met   1. The patient will begin a written HEP 1 ongoing 8/2/2023       2. Increase passive flexion and abduction to 160* 6 ongoing 8/2/2023       3. Increase passive external rotation to 80* 6 ongoing 8/2/2023       4. Decrease pain at worst to 4/10 6 ongoing 8/2/2023          Long Term Goals (LTG) # weeks Goal Review Date Reviewed Date Met   1. The patient will be independent with a HEP for maintenace 12 ongoing 8/2/2023       2. Decrease soft tissue tenderness to mild 12 ongoing 8/2/2023       3. Decrease FOTO score to 41% 12 ongoing 8/2/2023     4. Increase UE strength to 4+/5 12 ongoing 8/2/2023     5. The patient will be able to lift 25# from floor to waist 12 ongoing 8/2/2023        Plan     Cont per Plan of Care, Range of Motion, posture, strengthening per MD protocol.    Plan of care Certification: 5/11/2023 to 8/25/2023.     Outpatient Physical Therapy 3 times weekly for 12 weeks to include the following interventions: Manual Therapy, Neuromuscular Re-ed, Patient Education, Therapeutic Activities, and Therapeutic Exercise.       Chava Marquis, PT   8/2/2023

## 2023-08-02 NOTE — PROGRESS NOTES
[facility], Orthopedics and Sports Medicine  [facility]    Shoulder Post-op Visit  08/02/2023     Diagnosis:  Right shoulder pain  High Grade Partial Rotator Cuff Tear  Biceps Tendonitis  Subacromial Impingement  Prior rotator cuff repair     Procedure: 5/2/23  Arthroscopic Rotator Cuff Repair   Open Biceps Tenodesis  Arthroscopic Shoulder Debridement - Extensive     Subjective:      Celia Alford is a 52 y.o. female who is now 3 months status post right shoulder surgery. Taking Tylenol as needed for pain. The patient denies none, fever, wound drainage, increasing redness, pus, increasing pain, increasing swelling. Post op problems reported: none.    Doing well in PT. Still in light duty at work. Does not believe she can go to full duty at this time.Still experiencing pain especially after PT. Does admit to neck pain but denies radiating symptoms and paresthesias. Previous had neck pain about one year ago when referred to Dr. Pillai. Xrays were ordered. No MRI ordered.      Objective:      Ortho/SPM Exam  General: alert, appears stated age, and cooperative   Sutures: Sutures out.  Incision: healing well, no significant drainage, no dehiscence, no significant erythema  Tenderness: none  Forward Flexion: 110 degrees 4/5  External Rotation: 70 degrees 4/5  Abduction: 80 degrees 4/5   Elbow/Wrist/Hand: Full ROM  Neuro: Intact to light touch Ax/R/U/M  Vascular: CR<2s. Palpable radial pulse      Imaging:  No imaging taken today       Assessment:       The patient is status post right shoulder surgery. The primary encounter diagnosis was Incomplete tear of right rotator cuff, unspecified whether traumatic. Diagnoses of Biceps tendonitis on right and Shoulder impingement syndrome, right were also pertinent to this visit.  Doing well postoperatively. Continuation of post-op rehab course is recommended at this time. All of the patient's questions were answered.    Continue PT as directed.  Continue light duty. Paperwork filled out today for this. Could consider looking into neck as source of pain in the future if she does not have significant improvement in symptoms by 6 mon.      Plan:      Tylenol 650mg TID PRN for pain.  Continue physical therapy.  Weightbearing as tolerated on operative extremity.  Follow up as needed. 6 mon PO with Dr. Rosas Lawson PA-C  Department of Orthopedic Surgery  [facility]  Office: [phone]  Website: [email or web address]        No orders of the defined types were placed in this encounter.

## 2023-08-02 NOTE — LETTER
August 2, 2023      [facility]  [address]  [place] [address]  Phone: [phone]  Fax: [phone]       Patient: Celia Alford   YOB: 1970  Date of Visit: 08/02/2023    To Whom It May Concern:    Connie Alford  was at [facility] on 08/02/2023. The patient may return to work/school on 8/3/23 with no restrictions. If you have any questions or concerns, or if I can be of further assistance, please do not hesitate to contact me.    Sincerely,        Evelina Singh RN

## 2023-08-04 ENCOUNTER — CLINICAL SUPPORT (OUTPATIENT)
Dept: REHABILITATION | Facility: HOSPITAL | Age: 53
End: 2023-08-04
Attending: ORTHOPAEDIC SURGERY
Payer: OTHER GOVERNMENT

## 2023-08-04 DIAGNOSIS — M75.41 SHOULDER IMPINGEMENT SYNDROME, RIGHT: ICD-10-CM

## 2023-08-04 DIAGNOSIS — M25.611 DECREASED ROM OF RIGHT SHOULDER: ICD-10-CM

## 2023-08-04 DIAGNOSIS — M75.111 INCOMPLETE TEAR OF RIGHT ROTATOR CUFF, UNSPECIFIED WHETHER TRAUMATIC: Primary | ICD-10-CM

## 2023-08-04 DIAGNOSIS — R29.898 DECREASED STRENGTH OF UPPER EXTREMITY: ICD-10-CM

## 2023-08-04 DIAGNOSIS — M75.21 BICEPS TENDONITIS ON RIGHT: ICD-10-CM

## 2023-08-04 PROCEDURE — 97110 THERAPEUTIC EXERCISES: CPT | Mod: PN,CQ

## 2023-08-04 PROCEDURE — 97112 NEUROMUSCULAR REEDUCATION: CPT | Mod: PN,CQ

## 2023-08-04 NOTE — PROGRESS NOTES
Physical Therapy Daily Treatment Note     Name: Celia Hernandez [facility]  Clinic Number: [id number]    Therapy Diagnosis:   Encounter Diagnoses   Name Primary?    Incomplete tear of right rotator cuff, unspecified whether traumatic Yes    Shoulder impingement syndrome, right     Biceps tendonitis on right     Decreased ROM of right shoulder     Decreased strength of upper extremity        Physician: Olman Pillai IV, MD    Visit Date: 8/4/2023    Physician Orders: PT Eval and Treat as per rotator cuff and biceps tenodesis protocol  Medical Diagnosis from Referral: Incomplete tear of right rotator cuff, unspecified whether traumatic, Shoulder impingement syndrome, right & Biceps tendonitis on right  Evaluation Date: 5/11/2023  Authorization Period Expiration: 8/18/2023  Plan of Care Expiration: 8/25/2023  Visit #/Visits authorized:  1/ 12 - (new [id number])    (# of No Show Appts 1 / Number of Cancelled Appts 5)    Time In: 0812 (pt arrived late for appt)  Time Out: 0900  Total Appointment Time 30  minutes  Total Unbillable time:  18 minutes    Precautions: Standard and as per protocol     Occupation (including job description if provided):  at [employer]   Job Demands: Standing, walking, lifting, carrying  Prior Medical Treatment: Therapy  and Surgery  Current Work Restrictions: Light Duty      Subjective     Pt reports: her pain is minimal this morning  She was compliant with home exercise program.  Response to previous treatment: no complaints   Functional change: decreased pain    Pain: 2/10  Location: right shoulder(s); anterior    Objective/Treatment     7/12 MD visit: Likely overexertion with therapy.  Will decrease intensity of therapy.  Strengthening only 1x per week, but continue modalities and gentle stretching the remaining visits per week.   DOS 5/2/23    Celia received the following treatment:    Therapeutic exercises x 218minutes to improve Range of Motion, posture, strength:    Overhead pulley flexion 3  min  Overhead pulley scaption 3 min    Active assist range of motion on cable column  3 x 10 3# flexion (not performed today)    Wall wipes flexion and scaption 3 x 10 each  Wall wipes circles 3 x 10 each direction     Celia participated in neuromuscular re-education activities to improve: Coordination, Kinesthetic, Sense, and Proprioception for 30 minutes.     T-Bar flexion 3 x 10 reps   T-Bar external rotation 3 x 10 reps    T-Bar SA punch 3 x 10 reps    Sidelying External Rotation x 30 reps     Bilateral shoulder External Rotation x 30 reps   Anterior chest wall stretch 3 x 30 seconds      Prone shoulder extension 3 x 10 reps 1#    Prone shoulder high row 3 x 10 reps 1#    Prone scapular retraction 3 x 10 reps 0#   Side lying shoulder external rotation  3 x 10 reps 1#    Side lying shoulder flexion with pole 3 x 10 reps     Not performed today)  Cable column extension 2 x 10 reps 3# bilateral- not able to do 3     Cable column adduction 3 x 10 reps 3# bilateral      Cable column mid row 3 x 10 reps 3# bilateral     Finger ladder x 10 reps, level 29 (discomfort when she comes down)  Active assist range of motion on cable column  3 x 10 3# scaption      Red Theraband walkouts x 20 reps each direction (flexion/External Rotation/Internal Rotation)    Shoulder taps, x 30 reps  Alternate    Physioball rolling on wall 3 x 10     Manual therapy:  0 mins   Grade II A/P glides   Grade II inferior glides   Passive Range of Motion all planes  Scapular mobs in side-lying   NOT PERFORMED       Home Exercises Provided and Patient Education Provided     Education provided:   -cont HOME EXERCISE PROGRAM   -apply ice as needed for delayed muscle soreness    Written Home Exercises Provided: Patient instructed to cont prior HEP.  Exercises were reviewed and Celia was able to demonstrate them prior to the end of the session.  Celia demonstrated good  understanding of the education provided.     See EMR under Patient  Instructions for exercises provided prior visit.    Assessment     Celia reports experiencing discomfort/pinching in her Right shoulder with wall scaption, circles and several prone exercises.  Decreased weight to 1# with prone exercises and discomfort was abolished.  Verbal cuing to remind patient to avoid rounding shoulders when she does exercises.    Celia Is progressing well towards her goals.   Pt prognosis is Good.     Pt will continue to benefit from skilled Physical Therapy interventions in order to address the deficits listed in the problem list box on initial evaluation, provide education, and to address the musculoskeletal limitations and work-related functional deficits for their job as a  at [employer].    Pt's spiritual, cultural and educational needs considered and pt agreeable to plan of care and goals.     Anticipated barriers to physical therapy: fear of re-injury    Goals:  Short Term Goals (STG) # weeks Goal Review Date Reviewed Date Met   1. The patient will begin a written HEP 1 ongoing 8/4/2023       2. Increase passive flexion and abduction to 160* 6 ongoing 8/4/2023       3. Increase passive external rotation to 80* 6 ongoing 8/4/2023       4. Decrease pain at worst to 4/10 6 ongoing 8/4/2023          Long Term Goals (LTG) # weeks Goal Review Date Reviewed Date Met   1. The patient will be independent with a HEP for maintenace 12 ongoing 8/4/2023       2. Decrease soft tissue tenderness to mild 12 ongoing 8/4/2023       3. Decrease FOTO score to 41% 12 ongoing 8/4/2023     4. Increase UE strength to 4+/5 12 ongoing 8/4/2023     5. The patient will be able to lift 25# from floor to waist 12 ongoing 8/4/2023        Plan     Cont per Plan of Care, Range of Motion, posture, strengthening per MD protocol.    Plan of care Certification: 5/11/2023 to 8/25/2023.     Outpatient Physical Therapy 3 times weekly for 12 weeks to include the following interventions: Manual Therapy, Neuromuscular  Re-ed, Patient Education, Therapeutic Activities, and Therapeutic Exercise.       Mariola Castro, PTA   8/4/2023

## 2023-08-14 ENCOUNTER — CLINICAL SUPPORT (OUTPATIENT)
Dept: REHABILITATION | Facility: HOSPITAL | Age: 53
End: 2023-08-14
Payer: OTHER GOVERNMENT

## 2023-08-14 DIAGNOSIS — M25.611 DECREASED ROM OF RIGHT SHOULDER: ICD-10-CM

## 2023-08-14 DIAGNOSIS — M75.21 BICEPS TENDONITIS ON RIGHT: ICD-10-CM

## 2023-08-14 DIAGNOSIS — M75.111 INCOMPLETE TEAR OF RIGHT ROTATOR CUFF, UNSPECIFIED WHETHER TRAUMATIC: Primary | ICD-10-CM

## 2023-08-14 DIAGNOSIS — M75.41 SHOULDER IMPINGEMENT SYNDROME, RIGHT: ICD-10-CM

## 2023-08-14 DIAGNOSIS — R29.898 DECREASED STRENGTH OF UPPER EXTREMITY: ICD-10-CM

## 2023-08-14 PROCEDURE — 97112 NEUROMUSCULAR REEDUCATION: CPT | Mod: PN,CQ

## 2023-08-14 PROCEDURE — 97110 THERAPEUTIC EXERCISES: CPT | Mod: PN,CQ

## 2023-08-14 NOTE — PROGRESS NOTES
Physical Therapy Daily Treatment Note     Name: Celia Hernandez [facility]  Clinic Number: [id number]    Therapy Diagnosis:   Encounter Diagnoses   Name Primary?    Incomplete tear of right rotator cuff, unspecified whether traumatic Yes    Shoulder impingement syndrome, right     Biceps tendonitis on right     Decreased ROM of right shoulder     Decreased strength of upper extremity        Physician: Olman Pillai IV, MD    Visit Date: 8/14/2023    Physician Orders: PT Eval and Treat as per rotator cuff and biceps tenodesis protocol  Medical Diagnosis from Referral: Incomplete tear of right rotator cuff, unspecified whether traumatic, Shoulder impingement syndrome, right & Biceps tendonitis on right  Evaluation Date: 5/11/2023  Authorization Period Expiration: 8/18/2023  Plan of Care Expiration: 8/25/2023  Visit #/Visits authorized:  1/ 12 - (new [id number])    (# of No Show Appts 1 / Number of Cancelled Appts 5)    Time In: 0810 (pt arrived late for appt)  Time Out: 0900  Total Appointment Time  30 minutes  Total Unbillable time: 20 minutes    Precautions: Standard and as per protocol     Occupation (including job description if provided):  at [employer]   Job Demands: Standing, walking, lifting, carrying  Prior Medical Treatment: Therapy  and Surgery  Current Work Restrictions: Light Duty      Subjective     Pt reports: she is not having any pain this morning  She was compliant with home exercise program.  Response to previous treatment: no complaints   Functional change: decreased pain    Pain: 2/10  Location: right shoulder(s); anterior    Objective/Treatment     7/12 MD visit: Likely overexertion with therapy.  Will decrease intensity of therapy.  Strengthening only 1x per week, but continue modalities and gentle stretching the remaining visits per week.   DOS 5/2/23    Celia received the following treatment:    Therapeutic exercises x 20 minutes to improve Range of Motion, posture, strength:    Upper Body  Ergometer 3'/3'  Overhead pulley flexion 3 minutes  Overhead pulley scaption 3 minutes    Active assist range of motion on cable column  3 x 10 3# flexion    Wall wipes flexion and scaption 3 x 10 reps each  Wall wipes circles 3 x 10 reps each direction     Celia participated in neuromuscular re-education activities to improve: Coordination, Kinesthetic, Sense, and Proprioception for 30 minutes.     T-Bar flexion 3 x 10 reps   T-Bar external rotation 3 x 10 reps    T-Bar SA punch 3 x 10 reps    Sidelying External Rotation x 30 reps (not performed)    Bilateral shoulder External Rotation x 30 reps   Anterior chest wall stretch 3 x 30 seconds      Prone shoulder extension 3 x 10 reps 1#    Prone shoulder high row 3 x 10 reps 1#    Prone scapular retraction 3 x 10 reps 0#   Side lying shoulder external rotation  3 x 10 reps 1#    Side lying shoulder flexion with pole 3 x 10 reps     (Not performed today)  Cable column extension 2 x 10 reps 3# bilateral- not able to do 3     Cable column adduction 3 x 10 reps 3# bilateral      Cable column mid row 3 x 10 reps 3# bilateral     Finger ladder x 10 reps, level 29 (discomfort when she comes down)  Active assist range of motion on cable column  3 x 10 3# scaption      Red Theraband walkouts x 20 reps each direction (flexion/External Rotation/Internal Rotation)    Shoulder taps, x 30 reps  Alternate    Physioball rolling on wall 3 x 10     Manual therapy:  0 mins   Grade II A/P glides   Grade II inferior glides   Passive Range of Motion all planes  Scapular mobs in side-lying   NOT PERFORMED       Home Exercises Provided and Patient Education Provided     Education provided:   -cont HOME EXERCISE PROGRAM   -apply ice as needed for delayed muscle soreness    Written Home Exercises Provided: Patient instructed to cont prior HEP.  Exercises were reviewed and Celia was able to demonstrate them prior to the end of the session.  Celia demonstrated good  understanding of the  education provided.     See EMR under Patient Instructions for exercises provided prior visit.    Assessment     Celia reports she is not having pain in her shoulder this morning.  Pt performed all exercises well without adverse effects.     Celia Is progressing well towards her goals.   Pt prognosis is Good.     Pt will continue to benefit from skilled Physical Therapy interventions in order to address the deficits listed in the problem list box on initial evaluation, provide education, and to address the musculoskeletal limitations and work-related functional deficits for their job as a  at [employer].    Pt's spiritual, cultural and educational needs considered and pt agreeable to plan of care and goals.     Anticipated barriers to physical therapy: fear of re-injury    Goals:  Short Term Goals (STG) # weeks Goal Review Date Reviewed Date Met   1. The patient will begin a written HEP 1 ongoing 8/14/2023       2. Increase passive flexion and abduction to 160* 6 ongoing 8/14/2023       3. Increase passive external rotation to 80* 6 ongoing 8/14/2023       4. Decrease pain at worst to 4/10 6 ongoing 8/14/2023          Long Term Goals (LTG) # weeks Goal Review Date Reviewed Date Met   1. The patient will be independent with a HEP for maintenace 12 ongoing 8/14/2023       2. Decrease soft tissue tenderness to mild 12 ongoing 8/14/2023       3. Decrease FOTO score to 41% 12 ongoing 8/14/2023     4. Increase UE strength to 4+/5 12 ongoing 8/14/2023     5. The patient will be able to lift 25# from floor to waist 12 ongoing 8/14/2023        Plan     Cont per Plan of Care, Range of Motion, posture, strengthening per MD protocol.    Plan of care Certification: 5/11/2023 to 8/25/2023.     Outpatient Physical Therapy 3 times weekly for 12 weeks to include the following interventions: Manual Therapy, Neuromuscular Re-ed, Patient Education, Therapeutic Activities, and Therapeutic Exercise.       Mariola Castro, PTA    8/14/2023

## 2023-08-15 NOTE — PROGRESS NOTES
Physical Therapy Daily Treatment Note     Name: Celia Hernandez [facility]  Clinic Number: [id number]    Therapy Diagnosis:   Encounter Diagnoses   Name Primary?    Incomplete tear of right rotator cuff, unspecified whether traumatic Yes    Shoulder impingement syndrome, right     Biceps tendonitis on right     Decreased ROM of right shoulder     Decreased strength of upper extremity          Physician: Olman Pillai IV, MD    Visit Date: 8/16/2023    Physician Orders: PT Eval and Treat as per rotator cuff and biceps tenodesis protocol  Medical Diagnosis from Referral: Incomplete tear of right rotator cuff, unspecified whether traumatic, Shoulder impingement syndrome, right & Biceps tendonitis on right  Evaluation Date: 5/11/2023  Authorization Period Expiration: 8/18/2023  Plan of Care Expiration: 8/25/2023  Visit #/Visits authorized:  5/ 12 - (new [id number])  PTA visit # 3/5    (# of No Show Appts 1 / Number of Cancelled Appts 5)    Time In: 0812 (pt arrived late for appt)  Time Out: 0908  Total Appointment Time  56 minutes  Total Unbillable time:  minutes    Precautions: Standard and as per protocol     Occupation (including job description if provided):  at [employer]   Job Demands: Standing, walking, lifting, carrying  Prior Medical Treatment: Therapy  and Surgery  Current Work Restrictions: Light Duty      Subjective     Pt reports: she is feeling good today and not having any pain  She was compliant with home exercise program.  Response to previous treatment: no complaints   Functional change: decreased pain    Pain: 0/10  Location: right shoulder(s); anterior    Objective/Treatment     7/12 MD visit: Likely overexertion with therapy.  Will decrease intensity of therapy.  Strengthening only 1x per week, but continue modalities and gentle stretching the remaining visits per week.     DOS 5/2/23    Celia received the following treatment:    Therapeutic exercises x 23 minutes to improve Range of Motion, posture,  strength:    Upper Body Ergometer 3'/3'  Overhead pulley flexion 3 minutes  Overhead pulley scaption 3 minutes    Active assist range of motion on cable column  3 x 10 3# flexion    Wall wipes flexion and scaption 3 x 10 reps each  Wall wipes circles 3 x 10 reps each direction     Celia participated in neuromuscular re-education activities to improve: Coordination, Kinesthetic, Sense, and Proprioception for 30 minutes.     T-Bar flexion 3 x 10 reps   T-Bar external rotation 3 x 10 reps    T-Bar SA punch 3 x 10 reps      Prone shoulder extension 3 x 10 reps 1#    Prone shoulder high row  x 15 reps 1#, x 15 3#    Prone scapular retraction 3 x 10 reps 3#   Side lying shoulder external rotation  3 x 10 reps 1#    Side lying shoulder flexion with pole 3 x 10 reps     Red Theraband walkouts x 20 reps each direction (flexion/External Rotation/Internal Rotation)      Manual therapy:  3 mins   Grade II A/P glides   Grade II inferior glides   Passive Range of Motion all planes  Rhythmic stabilization    Bilateral shoulder External Rotation x 30 reps (not performed)  Anterior chest wall stretch 3 x 30 seconds  (not performed)        (Not performed today)  Cable column extension 2 x 10 reps 3# bilateral- not able to do 3     Cable column adduction 3 x 10 reps 3# bilateral      Cable column mid row 3 x 10 reps 3# bilateral     Finger ladder x 10 reps, level 29 (discomfort when she comes down)  Active assist range of motion on cable column  3 x 10 3# scaption      Shoulder taps x 30 reps  Alternate    Physioball rolling on wall 3 x 10   Sidelying External Rotation x 30 reps (not performed)  Scapular mobs in side-lying   NOT PERFORMED       Home Exercises Provided and Patient Education Provided     Education provided:   -cont HOME EXERCISE PROGRAM   -apply ice as needed for delayed muscle soreness    Written Home Exercises Provided: Patient instructed to cont prior HEP.  Exercises were reviewed and Celia was able to  demonstrate them prior to the end of the session.  Celia demonstrated good  understanding of the education provided.     See EMR under Patient Instructions for exercises provided prior visit.    Assessment     Celia presents to PT with no pain in her shoulder this morning.  Pt continues to report pain when she does wall wipes (flexion and circles). Discomfort at end range of motion with manual therapy.    Celia Is progressing well towards her goals.   Pt prognosis is Good.     Pt will continue to benefit from skilled Physical Therapy interventions in order to address the deficits listed in the problem list box on initial evaluation, provide education, and to address the musculoskeletal limitations and work-related functional deficits for their job as a  at [employer].    Pt's spiritual, cultural and educational needs considered and pt agreeable to plan of care and goals.     Anticipated barriers to physical therapy: fear of re-injury    Goals:  Short Term Goals (STG) # weeks Goal Review Date Reviewed Date Met   1. The patient will begin a written HEP 1 ongoing 8/16/2023       2. Increase passive flexion and abduction to 160* 6 ongoing 8/16/2023       3. Increase passive external rotation to 80* 6 ongoing 8/16/2023       4. Decrease pain at worst to 4/10 6 ongoing 8/16/2023          Long Term Goals (LTG) # weeks Goal Review Date Reviewed Date Met   1. The patient will be independent with a HEP for maintenace 12 ongoing 8/16/2023       2. Decrease soft tissue tenderness to mild 12 ongoing 8/16/2023       3. Decrease FOTO score to 41% 12 ongoing 8/16/2023     4. Increase UE strength to 4+/5 12 ongoing 8/16/2023     5. The patient will be able to lift 25# from floor to waist 12 ongoing 8/16/2023        Plan     Cont per Plan of Care, Range of Motion, posture, strengthening per MD protocol.    Plan of care Certification: 5/11/2023 to 8/25/2023.     Outpatient Physical Therapy 3 times weekly for 12 weeks to  include the following interventions: Manual Therapy, Neuromuscular Re-ed, Patient Education, Therapeutic Activities, and Therapeutic Exercise.       Mariola Castro, PTA   8/16/2023

## 2023-08-16 ENCOUNTER — CLINICAL SUPPORT (OUTPATIENT)
Dept: REHABILITATION | Facility: HOSPITAL | Age: 53
End: 2023-08-16
Payer: OTHER GOVERNMENT

## 2023-08-16 DIAGNOSIS — R29.898 DECREASED STRENGTH OF UPPER EXTREMITY: ICD-10-CM

## 2023-08-16 DIAGNOSIS — M75.21 BICEPS TENDONITIS ON RIGHT: ICD-10-CM

## 2023-08-16 DIAGNOSIS — M75.41 SHOULDER IMPINGEMENT SYNDROME, RIGHT: ICD-10-CM

## 2023-08-16 DIAGNOSIS — M75.111 INCOMPLETE TEAR OF RIGHT ROTATOR CUFF, UNSPECIFIED WHETHER TRAUMATIC: Primary | ICD-10-CM

## 2023-08-16 DIAGNOSIS — M25.611 DECREASED ROM OF RIGHT SHOULDER: ICD-10-CM

## 2023-08-16 PROCEDURE — 97110 THERAPEUTIC EXERCISES: CPT | Mod: PN,CQ

## 2023-08-16 PROCEDURE — 97112 NEUROMUSCULAR REEDUCATION: CPT | Mod: PN,CQ

## 2023-08-21 ENCOUNTER — PATIENT MESSAGE (OUTPATIENT)
Dept: ORTHOPEDICS | Facility: CLINIC | Age: 53
End: 2023-08-21
Payer: COMMERCIAL

## 2023-08-21 ENCOUNTER — CLINICAL SUPPORT (OUTPATIENT)
Dept: REHABILITATION | Facility: HOSPITAL | Age: 53
End: 2023-08-21
Payer: OTHER GOVERNMENT

## 2023-08-21 DIAGNOSIS — M75.111 INCOMPLETE TEAR OF RIGHT ROTATOR CUFF, UNSPECIFIED WHETHER TRAUMATIC: Primary | ICD-10-CM

## 2023-08-21 DIAGNOSIS — R29.898 DECREASED STRENGTH OF UPPER EXTREMITY: ICD-10-CM

## 2023-08-21 DIAGNOSIS — M75.21 BICEPS TENDONITIS ON RIGHT: ICD-10-CM

## 2023-08-21 DIAGNOSIS — M75.41 SHOULDER IMPINGEMENT SYNDROME, RIGHT: ICD-10-CM

## 2023-08-21 DIAGNOSIS — M25.611 DECREASED ROM OF RIGHT SHOULDER: ICD-10-CM

## 2023-08-21 PROCEDURE — 97110 THERAPEUTIC EXERCISES: CPT | Mod: PN

## 2023-08-21 PROCEDURE — 97112 NEUROMUSCULAR REEDUCATION: CPT | Mod: PN

## 2023-08-21 NOTE — PROGRESS NOTES
Physical Therapy Daily Treatment Note     Name: Celia Hernandez [facility]  Clinic Number: [id number]    Therapy Diagnosis:   Encounter Diagnoses   Name Primary?    Incomplete tear of right rotator cuff, unspecified whether traumatic Yes    Shoulder impingement syndrome, right     Biceps tendonitis on right     Decreased ROM of right shoulder     Decreased strength of upper extremity          Physician: Olman Pillai IV, MD    Visit Date: 8/21/2023    Physician Orders: PT Eval and Treat as per rotator cuff and biceps tenodesis protocol  Medical Diagnosis from Referral: Incomplete tear of right rotator cuff, unspecified whether traumatic, Shoulder impingement syndrome, right & Biceps tendonitis on right  Evaluation Date: 5/11/2023  Authorization Period Expiration: 8/18/2023  Plan of Care Expiration: 8/25/2023  Visit #/Visits authorized:  6/ 12 - (new [id number])  PTA visit # 3/5    (# of No Show Appts 1 / Number of Cancelled Appts 5)    Time In: 0812 (pt arrived late for appt)  Time Out: 0908  Total Appointment Time  56 minutes  Total Unbillable time:  minutes    Precautions: Standard and as per protocol     Occupation (including job description if provided):  at [employer]   Job Demands: Standing, walking, lifting, carrying  Prior Medical Treatment: Therapy  and Surgery  Current Work Restrictions: Light Duty      Subjective     Pt reports: she is having some shoulder soreness  She was compliant with home exercise program.  Response to previous treatment: no complaints   Functional change: decreased pain    Pain: 0/10  Location: right shoulder(s); anterior    Objective/Treatment     7/12 MD visit: Likely overexertion with therapy.  Will decrease intensity of therapy.  Strengthening only 1x per week, but continue modalities and gentle stretching the remaining visits per week.     DOS 5/2/23    Celia received the following treatment:    Therapeutic exercises x 23 minutes to improve Range of Motion, posture,  strength:    Upper Body Ergometer 3'/3'  Overhead pulley flexion 3 minutes  Overhead pulley scaption 3 minutes    Active assist range of motion on cable column  3 x 10 3# flexion    Wall wipes flexion and scaption 3 x 10 reps each  Wall wipes circles 3 x 10 reps each direction     Celia participated in neuromuscular re-education activities to improve: Coordination, Kinesthetic, Sense, and Proprioception for 30 minutes.     T-Bar flexion 3 x 10 reps   T-Bar external rotation 3 x 10 reps    T-Bar SA punch 3 x 10 reps      Prone shoulder extension 3 x 10 reps 1#    Prone shoulder high row  x 15 reps 2#, x 15    Prone scapular retraction 3 x 10 reps 2#   Side lying shoulder external rotation  3 x 10 reps 1#    Side lying shoulder flexion with pole 3 x 10 reps     Red Theraband walkouts x 20 reps each direction (flexion/External Rotation/Internal Rotation)      Manual therapy:  3 mins   Grade II A/P glides   Grade II inferior glides   Passive Range of Motion all planes  Rhythmic stabilization    Bilateral shoulder External Rotation x 30 reps (not performed)  Anterior chest wall stretch 3 x 30 seconds  (not performed)        (Not performed today)  Cable column extension 2 x 10 reps 3# bilateral- not able to do 3     Cable column adduction 3 x 10 reps 3# bilateral      Cable column mid row 3 x 10 reps 3# bilateral     Finger ladder x 10 reps, level 29 (discomfort when she comes down)  Active assist range of motion on cable column  3 x 10 3# scaption      Shoulder taps x 30 reps  Alternate    Physioball rolling on wall 3 x 10   Sidelying External Rotation x 30 reps (not performed)  Scapular mobs in side-lying   NOT PERFORMED       Home Exercises Provided and Patient Education Provided     Education provided:   -cont HOME EXERCISE PROGRAM   -apply ice as needed for delayed muscle soreness    Written Home Exercises Provided: Patient instructed to cont prior HEP.  Exercises were reviewed and Celia was able to demonstrate  them prior to the end of the session.  Celia demonstrated good  understanding of the education provided.     See EMR under Patient Instructions for exercises provided prior visit.    Assessment     Celia presents to PT with no pain, some stiffness in her shoulder this morning.  Pt needs to improve her strength in Rotator Cuff and scapula muscles.    Celia Is progressing well towards her goals.   Pt prognosis is Good.     Pt will continue to benefit from skilled Physical Therapy interventions in order to address the deficits listed in the problem list box on initial evaluation, provide education, and to address the musculoskeletal limitations and work-related functional deficits for their job as a  at [employer].    Pt's spiritual, cultural and educational needs considered and pt agreeable to plan of care and goals.     Anticipated barriers to physical therapy: fear of re-injury    Goals:  Short Term Goals (STG) # weeks Goal Review Date Reviewed Date Met   1. The patient will begin a written HEP 1 ongoing 8/21/2023       2. Increase passive flexion and abduction to 160* 6 ongoing 8/21/2023       3. Increase passive external rotation to 80* 6 ongoing 8/21/2023       4. Decrease pain at worst to 4/10 6 ongoing 8/21/2023          Long Term Goals (LTG) # weeks Goal Review Date Reviewed Date Met   1. The patient will be independent with a HEP for maintenace 12 ongoing 8/21/2023       2. Decrease soft tissue tenderness to mild 12 ongoing 8/21/2023       3. Decrease FOTO score to 41% 12 ongoing 8/21/2023     4. Increase UE strength to 4+/5 12 ongoing 8/21/2023     5. The patient will be able to lift 25# from floor to waist 12 ongoing 8/21/2023        Plan     Cont per Plan of Care, Range of Motion, posture, strengthening per MD protocol.    Plan of care Certification: 5/11/2023 to 8/25/2023.     Outpatient Physical Therapy 3 times weekly for 12 weeks to include the following interventions: Manual Therapy,  Neuromuscular Re-ed, Patient Education, Therapeutic Activities, and Therapeutic Exercise.       Alex Moreau, PT   8/21/2023

## 2023-08-29 ENCOUNTER — PATIENT MESSAGE (OUTPATIENT)
Dept: ORTHOPEDICS | Facility: CLINIC | Age: 53
End: 2023-08-29
Payer: COMMERCIAL

## 2023-08-29 ENCOUNTER — PATIENT MESSAGE (OUTPATIENT)
Dept: INTERNAL MEDICINE | Facility: CLINIC | Age: 53
End: 2023-08-29
Payer: COMMERCIAL

## 2023-08-29 NOTE — TELEPHONE ENCOUNTER
Would you like for her to come in and be seen if something is available sooner, or can she wait until Dr. Roblero comes back in?

## 2023-08-29 NOTE — TELEPHONE ENCOUNTER
Called pt to help schedule an appt for today. She was unable to come in due to work, so she is coming in Thursday and will be seeing Dr. Neely.

## 2023-08-30 ENCOUNTER — PATIENT MESSAGE (OUTPATIENT)
Dept: ORTHOPEDICS | Facility: CLINIC | Age: 53
End: 2023-08-30
Payer: COMMERCIAL

## 2023-08-31 ENCOUNTER — TELEPHONE (OUTPATIENT)
Dept: INTERNAL MEDICINE | Facility: CLINIC | Age: 53
End: 2023-08-31

## 2023-08-31 ENCOUNTER — OFFICE VISIT (OUTPATIENT)
Dept: INTERNAL MEDICINE | Facility: CLINIC | Age: 53
End: 2023-08-31
Payer: COMMERCIAL

## 2023-08-31 VITALS
WEIGHT: 201.94 LBS | HEART RATE: 97 BPM | SYSTOLIC BLOOD PRESSURE: 130 MMHG | DIASTOLIC BLOOD PRESSURE: 100 MMHG | BODY MASS INDEX: 29.82 KG/M2

## 2023-08-31 DIAGNOSIS — I10 ESSENTIAL HYPERTENSION: Primary | ICD-10-CM

## 2023-08-31 DIAGNOSIS — R82.90 FOUL SMELLING URINE: ICD-10-CM

## 2023-08-31 DIAGNOSIS — R03.0 ELEVATED BLOOD PRESSURE READING WITHOUT DIAGNOSIS OF HYPERTENSION: ICD-10-CM

## 2023-08-31 LAB
BACTERIA #/AREA URNS AUTO: ABNORMAL /HPF
BILIRUB UR QL STRIP: NEGATIVE
CLARITY UR REFRACT.AUTO: ABNORMAL
COLOR UR AUTO: YELLOW
GLUCOSE UR QL STRIP: NEGATIVE
HGB UR QL STRIP: NEGATIVE
KETONES UR QL STRIP: NEGATIVE
LEUKOCYTE ESTERASE UR QL STRIP: ABNORMAL
MICROSCOPIC COMMENT: ABNORMAL
NITRITE UR QL STRIP: NEGATIVE
PH UR STRIP: 6 [PH] (ref 5–8)
PROT UR QL STRIP: NEGATIVE
SP GR UR STRIP: 1.02 (ref 1–1.03)
SQUAMOUS #/AREA URNS AUTO: 7 /HPF
URN SPEC COLLECT METH UR: ABNORMAL
WBC #/AREA URNS AUTO: >100 /HPF (ref 0–5)
WBC CLUMPS UR QL AUTO: ABNORMAL

## 2023-08-31 PROCEDURE — 99999 PR PBB SHADOW E&M-EST. PATIENT-LVL II: ICD-10-PCS | Mod: PBBFAC,,, | Performed by: INTERNAL MEDICINE

## 2023-08-31 PROCEDURE — 3075F SYST BP GE 130 - 139MM HG: CPT | Mod: CPTII,S$GLB,, | Performed by: INTERNAL MEDICINE

## 2023-08-31 PROCEDURE — 1160F PR REVIEW ALL MEDS BY PRESCRIBER/CLIN PHARMACIST DOCUMENTED: ICD-10-PCS | Mod: CPTII,S$GLB,, | Performed by: INTERNAL MEDICINE

## 2023-08-31 PROCEDURE — 81001 URINALYSIS AUTO W/SCOPE: CPT | Performed by: INTERNAL MEDICINE

## 2023-08-31 PROCEDURE — 87088 URINE BACTERIA CULTURE: CPT | Performed by: INTERNAL MEDICINE

## 2023-08-31 PROCEDURE — 3008F BODY MASS INDEX DOCD: CPT | Mod: CPTII,S$GLB,, | Performed by: INTERNAL MEDICINE

## 2023-08-31 PROCEDURE — 1160F RVW MEDS BY RX/DR IN RCRD: CPT | Mod: CPTII,S$GLB,, | Performed by: INTERNAL MEDICINE

## 2023-08-31 PROCEDURE — 99214 PR OFFICE/OUTPT VISIT, EST, LEVL IV, 30-39 MIN: ICD-10-PCS | Mod: S$GLB,,, | Performed by: INTERNAL MEDICINE

## 2023-08-31 PROCEDURE — 1159F PR MEDICATION LIST DOCUMENTED IN MEDICAL RECORD: ICD-10-PCS | Mod: CPTII,S$GLB,, | Performed by: INTERNAL MEDICINE

## 2023-08-31 PROCEDURE — 87186 SC STD MICRODIL/AGAR DIL: CPT | Performed by: INTERNAL MEDICINE

## 2023-08-31 PROCEDURE — 87086 URINE CULTURE/COLONY COUNT: CPT | Performed by: INTERNAL MEDICINE

## 2023-08-31 PROCEDURE — 99999 PR PBB SHADOW E&M-EST. PATIENT-LVL II: CPT | Mod: PBBFAC,,, | Performed by: INTERNAL MEDICINE

## 2023-08-31 PROCEDURE — 1159F MED LIST DOCD IN RCRD: CPT | Mod: CPTII,S$GLB,, | Performed by: INTERNAL MEDICINE

## 2023-08-31 PROCEDURE — 3080F PR MOST RECENT DIASTOLIC BLOOD PRESSURE >= 90 MM HG: ICD-10-PCS | Mod: CPTII,S$GLB,, | Performed by: INTERNAL MEDICINE

## 2023-08-31 PROCEDURE — 3080F DIAST BP >= 90 MM HG: CPT | Mod: CPTII,S$GLB,, | Performed by: INTERNAL MEDICINE

## 2023-08-31 PROCEDURE — 99214 OFFICE O/P EST MOD 30 MIN: CPT | Mod: S$GLB,,, | Performed by: INTERNAL MEDICINE

## 2023-08-31 PROCEDURE — 87077 CULTURE AEROBIC IDENTIFY: CPT | Performed by: INTERNAL MEDICINE

## 2023-08-31 PROCEDURE — 3008F PR BODY MASS INDEX (BMI) DOCUMENTED: ICD-10-PCS | Mod: CPTII,S$GLB,, | Performed by: INTERNAL MEDICINE

## 2023-08-31 PROCEDURE — 3075F PR MOST RECENT SYSTOLIC BLOOD PRESS GE 130-139MM HG: ICD-10-PCS | Mod: CPTII,S$GLB,, | Performed by: INTERNAL MEDICINE

## 2023-08-31 RX ORDER — HYDROCHLOROTHIAZIDE 12.5 MG/1
25 TABLET ORAL DAILY
Qty: 60 TABLET | Refills: 11
Start: 2023-08-31 | End: 2023-10-06

## 2023-08-31 NOTE — TELEPHONE ENCOUNTER
----- Message from Elvis Neely MD sent at 8/31/2023 10:00 AM CDT -----  Hi, please offer her an appt to see back Dr. Roblero for 2-3 weeks from now for the high BP.  Thank you, Elvis Neely

## 2023-08-31 NOTE — Clinical Note
Hi, please offer her an appt to see back Dr. Roblero for 2-3 weeks from now for the high BP. Thank you, Elvis Neely

## 2023-08-31 NOTE — PROGRESS NOTES
Subjective:       Patient ID: Celia Alford is a 52 y.o. female.    Chief Complaint: No chief complaint on file.    Here for urgent care --  She has had adriana pressures, she checks at work -- 150s/100s. She has not been consistent with taking her hydrochlorothiazide daily.    Also c/o urine change in color -- darkened urine color. Denies dysuria, no blood in urine. She does have urinary frequ.    Slight headache, not severe.      Review of Systems   Constitutional:  Negative for activity change, appetite change and unexpected weight change.   Eyes:  Negative for visual disturbance.   Respiratory:  Negative for cough, chest tightness and shortness of breath.    Cardiovascular:  Negative for chest pain.   Gastrointestinal:  Negative for abdominal distention and abdominal pain.   Genitourinary:  Positive for frequency. Negative for difficulty urinating and urgency.        Urine foul odor.   Skin:  Negative for rash.           Objective:      Physical Exam  Vitals reviewed.   Constitutional:       General: She is not in acute distress.     Appearance: Normal appearance. She is well-developed. She is not ill-appearing, toxic-appearing or diaphoretic.   HENT:      Head: Normocephalic and atraumatic.   Eyes:      General: No scleral icterus.     Pupils: Pupils are equal, round, and reactive to light.   Neck:      Thyroid: No thyromegaly.   Cardiovascular:      Rate and Rhythm: Normal rate and regular rhythm.      Heart sounds: Normal heart sounds. No murmur heard.     No friction rub. No gallop.   Pulmonary:      Effort: Pulmonary effort is normal. No respiratory distress.      Breath sounds: Normal breath sounds. No wheezing or rales.   Abdominal:      General: Bowel sounds are normal. There is no distension.      Palpations: Abdomen is soft. There is no mass.      Tenderness: There is no abdominal tenderness. There is no guarding or rebound.   Musculoskeletal:         General: No tenderness. Normal range of motion.       Cervical back: Normal range of motion.   Lymphadenopathy:      Cervical: No cervical adenopathy.   Neurological:      General: No focal deficit present.      Mental Status: She is alert and oriented to person, place, and time.   Psychiatric:         Mood and Affect: Mood normal.         Speech: Speech normal.         Behavior: Behavior normal.         Assessment:       1. Essential hypertension    2. Foul smelling urine        Plan:       Diagnoses and all orders for this visit:    Essential hypertension  I advised that she inc dose to:  Orders Placed This Encounter        hydroCHLOROthiazide (HYDRODIURIL) 12.5 MG Tab   And take med daily  Email me or Jeb Stoddard MD in 2 weeks with her pressure, it is possible that she will need additional med      Foul smelling urine  -     Urinalysis, Reflex to Urine Culture Urine, Clean Catch              Future Appointments   Date Time Provider Department Center   9/6/2023  8:00 AM Mariola Castro, PTA [facility] [place]   9/8/2023  8:00 AM Alex Moreau, PT [facility] [place] [facility]   9/11/2023  8:00 AM Alex Moreau, PT [facility] [place] [facility]   11/2/2023 10:30 AM Olman Pillai IV, MD [id number] [address]

## 2023-09-01 ENCOUNTER — PATIENT MESSAGE (OUTPATIENT)
Dept: INTERNAL MEDICINE | Facility: CLINIC | Age: 53
End: 2023-09-01
Payer: COMMERCIAL

## 2023-09-01 DIAGNOSIS — R82.90 FOUL SMELLING URINE: Primary | ICD-10-CM

## 2023-09-01 RX ORDER — NITROFURANTOIN 25; 75 MG/1; MG/1
100 CAPSULE ORAL 2 TIMES DAILY
Qty: 6 CAPSULE | Refills: 0 | Status: SHIPPED | OUTPATIENT
Start: 2023-09-01 | End: 2023-09-04

## 2023-09-01 NOTE — TELEPHONE ENCOUNTER
Hi, please call the patient --  The urine test does appear like the patient has a urinary tract infection or UTI.  I recommend starting an antibiotic and I have sent in --    Orders Placed This Encounter    nitrofurantoin, macrocrystal-monohydrate, (MACROBID) 100 MG capsule     The final urine culture will take a few more days to complete. Occasionally we need to change the antibiotic after seeing the final culture report, but I still recommend that the patient start this antibiotic now.    Let me know if patient has any questions.  Thank you, Elvis Neely

## 2023-09-02 LAB — BACTERIA UR CULT: ABNORMAL

## 2023-09-08 ENCOUNTER — CLINICAL SUPPORT (OUTPATIENT)
Dept: REHABILITATION | Facility: HOSPITAL | Age: 53
End: 2023-09-08
Payer: OTHER GOVERNMENT

## 2023-09-08 DIAGNOSIS — M75.21 BICEPS TENDONITIS ON RIGHT: ICD-10-CM

## 2023-09-08 DIAGNOSIS — M75.111 INCOMPLETE TEAR OF RIGHT ROTATOR CUFF, UNSPECIFIED WHETHER TRAUMATIC: Primary | ICD-10-CM

## 2023-09-08 DIAGNOSIS — M25.611 DECREASED ROM OF RIGHT SHOULDER: ICD-10-CM

## 2023-09-08 DIAGNOSIS — R29.898 DECREASED STRENGTH OF UPPER EXTREMITY: ICD-10-CM

## 2023-09-08 DIAGNOSIS — M75.41 SHOULDER IMPINGEMENT SYNDROME, RIGHT: ICD-10-CM

## 2023-09-08 PROCEDURE — 97112 NEUROMUSCULAR REEDUCATION: CPT | Mod: PN

## 2023-09-08 PROCEDURE — 97110 THERAPEUTIC EXERCISES: CPT | Mod: PN

## 2023-09-08 NOTE — PROGRESS NOTES
Physical Therapy Daily Treatment Note     Name: Celia Hernandez [facility]  Clinic Number: [id number]    Therapy Diagnosis:   Encounter Diagnoses   Name Primary?    Incomplete tear of right rotator cuff, unspecified whether traumatic Yes    Shoulder impingement syndrome, right     Biceps tendonitis on right     Decreased ROM of right shoulder     Decreased strength of upper extremity          Physician: Olman Pillai IV, MD    Visit Date: 9/8/2023    Physician Orders: PT Eval and Treat as per rotator cuff and biceps tenodesis protocol  Medical Diagnosis from Referral: Incomplete tear of right rotator cuff, unspecified whether traumatic, Shoulder impingement syndrome, right & Biceps tendonitis on right  Evaluation Date: 5/11/2023  Authorization Period Expiration: 8/18/2023  Plan of Care Expiration: 8/25/2023  Visit #/Visits authorized:  6/ 12 - (new [id number])  [facility] visit # 3/5    (# of No Show Appts 1 / Number of Cancelled Appts 5)    Time In: 0810  Time Out: 0900  Total Appointment Time  50 minutes  Total Unbillable time:  minutes    Precautions: Standard and as per protocol     Occupation (including job description if provided):  at [employer]   Job Demands: Standing, walking, lifting, carrying  Prior Medical Treatment: Therapy  and Surgery  Current Work Restrictions: Light Duty      Subjective     Pt reports: she is having some shoulder soreness  She was compliant with home exercise program.  Response to previous treatment: no complaints   Functional change: decreased pain    Pain: 0/10  Location: right shoulder(s); anterior    Objective/Treatment     7/12 MD visit: Likely overexertion with therapy.  Will decrease intensity of therapy.  Strengthening only 1x per week, but continue modalities and gentle stretching the remaining visits per week.     DOS 5/2/23    Celia received the following treatment:    Therapeutic exercises x 23 minutes to improve Range of Motion, posture, strength:    Upper Body Ergometer  3'/3'  Overhead pulley flexion 3 minutes  Overhead pulley scaption 3 minutes    Active assist range of motion on cable column  3 x 10 3# flexion    Wall wipes flexion and scaption 3 x 10 reps each  Wall wipes circles 3 x 10 reps each direction     Celia participated in neuromuscular re-education activities to improve: Coordination, Kinesthetic, Sense, and Proprioception for 30 minutes.     T-Bar flexion 3 x 10 reps   T-Bar external rotation 3 x 10 reps    T-Bar SA punch 3 x 10 reps      Prone shoulder extension 3 x 10 reps 2#    Prone shoulder high row  x 15 reps 2#, x 15    Prone scapular retraction 3 x 10 reps 2#   Side lying shoulder external rotation  3 x 10 reps 1#    Side lying shoulder flexion with pole 3 x 10 reps     Red Theraband walkouts x 20 reps each direction (flexion/External Rotation/Internal Rotation)      Manual therapy:  3 mins   Grade II A/P glides   Grade II inferior glides   Passive Range of Motion all planes  Rhythmic stabilization    Bilateral shoulder External Rotation x 30 reps (not performed)  Anterior chest wall stretch 3 x 30 seconds  (not performed)        (Not performed today)  Cable column extension 2 x 10 reps 3# bilateral- not able to do 3     Cable column adduction 3 x 10 reps 3# bilateral      Cable column mid row 3 x 10 reps 3# bilateral     Finger ladder x 10 reps, level 29 (discomfort when she comes down)  Active assist range of motion on cable column  3 x 10 3# scaption      Shoulder taps x 30 reps  Alternate    Physioball rolling on wall 3 x 10   Sidelying External Rotation x 30 reps (not performed)  Scapular mobs in side-lying   NOT PERFORMED       Home Exercises Provided and Patient Education Provided     Education provided:   -cont HOME EXERCISE PROGRAM   -apply ice as needed for delayed muscle soreness    Written Home Exercises Provided: Patient instructed to cont prior HEP.  Exercises were reviewed and Celia was able to demonstrate them prior to the end of the  session.  Celia demonstrated good  understanding of the education provided.     See EMR under Patient Instructions for exercises provided prior visit.    Assessment     Celia presents to PT with no pain, some stiffness in her shoulder this morning.  Pt needs to improve her strength in Rotator Cuff and scapula muscles.Passive motion is good, needs better active motion, and to be able to work on more work tasks, lifting and carrying, still with 5# limit    Celia Is progressing well towards her goals.   Pt prognosis is Good.     Pt will continue to benefit from skilled Physical Therapy interventions in order to address the deficits listed in the problem list box on initial evaluation, provide education, and to address the musculoskeletal limitations and work-related functional deficits for their job as a  at [employer].    Pt's spiritual, cultural and educational needs considered and pt agreeable to plan of care and goals.     Anticipated barriers to physical therapy: fear of re-injury    Goals:  Short Term Goals (STG) # weeks Goal Review Date Reviewed Date Met   1. The patient will begin a written HEP 1 ongoing 9/8/2023       2. Increase passive flexion and abduction to 160* 6 ongoing 9/8/2023       3. Increase passive external rotation to 80* 6 ongoing 9/8/2023       4. Decrease pain at worst to 4/10 6 ongoing 9/8/2023          Long Term Goals (LTG) # weeks Goal Review Date Reviewed Date Met   1. The patient will be independent with a HEP for maintenace 12 ongoing 9/8/2023       2. Decrease soft tissue tenderness to mild 12 ongoing 9/8/2023       3. Decrease FOTO score to 41% 12 ongoing 9/8/2023     4. Increase UE strength to 4+/5 12 ongoing 9/8/2023     5. The patient will be able to lift 25# from floor to waist 12 ongoing 9/8/2023        Plan     Cont per Plan of Care, Range of Motion, posture, strengthening per MD protocol.    Plan of care Certification: 5/11/2023 to 8/25/2023.     Outpatient Physical  Therapy 3 times weekly for 12 weeks to include the following interventions: Manual Therapy, Neuromuscular Re-ed, Patient Education, Therapeutic Activities, and Therapeutic Exercise.       Alex Moreau, PT   9/8/2023

## 2023-09-10 NOTE — PLAN OF CARE
Outpatient Therapy Updated Plan of Care     Visit Date: 9/8/2023    Name: Celia Alfrod  Clinic Number: [id number]    Therapy Diagnosis:   Encounter Diagnoses   Name Primary?    Incomplete tear of right rotator cuff, unspecified whether traumatic Yes    Shoulder impingement syndrome, right     Biceps tendonitis on right     Decreased ROM of right shoulder     Decreased strength of upper extremity      Physician: Olman Pillai IV, MD    Physician Orders: PT Eval and Treat   Medical Diagnosis from Referral: Incomplete tear of right rotator cuff, unspecified whether traumatic, Shoulder impingement syndrome, right & Biceps tendonitis on right  Evaluation Date: 9/8/2023  Current Certification Period:  5/11/23 to 8/25/23  Authorization Period Expiration: 12/31/23  Plan of Care Expiration: 8/25/23  Visit # / Visits authorized: 31/ 36    Precautions: Standard  Functional Level Prior to Evaluation:  Independent     Subjective     Update: Patient is still having some arm soreness. Gets worse when she uses it. Does not feel ready to return to work as she will have to lift  up to 72# throughout the day, currently she is restricted to 5#     Pain:  Use of Numeric Pain Scale:  Current 0/10, worst 7/10, best 0/10   Location: right shoulder   Description: Aching    Objective     Update:     Range of Motion/Strength:      Shoulder Left Right Pain/Dysfunction with Movement   AROM         flexion  170*  125     abduction  160* 120     Internal rotation  L1  Sacrum      ER at 90° abd  84* 55           Shoulder Left Right Pain/Dysfunction with Movement   PROM         flexion  175*  170*     abduction  168*  155*     Internal rotation  L1  L3     ER at 90° abd  84*  85*           UE MMT Left Right Pain/Dysfunction with Movement   Shoulder Flexion 5/5. 4-/5     Shoulder Extension 5/5. 4/5     Shoulder Abduction 5/5. 4/5     Shoulder Adduction 5/5. 4/5     Shoulder IR 5/5. 4/5     Shoulder ER  @ 0* Abduction 4/5. 3+/5            Functional Job Specific Testing:      Job Specific Task Job Demands Current Ability Deficit? (Yes or No)   1. Lifting Occasionally Unable yes   2. Carrying Occasionally Unable yes   3. Standing/Walking Frequently Limited yes           TREATMENT     SEE DAILY NOTE FOR TREATMENT       Assessment     Update: Patient is making good progress with her Range of Motion, passive more than active, pain is controlled. She is still weak and has not been able to do any job related tasks such as lifting and carrying due to her 5# restriction. She will need more therapy to get back to work     Previous Short Term Goals Status:   Not Met   New Short Term Goals Status:   Partially Met  Long Term Goal Status:   continue per initial plan of care.  Reasons for Recertification of Therapy:   Strength Range of Motion and work on lifting and carrying to get ready to return to work    Plan     Updated Certification Period: 9/8/2023 to 11/15/23  Recommended Treatment Plan: 2 times per week for 6 weeks: Manual Therapy, Moist Heat/ Ice, Neuromuscular Re-ed, Patient Education, Self Care, Therapeutic Activities, and Therapeutic Exercise  Other Recommendations: Possible Work Conditioning when cleared to do so by MD Alex Moreau, PT  9/8/2023      I CERTIFY THE NEED FOR THESE SERVICES FURNISHED UNDER THIS PLAN OF TREATMENT AND WHILE UNDER MY CARE    Physician's comments:        Physician's Signature: ___________________________________________________

## 2023-09-11 ENCOUNTER — CLINICAL SUPPORT (OUTPATIENT)
Dept: REHABILITATION | Facility: HOSPITAL | Age: 53
End: 2023-09-11
Payer: OTHER GOVERNMENT

## 2023-09-11 DIAGNOSIS — M25.611 DECREASED ROM OF RIGHT SHOULDER: ICD-10-CM

## 2023-09-11 DIAGNOSIS — M75.111 INCOMPLETE TEAR OF RIGHT ROTATOR CUFF, UNSPECIFIED WHETHER TRAUMATIC: Primary | ICD-10-CM

## 2023-09-11 DIAGNOSIS — M75.21 BICEPS TENDONITIS ON RIGHT: ICD-10-CM

## 2023-09-11 DIAGNOSIS — M75.41 SHOULDER IMPINGEMENT SYNDROME, RIGHT: ICD-10-CM

## 2023-09-11 DIAGNOSIS — R29.898 DECREASED STRENGTH OF UPPER EXTREMITY: ICD-10-CM

## 2023-09-11 PROCEDURE — 97112 NEUROMUSCULAR REEDUCATION: CPT | Mod: PN

## 2023-09-11 PROCEDURE — 97110 THERAPEUTIC EXERCISES: CPT | Mod: PN

## 2023-09-11 NOTE — PROGRESS NOTES
Physical Therapy Daily Treatment Note     Name: [facility] [place]  Clinic Number: [id number]    Therapy Diagnosis:   Encounter Diagnoses   Name Primary?    Incomplete tear of right rotator cuff, unspecified whether traumatic Yes    Shoulder impingement syndrome, right     Biceps tendonitis on right     Decreased ROM of right shoulder     Decreased strength of upper extremity          Physician: Olman Pillai IV, MD    Visit Date: 9/11/2023    Physician Orders: PT Eval and Treat as per rotator cuff and biceps tenodesis protocol  Medical Diagnosis from Referral: Incomplete tear of right rotator cuff, unspecified whether traumatic, Shoulder impingement syndrome, right & Biceps tendonitis on right  Evaluation Date: 5/11/2023  Authorization Period Expiration: 8/18/2023  Plan of Care Expiration: 8/25/2023  Visit #/Visits authorized:  7/ 12 - (new [id number])  \[facility] [place] [facility]\"" visit # 3/5    (# of No Show Appts 1 / Number of Cancelled Appts 5)    Time In: 0810  Time Out: 0900  Total Appointment Time  50 minutes  Total Unbillable time:  minutes    Precautions: Standard and as per protocol     Occupation (including job description if provided):  at [employer], [place]   Job Demands: Standing, walking, lifting, carrying  Prior Medical Treatment: Therapy  and Surgery  Current Work Restrictions: Light Duty      Subjective     Pt reports: she feels about the same  She was compliant with home exercise program.  Response to previous treatment: no complaints   Functional change: decreased pain    Pain: 0/10  Location: right shoulder(s); anterior    Objective/Treatment     7/12 MD visit: Likely overexertion with therapy.  Will decrease intensity of therapy.  Strengthening only 1x per week, but continue modalities and gentle stretching the remaining visits per week.     DOS 5/2/23    Celia received the following treatment:    Therapeutic exercises x 23 minutes to improve Range of Motion, posture, strength:    Upper Body Ergometer 3'/3'  Overhead  pulley flexion 3 minutes  Overhead pulley scaption 3 minutes    Active assist range of motion on cable column  3 x 10 3# flexion    Wall wipes flexion and scaption 3 x 10 reps each  Wall wipes circles 3 x 10 reps each direction     Celia participated in neuromuscular re-education activities to improve: Coordination, Kinesthetic, Sense, and Proprioception for 30 minutes.     3# Bar flexion 3 x 10 reps   3# Bar SA punch 3 x 10 reps      Prone shoulder extension 3 x 10 reps 2#    Prone shoulder high row  x 15 reps 2#, x 15    Prone scapular retraction 3 x 10 reps 2#   Side lying shoulder external rotation  3 x 10 reps 1#    Side lying shoulder flexion  3 x 10 reps   Side lying hor Abduction 3 x 10     Red Theraband x 20 reps each direction (External Rotation/Internal Rotation)      Manual therapy:  3 mins   Grade II A/P glides   Grade II inferior glides   Passive Range of Motion all planes  Rhythmic stabilization    Home Exercises Provided and Patient Education Provided     Education provided:   -cont HOME EXERCISE PROGRAM   -apply ice as needed for delayed muscle soreness    Written Home Exercises Provided: Patient instructed to cont prior HEP.  Exercises were reviewed and Celia was able to demonstrate them prior to the end of the session.  Celia demonstrated good  understanding of the education provided.     See EMR under Patient Instructions for exercises provided prior visit.    Assessment     Celia presents to PT with no pain, some stiffness in her shoulder this morning.  Pt needs to improve her strength in Rotator Cuff and scapula muscles. Will slowly add in more strength as to not inflame her    Celia Is progressing well towards her goals.   Pt prognosis is Good.     Pt will continue to benefit from skilled Physical Therapy interventions in order to address the deficits listed in the problem list box on initial evaluation, provide education, and to address the musculoskeletal limitations and  work-related functional deficits for their job as a  at [employer].    Pt's spiritual, cultural and educational needs considered and pt agreeable to plan of care and goals.     Anticipated barriers to physical therapy: fear of re-injury    Goals:  Short Term Goals (STG) # weeks Goal Review Date Reviewed Date Met   1. The patient will begin a written HEP 1 ongoing 9/11/2023       2. Increase passive flexion and abduction to 160* 6 ongoing 9/11/2023       3. Increase passive external rotation to 80* 6 ongoing 9/11/2023       4. Decrease pain at worst to 4/10 6 ongoing 9/11/2023          Long Term Goals (LTG) # weeks Goal Review Date Reviewed Date Met   1. The patient will be independent with a HEP for maintenace 12 ongoing 9/11/2023       2. Decrease soft tissue tenderness to mild 12 ongoing 9/11/2023       3. Decrease FOTO score to 41% 12 ongoing 9/11/2023     4. Increase UE strength to 4+/5 12 ongoing 9/11/2023     5. The patient will be able to lift 25# from floor to waist 12 ongoing 9/11/2023        Plan     Cont per Plan of Care, Range of Motion, posture, strengthening per MD protocol.    Plan of care Certification: 5/11/2023 to 8/25/2023.     Outpatient Physical Therapy 3 times weekly for 12 weeks to include the following interventions: Manual Therapy, Neuromuscular Re-ed, Patient Education, Therapeutic Activities, and Therapeutic Exercise.       Alex Moreau, PT   9/11/2023

## 2023-09-12 DIAGNOSIS — M75.111 INCOMPLETE TEAR OF RIGHT ROTATOR CUFF, UNSPECIFIED WHETHER TRAUMATIC: Primary | ICD-10-CM

## 2023-09-25 ENCOUNTER — PATIENT MESSAGE (OUTPATIENT)
Dept: ORTHOPEDICS | Facility: CLINIC | Age: 53
End: 2023-09-25
Payer: COMMERCIAL

## 2023-10-05 ENCOUNTER — PATIENT OUTREACH (OUTPATIENT)
Dept: ADMINISTRATIVE | Facility: HOSPITAL | Age: 53
End: 2023-10-05
Payer: COMMERCIAL

## 2023-10-05 ENCOUNTER — CLINICAL SUPPORT (OUTPATIENT)
Dept: REHABILITATION | Facility: HOSPITAL | Age: 53
End: 2023-10-05
Payer: OTHER GOVERNMENT

## 2023-10-05 DIAGNOSIS — M25.611 DECREASED ROM OF RIGHT SHOULDER: ICD-10-CM

## 2023-10-05 DIAGNOSIS — R29.898 DECREASED STRENGTH OF UPPER EXTREMITY: ICD-10-CM

## 2023-10-05 DIAGNOSIS — M75.41 SHOULDER IMPINGEMENT SYNDROME, RIGHT: ICD-10-CM

## 2023-10-05 DIAGNOSIS — M75.111 INCOMPLETE TEAR OF RIGHT ROTATOR CUFF, UNSPECIFIED WHETHER TRAUMATIC: Primary | ICD-10-CM

## 2023-10-05 DIAGNOSIS — M75.21 BICEPS TENDONITIS ON RIGHT: ICD-10-CM

## 2023-10-05 PROCEDURE — 97112 NEUROMUSCULAR REEDUCATION: CPT | Mod: PN

## 2023-10-05 PROCEDURE — 97110 THERAPEUTIC EXERCISES: CPT | Mod: PN

## 2023-10-05 NOTE — PROGRESS NOTES
Physical Therapy Daily Treatment Note     Name: [facility]  Clinic Number: [id number]    Therapy Diagnosis:   Encounter Diagnoses   Name Primary?    Incomplete tear of right rotator cuff, unspecified whether traumatic Yes    Shoulder impingement syndrome, right     Biceps tendonitis on right     Decreased ROM of right shoulder     Decreased strength of upper extremity          Physician: Olman Pillai IV, MD    Visit Date: 10/5/2023    Physician Orders: PT Eval and Treat as per rotator cuff and biceps tenodesis protocol  Medical Diagnosis from Referral: Incomplete tear of right rotator cuff, unspecified whether traumatic, Shoulder impingement syndrome, right & Biceps tendonitis on right  Evaluation Date: 5/11/2023  Authorization Period Expiration: 8/18/2023  Plan of Care Expiration: 8/25/2023  Visit #/Visits authorized:  7/ 12 - (new [id number])  \[facility]\"" visit # 3/5    (# of No Show Appts 1 / Number of Cancelled Appts 5)    Time In: 400  Time Out: 450  Total Appointment Time  50 minutes  Total Unbillable time:  minutes    Precautions: Standard and as per protocol     Occupation (including job description if provided):  at [employer]   Job Demands: Standing, walking, lifting, carrying  Prior Medical Treatment: Therapy  and Surgery  Current Work Restrictions: Light Duty      Subjective     Pt reports: she is sti;; having pain. Feels weak  She was compliant with home exercise program.  Response to previous treatment: no complaints   Functional change: decreased pain    Pain: 2/10  Location: right shoulder(s); anterior    Objective/Treatment     7/12 MD visit: Likely overexertion with therapy.  Will decrease intensity of therapy.  Strengthening only 1x per week, but continue modalities and gentle stretching the remaining visits per week.     DOS 5/2/23    Celia received the following treatment:    Therapeutic exercises x 23 minutes to improve Range of Motion, posture, strength:    Upper Body Ergometer  3'/3'  Overhead pulley flexion 3 minutes  Overhead pulley scaption 3 minutes    Active assist range of motion on cable column  3 x 10 3# flexion    Wall wipes flexion and scaption 3 x 10 reps each  Wall wipes circles 3 x 10 reps each direction     Celia participated in neuromuscular re-education activities to improve: Coordination, Kinesthetic, Sense, and Proprioception for 30 minutes.     3# Bar flexion 3 x 10 reps   3# Bar SA punch 3 x 10 reps      Prone shoulder extension 3 x 10 reps 2#    Prone shoulder high row  x 15 reps 2#, x 15    Prone scapular retraction 3 x 10 reps 2#   Side lying shoulder external rotation  3 x 10 reps 1#    Side lying shoulder flexion  3 x 10 reps   Side lying hor Abduction 3 x 10     Red Theraband x 20 reps each direction (External Rotation/Internal Rotation)      Manual therapy:  3 mins   Grade II A/P glides   Grade II inferior glides   Passive Range of Motion all planes  Rhythmic stabilization    Home Exercises Provided and Patient Education Provided     Education provided:   -cont HOME EXERCISE PROGRAM   -apply ice as needed for delayed muscle soreness    Written Home Exercises Provided: Patient instructed to cont prior HEP.  Exercises were reviewed and Celia was able to demonstrate them prior to the end of the session.  Celia demonstrated good  understanding of the education provided.     See EMR under Patient Instructions for exercises provided prior visit.    Assessment     Celia presents to PT with some pain, some stiffness in her shoulder lacking end range motions.  Pt needs to improve her strength in Rotator Cuff and scapula muscles. Will slowly add in more strength as to not inflame her. Missed few weeks due to authorization and scheduling    Celia Is progressing well towards her goals.   Pt prognosis is Good.     Pt will continue to benefit from skilled Physical Therapy interventions in order to address the deficits listed in the problem list box on initial  evaluation, provide education, and to address the musculoskeletal limitations and work-related functional deficits for their job as a  at [employer].    Pt's spiritual, cultural and educational needs considered and pt agreeable to plan of care and goals.     Anticipated barriers to physical therapy: fear of re-injury    Goals:  Short Term Goals (STG) # weeks Goal Review Date Reviewed Date Met   1. The patient will begin a written HEP 1 ongoing 10/5/2023       2. Increase passive flexion and abduction to 160* 6 ongoing 10/5/2023       3. Increase passive external rotation to 80* 6 ongoing 10/5/2023       4. Decrease pain at worst to 4/10 6 ongoing 10/5/2023          Long Term Goals (LTG) # weeks Goal Review Date Reviewed Date Met   1. The patient will be independent with a HEP for maintenace 12 ongoing 10/5/2023       2. Decrease soft tissue tenderness to mild 12 ongoing 10/5/2023       3. Decrease FOTO score to 41% 12 ongoing 10/5/2023     4. Increase UE strength to 4+/5 12 ongoing 10/5/2023     5. The patient will be able to lift 25# from floor to waist 12 ongoing 10/5/2023        Plan     Cont per Plan of Care, Range of Motion, posture, strengthening per MD protocol.    Plan of care Certification: 5/11/2023 to 8/25/2023.     Outpatient Physical Therapy 3 times weekly for 12 weeks to include the following interventions: Manual Therapy, Neuromuscular Re-ed, Patient Education, Therapeutic Activities, and Therapeutic Exercise.       Alex Moreau, PT   10/5/2023

## 2023-10-05 NOTE — PROGRESS NOTES
Health Maintenance Due   Topic Date Due    Colorectal Cancer Screening  Never done    Mammogram  07/10/2021    COVID-19 Vaccine (5 - Pfizer series) 12/31/2021    Shingles Vaccine (1 of 2) Never done    Influenza Vaccine (1) 09/01/2023      Chart reviewed. Triggered LINKS. Updated Care Everywhere. Checked DIS; No results. Cologuard Screening (Multitarget Stool DNA)Released 5/24/2023.     Pauline Antonio CMA  Population Health Care Coordinator  Primary Care Team

## 2023-10-06 ENCOUNTER — IMMUNIZATION (OUTPATIENT)
Dept: INTERNAL MEDICINE | Facility: CLINIC | Age: 53
End: 2023-10-06
Payer: COMMERCIAL

## 2023-10-06 ENCOUNTER — OFFICE VISIT (OUTPATIENT)
Dept: INTERNAL MEDICINE | Facility: CLINIC | Age: 53
End: 2023-10-06
Payer: COMMERCIAL

## 2023-10-06 VITALS
HEART RATE: 100 BPM | WEIGHT: 203.69 LBS | DIASTOLIC BLOOD PRESSURE: 100 MMHG | HEIGHT: 69 IN | BODY MASS INDEX: 30.17 KG/M2 | SYSTOLIC BLOOD PRESSURE: 161 MMHG

## 2023-10-06 DIAGNOSIS — Z79.899 ENCOUNTER FOR LONG-TERM (CURRENT) USE OF OTHER MEDICATIONS: ICD-10-CM

## 2023-10-06 DIAGNOSIS — Z00.00 ANNUAL PHYSICAL EXAM: Primary | ICD-10-CM

## 2023-10-06 DIAGNOSIS — Z12.11 ENCOUNTER FOR COLORECTAL CANCER SCREENING: ICD-10-CM

## 2023-10-06 DIAGNOSIS — Z12.31 ENCOUNTER FOR SCREENING MAMMOGRAM FOR BREAST CANCER: ICD-10-CM

## 2023-10-06 DIAGNOSIS — K21.9 GASTROESOPHAGEAL REFLUX DISEASE, UNSPECIFIED WHETHER ESOPHAGITIS PRESENT: ICD-10-CM

## 2023-10-06 DIAGNOSIS — R00.0 TACHYCARDIA: ICD-10-CM

## 2023-10-06 DIAGNOSIS — Z12.12 ENCOUNTER FOR COLORECTAL CANCER SCREENING: ICD-10-CM

## 2023-10-06 DIAGNOSIS — I10 ESSENTIAL HYPERTENSION: ICD-10-CM

## 2023-10-06 PROCEDURE — 90686 IIV4 VACC NO PRSV 0.5 ML IM: CPT | Mod: S$GLB,,, | Performed by: INTERNAL MEDICINE

## 2023-10-06 PROCEDURE — 3080F DIAST BP >= 90 MM HG: CPT | Mod: CPTII,S$GLB,, | Performed by: FAMILY MEDICINE

## 2023-10-06 PROCEDURE — 99999 PR PBB SHADOW E&M-EST. PATIENT-LVL IV: ICD-10-PCS | Mod: PBBFAC,,, | Performed by: FAMILY MEDICINE

## 2023-10-06 PROCEDURE — 3077F SYST BP >= 140 MM HG: CPT | Mod: CPTII,S$GLB,, | Performed by: FAMILY MEDICINE

## 2023-10-06 PROCEDURE — 3008F BODY MASS INDEX DOCD: CPT | Mod: CPTII,S$GLB,, | Performed by: FAMILY MEDICINE

## 2023-10-06 PROCEDURE — 1159F PR MEDICATION LIST DOCUMENTED IN MEDICAL RECORD: ICD-10-PCS | Mod: CPTII,S$GLB,, | Performed by: FAMILY MEDICINE

## 2023-10-06 PROCEDURE — 3077F PR MOST RECENT SYSTOLIC BLOOD PRESSURE >= 140 MM HG: ICD-10-PCS | Mod: CPTII,S$GLB,, | Performed by: FAMILY MEDICINE

## 2023-10-06 PROCEDURE — 99999 PR PBB SHADOW E&M-EST. PATIENT-LVL IV: CPT | Mod: PBBFAC,,, | Performed by: FAMILY MEDICINE

## 2023-10-06 PROCEDURE — 99396 PR PREVENTIVE VISIT,EST,40-64: ICD-10-PCS | Mod: S$GLB,,, | Performed by: FAMILY MEDICINE

## 2023-10-06 PROCEDURE — 90686 FLU VACCINE (QUAD) GREATER THAN OR EQUAL TO 3YO PRESERVATIVE FREE IM: ICD-10-PCS | Mod: S$GLB,,, | Performed by: INTERNAL MEDICINE

## 2023-10-06 PROCEDURE — 3008F PR BODY MASS INDEX (BMI) DOCUMENTED: ICD-10-PCS | Mod: CPTII,S$GLB,, | Performed by: FAMILY MEDICINE

## 2023-10-06 PROCEDURE — G0008 ADMIN INFLUENZA VIRUS VAC: HCPCS | Mod: S$GLB,,, | Performed by: INTERNAL MEDICINE

## 2023-10-06 PROCEDURE — 1159F MED LIST DOCD IN RCRD: CPT | Mod: CPTII,S$GLB,, | Performed by: FAMILY MEDICINE

## 2023-10-06 PROCEDURE — 99396 PREV VISIT EST AGE 40-64: CPT | Mod: S$GLB,,, | Performed by: FAMILY MEDICINE

## 2023-10-06 PROCEDURE — G0008 FLU VACCINE (QUAD) GREATER THAN OR EQUAL TO 3YO PRESERVATIVE FREE IM: ICD-10-PCS | Mod: S$GLB,,, | Performed by: INTERNAL MEDICINE

## 2023-10-06 PROCEDURE — 3080F PR MOST RECENT DIASTOLIC BLOOD PRESSURE >= 90 MM HG: ICD-10-PCS | Mod: CPTII,S$GLB,, | Performed by: FAMILY MEDICINE

## 2023-10-06 RX ORDER — HYDROCHLOROTHIAZIDE 25 MG/1
25 TABLET ORAL DAILY
Qty: 30 TABLET | Refills: 11 | Status: SHIPPED | OUTPATIENT
Start: 2023-10-06 | End: 2024-10-05

## 2023-10-06 RX ORDER — TERBINAFINE HYDROCHLORIDE 250 MG/1
250 TABLET ORAL
COMMUNITY
Start: 2023-08-01

## 2023-10-06 RX ORDER — OMEPRAZOLE 20 MG/1
20 CAPSULE, DELAYED RELEASE ORAL DAILY
Qty: 30 CAPSULE | Refills: 11 | Status: SHIPPED | OUTPATIENT
Start: 2023-10-06 | End: 2024-10-05

## 2023-10-06 RX ORDER — HYDROCORTISONE 25 MG/G
OINTMENT TOPICAL
COMMUNITY
Start: 2023-08-02

## 2023-10-06 RX ORDER — PIMECROLIMUS 10 MG/G
CREAM TOPICAL
COMMUNITY
Start: 2023-08-02

## 2023-10-06 RX ORDER — METOPROLOL SUCCINATE 50 MG/1
50 TABLET, EXTENDED RELEASE ORAL DAILY
Qty: 30 TABLET | Refills: 11 | Status: SHIPPED | OUTPATIENT
Start: 2023-10-06 | End: 2024-10-05

## 2023-10-06 NOTE — PROGRESS NOTES
Subjective:       Patient ID: Celia Alford is a 52 y.o. female.    Chief Complaint: Follow-up    HPI    Celia Alford is a 52 y.o. female PMHx HTN for checkup.     #Cards: HTN, HLD, Palps  - est w/ Dr. Lobo,  7/2022  - reg: HCTZ 12.5 2tabs qd  - adverse effects w/ amlodipine     #Heme: Macrocytic anemia     #Ortho: S/p Rt shldr rotator cuff repair (5/2023)  - est w/ Dr. Pillai     #Obgyn:  - est w/ Dr. Ricardo,  8/2022     #Psych: Anx, Insomnia     #BMI 30    Review of Systems   Constitutional:  Negative for fever.   Respiratory:  Negative for shortness of breath.    Cardiovascular:  Positive for palpitations.   Gastrointestinal:  Negative for abdominal pain.   Neurological:  Positive for headaches.         Past Medical History:   Diagnosis Date    COVID-19 virus infection 8/6/2021 7/2021    Essential (primary) hypertension     Gastroesophageal reflux disease 10/6/2023        Prior to Admission medications    Medication Sig Start Date End Date Taking? Authorizing Provider   aspirin (ECOTRIN) 81 MG EC tablet Take 1 tablet (81 mg total) by mouth once daily.  Patient not taking: Reported on 5/24/2023 5/2/23 6/1/23  Humberto Gudino MD   gabapentin (NEURONTIN) 300 MG capsule Take 1 capsule (300 mg total) by mouth every evening. for 14 days 5/2/23 5/17/23  Humberto Gudino MD   hydroCHLOROthiazide (HYDRODIURIL) 12.5 MG Tab Take 2 tablets (25 mg total) by mouth once daily. 8/31/23 8/30/24  Elvis Neely MD   medroxyPROGESTERone (DEPO-PROVERA) 150 mg/mL Syrg Inject 1 mL (150 mg total) into the muscle every 3 (three) months. 5/24/23   Mare Santos MD        Past medical history, surgical history, and family medical history reviewed and updated as appropriate.    Medications and allergies reviewed.     Objective:          Vitals:    10/06/23 1309 10/06/23 1346   BP: (!) 155/107 (!) 161/100   BP Location: Left arm Left arm   Patient Position: Sitting Sitting   Pulse: 100  "   Weight: 92.4 kg (203 lb 11.3 oz)    Height: 5' 9" (1.753 m)      Body mass index is 30.08 kg/m².  Physical Exam  Vitals and nursing note reviewed.   Constitutional:       General: She is not in acute distress.  Cardiovascular:      Rate and Rhythm: Regular rhythm. Tachycardia present.   Pulmonary:      Effort: Pulmonary effort is normal. No respiratory distress.   Neurological:      Mental Status: She is alert and oriented to person, place, and time.   Psychiatric:         Mood and Affect: Mood normal.         Behavior: Behavior normal.         Lab Results   Component Value Date    WBC 5.64 04/20/2023    HGB 11.0 (L) 04/20/2023    HCT 34.1 (L) 04/20/2023     04/20/2023    CHOL 248 (H) 09/20/2022    TRIG 137 09/20/2022    HDL 58 09/20/2022    ALT 47 (H) 07/01/2022    AST 24 07/01/2022     04/20/2023    K 4.2 04/20/2023     04/20/2023    CREATININE 0.9 04/20/2023    BUN 11 04/20/2023    CO2 25 04/20/2023    TSH 0.944 09/20/2022    INR 1.0 04/20/2023    HGBA1C 5.1 09/09/2021       Assessment:       1. Annual physical exam    2. Encounter for long-term (current) use of other medications    3. Essential hypertension    4. Tachycardia    5. Gastroesophageal reflux disease, unspecified whether esophagitis present    6. Encounter for screening mammogram for breast cancer    7. Encounter for colorectal cancer screening          Plan:   1. Annual physical exam    2. Encounter for long-term (current) use of other medications    3. Essential hypertension  Overview:  Cont hctz  - add metoprolol  - consider adding on combo agent to hctz (ARB)  - adverse effects w/ amlodipine    Orders:  -     hydroCHLOROthiazide (HYDRODIURIL) 25 MG tablet; Take 1 tablet (25 mg total) by mouth once daily.  Dispense: 30 tablet; Refill: 11    4. Tachycardia  -     metoprolol succinate (TOPROL-XL) 50 MG 24 hr tablet; Take 1 tablet (50 mg total) by mouth once daily.  Dispense: 30 tablet; Refill: 11    5. Gastroesophageal reflux " disease, unspecified whether esophagitis present  -     omeprazole (PRILOSEC) 20 MG capsule; Take 1 capsule (20 mg total) by mouth once daily.  Dispense: 30 capsule; Refill: 11    6. Encounter for screening mammogram for breast cancer  -     Mammo Digital Screening Bilat w/ Remberto; Future; Expected date: 10/06/2023    7. Encounter for colorectal cancer screening  -     Ambulatory referral/consult to Endo Procedure ; Future; Expected date: 10/07/2023        Health maintenance reviewed with patient.     Follow up in about 6 weeks (around 11/17/2023) for Medication Checkup.    Jeb Roblero MD  Family Medicine / Primary Care  [facility]  10/6/2023

## 2023-10-17 ENCOUNTER — CLINICAL SUPPORT (OUTPATIENT)
Dept: REHABILITATION | Facility: HOSPITAL | Age: 53
End: 2023-10-17
Payer: OTHER GOVERNMENT

## 2023-10-17 DIAGNOSIS — M75.21 BICEPS TENDONITIS ON RIGHT: ICD-10-CM

## 2023-10-17 DIAGNOSIS — M75.41 SHOULDER IMPINGEMENT SYNDROME, RIGHT: Primary | ICD-10-CM

## 2023-10-17 DIAGNOSIS — M25.611 DECREASED ROM OF RIGHT SHOULDER: ICD-10-CM

## 2023-10-17 DIAGNOSIS — R29.898 DECREASED STRENGTH OF UPPER EXTREMITY: ICD-10-CM

## 2023-10-17 DIAGNOSIS — M75.111 INCOMPLETE TEAR OF RIGHT ROTATOR CUFF, UNSPECIFIED WHETHER TRAUMATIC: ICD-10-CM

## 2023-10-17 PROCEDURE — 97112 NEUROMUSCULAR REEDUCATION: CPT | Mod: PN

## 2023-10-17 PROCEDURE — 97110 THERAPEUTIC EXERCISES: CPT | Mod: PN

## 2023-10-17 NOTE — PROGRESS NOTES
Physical Therapy Daily Treatment Note     Name: [facility] [place] [facility]  Clinic Number: [id number]    Therapy Diagnosis:   Encounter Diagnoses   Name Primary?    Incomplete tear of right rotator cuff, unspecified whether traumatic     Shoulder impingement syndrome, right Yes    Biceps tendonitis on right     Decreased ROM of right shoulder     Decreased strength of upper extremity          Physician: Olman Pillai IV, MD    Visit Date: 10/17/2023    Physician Orders: PT Eval and Treat as per rotator cuff and biceps tenodesis protocol  Medical Diagnosis from Referral: Incomplete tear of right rotator cuff, unspecified whether traumatic, Shoulder impingement syndrome, right & Biceps tendonitis on right  Evaluation Date: 5/11/2023  Authorization Period Expiration: 8/18/2023  Plan of Care Expiration: 8/25/2023  Visit #/Visits authorized:  8/ 12 - (new [id number])  [facility] visit # 0/5    Time In: 410  Time Out: 500  Total Appointment Time  50 minutes  Total Unbillable time:  minutes    Precautions: Standard and as per protocol     Occupation (including job description if provided):  at [employer]   Job Demands: Standing, walking, lifting, carrying  Prior Medical Treatment: Therapy  and Surgery  Current Work Restrictions: Light Duty      Subjective     Pt reports: she is about the same, no change in her pain  She was compliant with home exercise program.  Response to previous treatment: no complaints   Functional change: decreased pain    Pain: 2/10  Location: right shoulder(s); anterior    Objective/Treatment     7/12 MD visit: Likely overexertion with therapy.  Will decrease intensity of therapy.  Strengthening only 1x per week, but continue modalities and gentle stretching the remaining visits per week.     DOS 5/2/23    Celia received the following treatment:    Therapeutic exercises x 23 minutes to improve Range of Motion, posture, strength:    Upper Body Ergometer 3'/3'  Overhead pulley flexion 3 minutes  Overhead pulley  scaption 3 minutes    Active assist range of motion on cable column  3 x 10 3# flexion    Wall wipes flexion and scaption 3 x 10 reps each  Wall wipes circles 3 x 10 reps each direction     Celia participated in neuromuscular re-education activities to improve: Coordination, Kinesthetic, Sense, and Proprioception for 30 minutes.     3# Bar flexion 3 x 10 reps   3# Bar SA punch 3 x 10 reps      Prone shoulder extension 3 x 10 reps 2#    Prone shoulder high row  x 15 reps 2#, x 15    Prone scapular retraction 3 x 10 reps 2#   Side lying shoulder external rotation  3 x 10 reps 1#    Side lying shoulder flexion  3 x 10 reps   Side lying hor Abduction 3 x 10     Red Theraband x 20 reps each direction (External Rotation/Internal Rotation)      Manual therapy:  3 mins   Grade II A/P glides   Grade II inferior glides   Passive Range of Motion all planes  Rhythmic stabilization    Home Exercises Provided and Patient Education Provided     Education provided:   -cont HOME EXERCISE PROGRAM   -apply ice as needed for delayed muscle soreness    Written Home Exercises Provided: Patient instructed to cont prior HEP.  Exercises were reviewed and Celia was able to demonstrate them prior to the end of the session.  Celia demonstrated good  understanding of the education provided.     See EMR under Patient Instructions for exercises provided prior visit.    Assessment     Celia presents to PT with some pain, some stiffness in her shoulder lacking end range motions.  Pt needs to improve her strength in Rotator Cuff and scapula muscles. Has been challenging to do due to pain    Celia Is progressing well towards her goals.   Pt prognosis is Good.     Pt will continue to benefit from skilled Physical Therapy interventions in order to address the deficits listed in the problem list box on initial evaluation, provide education, and to address the musculoskeletal limitations and work-related functional deficits for their job  as a  at [employer].    Pt's spiritual, cultural and educational needs considered and pt agreeable to plan of care and goals.     Anticipated barriers to physical therapy: fear of re-injury    Goals:  Short Term Goals (STG) # weeks Goal Review Date Reviewed Date Met   1. The patient will begin a written HEP 1 ongoing 10/17/2023       2. Increase passive flexion and abduction to 160* 6 ongoing 10/17/2023       3. Increase passive external rotation to 80* 6 ongoing 10/17/2023       4. Decrease pain at worst to 4/10 6 ongoing 10/17/2023          Long Term Goals (LTG) # weeks Goal Review Date Reviewed Date Met   1. The patient will be independent with a HEP for maintenace 12 ongoing 10/17/2023       2. Decrease soft tissue tenderness to mild 12 ongoing 10/17/2023       3. Decrease FOTO score to 41% 12 ongoing 10/17/2023     4. Increase UE strength to 4+/5 12 ongoing 10/17/2023     5. The patient will be able to lift 25# from floor to waist 12 ongoing 10/17/2023        Plan     Cont per Plan of Care, Range of Motion, posture, strengthening per MD protocol.    Plan of care Certification: 5/11/2023 to 8/25/2023.     Outpatient Physical Therapy 3 times weekly for 12 weeks to include the following interventions: Manual Therapy, Neuromuscular Re-ed, Patient Education, Therapeutic Activities, and Therapeutic Exercise.       Alex Moreau, PT   10/17/2023

## 2023-10-24 ENCOUNTER — CLINICAL SUPPORT (OUTPATIENT)
Dept: REHABILITATION | Facility: HOSPITAL | Age: 53
End: 2023-10-24
Payer: OTHER GOVERNMENT

## 2023-10-24 DIAGNOSIS — M25.611 DECREASED ROM OF RIGHT SHOULDER: ICD-10-CM

## 2023-10-24 DIAGNOSIS — M75.111 INCOMPLETE TEAR OF RIGHT ROTATOR CUFF, UNSPECIFIED WHETHER TRAUMATIC: Primary | ICD-10-CM

## 2023-10-24 DIAGNOSIS — M75.21 BICEPS TENDONITIS ON RIGHT: ICD-10-CM

## 2023-10-24 DIAGNOSIS — R29.898 DECREASED STRENGTH OF UPPER EXTREMITY: ICD-10-CM

## 2023-10-24 DIAGNOSIS — M75.41 SHOULDER IMPINGEMENT SYNDROME, RIGHT: ICD-10-CM

## 2023-10-24 PROCEDURE — 97112 NEUROMUSCULAR REEDUCATION: CPT | Mod: PN

## 2023-10-24 PROCEDURE — 97110 THERAPEUTIC EXERCISES: CPT | Mod: PN

## 2023-10-24 NOTE — PROGRESS NOTES
Physical Therapy Daily Treatment Note     Name: Celia Hernandez [facility]  Clinic Number: [id number]    Therapy Diagnosis:   Encounter Diagnoses   Name Primary?    Incomplete tear of right rotator cuff, unspecified whether traumatic Yes    Shoulder impingement syndrome, right     Biceps tendonitis on right     Decreased ROM of right shoulder     Decreased strength of upper extremity          Physician: Olman Pillai IV, MD    Visit Date: 10/24/2023    Physician Orders: PT Eval and Treat as per rotator cuff and biceps tenodesis protocol  Medical Diagnosis from Referral: Incomplete tear of right rotator cuff, unspecified whether traumatic, Shoulder impingement syndrome, right & Biceps tendonitis on right  Evaluation Date: 5/11/2023  Authorization Period Expiration: 8/18/2023  Plan of Care Expiration: 8/25/2023  Visit #/Visits authorized:  8/ 12 - (new [id number])  hospitals visit # 0/5    Time In: 410  Time Out: 500  Total Appointment Time  50 minutes  Total Unbillable time:  minutes    Precautions: Standard and as per protocol     Occupation (including job description if provided):  at [employer]   Job Demands: Standing, walking, lifting, carrying  Prior Medical Treatment: Therapy  and Surgery  Current Work Restrictions: Light Duty      Subjective     Pt reports: she is about the same, no change in her pain  She was compliant with home exercise program.  Response to previous treatment: no complaints   Functional change: decreased pain    Pain: 2/10  Location: right shoulder(s); anterior    Objective/Treatment     7/12 MD visit: Likely overexertion with therapy.  Will decrease intensity of therapy.  Strengthening only 1x per week, but continue modalities and gentle stretching the remaining visits per week.     DOS 5/2/23    Celia received the following treatment:    Therapeutic exercises x 23 minutes to improve Range of Motion, posture, strength:    Upper Body Ergometer 3'/3'  Overhead pulley flexion 3 minutes  Overhead pulley  scaption 3 minutes    Active assist range of motion on cable column  3 x 10 3# flexion    Wall wipes flexion and scaption 3 x 10 reps each  Wall wipes circles 3 x 10 reps each direction     Celia participated in neuromuscular re-education activities to improve: Coordination, Kinesthetic, Sense, and Proprioception for 30 minutes.     3# Bar flexion 3 x 10 reps   3# Bar SA punch 3 x 10 reps      Prone shoulder extension 3 x 10 reps 2#    Prone shoulder high row  x 15 reps 2#, x 15    Prone scapular retraction 3 x 10 reps 2#   Side lying shoulder external rotation  3 x 10 reps 1#    Side lying shoulder flexion  3 x 10 reps   Side lying hor Abduction 3 x 10     Red Theraband x 20 reps each direction (External Rotation/Internal Rotation)      Manual therapy:  3 mins   Grade II A/P glides   Grade II inferior glides   Passive Range of Motion all planes  Rhythmic stabilization    Home Exercises Provided and Patient Education Provided     Education provided:   -cont HOME EXERCISE PROGRAM   -apply ice as needed for delayed muscle soreness    Written Home Exercises Provided: Patient instructed to cont prior HEP.  Exercises were reviewed and Celia was able to demonstrate them prior to the end of the session.  Celia demonstrated good  understanding of the education provided.     See EMR under Patient Instructions for exercises provided prior visit.    Assessment     Celia presents to PT with continued pain and lack of end range motion.  Progress is Plateau    Celia Is progressing well towards her goals.   Pt prognosis is Good.     Pt will continue to benefit from skilled Physical Therapy interventions in order to address the deficits listed in the problem list box on initial evaluation, provide education, and to address the musculoskeletal limitations and work-related functional deficits for their job as a  at [employer], [place].    Pt's spiritual, cultural and educational needs considered and pt agreeable to plan of care  and goals.     Anticipated barriers to physical therapy: fear of re-injury    Goals:  Short Term Goals (STG) # weeks Goal Review Date Reviewed Date Met   1. The patient will begin a written HEP 1 ongoing 10/24/2023       2. Increase passive flexion and abduction to 160* 6 ongoing 10/24/2023       3. Increase passive external rotation to 80* 6 ongoing 10/24/2023       4. Decrease pain at worst to 4/10 6 ongoing 10/24/2023          Long Term Goals (LTG) # weeks Goal Review Date Reviewed Date Met   1. The patient will be independent with a HEP for maintenace 12 ongoing 10/24/2023       2. Decrease soft tissue tenderness to mild 12 ongoing 10/24/2023       3. Decrease FOTO score to 41% 12 ongoing 10/24/2023     4. Increase UE strength to 4+/5 12 ongoing 10/24/2023     5. The patient will be able to lift 25# from floor to waist 12 ongoing 10/24/2023        Plan     Cont per Plan of Care, Range of Motion, posture, strengthening per MD protocol.    Plan of care Certification: 5/11/2023 to 8/25/2023.     Outpatient Physical Therapy 3 times weekly for 12 weeks to include the following interventions: Manual Therapy, Neuromuscular Re-ed, Patient Education, Therapeutic Activities, and Therapeutic Exercise.       Alex Moreau, PT   10/24/2023

## 2023-11-02 ENCOUNTER — OFFICE VISIT (OUTPATIENT)
Dept: ORTHOPEDICS | Facility: CLINIC | Age: 53
End: 2023-11-02
Payer: OTHER GOVERNMENT

## 2023-11-02 ENCOUNTER — CLINICAL SUPPORT (OUTPATIENT)
Dept: REHABILITATION | Facility: HOSPITAL | Age: 53
End: 2023-11-02
Payer: OTHER GOVERNMENT

## 2023-11-02 VITALS
HEIGHT: 69 IN | DIASTOLIC BLOOD PRESSURE: 92 MMHG | HEART RATE: 83 BPM | WEIGHT: 203.69 LBS | BODY MASS INDEX: 30.17 KG/M2 | SYSTOLIC BLOOD PRESSURE: 143 MMHG

## 2023-11-02 DIAGNOSIS — R29.898 DECREASED STRENGTH OF UPPER EXTREMITY: ICD-10-CM

## 2023-11-02 DIAGNOSIS — M25.611 DECREASED ROM OF RIGHT SHOULDER: ICD-10-CM

## 2023-11-02 DIAGNOSIS — M75.21 BICEPS TENDONITIS ON RIGHT: ICD-10-CM

## 2023-11-02 DIAGNOSIS — M75.41 SHOULDER IMPINGEMENT SYNDROME, RIGHT: ICD-10-CM

## 2023-11-02 DIAGNOSIS — M75.111 INCOMPLETE TEAR OF RIGHT ROTATOR CUFF, UNSPECIFIED WHETHER TRAUMATIC: Primary | ICD-10-CM

## 2023-11-02 PROCEDURE — 99213 PR OFFICE/OUTPT VISIT, EST, LEVL III, 20-29 MIN: ICD-10-PCS | Mod: S$GLB,,, | Performed by: ORTHOPAEDIC SURGERY

## 2023-11-02 PROCEDURE — 97112 NEUROMUSCULAR REEDUCATION: CPT | Mod: PN

## 2023-11-02 PROCEDURE — 97110 THERAPEUTIC EXERCISES: CPT | Mod: PN

## 2023-11-02 PROCEDURE — 99999 PR PBB SHADOW E&M-EST. PATIENT-LVL III: ICD-10-PCS | Mod: PBBFAC,,, | Performed by: ORTHOPAEDIC SURGERY

## 2023-11-02 PROCEDURE — 99213 OFFICE O/P EST LOW 20 MIN: CPT | Mod: S$GLB,,, | Performed by: ORTHOPAEDIC SURGERY

## 2023-11-02 PROCEDURE — 99999 PR PBB SHADOW E&M-EST. PATIENT-LVL III: CPT | Mod: PBBFAC,,, | Performed by: ORTHOPAEDIC SURGERY

## 2023-11-02 NOTE — PROGRESS NOTES
[facility], Orthopedics and Sports Medicine  [facility]    Established Patient Shoulder Office Visit  11/02/2023     Diagnosis:  Right shoulder pain  High Grade Partial Rotator Cuff Tear  Biceps Tendonitis  Subacromial Impingement  Prior rotator cuff repair     Procedure: 5/2/23  Arthroscopic Rotator Cuff Repair   Open Biceps Tenodesis  Arthroscopic Shoulder Debridement - Extensive     Subjective:      Celia Alford is a 53 y.o. female who returns for follow up treatment of the above mentioned shoulder issue.  She is doing okay overall.  Her pain is better than before surgery but still has some difficulty with pain during lifting the arm overhead and away from body.  She is in therapy currently.  No new trauma.     Workers compensation claim.     Continues to work at this time, limited duty.     Outside reports reviewed: historical medical records, office notes, and operative reports.    Past Medical History:   Diagnosis Date    COVID-19 virus infection 8/6/2021 7/2021    Essential (primary) hypertension     Gastroesophageal reflux disease 10/6/2023       Patient Active Problem List   Diagnosis    Chronic right shoulder pain    Shoulder joint dysfunction    Neck pain on right side    Traumatic incomplete tear of right rotator cuff    Decreased range of motion of shoulder, right    Decreased strength of upper extremity    Essential hypertension    Neck muscle spasm    Encounter for long-term (current) use of other medications    Overweight (BMI 25.0-29.9)    Palpitations    Right cervical radiculopathy    Shoulder weakness    Biceps tendinitis of right upper extremity    S/P rotator cuff surgery    Mixed hyperlipidemia    Insomnia    Macrocytic anemia    Anxiety    Subacromial impingement of right shoulder    Incomplete tear of right rotator cuff    Shoulder impingement syndrome, right    Biceps tendonitis on right    Decreased ROM of right shoulder    Tachycardia     Gastroesophageal reflux disease       Past Surgical History:   Procedure Laterality Date    ARTHROSCOPIC REPAIR OF ROTATOR CUFF OF SHOULDER Right 11/20/2020    Procedure: REPAIR, ROTATOR CUFF, ARTHROSCOPIC;  Surgeon: Jens Han Jr., MD;  Location: [facility] OR;  Service: Orthopedics;  Laterality: Right;  need opus system- Jehovah's witness notified tori-11/10  video Jehovah's witness confirmed 11/19/2020 KB [id number]    ARTHROSCOPY OF SHOULDER WITH DECOMPRESSION OF SUBACROMIAL SPACE Right 5/2/2023    Procedure: ARTHROSCOPY, SHOULDER, WITH SUBACROMIAL SPACE DECOMPRESSION;  Surgeon: Olman Pillai IV, MD;  Location: [facility] OR;  Service: Orthopedics;  Laterality: Right;    ROTATOR CUFF REPAIR  11/20/2020    ROTATOR CUFF REPAIR Right 5/2/2023    Procedure: REPAIR, ROTATOR CUFF;  Surgeon: Olman Pillai IV, MD;  Location: [facility] OR;  Service: Orthopedics;  Laterality: Right;    SHOULDER ARTHROSCOPY Right 5/2/2023    Procedure: ARTHROSCOPY, SHOULDER; rotator cuff repair, subacromial decompression, possible mini-open biceps tenodesis;  Surgeon: Olman Pillai IV, MD;  Location: [facility] OR;  Service: Orthopedics;  Laterality: Right;  Beachchair position, spider arm isaac, arthrex suture anchors, arthrex biceps tenodesis button, Dr. Pillai special Frank retractor set arthrex eleanor notified cc  Have available depu    TONSILLECTOMY          Current Outpatient Medications   Medication Instructions    aspirin (ECOTRIN) 81 mg, Oral, Daily    gabapentin (NEURONTIN) 300 mg, Oral, Nightly    hydroCHLOROthiazide (HYDRODIURIL) 25 mg, Oral, Daily    hydrocortisone 2.5 % ointment APPLY 1 APPLICATION TOPICALLY ONCE A DAY 30 DAYS    medroxyPROGESTERone (DEPO-PROVERA) 150 mg, Intramuscular, Every 3 months    metoprolol succinate (TOPROL-XL) 50 mg, Oral, Daily    omeprazole (PRILOSEC) 20 mg, Oral, Daily    pimecrolimus (ELIDEL) 1 % cream No dose, route, or frequency recorded.    terbinafine HCL (LAMISIL) 250 mg, Oral        Review of patient's allergies indicates:    Allergen Reactions    Pcn [penicillins] Hives and Rash       Social History     Socioeconomic History    Marital status: Single   Tobacco Use    Smoking status: Never    Smokeless tobacco: Never   Substance and Sexual Activity    Alcohol use: Yes     Comment: seldom    Drug use: No    Sexual activity: Not Currently     Partners: Male     Birth control/protection: Injection       Family History   Problem Relation Age of Onset    Diabetes Maternal Grandmother     Hypertension Maternal Grandmother     No Known Problems Mother     Breast cancer Neg Hx     Colon cancer Neg Hx     Ovarian cancer Neg Hx          Review of Systems   Constitutional: Negative for chills and fever.   HENT:  Negative for hearing loss.    Eyes:  Negative for blurred vision.   Cardiovascular:  Negative for chest pain.   Respiratory:  Negative for shortness of breath.    Gastrointestinal:  Negative for abdominal pain.   Neurological:  Negative for light-headedness.        Objective:      General    Nursing note and vitals reviewed.  Constitutional: She is oriented to person, place, and time. She appears well-developed and well-nourished. No distress.   HENT:   Head: Normocephalic and atraumatic.   Eyes: Pupils are equal, round, and reactive to light.   Cardiovascular:  Normal rate and regular rhythm.            Pulmonary/Chest: Effort normal and breath sounds normal. No respiratory distress.   Neurological: She is alert and oriented to person, place, and time.   Psychiatric: She has a normal mood and affect. Her behavior is normal.         Right Shoulder Exam     Inspection/Observation   Swelling: absent  Bruising: absent  Scars: present  Atrophy: absent    Range of Motion   Active abduction:  120   Forward Flexion:  120   External Rotation 0 degrees:  50   Internal rotation 0 degrees:  L2     Tests & Signs   Drop arm: negative  Rotator Cuff Painful Arc/Range: mild  Belly Press: negative    Other   Sensation: normal    Comments:  Wang test-  negative    Left Shoulder Exam     Inspection/Observation   Swelling: absent  Bruising: absent  Atrophy: absent    Tenderness   The patient is experiencing no tenderness.     Range of Motion   Active abduction:  170 normal   Forward Flexion:  170 normal   External Rotation 0 degrees:  50 normal   Internal rotation 0 degrees:  T8 normal     Tests & Signs   Drop arm: negative  Corbett test: negative  Impingement: negative  Belly Press: negative  Active Compression test (Reston's Sign): negative  Speed's Test: negative    Other   Sensation: normal     Comments:  Wang test- negative      Muscle Strength   Right Upper Extremity   Shoulder Abduction: 4/5   Shoulder Internal Rotation: 5/5   Shoulder External Rotation: 5/5   Biceps: 5/5   Left Upper Extremity  Shoulder Abduction: 5/5   Shoulder Internal Rotation: 5/5   Shoulder External Rotation: 5/5   Biceps: 5/5     Reflexes     Left Side  Biceps:  2+  Triceps:  2+  Brachioradialis:  2+  Left Olvera's Sign:  Absent    Right Side   Biceps:  2+  Triceps:  2+  Brachioradialis:  2+  Right Olvera's Sign:  absent    Vascular Exam     Right Pulses      Radial:                    2+      Left Pulses      Radial:                    2+        Imaging:  none    Procedures      Assessment:       Celia Alford is a 53 y.o. female seen in the office today. The primary encounter diagnosis was Incomplete tear of right rotator cuff, unspecified whether traumatic. Diagnoses of Biceps tendonitis on right and Shoulder impingement syndrome, right were also pertinent to this visit.  Continuation of post op rehab program is recommended at this time. Also recommend continuing light duty until patient has maximally recovered. The natural history and expected course discussed with patient. Various treatment options were discussed, including their risks and benefits. All of the patient's questions were answered.     Plan:      Continue physical therapy as currently ordered.   Tylenol  650mg TID, PRN pain.  Follow up in 6 weeks.   Light duty filled out for work today         Olman Pillai IV, MD   of Clinical Orthopedics  Department of Orthopedic Surgery  [facility]  Office: [phone]  Website: [email or web address]      Orders Placed This Encounter    Ambulatory referral/consult to Physical/Occupational Therapy

## 2023-11-02 NOTE — PROGRESS NOTES
Physical Therapy Daily Treatment Note     Name: Celia Hernandez [facility]  Clinic Number: [id number]    Therapy Diagnosis:   Encounter Diagnoses   Name Primary?    Incomplete tear of right rotator cuff, unspecified whether traumatic Yes    Shoulder impingement syndrome, right     Biceps tendonitis on right     Decreased ROM of right shoulder     Decreased strength of upper extremity          Physician: Olman Pillai IV, MD    Visit Date: 11/2/2023    Physician Orders: PT Eval and Treat as per rotator cuff and biceps tenodesis protocol  Medical Diagnosis from Referral: Incomplete tear of right rotator cuff, unspecified whether traumatic, Shoulder impingement syndrome, right & Biceps tendonitis on right  Evaluation Date: 5/11/2023  Authorization Period Expiration: 8/18/2023  Plan of Care Expiration: 8/25/2023  Visit #/Visits authorized:  8/ 12 - (new [id number])  [facility] visit # 0/5    Time In: 410  Time Out: 500  Total Appointment Time  50 minutes  Total Unbillable time:  minutes    Precautions: Standard and as per protocol     Occupation (including job description if provided):  at [employer]   Job Demands: Standing, walking, lifting, carrying  Prior Medical Treatment: Therapy  and Surgery  Current Work Restrictions: Light Duty      Subjective     Pt reports: she is about the same, no change in her pain  She was compliant with home exercise program.  Response to previous treatment: no complaints   Functional change: decreased pain    Pain: 2/10  Location: right shoulder(s); anterior    Objective/Treatment     7/12 MD visit: Likely overexertion with therapy.  Will decrease intensity of therapy.  Strengthening only 1x per week, but continue modalities and gentle stretching the remaining visits per week.     DOS 5/2/23    Celia received the following treatment:    Therapeutic exercises x 23 minutes to improve Range of Motion, posture, strength:    Upper Body Ergometer 3'/3'  Overhead pulley flexion 3 minutes  Overhead pulley  scaption 3 minutes    Active assist range of motion on cable column  3 x 10 3# flexion    Wall wipes flexion and scaption 3 x 10 reps each  Wall wipes circles 3 x 10 reps each direction     Celia participated in neuromuscular re-education activities to improve: Coordination, Kinesthetic, Sense, and Proprioception for 30 minutes.     3# Bar flexion 3 x 10 reps   3# Bar SA punch 3 x 10 reps      Prone shoulder extension 3 x 10 reps 2#    Prone shoulder high row  x 15 reps 2#, x 15    Prone scapular retraction 3 x 10 reps 2#   Side lying shoulder external rotation  3 x 10 reps 1#    Side lying shoulder flexion  3 x 10 reps   Side lying hor Abduction 3 x 10     Red Theraband x 20 reps each direction (External Rotation/Internal Rotation)      Manual therapy:  3 mins   Grade II A/P glides   Grade II inferior glides   Passive Range of Motion all planes  Rhythmic stabilization    Home Exercises Provided and Patient Education Provided     Education provided:   -cont HOME EXERCISE PROGRAM   -apply ice as needed for delayed muscle soreness    Written Home Exercises Provided: Patient instructed to cont prior HEP.  Exercises were reviewed and Celia was able to demonstrate them prior to the end of the session.  Celia demonstrated good  understanding of the education provided.     See EMR under Patient Instructions for exercises provided prior visit.    Assessment     Celia presents to PT with continued pain and lack of end range motion.  Progress is Plateau    Celia Is progressing well towards her goals.   Pt prognosis is Good.     Pt will continue to benefit from skilled Physical Therapy interventions in order to address the deficits listed in the problem list box on initial evaluation, provide education, and to address the musculoskeletal limitations and work-related functional deficits for their job as a  at [employer].    Pt's spiritual, cultural and educational needs considered and pt agreeable to plan of care  and goals.     Anticipated barriers to physical therapy: fear of re-injury    Goals:  Short Term Goals (STG) # weeks Goal Review Date Reviewed Date Met   1. The patient will begin a written HEP 1 ongoing 11/2/2023       2. Increase passive flexion and abduction to 160* 6 ongoing 11/2/2023       3. Increase passive external rotation to 80* 6 ongoing 11/2/2023       4. Decrease pain at worst to 4/10 6 ongoing 11/2/2023          Long Term Goals (LTG) # weeks Goal Review Date Reviewed Date Met   1. The patient will be independent with a HEP for maintenace 12 ongoing 11/2/2023       2. Decrease soft tissue tenderness to mild 12 ongoing 11/2/2023       3. Decrease FOTO score to 41% 12 ongoing 11/2/2023     4. Increase UE strength to 4+/5 12 ongoing 11/2/2023     5. The patient will be able to lift 25# from floor to waist 12 ongoing 11/2/2023        Plan     Cont per Plan of Care, Range of Motion, posture, strengthening per MD protocol.    Plan of care Certification: 5/11/2023 to 8/25/2023.     Outpatient Physical Therapy 3 times weekly for 12 weeks to include the following interventions: Manual Therapy, Neuromuscular Re-ed, Patient Education, Therapeutic Activities, and Therapeutic Exercise.       Alex Moreau, PT   11/2/2023

## 2023-11-09 ENCOUNTER — PATIENT MESSAGE (OUTPATIENT)
Dept: ORTHOPEDICS | Facility: CLINIC | Age: 53
End: 2023-11-09
Payer: COMMERCIAL

## 2023-11-14 ENCOUNTER — CLINICAL SUPPORT (OUTPATIENT)
Dept: REHABILITATION | Facility: HOSPITAL | Age: 53
End: 2023-11-14
Payer: OTHER GOVERNMENT

## 2023-11-14 DIAGNOSIS — R29.898 DECREASED STRENGTH OF UPPER EXTREMITY: ICD-10-CM

## 2023-11-14 DIAGNOSIS — M75.41 SHOULDER IMPINGEMENT SYNDROME, RIGHT: ICD-10-CM

## 2023-11-14 DIAGNOSIS — M75.21 BICEPS TENDONITIS ON RIGHT: ICD-10-CM

## 2023-11-14 DIAGNOSIS — M75.111 INCOMPLETE TEAR OF RIGHT ROTATOR CUFF, UNSPECIFIED WHETHER TRAUMATIC: Primary | ICD-10-CM

## 2023-11-14 DIAGNOSIS — M25.611 DECREASED ROM OF RIGHT SHOULDER: ICD-10-CM

## 2023-11-14 PROCEDURE — 97112 NEUROMUSCULAR REEDUCATION: CPT | Mod: PN

## 2023-11-14 PROCEDURE — 97110 THERAPEUTIC EXERCISES: CPT | Mod: PN

## 2023-11-14 NOTE — PROGRESS NOTES
Physical Therapy Daily Treatment Note     Name: Celia Hernandez [facility]  Clinic Number: [id number]    Therapy Diagnosis:   Encounter Diagnoses   Name Primary?    Incomplete tear of right rotator cuff, unspecified whether traumatic Yes    Shoulder impingement syndrome, right     Biceps tendonitis on right     Decreased ROM of right shoulder     Decreased strength of upper extremity          Physician: Olman Pillai IV, MD    Visit Date: 11/14/2023    Physician Orders: PT Eval and Treat as per rotator cuff and biceps tenodesis protocol  Medical Diagnosis from Referral: Incomplete tear of right rotator cuff, unspecified whether traumatic, Shoulder impingement syndrome, right & Biceps tendonitis on right  Evaluation Date: 5/11/2023  Authorization Period Expiration: 8/18/2023  Plan of Care Expiration: 8/25/2023  Visit #/Visits authorized:  9/ 12 - (new [id number])  [facility] visit # 0/5    Time In: 410  Time Out: 500  Total Appointment Time  50 minutes  Total Unbillable time:  minutes    Precautions: Standard and as per protocol     Occupation (including job description if provided):  at [employer]   Job Demands: Standing, walking, lifting, carrying  Prior Medical Treatment: Therapy  and Surgery  Current Work Restrictions: Light Duty      Subjective     Pt reports: she is about the same, no change in her pain, she has trouble making some appointments due to traffic and commute  She was compliant with home exercise program.  Response to previous treatment: no complaints   Functional change: decreased pain    Pain: 2/10  Location: right shoulder(s); anterior    Objective/Treatment     7/12 MD visit: Likely overexertion with therapy.  Will decrease intensity of therapy.  Strengthening only 1x per week, but continue modalities and gentle stretching the remaining visits per week.     DOS 5/2/23    Celia received the following treatment:    Therapeutic exercises x 23 minutes to improve Range of Motion, posture, strength:    Upper Body  Ergometer 3'/3'  Overhead pulley flexion 3 minutes  Overhead pulley scaption 3 minutes    Active assist range of motion on cable column  3 x 10 3# flexion    Wall wipes flexion and scaption 3 x 10 reps each  Wall wipes circles 3 x 10 reps each direction     Celia participated in neuromuscular re-education activities to improve: Coordination, Kinesthetic, Sense, and Proprioception for 30 minutes.     3# Bar flexion 3 x 10 reps   3# Bar SA punch 3 x 10 reps      Prone shoulder extension 3 x 10 reps 2#    Prone shoulder high row  x 15 reps 2#,    Prone scapular retraction 3 x 10 reps 2#   Side lying shoulder external rotation  3 x 10 reps 1#    Side lying shoulder flexion  3 x 10 reps   Side lying hor Abduction 3 x 10 NT Pain    Red Theraband x 20 reps each direction (External Rotation/Internal Rotation)      Manual therapy:  3 mins   Grade II A/P glides   Grade II inferior glides   Passive Range of Motion all planes  Rhythmic stabilization    Home Exercises Provided and Patient Education Provided     Education provided:   -cont HOME EXERCISE PROGRAM   -apply ice as needed for delayed muscle soreness    Written Home Exercises Provided: Patient instructed to cont prior HEP.  Exercises were reviewed and Celia was able to demonstrate them prior to the end of the session.  Celia demonstrated good  understanding of the education provided.     See EMR under Patient Instructions for exercises provided prior visit.    Assessment     Celia presents to PT with continued pain and lack of end range motion.  Progress is at a Plateau.     Celia Is progressing well towards her goals.   Pt prognosis is Good.     Pt will continue to benefit from skilled Physical Therapy interventions in order to address the deficits listed in the problem list box on initial evaluation, provide education, and to address the musculoskeletal limitations and work-related functional deficits for their job as a  at [employer].    Pt's  spiritual, cultural and educational needs considered and pt agreeable to plan of care and goals.     Anticipated barriers to physical therapy: fear of re-injury    Goals:  Short Term Goals (STG) # weeks Goal Review Date Reviewed Date Met   1. The patient will begin a written HEP 1 ongoing 11/14/2023       2. Increase passive flexion and abduction to 160* 6 ongoing 11/14/2023       3. Increase passive external rotation to 80* 6 ongoing 11/14/2023       4. Decrease pain at worst to 4/10 6 ongoing 11/14/2023          Long Term Goals (LTG) # weeks Goal Review Date Reviewed Date Met   1. The patient will be independent with a HEP for maintenace 12 ongoing 11/14/2023       2. Decrease soft tissue tenderness to mild 12 ongoing 11/14/2023       3. Decrease FOTO score to 41% 12 ongoing 11/14/2023     4. Increase UE strength to 4+/5 12 ongoing 11/14/2023     5. The patient will be able to lift 25# from floor to waist 12 ongoing 11/14/2023        Plan     Cont per Plan of Care, Range of Motion, posture, strengthening per MD protocol.    Plan of care Certification: 5/11/2023 to 8/25/2023.     Outpatient Physical Therapy 3 times weekly for 12 weeks to include the following interventions: Manual Therapy, Neuromuscular Re-ed, Patient Education, Therapeutic Activities, and Therapeutic Exercise.       Alex Moreau, PT   11/14/2023         Routine  care and anticipatory guidance

## 2023-11-24 ENCOUNTER — OFFICE VISIT (OUTPATIENT)
Dept: URGENT CARE | Facility: CLINIC | Age: 53
End: 2023-11-24
Payer: COMMERCIAL

## 2023-11-24 VITALS
SYSTOLIC BLOOD PRESSURE: 139 MMHG | DIASTOLIC BLOOD PRESSURE: 88 MMHG | HEIGHT: 69 IN | BODY MASS INDEX: 30.07 KG/M2 | WEIGHT: 203 LBS | OXYGEN SATURATION: 98 % | RESPIRATION RATE: 19 BRPM | HEART RATE: 90 BPM | TEMPERATURE: 99 F

## 2023-11-24 DIAGNOSIS — M54.31 SCIATICA OF RIGHT SIDE: Primary | ICD-10-CM

## 2023-11-24 PROCEDURE — 99213 OFFICE O/P EST LOW 20 MIN: CPT | Mod: 25,S$GLB,, | Performed by: NURSE PRACTITIONER

## 2023-11-24 PROCEDURE — 96372 THER/PROPH/DIAG INJ SC/IM: CPT | Mod: S$GLB,,, | Performed by: NURSE PRACTITIONER

## 2023-11-24 PROCEDURE — 99213 PR OFFICE/OUTPT VISIT, EST, LEVL III, 20-29 MIN: ICD-10-PCS | Mod: 25,S$GLB,, | Performed by: NURSE PRACTITIONER

## 2023-11-24 PROCEDURE — 96372 PR INJECTION,THERAP/PROPH/DIAG2ST, IM OR SUBCUT: ICD-10-PCS | Mod: S$GLB,,, | Performed by: NURSE PRACTITIONER

## 2023-11-24 RX ORDER — KETOROLAC TROMETHAMINE 30 MG/ML
30 INJECTION, SOLUTION INTRAMUSCULAR; INTRAVENOUS
Status: COMPLETED | OUTPATIENT
Start: 2023-11-24 | End: 2023-11-24

## 2023-11-24 RX ORDER — METHOCARBAMOL 500 MG/1
500 TABLET, FILM COATED ORAL 4 TIMES DAILY
Qty: 40 TABLET | Refills: 0 | Status: SHIPPED | OUTPATIENT
Start: 2023-11-24 | End: 2023-12-04

## 2023-11-24 RX ORDER — NAPROXEN 500 MG/1
TABLET ORAL
Qty: 60 TABLET | Refills: 0 | Status: SHIPPED | OUTPATIENT
Start: 2023-11-24

## 2023-11-24 RX ADMIN — KETOROLAC TROMETHAMINE 30 MG: 30 INJECTION, SOLUTION INTRAMUSCULAR; INTRAVENOUS at 05:11

## 2023-11-24 NOTE — PROGRESS NOTES
"Subjective:      Patient ID: Celia Alford is a 53 y.o. female.    Vitals:  height is 5' 9" (1.753 m) and weight is 92.1 kg (203 lb). Her oral temperature is 98.7 °F (37.1 °C). Her blood pressure is 139/88 and her pulse is 90. Her respiration is 19 and oxygen saturation is 98%.     Chief Complaint: Back Pain    53-year-old female presents with a complaint of low back pain, right-sided.  She reports onset, 3 days ago.  She denies traumatic injury, osteoarthritis, numbness, tingling, or paresthesia.  She reports of recent sciatic flare approximately 2 years ago, states similar symptoms today.  She reports pain radiates from right-sided lower back to right buttock to right posterior thigh.  She denies a history of renal calculi, fever, chills, dysuria or hematuria.    Back Pain  This is a recurrent problem. The current episode started in the past 7 days. The problem occurs rarely. The problem has been rapidly worsening since onset. The pain is present in the sacro-iliac and lumbar spine. The quality of the pain is described as aching, burning, cramping, shooting and stabbing. The pain radiates to the right thigh (Buttocks). The pain is at a severity of 10/10. The pain is severe. The pain is The same all the time. The symptoms are aggravated by bending, lying down, sitting, twisting, standing and coughing. Stiffness is present All day. Pertinent negatives include no abdominal pain, bladder incontinence, bowel incontinence, chest pain, dysuria, fever, headaches, leg pain, numbness, paresis, paresthesias, pelvic pain, perianal numbness, tingling, weakness or weight loss. She has tried NSAIDs for the symptoms. The treatment provided no relief.       Constitution: Negative for chills and fever.   Cardiovascular:  Negative for chest pain and palpitations.   Respiratory:  Negative for cough and shortness of breath.    Gastrointestinal:  Negative for abdominal pain and bowel incontinence.   Genitourinary:  Negative for " dysuria, frequency, urgency, bladder incontinence, hematuria, history of kidney stones, vaginal discharge and pelvic pain.   Musculoskeletal:  Positive for pain, abnormal ROM of joint and back pain. Negative for trauma, joint pain, joint swelling, arthritis, gout, muscle cramps, muscle ache and history of spine disorder.   Skin:  Negative for rash and erythema.   Neurological:  Negative for headaches and numbness.      Objective:     Physical Exam   Constitutional: She is oriented to person, place, and time. She appears well-developed. She is cooperative.  Non-toxic appearance. She does not appear ill. No distress.   HENT:   Head: Normocephalic and atraumatic.   Nose: Nose normal.   Mouth/Throat: Oropharynx is clear and moist and mucous membranes are normal.   Eyes: Conjunctivae and lids are normal.   Neck: Trachea normal and phonation normal. Neck supple.   Cardiovascular: Normal rate, regular rhythm, normal heart sounds and normal pulses.   Pulmonary/Chest: Effort normal and breath sounds normal.   Abdominal: Normal appearance and bowel sounds are normal. She exhibits no mass. Soft.   Musculoskeletal:         General: Tenderness present. No deformity.      Thoracic back: Normal.      Lumbar back: She exhibits decreased range of motion, tenderness and spasm. She exhibits no bony tenderness, no swelling, no edema, no deformity and no laceration.        Back:       Comments: Positive straight leg, right side, moderate tenderness lumbosacral spine   Neurological: She is alert and oriented to person, place, and time. She has normal strength and normal reflexes. No sensory deficit.   Skin: Skin is warm, dry, intact and not diaphoretic. No erythema   Psychiatric: Her speech is normal and behavior is normal. Judgment and thought content normal.   Nursing note and vitals reviewed.      Assessment:     1. Sciatica of right side      Plan:     Sciatica of right side  -     ketorolac injection 30 mg  -     methocarbamoL  (ROBAXIN) 500 MG Tab; Take 1 tablet (500 mg total) by mouth 4 (four) times daily. for 10 days  Dispense: 40 tablet; Refill: 0  -     naproxen (NAPROSYN) 500 MG tablet; Take with food.  Dispense: 60 tablet; Refill: 0      Alternate heat and ice for first 48 hours then  apply heat. You may do gently stretching if tolerable.    Moist warm compresss to area several times daily.  May use a heating pad on LOW to provide heat over a towel which was dampended with warm water. DO NOT FALL ASLEEP WITH HEATING PAD ON.  Do not stay in one position to long.  When sleeping on your back place a pillow under knees to reduce tension on back.  If sleeping on your side, place pillow between knees to keep spine in better alinement.  Wear supportive shoes such as tennis shoes for support of the lower back.  Take any medication as directed.    If you were not prescribed an anti-inflammatory medication, and if you do not have any history of stomach/intestinal ulcers, or kidney disease, or are not taking a blood thinner such as Coumadin, Plavix, Pradaxa, Eloquis, or Xaralta for example, it is OK to take over the counter Ibuprofen or Advil or Motrin or Aleve as directed.  Do not take these medications on an empty stomach.    If you were prescribed a narcotic medication, do not drive or operate heavy equipment or machinery while taking these medications.    If you lose control of your bowel and/or bladder, please go to the nearest Emergency Department immediately.  If you lose sensation in between your legs by your genitalia and/or rectum, please go to the nearest Emergency Department immediately.  If you lose control or sensation of any extremity, please go to the nearest Emergency Department immediately.         You must understand that you've received an Urgent Care treatment only and that you may be released before all your medical problems are known or treated. You, the patient, will arrange for follow up care as instructed.  Follow up  with your PCP or specialty clinic as directed in the next 1-2 weeks if not improved or as needed.  You can call [phone] to schedule an appointment with the appropriate provider.  If your condition worsens we recommend that you receive another evaluation at the emergency room immediately or contact your primary medical clinics after hours call service to discuss your concerns.  Please return here or go to the Emergency Department for any concerns or worsening of condition.    If you were prescribed a narcotic or controlled medication, do not drive or operate heavy equipment or machinery while taking these medications.    Thank you for choosing [facility]!    Our goal in the Urgent Care is to always provide outstanding medical care. You may receive a survey by mail or e-mail in the next week regarding your experience today. We would greatly appreciate you completing and returning the survey. Your feedback provides us with a way to recognize our staff who provide very good care, and it helps us learn how to improve when your experience was below our aspiration of excellence.      We appreciate you trusting us with your medical care. We hope you feel better soon. We will be happy to take care of you for all of your future medical needs.   This note was prepared using voice-recognition software.  Although efforts are made to proofread the note, some errors may persist in the final document.     Sincerely,    Nirmal Mayo DNP, MATTP-C

## 2023-11-24 NOTE — PATIENT INSTRUCTIONS
Alternate heat and ice for first 48 hours then  apply heat. You may do gently stretching if tolerable.    Moist warm compresss to area several times daily.  May use a heating pad on LOW to provide heat over a towel which was dampended with warm water. DO NOT FALL ASLEEP WITH HEATING PAD ON.  Do not stay in one position to long.  When sleeping on your back place a pillow under knees to reduce tension on back.  If sleeping on your side, place pillow between knees to keep spine in better alinement.  Wear supportive shoes such as tennis shoes for support of the lower back.  Take any medication as directed.    If you were not prescribed an anti-inflammatory medication, and if you do not have any history of stomach/intestinal ulcers, or kidney disease, or are not taking a blood thinner such as Coumadin, Plavix, Pradaxa, Eloquis, or Xaralta for example, it is OK to take over the counter Ibuprofen or Advil or Motrin or Aleve as directed.  Do not take these medications on an empty stomach.    If you were prescribed a narcotic medication, do not drive or operate heavy equipment or machinery while taking these medications.    If you lose control of your bowel and/or bladder, please go to the nearest Emergency Department immediately.  If you lose sensation in between your legs by your genitalia and/or rectum, please go to the nearest Emergency Department immediately.  If you lose control or sensation of any extremity, please go to the nearest Emergency Department immediately.        You must understand that you've received an Urgent Care treatment only and that you may be released before all your medical problems are known or treated. You, the patient, will arrange for follow up care as instructed.  Follow up with your PCP or specialty clinic as directed in the next 1-2 weeks if not improved or as needed.  You can call [phone] to schedule an appointment with the appropriate provider.  If your condition worsens we  recommend that you receive another evaluation at the emergency room immediately or contact your primary medical clinics after hours call service to discuss your concerns.  Please return here or go to the Emergency Department for any concerns or worsening of condition.    If you were prescribed a narcotic or controlled medication, do not drive or operate heavy equipment or machinery while taking these medications.    Thank you for choosing [facility]!    Our goal in the Urgent Care is to always provide outstanding medical care. You may receive a survey by mail or e-mail in the next week regarding your experience today. We would greatly appreciate you completing and returning the survey. Your feedback provides us with a way to recognize our staff who provide very good care, and it helps us learn how to improve when your experience was below our aspiration of excellence.      We appreciate you trusting us with your medical care. We hope you feel better soon. We will be happy to take care of you for all of your future medical needs.   This note was prepared using voice-recognition software.  Although efforts are made to proofread the note, some errors may persist in the final document.     Sincerely,    Nirmal Mayo DNP, FNP-C

## 2023-11-28 ENCOUNTER — CLINICAL SUPPORT (OUTPATIENT)
Dept: REHABILITATION | Facility: HOSPITAL | Age: 53
End: 2023-11-28
Payer: OTHER GOVERNMENT

## 2023-11-28 DIAGNOSIS — R29.898 DECREASED STRENGTH OF UPPER EXTREMITY: ICD-10-CM

## 2023-11-28 DIAGNOSIS — M75.41 SHOULDER IMPINGEMENT SYNDROME, RIGHT: ICD-10-CM

## 2023-11-28 DIAGNOSIS — M75.21 BICEPS TENDONITIS ON RIGHT: ICD-10-CM

## 2023-11-28 DIAGNOSIS — M75.111 INCOMPLETE TEAR OF RIGHT ROTATOR CUFF, UNSPECIFIED WHETHER TRAUMATIC: Primary | ICD-10-CM

## 2023-11-28 DIAGNOSIS — M25.611 DECREASED ROM OF RIGHT SHOULDER: ICD-10-CM

## 2023-11-28 PROCEDURE — 97110 THERAPEUTIC EXERCISES: CPT | Mod: PN

## 2023-11-28 PROCEDURE — 97112 NEUROMUSCULAR REEDUCATION: CPT | Mod: PN

## 2023-11-28 NOTE — PROGRESS NOTES
Physical Therapy Daily Treatment Note     Name: Celia Hernandez [facility]  Clinic Number: [id number]    Therapy Diagnosis:   Encounter Diagnoses   Name Primary?    Incomplete tear of right rotator cuff, unspecified whether traumatic Yes    Shoulder impingement syndrome, right     Biceps tendonitis on right     Decreased ROM of right shoulder     Decreased strength of upper extremity          Physician: Olman Pillai IV, MD    Visit Date: 11/28/2023    Physician Orders: PT Eval and Treat as per rotator cuff and biceps tenodesis protocol  Medical Diagnosis from Referral: Incomplete tear of right rotator cuff, unspecified whether traumatic, Shoulder impingement syndrome, right & Biceps tendonitis on right  Evaluation Date: 5/11/2023  Authorization Period Expiration: 8/18/2023  Plan of Care Expiration: 8/25/2023  Visit #/Visits authorized:  9/ 12 - (new [id number])  [facility] [place] [facility]    Time In: 410  Time Out: 500  Total Appointment Time  50 minutes  Total Unbillable time:  minutes    Precautions: Standard and as per protocol     Occupation (including job description if provided):  at [employer]   Job Demands: Standing, walking, lifting, carrying  Prior Medical Treatment: Therapy  and Surgery  Current Work Restrictions: Light Duty      Subjective     Pt reports: she is about the same, no change in her pain, she has trouble making some appointments due to traffic and commute  She was compliant with home exercise program.  Response to previous treatment: no complaints   Functional change: decreased pain    Pain: 2/10  Location: right shoulder(s); anterior    Objective/Treatment     7/12 MD visit: Likely overexertion with therapy.  Will decrease intensity of therapy.  Strengthening only 1x per week, but continue modalities and gentle stretching the remaining visits per week.     DOS 5/2/23    Celia received the following treatment:    Therapeutic exercises x 23 minutes to improve Range of Motion, posture, strength:    Upper Body  Ergometer 3'/3'  Overhead pulley flexion 3 minutes  Overhead pulley scaption 3 minutes    Active assist range of motion on cable column  3 x 10 3# flexion    Wall wipes flexion and scaption 3 x 10 reps each  Wall wipes circles 3 x 10 reps each direction     Celia participated in neuromuscular re-education activities to improve: Coordination, Kinesthetic, Sense, and Proprioception for 30 minutes.     3# Bar flexion 3 x 10 reps   3# Bar SA punch 3 x 10 reps      Prone shoulder extension 3 x 10 reps 2#    Prone shoulder high row  x 15 reps 2#,    Prone scapular retraction 3 x 10 reps 2#   Side lying shoulder external rotation  3 x 10 reps 1#    Side lying shoulder flexion  3 x 10 reps   Side lying hor Abduction 3 x 10 NT Pain    Red Theraband x 20 reps each direction (External Rotation/Internal Rotation)      Manual therapy:  3 mins   Grade II A/P glides   Grade II inferior glides   Passive Range of Motion all planes  Rhythmic stabilization    Home Exercises Provided and Patient Education Provided     Education provided:   -cont HOME EXERCISE PROGRAM   -apply ice as needed for delayed muscle soreness    Written Home Exercises Provided: Patient instructed to cont prior HEP.  Exercises were reviewed and Celia was able to demonstrate them prior to the end of the session.  Celia demonstrated good  understanding of the education provided.     See EMR under Patient Instructions for exercises provided prior visit.    Assessment     Celia presents with same lack of motion especially end ranges and pain with her motion.  She is not progressing at this point.      Celia Is progressing well towards her goals.   Pt prognosis is Good.     Pt will continue to benefit from skilled Physical Therapy interventions in order to address the deficits listed in the problem list box on initial evaluation, provide education, and to address the musculoskeletal limitations and work-related functional deficits for their job as a   at [employer].    Pt's spiritual, cultural and educational needs considered and pt agreeable to plan of care and goals.     Anticipated barriers to physical therapy: fear of re-injury    Goals:  Short Term Goals (STG) # weeks Goal Review Date Reviewed Date Met   1. The patient will begin a written HEP 1 ongoing 11/28/2023       2. Increase passive flexion and abduction to 160* 6 ongoing 11/28/2023       3. Increase passive external rotation to 80* 6 ongoing 11/28/2023       4. Decrease pain at worst to 4/10 6 ongoing 11/28/2023          Long Term Goals (LTG) # weeks Goal Review Date Reviewed Date Met   1. The patient will be independent with a HEP for maintenace 12 ongoing 11/28/2023       2. Decrease soft tissue tenderness to mild 12 ongoing 11/28/2023       3. Decrease FOTO score to 41% 12 ongoing 11/28/2023     4. Increase UE strength to 4+/5 12 ongoing 11/28/2023     5. The patient will be able to lift 25# from floor to waist 12 ongoing 11/28/2023        Plan     Cont per Plan of Care, Range of Motion, posture, strengthening per MD protocol.    Plan of care Certification: 5/11/2023 to 8/25/2023.     Outpatient Physical Therapy 3 times weekly for 12 weeks to include the following interventions: Manual Therapy, Neuromuscular Re-ed, Patient Education, Therapeutic Activities, and Therapeutic Exercise.       Alex Moreau, PT   11/28/2023

## 2023-11-29 ENCOUNTER — OFFICE VISIT (OUTPATIENT)
Dept: INTERNAL MEDICINE | Facility: CLINIC | Age: 53
End: 2023-11-29
Payer: COMMERCIAL

## 2023-11-29 VITALS — SYSTOLIC BLOOD PRESSURE: 139 MMHG | DIASTOLIC BLOOD PRESSURE: 88 MMHG

## 2023-11-29 DIAGNOSIS — M54.31 SCIATICA OF RIGHT SIDE: Primary | ICD-10-CM

## 2023-11-29 DIAGNOSIS — I10 ESSENTIAL HYPERTENSION: ICD-10-CM

## 2023-11-29 PROCEDURE — 3079F DIAST BP 80-89 MM HG: CPT | Mod: CPTII,95,, | Performed by: FAMILY MEDICINE

## 2023-11-29 PROCEDURE — 99214 PR OFFICE/OUTPT VISIT, EST, LEVL IV, 30-39 MIN: ICD-10-PCS | Mod: 95,,, | Performed by: FAMILY MEDICINE

## 2023-11-29 PROCEDURE — 3075F SYST BP GE 130 - 139MM HG: CPT | Mod: CPTII,95,, | Performed by: FAMILY MEDICINE

## 2023-11-29 PROCEDURE — 1160F RVW MEDS BY RX/DR IN RCRD: CPT | Mod: CPTII,95,, | Performed by: FAMILY MEDICINE

## 2023-11-29 PROCEDURE — 99214 OFFICE O/P EST MOD 30 MIN: CPT | Mod: 95,,, | Performed by: FAMILY MEDICINE

## 2023-11-29 PROCEDURE — 1159F MED LIST DOCD IN RCRD: CPT | Mod: CPTII,95,, | Performed by: FAMILY MEDICINE

## 2023-11-29 PROCEDURE — 1160F PR REVIEW ALL MEDS BY PRESCRIBER/CLIN PHARMACIST DOCUMENTED: ICD-10-PCS | Mod: CPTII,95,, | Performed by: FAMILY MEDICINE

## 2023-11-29 PROCEDURE — 3075F PR MOST RECENT SYSTOLIC BLOOD PRESS GE 130-139MM HG: ICD-10-PCS | Mod: CPTII,95,, | Performed by: FAMILY MEDICINE

## 2023-11-29 PROCEDURE — 3079F PR MOST RECENT DIASTOLIC BLOOD PRESSURE 80-89 MM HG: ICD-10-PCS | Mod: CPTII,95,, | Performed by: FAMILY MEDICINE

## 2023-11-29 PROCEDURE — 1159F PR MEDICATION LIST DOCUMENTED IN MEDICAL RECORD: ICD-10-PCS | Mod: CPTII,95,, | Performed by: FAMILY MEDICINE

## 2023-11-29 RX ORDER — KETOCONAZOLE 20 MG/G
CREAM TOPICAL
COMMUNITY
Start: 2023-11-20

## 2023-11-29 RX ORDER — GABAPENTIN 300 MG/1
300 CAPSULE ORAL 3 TIMES DAILY
Qty: 90 CAPSULE | Refills: 1 | Status: SHIPPED | OUTPATIENT
Start: 2023-11-29 | End: 2024-11-28

## 2023-11-29 NOTE — PROGRESS NOTES
Subjective:       Patient ID: Celia Alford is a 53 y.o. female.    Chief Complaint: No chief complaint on file.    Back Pain  This is a recurrent problem. The current episode started in the past 7 days. The problem occurs constantly. The problem has been gradually worsening since onset. The pain is present in the lumbar spine and sacro-iliac. The quality of the pain is described as aching, shooting and stabbing. The pain radiates to the right thigh. The pain is at a severity of 8/10. The pain is moderate. The pain is The same all the time. The symptoms are aggravated by bending, lying down, sitting and standing. Stiffness is present All day. Pertinent negatives include no abdominal pain, bladder incontinence, bowel incontinence, chest pain, dysuria, fever, headaches, leg pain, numbness, paresis, paresthesias, pelvic pain, perianal numbness, tingling, weakness or weight loss. The treatment provided moderate relief.     The patient location is: [place]  The chief complaint leading to consultation is: checkup    Visit type: audiovisual    Face to Face time with patient: 10 min   30 minutes of total time spent on the encounter, which includes face to face time and non-face to face time preparing to see the patient (eg, review of tests), Obtaining and/or reviewing separately obtained history, Documenting clinical information in the electronic or other health record, Independently interpreting results (not separately reported) and communicating results to the patient/family/caregiver, or Care coordination (not separately reported).     Each patient to whom he or she provides medical services by telemedicine is:  (1) informed of the relationship between the physician and patient and the respective role of any other health care provider with respect to management of the patient; and (2) notified that he or she may decline to receive medical services by telemedicine and may withdraw from such care at any time.    Notes:      Celia Alford is a 53 y.o. female PMHx HTN for checkup.     Dealing with sciatica over the past week  See UC note for details     #Cards: HTN, HLD, Palps  - est w/ Dr. Lobo,  7/2022  - reg: HCTZ 25 qd; Metoprolol 50 qd  - adverse effects w/ amlodipine     #Heme: Macrocytic anemia     #Ortho: S/p Rt shldr rotator cuff repair (5/2023)  - est w/ Dr. Pillai     #Obgyn:  - est w/ Dr. Ricardo,  8/2022     #Psych: Anx, Insomnia     #BMI 30    Review of Systems   Constitutional:  Negative for fever and weight loss.   Cardiovascular:  Negative for chest pain.   Gastrointestinal:  Negative for abdominal pain and bowel incontinence.   Genitourinary:  Negative for bladder incontinence, dysuria, hematuria and pelvic pain.   Musculoskeletal:  Positive for back pain.   Neurological:  Negative for tingling, weakness, numbness, headaches and paresthesias.         Past Medical History:   Diagnosis Date    COVID-19 virus infection 8/6/2021 7/2021    Essential (primary) hypertension     Gastroesophageal reflux disease 10/6/2023        Prior to Admission medications    Medication Sig Start Date End Date Taking? Authorizing Provider   hydroCHLOROthiazide (HYDRODIURIL) 25 MG tablet Take 1 tablet (25 mg total) by mouth once daily. 10/6/23 10/5/24  Jeb Roblero MD   hydrocortisone 2.5 % ointment APPLY 1 APPLICATION TOPICALLY ONCE A DAY 30 DAYS 8/2/23   Provider, Historical   medroxyPROGESTERone (DEPO-PROVERA) 150 mg/mL Syrg Inject 1 mL (150 mg total) into the muscle every 3 (three) months. 5/24/23   Mare Santos MD   methocarbamoL (ROBAXIN) 500 MG Tab Take 1 tablet (500 mg total) by mouth 4 (four) times daily. for 10 days 11/24/23 12/4/23  Nirmal Mayo DNP   metoprolol succinate (TOPROL-XL) 50 MG 24 hr tablet Take 1 tablet (50 mg total) by mouth once daily. 10/6/23 10/5/24  Jeb Roblero MD   naproxen (NAPROSYN) 500 MG tablet Take with food. 11/24/23   Nirmal Mayo DNP   omeprazole  (PRILOSEC) 20 MG capsule Take 1 capsule (20 mg total) by mouth once daily. 10/6/23 10/5/24  Jeb Roblero MD   pimecrolimus (ELIDEL) 1 % cream  8/2/23   Provider, Historical   terbinafine HCL (LAMISIL) 250 mg tablet Take 250 mg by mouth. 8/1/23   Provider, Historical        Past medical history, surgical history, and family medical history reviewed and updated as appropriate.    Medications and allergies reviewed.     Objective:          There were no vitals filed for this visit.  There is no height or weight on file to calculate BMI.  Physical Exam  Constitutional:       General: She is not in acute distress.     Appearance: Normal appearance.   Eyes:      Extraocular Movements: Extraocular movements intact.   Pulmonary:      Effort: Pulmonary effort is normal. No respiratory distress.   Neurological:      Mental Status: She is alert and oriented to person, place, and time.   Psychiatric:         Mood and Affect: Mood normal.         Behavior: Behavior normal.         Lab Results   Component Value Date    WBC 5.64 04/20/2023    HGB 11.0 (L) 04/20/2023    HCT 34.1 (L) 04/20/2023     04/20/2023    CHOL 248 (H) 09/20/2022    TRIG 137 09/20/2022    HDL 58 09/20/2022    ALT 47 (H) 07/01/2022    AST 24 07/01/2022     04/20/2023    K 4.2 04/20/2023     04/20/2023    CREATININE 0.9 04/20/2023    BUN 11 04/20/2023    CO2 25 04/20/2023    TSH 0.944 09/20/2022    INR 1.0 04/20/2023    HGBA1C 5.1 09/09/2021       Assessment:       1. Sciatica of right side          Plan:   1. Sciatica of right side  -     Ambulatory referral/consult to Physical/Occupational Therapy; Future; Expected date: 11/29/2023  -     gabapentin (NEURONTIN) 300 MG capsule; Take 1 capsule (300 mg total) by mouth 3 (three) times daily.  Dispense: 90 capsule; Refill: 1        Health maintenance reviewed with patient.     Follow up in about 3 months (around 2/29/2024) for Medication Checkup.    Jeb Roblero MD  Family Medicine /  Primary Care  [facility]  11/29/2023    Answers submitted by the patient for this visit:  Back Pain Questionnaire (Submitted on 11/29/2023)  Chief Complaint: Back pain  genital pain: No

## 2023-12-14 ENCOUNTER — OFFICE VISIT (OUTPATIENT)
Dept: ORTHOPEDICS | Facility: CLINIC | Age: 53
End: 2023-12-14
Payer: COMMERCIAL

## 2023-12-14 VITALS
SYSTOLIC BLOOD PRESSURE: 136 MMHG | HEART RATE: 76 BPM | WEIGHT: 203.06 LBS | BODY MASS INDEX: 30.08 KG/M2 | HEIGHT: 69 IN | DIASTOLIC BLOOD PRESSURE: 88 MMHG

## 2023-12-14 DIAGNOSIS — Z98.890 S/P ROTATOR CUFF SURGERY: Primary | ICD-10-CM

## 2023-12-14 DIAGNOSIS — M75.111 INCOMPLETE TEAR OF RIGHT ROTATOR CUFF, UNSPECIFIED WHETHER TRAUMATIC: ICD-10-CM

## 2023-12-14 DIAGNOSIS — M75.21 BICEPS TENDONITIS ON RIGHT: ICD-10-CM

## 2023-12-14 PROCEDURE — 3075F SYST BP GE 130 - 139MM HG: CPT | Mod: CPTII,S$GLB,, | Performed by: ORTHOPAEDIC SURGERY

## 2023-12-14 PROCEDURE — 3079F PR MOST RECENT DIASTOLIC BLOOD PRESSURE 80-89 MM HG: ICD-10-PCS | Mod: CPTII,S$GLB,, | Performed by: ORTHOPAEDIC SURGERY

## 2023-12-14 PROCEDURE — 99213 OFFICE O/P EST LOW 20 MIN: CPT | Mod: S$GLB,,, | Performed by: ORTHOPAEDIC SURGERY

## 2023-12-14 PROCEDURE — 99999 PR PBB SHADOW E&M-EST. PATIENT-LVL IV: ICD-10-PCS | Mod: PBBFAC,,, | Performed by: ORTHOPAEDIC SURGERY

## 2023-12-14 PROCEDURE — 99999 PR PBB SHADOW E&M-EST. PATIENT-LVL IV: CPT | Mod: PBBFAC,,, | Performed by: ORTHOPAEDIC SURGERY

## 2023-12-14 PROCEDURE — 3008F PR BODY MASS INDEX (BMI) DOCUMENTED: ICD-10-PCS | Mod: CPTII,S$GLB,, | Performed by: ORTHOPAEDIC SURGERY

## 2023-12-14 PROCEDURE — 99213 PR OFFICE/OUTPT VISIT, EST, LEVL III, 20-29 MIN: ICD-10-PCS | Mod: S$GLB,,, | Performed by: ORTHOPAEDIC SURGERY

## 2023-12-14 PROCEDURE — 1159F PR MEDICATION LIST DOCUMENTED IN MEDICAL RECORD: ICD-10-PCS | Mod: CPTII,S$GLB,, | Performed by: ORTHOPAEDIC SURGERY

## 2023-12-14 PROCEDURE — 3079F DIAST BP 80-89 MM HG: CPT | Mod: CPTII,S$GLB,, | Performed by: ORTHOPAEDIC SURGERY

## 2023-12-14 PROCEDURE — 3008F BODY MASS INDEX DOCD: CPT | Mod: CPTII,S$GLB,, | Performed by: ORTHOPAEDIC SURGERY

## 2023-12-14 PROCEDURE — 1159F MED LIST DOCD IN RCRD: CPT | Mod: CPTII,S$GLB,, | Performed by: ORTHOPAEDIC SURGERY

## 2023-12-14 PROCEDURE — 3075F PR MOST RECENT SYSTOLIC BLOOD PRESS GE 130-139MM HG: ICD-10-PCS | Mod: CPTII,S$GLB,, | Performed by: ORTHOPAEDIC SURGERY

## 2023-12-14 NOTE — PROGRESS NOTES
[facility], Orthopedics and Sports Medicine  [facility]    Established Patient Shoulder Office Visit  12/14/2023     Diagnosis:  Right shoulder pain  High Grade Partial Rotator Cuff Tear  Biceps Tendonitis  Subacromial Impingement  Prior rotator cuff repair     Procedure: 5/2/23  Arthroscopic Rotator Cuff Repair, revision   Open Biceps Tenodesis  Arthroscopic Shoulder Debridement - Extensive     Subjective:      Celia Alford is a 53 y.o. female who returns for follow up treatment of the right shoulder problem.  She is now about 7 months s/p the above surgery, which was a revision rotator cuff repair.  She has some shoulder pain with lifting the arm above 90 degrees.  However her pain is better than before surgery.    She is back at work.     Last therapy was 2 weeks ago but patient switched to another location and has not started.  Was going twice a week therapy before switch.     Outside reports reviewed: historical medical records and office notes.      Past Medical History:   Diagnosis Date    COVID-19 virus infection 8/6/2021 7/2021    Essential (primary) hypertension     Gastroesophageal reflux disease 10/6/2023       Patient Active Problem List   Diagnosis    Chronic right shoulder pain    Shoulder joint dysfunction    Neck pain on right side    Traumatic incomplete tear of right rotator cuff    Decreased range of motion of shoulder, right    Decreased strength of upper extremity    Essential hypertension    Neck muscle spasm    Encounter for long-term (current) use of other medications    Overweight (BMI 25.0-29.9)    Palpitations    Right cervical radiculopathy    Shoulder weakness    Biceps tendinitis of right upper extremity    S/P rotator cuff surgery    Mixed hyperlipidemia    Insomnia    Macrocytic anemia    Anxiety    Subacromial impingement of right shoulder    Incomplete tear of right rotator cuff    Shoulder impingement syndrome, right    Biceps tendonitis on  right    Decreased ROM of right shoulder    Tachycardia    Gastroesophageal reflux disease    Sciatica of right side       Past Surgical History:   Procedure Laterality Date    ADENOIDECTOMY  January 2009    ARTHROSCOPIC REPAIR OF ROTATOR CUFF OF SHOULDER Right 11/20/2020    Procedure: REPAIR, ROTATOR CUFF, ARTHROSCOPIC;  Surgeon: Jens Han Jr., MD;  Location: [facility] OR;  Service: Orthopedics;  Laterality: Right;  need opus system- [facility] notified tori-11/10  video [employer] confirmed 11/19/2020 KB [id number]    ARTHROSCOPY OF SHOULDER WITH DECOMPRESSION OF SUBACROMIAL SPACE Right 05/02/2023    Procedure: ARTHROSCOPY, SHOULDER, WITH SUBACROMIAL SPACE DECOMPRESSION;  Surgeon: Olman Pillai IV, MD;  Location: [facility] OR;  Service: Orthopedics;  Laterality: Right;    EYE SURGERY  April 2019    Laser eye surgery    ROTATOR CUFF REPAIR  11/20/2020    ROTATOR CUFF REPAIR Right 05/02/2023    Procedure: REPAIR, ROTATOR CUFF;  Surgeon: Olman Pillai IV, MD;  Location: [facility] OR;  Service: Orthopedics;  Laterality: Right;    SHOULDER ARTHROSCOPY Right 05/02/2023    Procedure: ARTHROSCOPY, SHOULDER; rotator cuff repair, subacromial decompression, possible mini-open biceps tenodesis;  Surgeon: Olman Pillai IV, MD;  Location: [facility] OR;  Service: Orthopedics;  Laterality: Right;  Beachchair position, spider arm isaac, arthrex suture anchors, arthrex biceps tenodesis button, Dr. Pillai special Frank retractor set arthrex eleanor notified cc  Have available depu    TONSILLECTOMY          Current Outpatient Medications   Medication Instructions    gabapentin (NEURONTIN) 300 mg, Oral, 3 times daily    hydroCHLOROthiazide (HYDRODIURIL) 25 mg, Oral, Daily    hydrocortisone 2.5 % ointment APPLY 1 APPLICATION TOPICALLY ONCE A DAY 30 DAYS    ketoconazole (NIZORAL) 2 % cream Topical (Top)    medroxyPROGESTERone (DEPO-PROVERA) 150 mg, Intramuscular, Every 3 months    metoprolol succinate (TOPROL-XL) 50 mg, Oral, Daily    naproxen  (NAPROSYN) 500 MG tablet Take with food.    omeprazole (PRILOSEC) 20 mg, Oral, Daily    pimecrolimus (ELIDEL) 1 % cream No dose, route, or frequency recorded.    terbinafine HCL (LAMISIL) 250 mg, Oral        Review of patient's allergies indicates:   Allergen Reactions    Pcn [penicillins] Hives and Rash       Social History     Socioeconomic History    Marital status: Single   Tobacco Use    Smoking status: Never    Smokeless tobacco: Never   Substance and Sexual Activity    Alcohol use: Not Currently     Alcohol/week: 3.0 standard drinks of alcohol     Types: 3 Glasses of wine per week     Comment: seldom    Drug use: Never    Sexual activity: Not Currently     Partners: Male     Birth control/protection: Injection     Social Determinants of Health     Social Connections: Unknown (11/29/2023)    Social Connection and Isolation Panel [NHANES]     Active Member of Clubs or Organizations: No     Attends Club or Organization Meetings: Patient refused       Family History   Problem Relation Age of Onset    Diabetes Maternal Grandmother     Hypertension Maternal Grandmother     Stroke Maternal Grandmother     No Known Problems Mother     Breast cancer Neg Hx     Colon cancer Neg Hx     Ovarian cancer Neg Hx          Review of Systems   Constitutional: Negative for chills and fever.   HENT:  Negative for hearing loss.    Eyes:  Negative for blurred vision.   Cardiovascular:  Negative for chest pain.   Respiratory:  Negative for shortness of breath.    Gastrointestinal:  Negative for abdominal pain.   Neurological:  Negative for light-headedness.        Objective:      General    Nursing note and vitals reviewed.  Constitutional: She is oriented to person, place, and time. She appears well-developed and well-nourished. No distress.   HENT:   Head: Normocephalic and atraumatic.   Eyes: Pupils are equal, round, and reactive to light.   Cardiovascular:  Normal rate and regular rhythm.            Pulmonary/Chest: Effort  normal and breath sounds normal. No respiratory distress.   Neurological: She is alert and oriented to person, place, and time.   Psychiatric: She has a normal mood and affect. Her behavior is normal.         Right Shoulder Exam     Inspection/Observation   Swelling: absent  Bruising: absent  Scars: present  Atrophy: absent    Range of Motion   Active abduction:  120   Forward Flexion:  120   External Rotation 0 degrees:  50   Internal rotation 0 degrees:  L2     Tests & Signs   Drop arm: negative  Rotator Cuff Painful Arc/Range: mild  Belly Press: negative    Other   Sensation: normal    Comments:  Wang test- negative    Left Shoulder Exam     Inspection/Observation   Swelling: absent  Bruising: absent  Atrophy: absent    Tenderness   The patient is experiencing no tenderness.     Range of Motion   Active abduction:  170 normal   Forward Flexion:  170 normal   External Rotation 0 degrees:  50 normal   Internal rotation 0 degrees:  T8 normal     Tests & Signs   Drop arm: negative  Corbett test: negative  Impingement: negative  Belly Press: negative  Active Compression test (Boston's Sign): negative  Speed's Test: negative    Other   Sensation: normal     Comments:  Wang test- negative      Muscle Strength   Right Upper Extremity   Shoulder Abduction: 4/5   Shoulder Internal Rotation: 5/5   Shoulder External Rotation: 5/5   Biceps: 5/5   Left Upper Extremity  Shoulder Abduction: 5/5   Shoulder Internal Rotation: 5/5   Shoulder External Rotation: 5/5   Biceps: 5/5     Reflexes     Left Side  Biceps:  2+  Triceps:  2+  Brachioradialis:  2+  Left Olvera's Sign:  Absent    Right Side   Biceps:  2+  Triceps:  2+  Brachioradialis:  2+  Right Olvera's Sign:  absent    Vascular Exam     Right Pulses      Radial:                    2+      Left Pulses      Radial:                    2+        Imaging:  None today    Procedures      Assessment:       Celia Alford is a 53 y.o. female seen in the office today. The  primary encounter diagnosis was S/P rotator cuff surgery. Diagnoses of Incomplete tear of right rotator cuff, unspecified whether traumatic and Biceps tendonitis on right were also pertinent to this visit.  Continuation of post op rehab program is recommended at this time. Will put in new order for therapy so patient can establish care at new location.  Will continue with work limitations.  Patient will send us form to fill out.  Discussed injection but declined and will consider in future if needed.  The natural history and expected course discussed with patient. Various treatment options were discussed, including their risks and benefits. All of the patient's questions were answered.     Plan:      Continue physical therapy as currently ordered.   Tylenol 650mg TID, PRN pain.  Follow up in 3 months.         Olman Pillai IV, MD   of Clinical Orthopedics  Department of Orthopedic Surgery  [facility]  Office: [phone]  Website: [email or web address]      Orders Placed This Encounter    Ambulatory referral/consult to Physical/Occupational Therapy

## 2024-01-24 ENCOUNTER — TELEPHONE (OUTPATIENT)
Dept: INTERNAL MEDICINE | Facility: CLINIC | Age: 54
End: 2024-01-24
Payer: COMMERCIAL

## 2024-01-24 DIAGNOSIS — R30.0 DYSURIA: Primary | ICD-10-CM

## 2024-01-24 RX ORDER — NITROFURANTOIN 25; 75 MG/1; MG/1
100 CAPSULE ORAL 2 TIMES DAILY
Qty: 10 CAPSULE | Refills: 0 | Status: SHIPPED | OUTPATIENT
Start: 2024-01-24

## 2024-01-30 ENCOUNTER — PATIENT MESSAGE (OUTPATIENT)
Dept: ORTHOPEDICS | Facility: CLINIC | Age: 54
End: 2024-01-30
Payer: COMMERCIAL

## 2024-01-30 ENCOUNTER — CLINICAL SUPPORT (OUTPATIENT)
Dept: REHABILITATION | Facility: HOSPITAL | Age: 54
End: 2024-01-30
Payer: OTHER GOVERNMENT

## 2024-01-30 DIAGNOSIS — M75.111 INCOMPLETE TEAR OF RIGHT ROTATOR CUFF, UNSPECIFIED WHETHER TRAUMATIC: ICD-10-CM

## 2024-01-30 DIAGNOSIS — M75.21 BICEPS TENDONITIS ON RIGHT: ICD-10-CM

## 2024-01-30 DIAGNOSIS — Z98.890 SHOULDER PAIN WITH HISTORY OF REPAIR OF ROTATOR CUFF: Primary | ICD-10-CM

## 2024-01-30 DIAGNOSIS — M75.41 SHOULDER IMPINGEMENT SYNDROME, RIGHT: ICD-10-CM

## 2024-01-30 DIAGNOSIS — M25.519 SHOULDER PAIN WITH HISTORY OF REPAIR OF ROTATOR CUFF: Primary | ICD-10-CM

## 2024-01-30 PROBLEM — M25.619 DECREASED RANGE OF MOTION (ROM) OF SHOULDER: Status: ACTIVE | Noted: 2024-01-30

## 2024-01-30 PROCEDURE — 97140 MANUAL THERAPY 1/> REGIONS: CPT | Mod: PO

## 2024-01-30 PROCEDURE — 97161 PT EVAL LOW COMPLEX 20 MIN: CPT | Mod: PO

## 2024-01-30 NOTE — PROGRESS NOTES
[facility]   Physical Therapy Workers' Compensation Initial Evaluation      Name: Celia Alford  Clinic Number: [id number]    Therapy Diagnosis:   Encounter Diagnoses   Name Primary?    Shoulder pain with history of repair of rotator cuff Yes    Incomplete tear of right rotator cuff, unspecified whether traumatic     Biceps tendonitis on right     Shoulder impingement syndrome, right      Physician: Olman Pillai IV, MD    Physician Orders: PT Eval and Treat AMB REFERRAL/CONSULT TO PHYSICAL THERAPY/OCCUPATIONAL THERAPY  Medical Diagnosis from Referral:   Diagnosis   M75.111 (ICD-10-CM) - Incomplete tear of right rotator cuff, unspecified whether traumatic   M75.21 (ICD-10-CM) - Biceps tendonitis on right   Z98.890 (ICD-10-CM) - S/P rotator cuff surgery     Evaluation Date: 1/30/2024  Authorization Period Expiration: March 24, 2024  Plan of Care Expiration: 3/24/2024  Visit # / Visits authorized: 1/ 12    Foto  Date  Score    #1/3 1/30/2024 48.05   #2/3     #3/3       Time In: 10:00 am  Time Out: 11:05 am  Total Appointment Time (timed & untimed codes): 65 minutes    Subjective     Date of injury: 5/2023 (sx)  History of current condition - : she fell at work and attempted to break her fall by grabbing a counter. She underwent a rotator cuff repair in November 2020 and attended physical therapy but received no decrease in symptoms. She sought a second opinion and an MRI was performed which found rotator cuff and biceps tendon pathologies. She underwent a second rotator cuff repair with biceps tenodesis on 5/2/2023.      Prior medical treatment: Yes, pt was receiving PT near her home, but needs something closer to work    Occupation/job title: Light Duty at [employer]  Job demands: Able to lift up to 50 pounds when at full strength and capacity    Current work restrictions: 10 lb weight lift restriction  Previous work status: Independent without restrictions  Current work status:  Restrictive Light Duty  Date last worked (if applicable): Today    Imaging: MRI studies: Healing    Social History: Family lives with her    Subjective Complaints: Limited reaching ability, unable to life weighted object to shoulder level due to sharp shooting pain. Has to tilt head to tie hair up unable to lift R+ UE to put hair up; Sleeping is the most disruptive     Pain:  Current 5/10, worst 9/10, best 3/10   Location: right shoulder  right shoulder(s)  Description: Aching, Throbbing, Grabbing, Tight, Sharp, and Shooting  Aggravating Factors: Laying, Night Time, Flexing, and Lifting  Easing Factors: relaxation    Pt's goals: Return to employment according to previous level of function    Medical History:   Past Medical History:   Diagnosis Date    COVID-19 virus infection 8/6/2021 7/2021    Essential (primary) hypertension     Gastroesophageal reflux disease 10/6/2023       Surgical History:   Celia Alford  has a past surgical history that includes Tonsillectomy; Arthroscopic repair of rotator cuff of shoulder (Right, 11/20/2020); Rotator cuff repair (11/20/2020); Shoulder arthroscopy (Right, 05/02/2023); Rotator cuff repair (Right, 05/02/2023); Arthroscopy of shoulder with decompression of subacromial space (Right, 05/02/2023); Adenoidectomy (January 2009); and Eye surgery (April 2019).    Medications:   Celia has a current medication list which includes the following prescription(s): gabapentin, hydrochlorothiazide, hydrocortisone, ketoconazole, medroxyprogesterone, metoprolol succinate, naproxen, nitrofurantoin (macrocrystal-monohydrate), omeprazole, pimecrolimus, and terbinafine hcl, and the following Facility-Administered Medications: lactated ringers and lidocaine (pf) 10 mg/ml (1%).    Allergies:   Review of patient's allergies indicates:   Allergen Reactions    Pcn [penicillins] Hives and Rash        Objective       Palpation tenderness to: noted at distal and proximal bicep tendon, distal  bicep muscle belly, serratus anterior muscle, supraspinatus tendon insertion    Observation: Swelling noted over lateral and anterolateral deltoid of R+ shoulder                                                                                                                   Right Shoulder PROM AROM        Flexion 118 deg 80 deg seated   Extension NT 50 deg   ABduction 128 deg 110 deg seated   ADDuction WFL WFL   Medial Rotation   70 deg 90/90 Sacrum   Lateral Rotation 60 deg 90/90 Occiput       Functional Job Specific Testing:   Job Specific Task Job Demands Current Ability   1.      2.       3.         Intake Outcome Measure for FOTO Eval Survey    Therapist reviewed FOTO scores   FOTO documents entered into [employer] - see Media section.    Score: 48.05       Treatment   Total Treatment time (time-based codes) separate from Evaluation: 15 minutes    therapeutic exercises to develop strength, endurance, ROM, flexibility, posture, and core stabilization for  minutes:  Patient education on nature of current condition and PHYSICAL THERAPY POC  Written HOME EXERCISE PROGRAM not provided    manual therapy techniques: Joint mobilizations, Manual traction, Myofacial release, Soft tissue Mobilization, and Friction Massage for 15 minutes:  R+ bicep deep STM distal end to medial muscle belly  Scap Mobilization all 4 Direction  Serratus Anterior STM with pt in SL  PROM Flexion/Abduction    neuromuscular re-education activities to improve: Balance, Coordination, Kinesthetic, Sense, Proprioception, and Posture for  minutes:  therapeutic activities to improve functional performance for   minutes:      Home Exercises and Patient Education Provided  Education provided:   - yes    Home Exercises Provided:  not provided , will assess pt tolerance to Tx and then administer appropriate HEP . Pt denies performing HEP currently at home   Exercises were reviewed and Celia was able to demonstrate them prior to the end of the session.   Celia demonstrated good  understanding of the education provided.     Assessment     Celia is a 53 y.o. referred to outpatient Physical Therapy with a medical diagnosis of R+ RTC Repair Revision with Bicep Tenodesis.     Pt presents with decreased ROM, muscle strength, flexibility, joint mobility, Increased pain, stiffness, soft tissue restriction, Impaired posture/joint mechanics and an overall decrease in recreational and functional mobility as a result    The patient's current job specific task deficits include the following: Inability to Lift or Carry Objects, Difficulty reaching overhead    Patient prognosis: Good.   Rehab potential: Good    Patient will benefit from skilled outpatient Physical Therapy to address the deficits stated above and in the chart below, provide patient /family education, to maximize patient's level of independence, and to address work-related functional deficits for their job as a [employer] Worker.    Plan of care discussed with patient: Yes  Patient's spiritual, cultural and educational needs considered and patient is agreeable to the plan of care and goals as stated below:     Anticipated Barriers for therapy: Failure of 1st Surgery    Medical Necessity is demonstrated by the following  History  Co-morbidities and personal factors that may impact the plan of care [x] LOW: no personal factors / co-morbidities  [] MODERATE: 1-2 personal factors / co-morbidities  [] HIGH: 3+ personal factors / co-morbidities    Moderate / High Support Documentation:   Co-morbidities affecting plan of care: none    Personal Factors:   no deficits  no deficits   Examination  Body Structures and Functions, activity limitations and participation restrictions that may impact the plan of care [x] LOW: addressing 1-2 elements  [] MODERATE: 3+ elements  [] HIGH: 4+ elements (please support below)    Moderate / High Support Documentation: none     Clinical Presentation [x] LOW: stable  [] MODERATE: Evolving  []  HIGH: Unstable     Decision Making/ Complexity Score: low         STGs to be Achieved within 2-3 weeks of eval  PT to independent with HEP Program  Pt to report pain at worst in shoulder to <= 6/10  Pt to display PROM flexion >= 155 deg scaption >= 160 deg in order to improve pt ability to use shoulder without compensation for basic ADLs  Pt to improve AROM shoulder flexion >= 125 deg in order to improve ability to reach over head at work   Pt to improve R+ Shoulder MMT's >= 4/5 in order to allow her to lift 10 pounds which is a light duty level of restriction     LTGs to be achieved within 4-8 weeks of eval   Pt to report pain <= /10 in order to improve overall QOL  PT to demonstrate PROM shoulder flex/scap to >=160 deg and ER/IR >= 55 deg  Pt to display shoulder flexion and scaption >= 130-155 deg in order to allow pt to retreive objects in/out of cabinets/shelves  Pt to improve shoulder ER/IR to C7/spinous process of L4 in order to perform basic ADLs such as grooming hair, tucking in shirt, and putting on belt  Pt to improve shoulder MMT's shoulder flexion/scaption 4+/5 and ER 4+/5 and IR 5/5 in order to display ability to carry weighted objects as needed at work    Plan     Plan of care Certification: 1/30/2024 to 3/24/2024.    Outpatient Physical Therapy 2 times weekly for 6 weeks to include the following interventions: Cervical/Lumbar Traction, Electrical Stimulation re-eval, dry needling, Gait Training, Iontophoresis (with ), Manual Therapy, Moist Heat/ Ice, Neuromuscular Re-ed, Patient Education, Self Care, Therapeutic Activities, and Therapeutic Exercise.     Upon discharge from further skilled PT intervention it will determined if the need for a work conditioning program or Functional Capacity Evaluation is required to allow the injured worker to return to work with full potential achieved, continued improvement with body mechanics with advanced functional activities, and further minimize future  work-related injuries.     Physical therapist and physical therapy assistant(s) will met face to face to discuss patient's treatment plan and progress towards established goals. Pt will be seen by a physical therapist minimally every 6th visit or every 30 days.    Denise Cronin, PT  1/30/2024       I CERTIFY THE NEED FOR THESE SERVICES FURNISHED UNDER THIS PLAN OF TREATMENT AND WHILE UNDER MY CARE     Physician's comments:           Physician's Signature: ___________________________________________________

## 2024-02-01 NOTE — PLAN OF CARE
[facility]   Physical Therapy Workers' Compensation Initial Evaluation       Name: Celia Alford  Clinic Number: [id number]     Therapy Diagnosis:        Encounter Diagnoses   Name Primary?    Shoulder pain with history of repair of rotator cuff Yes    Incomplete tear of right rotator cuff, unspecified whether traumatic      Biceps tendonitis on right      Shoulder impingement syndrome, right        Physician: Olman Pillai IV, MD     Physician Orders: PT Eval and Treat AMB REFERRAL/CONSULT TO PHYSICAL THERAPY/OCCUPATIONAL THERAPY  Medical Diagnosis from Referral:   Diagnosis   M75.111 (ICD-10-CM) - Incomplete tear of right rotator cuff, unspecified whether traumatic   M75.21 (ICD-10-CM) - Biceps tendonitis on right   Z98.890 (ICD-10-CM) - S/P rotator cuff surgery      Evaluation Date: 1/30/2024  Authorization Period Expiration: March 24, 2024  Plan of Care Expiration: 3/24/2024  Visit # / Visits authorized: 1/ 12     Foto  Date  Score    #1/3 1/30/2024 48.05   #2/3       #3/3          Time In: 10:00 am  Time Out: 11:05 am  Total Appointment Time (timed & untimed codes): 65 minutes     Subjective      Date of injury: 5/2023 (sx)  History of current condition - : she fell at work and attempted to break her fall by grabbing a counter. She underwent a rotator cuff repair in November 2020 and attended physical therapy but received no decrease in symptoms. She sought a second opinion and an MRI was performed which found rotator cuff and biceps tendon pathologies. She underwent a second rotator cuff repair with biceps tenodesis on 5/2/2023.      Prior medical treatment: Yes, pt was receiving PT near her home, but needs something closer to work     Occupation/job title: Light Duty at [employer]  Job demands: Able to lift up to 50 pounds when at full strength and capacity     Current work restrictions: 10 lb weight lift restriction  Previous work status: Independent without restrictions  Current  work status: Restrictive Light Duty  Date last worked (if applicable): Today     Imaging: MRI studies: Healing     Social History: Family lives with her     Subjective Complaints: Limited reaching ability, unable to life weighted object to shoulder level due to sharp shooting pain. Has to tilt head to tie hair up unable to lift R+ UE to put hair up; Sleeping is the most disruptive      Pain:  Current 5/10, worst 9/10, best 3/10   Location: right shoulder  right shoulder(s)  Description: Aching, Throbbing, Grabbing, Tight, Sharp, and Shooting  Aggravating Factors: Laying, Night Time, Flexing, and Lifting  Easing Factors: relaxation     Pt's goals: Return to employment according to previous level of function     Medical History:        Past Medical History:   Diagnosis Date    COVID-19 virus infection 8/6/2021 7/2021    Essential (primary) hypertension      Gastroesophageal reflux disease 10/6/2023         Surgical History:   Celia Alford  has a past surgical history that includes Tonsillectomy; Arthroscopic repair of rotator cuff of shoulder (Right, 11/20/2020); Rotator cuff repair (11/20/2020); Shoulder arthroscopy (Right, 05/02/2023); Rotator cuff repair (Right, 05/02/2023); Arthroscopy of shoulder with decompression of subacromial space (Right, 05/02/2023); Adenoidectomy (January 2009); and Eye surgery (April 2019).     Medications:   Celia has a current medication list which includes the following prescription(s): gabapentin, hydrochlorothiazide, hydrocortisone, ketoconazole, medroxyprogesterone, metoprolol succinate, naproxen, nitrofurantoin (macrocrystal-monohydrate), omeprazole, pimecrolimus, and terbinafine hcl, and the following Facility-Administered Medications: lactated ringers and lidocaine (pf) 10 mg/ml (1%).     Allergies:        Review of patient's allergies indicates:   Allergen Reactions    Pcn [penicillins] Hives and Rash         Objective                  Palpation tenderness to:  noted at distal and proximal bicep tendon, distal bicep muscle belly, serratus anterior muscle, supraspinatus tendon insertion     Observation: Swelling noted over lateral and anterolateral deltoid of R+ shoulder                                                                                                                    Right Shoulder PROM AROM           Flexion 118 deg 80 deg seated   Extension NT 50 deg   ABduction 128 deg 110 deg seated   ADDuction WFL WFL   Medial Rotation    70 deg 90/90 Sacrum   Lateral Rotation 60 deg 90/90 Occiput         Functional Job Specific Testing:   Job Specific Task Job Demands Current Ability   1.        2.        3.           Intake Outcome Measure for FOTO Eval Survey     Therapist reviewed FOTO scores   FOTO documents entered into [employer] - see Media section.     Score: 48.05         Treatment   Total Treatment time (time-based codes) separate from Evaluation: 15 minutes     therapeutic exercises to develop strength, endurance, ROM, flexibility, posture, and core stabilization for  minutes:  Patient education on nature of current condition and PHYSICAL THERAPY POC  Written HOME EXERCISE PROGRAM not provided     manual therapy techniques: Joint mobilizations, Manual traction, Myofacial release, Soft tissue Mobilization, and Friction Massage for 15 minutes:  R+ bicep deep STM distal end to medial muscle belly  Scap Mobilization all 4 Direction  Serratus Anterior STM with pt in SL  PROM Flexion/Abduction     neuromuscular re-education activities to improve: Balance, Coordination, Kinesthetic, Sense, Proprioception, and Posture for  minutes:  therapeutic activities to improve functional performance for   minutes:        Home Exercises and Patient Education Provided  Education provided:   - yes     Home Exercises Provided:  not provided , will assess pt tolerance to Tx and then administer appropriate HEP . Pt denies performing HEP currently at home   Exercises were reviewed and  Celia was able to demonstrate them prior to the end of the session.  Celia demonstrated good  understanding of the education provided.      Assessment      Celia is a 53 y.o. referred to outpatient Physical Therapy with a medical diagnosis of R+ RTC Repair Revision with Bicep Tenodesis.      Pt presents with decreased ROM, muscle strength, flexibility, joint mobility, Increased pain, stiffness, soft tissue restriction, Impaired posture/joint mechanics and an overall decrease in recreational and functional mobility as a result     The patient's current job specific task deficits include the following: Inability to Lift or Carry Objects, Difficulty reaching overhead     Patient prognosis: Good.   Rehab potential: Good     Patient will benefit from skilled outpatient Physical Therapy to address the deficits stated above and in the chart below, provide patient /family education, to maximize patient's level of independence, and to address work-related functional deficits for their job as a [employer] Worker.     Plan of care discussed with patient: Yes  Patient's spiritual, cultural and educational needs considered and patient is agreeable to the plan of care and goals as stated below:      Anticipated Barriers for therapy: Failure of 1st Surgery     Medical Necessity is demonstrated by the following  History  Co-morbidities and personal factors that may impact the plan of care [x] LOW: no personal factors / co-morbidities  [] MODERATE: 1-2 personal factors / co-morbidities  [] HIGH: 3+ personal factors / co-morbidities     Moderate / High Support Documentation:   Co-morbidities affecting plan of care: none     Personal Factors:   no deficits  no deficits   Examination  Body Structures and Functions, activity limitations and participation restrictions that may impact the plan of care [x] LOW: addressing 1-2 elements  [] MODERATE: 3+ elements  [] HIGH: 4+ elements (please support below)     Moderate / High Support  Documentation: none      Clinical Presentation [x] LOW: stable  [] MODERATE: Evolving  [] HIGH: Unstable      Decision Making/ Complexity Score: low            STGs to be Achieved within 2-3 weeks of eval  PT to independent with HEP Program  Pt to report pain at worst in shoulder to <= 6/10  Pt to display PROM flexion >= 155 deg scaption >= 160 deg in order to improve pt ability to use shoulder without compensation for basic ADLs  Pt to improve AROM shoulder flexion >= 125 deg in order to improve ability to reach over head at work   Pt to improve R+ Shoulder MMT's >= 4/5 in order to allow her to lift 10 pounds which is a light duty level of restriction      LTGs to be achieved within 4-8 weeks of eval   Pt to report pain <= /10 in order to improve overall QOL  PT to demonstrate PROM shoulder flex/scap to >=160 deg and ER/IR >= 55 deg  Pt to display shoulder flexion and scaption >= 130-155 deg in order to allow pt to retreive objects in/out of cabinets/shelves  Pt to improve shoulder ER/IR to C7/spinous process of L4 in order to perform basic ADLs such as grooming hair, tucking in shirt, and putting on belt  Pt to improve shoulder MMT's shoulder flexion/scaption 4+/5 and ER 4+/5 and IR 5/5 in order to display ability to carry weighted objects as needed at work     Plan      Plan of care Certification: 1/30/2024 to 3/24/2024.     Outpatient Physical Therapy 2 times weekly for 6 weeks to include the following interventions: Cervical/Lumbar Traction, Electrical Stimulation re-eval, dry needling, Gait Training, Iontophoresis (with ), Manual Therapy, Moist Heat/ Ice, Neuromuscular Re-ed, Patient Education, Self Care, Therapeutic Activities, and Therapeutic Exercise.      Upon discharge from further skilled PT intervention it will determined if the need for a work conditioning program or Functional Capacity Evaluation is required to allow the injured worker to return to work with full potential achieved, continued  improvement with body mechanics with advanced functional activities, and further minimize future work-related injuries.      Physical therapist and physical therapy assistant(s) will met face to face to discuss patient's treatment plan and progress towards established goals. Pt will be seen by a physical therapist minimally every 6th visit or every 30 days.     Denise Cronin, PT  1/30/2024        I CERTIFY THE NEED FOR THESE SERVICES FURNISHED UNDER THIS PLAN OF TREATMENT AND WHILE UNDER MY CARE     Physician's comments:           Physician's Signature: ___________________________________________________

## 2024-02-06 ENCOUNTER — CLINICAL SUPPORT (OUTPATIENT)
Dept: REHABILITATION | Facility: HOSPITAL | Age: 54
End: 2024-02-06
Payer: OTHER GOVERNMENT

## 2024-02-06 ENCOUNTER — PATIENT MESSAGE (OUTPATIENT)
Dept: REHABILITATION | Facility: HOSPITAL | Age: 54
End: 2024-02-06
Payer: COMMERCIAL

## 2024-02-06 DIAGNOSIS — Z98.890 SHOULDER PAIN WITH HISTORY OF REPAIR OF ROTATOR CUFF: Primary | ICD-10-CM

## 2024-02-06 DIAGNOSIS — M25.519 SHOULDER PAIN WITH HISTORY OF REPAIR OF ROTATOR CUFF: Primary | ICD-10-CM

## 2024-02-06 PROCEDURE — 97112 NEUROMUSCULAR REEDUCATION: CPT | Mod: PO

## 2024-02-06 PROCEDURE — 97140 MANUAL THERAPY 1/> REGIONS: CPT | Mod: PO

## 2024-02-06 PROCEDURE — 97110 THERAPEUTIC EXERCISES: CPT | Mod: PO

## 2024-02-06 NOTE — PROGRESS NOTES
[facility]   Physical Therapy Treatment Note      Name: Celia Hernandez [facility]  Clinic Number: [id number]    Therapy Diagnosis: No diagnosis found.  Physician: Olman Pillai IV, MD    Visit Date: 2/6/2024    Physician Orders: PT Eval and Treat AMB REFERRAL/CONSULT TO PHYSICAL THERAPY/OCCUPATIONAL THERAPY  Medical Diagnosis from Referral:   Diagnosis   M75.111 (ICD-10-CM) - Incomplete tear of right rotator cuff, unspecified whether traumatic   M75.21 (ICD-10-CM) - Biceps tendonitis on right   Z98.890 (ICD-10-CM) - S/P rotator cuff surgery      Evaluation Date: 1/30/2024  Authorization Period Expiration: March 24, 2024  Plan of Care Expiration: 3/24/2024  Visit # / Visits authorized: 1/ 12     Foto  Date  Score    #1/3 1/30/2024 48.05   #2/3       #3/3          Time In: 10:00 am  Time Out: 11:05 am  Total Appointment Time (timed & untimed codes): 65 minutes     [copy and paste header from ofelia here including time in/out and billable time]    PTA Visit #: 0/5       Subjective     Patient reports: that she has some pain in her shoulder, but it could be from the way she slept.  She  did not have an HEP -  compliant with home exercise program.  Response to previous treatment: good  Functional change: none at current time    Pain: 7/10  Location: right shoulder      Objective      Objective Measures updated at progress report unless specified.     Treatment     Celia received the treatments listed below:      Hot pack for 10 minutes to R+ shoulder prior to manual Tx.    therapeutic exercises to develop strength, endurance, ROM, flexibility, and posture of R+ shoulder girdle musculature and GH Joint for 18 minutes including:  [] SL Abduction 2# 3 x 10  [] SL ER 3 x 10 1#  [] Supine (HOB elevated to apporx 60 deg ) shoulder flexion w/o weight to 100 deg - focus on scap setting with eccentric control   []      manual therapy techniques: Joint mobilizations, Manual traction, Myofacial release, Soft  tissue Mobilization, and Friction Massage for 15 minutes:  R+ bicep deep STM distal end to medial muscle belly  Scap Mobilization all 4 Direction  Serratus Anterior STM with pt in SL  PROM Flexion/Abduction       neuromuscular re-education activities to improve: Coordination, Kinesthetic, Sense, Proprioception, and Posture for 23 minutes. The following activities were included:  [] Scap retractions RTB 3 x 10  [] Lower Trap with band RTB 3 x 10  [] Seated B+ ER w/ YTB 3 x 10  []    therapeutic activities to improve functional performance for 0  minutes, including:          Patient Education and Home Exercises       Education provided:   - eccentric vs concentric     Written Home Exercises Provided:  no . Exercises were reviewed and Celia was able to demonstrate them prior to the end of the session.  Celia demonstrated good  understanding of the education provided. See Electronic Medical Record under Patient Instructions for exercises provided during therapy sessions    Assessment   PT require max v/c and t/c to ensure scap is set during GH Joint motion. PT reports pain in subacromial area with flexion. When pt properly activates scap musculature, there is a reduction in intensity of pain with GH Motion    Celia Is progressing well towards her goals.   Patient prognosis is Good.     Patient will continue to benefit from skilled outpatient physical therapy to address the deficits listed in the problem list box on initial evaluation, provide pt/family education and to maximize pt's level of independence in the home and community environment.     Patient's spiritual, cultural and educational needs considered and pt agreeable to plan of care and goals.     Anticipated barriers to physical therapy: none    Goals: STGs to be Achieved within 2-3 weeks of eval  PT to independent with HEP Program  Pt to report pain at worst in shoulder to <= 6/10  Pt to display PROM flexion >= 155 deg scaption >= 160 deg in order to  improve pt ability to use shoulder without compensation for basic ADLs  Pt to improve AROM shoulder flexion >= 125 deg in order to improve ability to reach over head at work   Pt to improve R+ Shoulder MMT's >= 4/5 in order to allow her to lift 10 pounds which is a light duty level of restriction      LTGs to be achieved within 4-8 weeks of eval   Pt to report pain <= /10 in order to improve overall QOL  PT to demonstrate PROM shoulder flex/scap to >=160 deg and ER/IR >= 55 deg  Pt to display shoulder flexion and scaption >= 130-155 deg in order to allow pt to retreive objects in/out of cabinets/shelves  Pt to improve shoulder ER/IR to C7/spinous process of L4 in order to perform basic ADLs such as grooming hair, tucking in shirt, and putting on belt  Pt to improve shoulder MMT's shoulder flexion/scaption 4+/5 and ER 4+/5 and IR 5/5 in order to display ability to carry weighted objects as needed at work    Plan     Plan of care Certification: 1/30/2024 to 3/24/2024.     Outpatient Physical Therapy 2 times weekly for 6 weeks to include the following interventions: Cervical/Lumbar Traction, Electrical Stimulation re-eval, dry needling, Gait Training, Iontophoresis (with ), Manual Therapy, Moist Heat/ Ice, Neuromuscular Re-ed, Patient Education, Self Care, Therapeutic Activities, and Therapeutic Exercise.      Upon discharge from further skilled PT intervention it will determined if the need for a work conditioning program or Functional Capacity Evaluation is required to allow the injured worker to return to work with full potential achieved, continued improvement with body mechanics with advanced functional activities, and further minimize future work-related injuries.      Physical therapist and physical therapy assistant(s) will met face to face to discuss patient's treatment plan and progress towards established goals. Pt will be seen by a physical therapist minimally every 6th visit or every 30 days.    Denise  eMhrdad, PT

## 2024-02-08 ENCOUNTER — CLINICAL SUPPORT (OUTPATIENT)
Dept: REHABILITATION | Facility: HOSPITAL | Age: 54
End: 2024-02-08
Payer: OTHER GOVERNMENT

## 2024-02-08 DIAGNOSIS — M25.519 SHOULDER PAIN WITH HISTORY OF REPAIR OF ROTATOR CUFF: Primary | ICD-10-CM

## 2024-02-08 DIAGNOSIS — Z98.890 SHOULDER PAIN WITH HISTORY OF REPAIR OF ROTATOR CUFF: Primary | ICD-10-CM

## 2024-02-08 PROCEDURE — 97140 MANUAL THERAPY 1/> REGIONS: CPT | Mod: PO

## 2024-02-08 PROCEDURE — 97110 THERAPEUTIC EXERCISES: CPT | Mod: PO

## 2024-02-08 NOTE — PROGRESS NOTES
[facility]   Physical Therapy Treatment Note      Name: Celia Hernandez [facility]  Clinic Number: [id number]    Therapy Diagnosis: No diagnosis found.  Physician: Olman Pillai IV, MD    Visit Date: 2/8/2024    Physician Orders: PT Eval and Treat AMB REFERRAL/CONSULT TO PHYSICAL THERAPY/OCCUPATIONAL THERAPY  Medical Diagnosis from Referral:   Diagnosis   M75.111 (ICD-10-CM) - Incomplete tear of right rotator cuff, unspecified whether traumatic   M75.21 (ICD-10-CM) - Biceps tendonitis on right   Z98.890 (ICD-10-CM) - S/P rotator cuff surgery      Evaluation Date: 1/30/2024  Authorization Period Expiration: March 24, 2024  Plan of Care Expiration: 3/24/2024  Visit # / Visits authorized: 3/12       Foto  Date  Score    #1/3 1/30/2024 48.05   #2/3       #3/3          Time In: 3:00 pm  Time Out: 3:30 pm  Total Appointment Time (timed & untimed codes): 30 minutes      PTA Visit #: 0/5       Subjective     Patient reports: that she is doing well today. She denied increase pain after last Tx  She  did not have an HEP -  compliant with home exercise program.  Response to previous treatment: good  Functional change: none at current time    Pain: 7/10  Location: right shoulder      Objective      Objective Measures updated at progress report unless specified.     Treatment     Celia received the treatments listed below:      Hot pack for 10 minutes to R+ shoulder prior to manual Tx.    therapeutic exercises to develop strength, endurance, ROM, flexibility, and posture of R+ shoulder girdle musculature and GH Joint for 15 minutes including:  [x] SL Abduction 2# 3 x 10  [x] SL ER 3 x 10 1#  [] Supine (HOB elevated to apporx 60 deg ) shoulder flexion w/o weight to 100 deg - focus on scap setting with eccentric control   [x]Pulley Flexion / Scaption 1.5 min each way      manual therapy techniques: Joint mobilizations, Manual traction, Myofacial release, Soft tissue Mobilization, and Friction Massage for  15 minutes:  R+ bicep deep STM distal end to medial muscle belly  Scap Mobilization all 4 Direction  Serratus Anterior STM with pt in SL  PROM Flexion/Abduction       neuromuscular re-education activities to improve: Coordination, Kinesthetic, Sense, Proprioception, and Posture for 00 minutes. The following activities were included:  [] Scap retractions RTB 3 x 10  [] Lower Trap with band RTB 3 x 10  [] Seated B+ ER w/ YTB 3 x 10  []    therapeutic activities to improve functional performance for 0  minutes, including:          Patient Education and Home Exercises       Education provided:   - no add'l education provided today    Written Home Exercises Provided:  no . Exercises were reviewed and Celia was able to demonstrate them prior to the end of the session.  Celia demonstrated good  understanding of the education provided. See Electronic Medical Record under Patient Instructions for exercises provided during therapy sessions    Assessment   Pt had maintained ROM achieved last Tx. She was in minimal to no pain throughout Tx. Ended Tx early due to another appt    Celia Is progressing well towards her goals.   Patient prognosis is Good.     Patient will continue to benefit from skilled outpatient physical therapy to address the deficits listed in the problem list box on initial evaluation, provide pt/family education and to maximize pt's level of independence in the home and community environment.     Patient's spiritual, cultural and educational needs considered and pt agreeable to plan of care and goals.     Anticipated barriers to physical therapy: none    Goals: STGs to be Achieved within 2-3 weeks of eval  PT to independent with HEP Program  Pt to report pain at worst in shoulder to <= 6/10  Pt to display PROM flexion >= 155 deg scaption >= 160 deg in order to improve pt ability to use shoulder without compensation for basic ADLs  Pt to improve AROM shoulder flexion >= 125 deg in order to improve  ability to reach over head at work   Pt to improve R+ Shoulder MMT's >= 4/5 in order to allow her to lift 10 pounds which is a light duty level of restriction      LTGs to be achieved within 4-8 weeks of eval   Pt to report pain <= /10 in order to improve overall QOL  PT to demonstrate PROM shoulder flex/scap to >=160 deg and ER/IR >= 55 deg  Pt to display shoulder flexion and scaption >= 130-155 deg in order to allow pt to retreive objects in/out of cabinets/shelves  Pt to improve shoulder ER/IR to C7/spinous process of L4 in order to perform basic ADLs such as grooming hair, tucking in shirt, and putting on belt  Pt to improve shoulder MMT's shoulder flexion/scaption 4+/5 and ER 4+/5 and IR 5/5 in order to display ability to carry weighted objects as needed at work    Plan     Plan of care Certification: 1/30/2024 to 3/24/2024.     Outpatient Physical Therapy 2 times weekly for 6 weeks to include the following interventions: Cervical/Lumbar Traction, Electrical Stimulation re-eval, dry needling, Gait Training, Iontophoresis (with ), Manual Therapy, Moist Heat/ Ice, Neuromuscular Re-ed, Patient Education, Self Care, Therapeutic Activities, and Therapeutic Exercise.      Upon discharge from further skilled PT intervention it will determined if the need for a work conditioning program or Functional Capacity Evaluation is required to allow the injured worker to return to work with full potential achieved, continued improvement with body mechanics with advanced functional activities, and further minimize future work-related injuries.      Physical therapist and physical therapy assistant(s) will met face to face to discuss patient's treatment plan and progress towards established goals. Pt will be seen by a physical therapist minimally every 6th visit or every 30 days.    Denise Cronin, PT

## 2024-03-01 ENCOUNTER — CLINICAL SUPPORT (OUTPATIENT)
Dept: REHABILITATION | Facility: HOSPITAL | Age: 54
End: 2024-03-01
Payer: OTHER GOVERNMENT

## 2024-03-01 DIAGNOSIS — M25.519 SHOULDER PAIN WITH HISTORY OF REPAIR OF ROTATOR CUFF: Primary | ICD-10-CM

## 2024-03-01 DIAGNOSIS — Z98.890 SHOULDER PAIN WITH HISTORY OF REPAIR OF ROTATOR CUFF: Primary | ICD-10-CM

## 2024-03-01 PROCEDURE — 97110 THERAPEUTIC EXERCISES: CPT | Mod: PO

## 2024-03-01 PROCEDURE — 97112 NEUROMUSCULAR REEDUCATION: CPT | Mod: PO

## 2024-03-01 PROCEDURE — 97140 MANUAL THERAPY 1/> REGIONS: CPT | Mod: PO

## 2024-03-01 NOTE — PROGRESS NOTES
[facility]   Physical Therapy  Monthly Treatment Note      Name: Celia Hernandez [facility]  Clinic Number: [id number]    Therapy Diagnosis: No diagnosis found.  Physician: Olman Pillai IV, MD    Visit Date: 3/1/2024    Physician Orders: PT Eval and Treat AMB REFERRAL/CONSULT TO PHYSICAL THERAPY/OCCUPATIONAL THERAPY  Medical Diagnosis from Referral:   Diagnosis   M75.111 (ICD-10-CM) - Incomplete tear of right rotator cuff, unspecified whether traumatic   M75.21 (ICD-10-CM) - Biceps tendonitis on right   Z98.890 (ICD-10-CM) - S/P rotator cuff surgery      Evaluation Date: 1/30/2024  Authorization Period Expiration: March 24, 2024  Plan of Care Expiration: 3/24/2024  Visit # / Visits authorized: 4/12       Foto  Date  Score    #1/3 1/30/2024 48.05   #2/3       #3/3          Time In: 3:00 pm  Time Out: 3:40 pm  Total Appointment Time (timed & untimed codes): 40 minutes      PTA Visit #: 0/5       Subjective     Patient reports: that her pain is about the same from the last time she was seen.   She  did not have an HEP -  compliant with home exercise program.  Response to previous treatment: good  Functional change: none at current time    Pain: 6/10  Location: right shoulder      Objective      Objective Measures updated at progress report unless specified.       Right Shoulder PROM Eval PROM 3/1/24 AROM Eval AROM 3/1/24             Flexion 118 deg 155 deg 80 deg seated 140 deg   Extension NT NT 50 deg 50 deg   ABduction 128 deg 160 deg 111 deg seated 120 deg   ADDuction WFL  WFL    Medial Rotation    70 deg 90/90  Sacrum NT   Lateral Rotation 60 deg 90/90  Occiput NT          Treatment     Celia received the treatments listed below:        therapeutic exercises to develop strength, endurance, ROM, flexibility, and posture of R+ shoulder girdle musculature and GH Joint for 15 minutes including:  [x] SL Abduction 1# 3 x 10  [x] SL ER 3 x 10 1#  [x] Shoulder flexion w/o weight to 100 deg - focus  on scap setting with eccentric control   [x]Pulley Flexion / Scaption 3 min each way      manual therapy techniques: Joint mobilizations, Manual traction, Myofacial release, Soft tissue Mobilization, and Friction Massage for 8 minutes:  R+ bicep deep STM distal end to medial muscle belly  Scap Mobilization all 4 Direction  Serratus Anterior STM with pt in SL  PROM Flexion/Abduction       neuromuscular re-education activities to improve: Coordination, Kinesthetic, Sense, Proprioception, and Posture for 08 minutes. The following activities were included:  [x] Scap retractions RTB 3 x 10  [] Lower Trap with band RTB 3 x 10  [x] Seated B+ ER w/ YTB 3 x 10      therapeutic activities to improve functional performance for 0  minutes, including:          Patient Education and Home Exercises       Education provided:   - no add'l education provided today    Written Home Exercises Provided:  no . Exercises were reviewed and Celia was able to demonstrate them prior to the end of the session.  Celia demonstrated good  understanding of the education provided. See Electronic Medical Record under Patient Instructions for exercises provided during therapy sessions    Assessment   Pt is with good improvements noted since SOC, despite only attending 3 prior visits. She had increased pain and difficulty with 2# weight, therefore all resistance was <= 1 pound. PT displays mild scap winging. Pt is with normal end feel. AROM impaired due to strength limitations which is largely due to lack of use. Current job requirements do not require lifting and/or much overhead reaching. Discussed importance of continued PT in order to gain max use of UE.     Celia Is progressing well towards her goals.   Patient prognosis is Good.     Patient will continue to benefit from skilled outpatient physical therapy to address the deficits listed in the problem list box on initial evaluation, provide pt/family education and to maximize pt's level  of independence in the home and community environment.     Patient's spiritual, cultural and educational needs considered and pt agreeable to plan of care and goals.     Anticipated barriers to physical therapy: none    Goals: STGs to be Achieved within 2-3 weeks of eval  PT to independent with HEP Program  Pt to report pain at worst in shoulder to <= 6/10 Met 3/1/24  Pt to display PROM flexion >= 155 deg scaption >= 160 deg in order to improve pt ability to use shoulder without compensation for basic ADLs Met 3/1/24  Pt to improve AROM shoulder flexion >= 125 deg in order to improve ability to reach over head at work Met 3/1/24  Pt to improve R+ Shoulder MMT's >= 4/5 in order to allow her to lift 10 pounds which is a light duty level of restriction      LTGs to be achieved within 4-8 weeks of eval - 3/1/2024 All goals in progress   Pt to report pain <= 3/10 in order to improve overall QOL  PT to demonstrate PROM shoulder flex/scap to >=160 deg and ER/IR >= 55 deg  Pt to display shoulder flexion and scaption >= 130-155 deg in order to allow pt to retreive objects in/out of cabinets/shelves  Pt to improve shoulder ER/IR to C7/spinous process of L4 in order to perform basic ADLs such as grooming hair, tucking in shirt, and putting on belt  Pt to improve shoulder MMT's shoulder flexion/scaption 4+/5 and ER 4+/5 and IR 5/5 in order to display ability to carry weighted objects as needed at work    Plan     Plan of care Certification: 1/30/2024 to 3/24/2024.     Outpatient Physical Therapy 2 times weekly for 6 weeks to include the following interventions: Cervical/Lumbar Traction, Electrical Stimulation re-eval, dry needling, Gait Training, Iontophoresis (with ), Manual Therapy, Moist Heat/ Ice, Neuromuscular Re-ed, Patient Education, Self Care, Therapeutic Activities, and Therapeutic Exercise.      Upon discharge from further skilled PT intervention it will determined if the need for a work conditioning program or  Functional Capacity Evaluation is required to allow the injured worker to return to work with full potential achieved, continued improvement with body mechanics with advanced functional activities, and further minimize future work-related injuries.      Physical therapist and physical therapy assistant(s) will met face to face to discuss patient's treatment plan and progress towards established goals. Pt will be seen by a physical therapist minimally every 6th visit or every 30 days.    Denise Cronin, PT

## 2024-03-06 ENCOUNTER — CLINICAL SUPPORT (OUTPATIENT)
Dept: REHABILITATION | Facility: HOSPITAL | Age: 54
End: 2024-03-06
Payer: OTHER GOVERNMENT

## 2024-03-06 DIAGNOSIS — M25.519 SHOULDER PAIN WITH HISTORY OF REPAIR OF ROTATOR CUFF: Primary | ICD-10-CM

## 2024-03-06 DIAGNOSIS — Z98.890 SHOULDER PAIN WITH HISTORY OF REPAIR OF ROTATOR CUFF: Primary | ICD-10-CM

## 2024-03-06 PROCEDURE — 97140 MANUAL THERAPY 1/> REGIONS: CPT | Mod: PO

## 2024-03-06 PROCEDURE — 97110 THERAPEUTIC EXERCISES: CPT | Mod: PO

## 2024-03-06 PROCEDURE — 97112 NEUROMUSCULAR REEDUCATION: CPT | Mod: PO

## 2024-03-06 NOTE — PROGRESS NOTES
[facility]   Physical Therapy Treatment Note      Name: Celia Hernandez [facility]  Clinic Number: [id number]    Therapy Diagnosis:   Encounter Diagnosis   Name Primary?    Shoulder pain with history of repair of rotator cuff Yes     Physician: Olman Pillai IV, MD    Visit Date: 3/6/2024    Physician Orders: PT Eval and Treat AMB REFERRAL/CONSULT TO PHYSICAL THERAPY/OCCUPATIONAL THERAPY  Medical Diagnosis from Referral:   Diagnosis   M75.111 (ICD-10-CM) - Incomplete tear of right rotator cuff, unspecified whether traumatic   M75.21 (ICD-10-CM) - Biceps tendonitis on right   Z98.890 (ICD-10-CM) - S/P rotator cuff surgery      Evaluation Date: 1/30/2024  Authorization Period Expiration: March 24, 2024  Plan of Care Expiration: 3/24/2024  Visit # / Visits authorized: 5/12       Foto  Date  Score    #1/3 1/30/2024 48.05   #2/3       #3/3          Time In: 2:00 pm  Time Out: 2:40 pm  Total Appointment Time (timed & untimed codes): 40 minutes      PTA Visit #: 0/5       Subjective     Patient reports: no changes or increase in pain since last seen  She  did not have an HEP -  compliant with home exercise program.  Response to previous treatment: good  Functional change: none at current time    Pain: 6/10  Location: right shoulder      Objective      Objective Measures updated at progress report unless specified.       Last PN 3/1/24       Treatment     Celia received the treatments listed below:        therapeutic exercises to develop strength, endurance, ROM, flexibility, and posture of R+ shoulder girdle musculature and GH Joint for 23 minutes including:  [x] SL Abduction 1# 3 x 10  [x] SL ER 3 x 10 2#  [x] Shoulder flexion w/ 1# weight to 100 deg - focus on scap setting with eccentric control   [x]Pulley Flexion / Scaption 3 min each way  [x]UBE 2 min fwd/bkwd each         manual therapy techniques: Joint mobilizations, Manual traction, Myofacial release, Soft tissue Mobilization, and Friction  Massage for 8 minutes:  R+ bicep deep STM distal end to medial muscle belly  Scap Mobilization all 4 Direction  Serratus Anterior STM with pt in SL  PROM Flexion/Abduction       neuromuscular re-education activities to improve: Coordination, Kinesthetic, Sense, Proprioception, and Posture for 9 minutes. The following activities were included:  [x] Scap retractions BTB 3 x 10  [x] [place] with 4# weight 3 x 10  [x] Seated B+ ER w/ YTB 3 x 10      therapeutic activities to improve functional performance for 0  minutes, including:          Patient Education and Home Exercises       Education provided:   - no add'l education provided today    Written Home Exercises Provided:  no . Exercises were reviewed and Celia was able to demonstrate them prior to the end of the session.  Celia demonstrated good  understanding of the education provided. See Electronic Medical Record under Patient Instructions for exercises provided during therapy sessions    Assessment   Pt tolerated progression well without difficulty. Pt with imporved PROM following manual.     Celia Is progressing well towards her goals.   Patient prognosis is Good.     Patient will continue to benefit from skilled outpatient physical therapy to address the deficits listed in the problem list box on initial evaluation, provide pt/family education and to maximize pt's level of independence in the home and community environment.     Patient's spiritual, cultural and educational needs considered and pt agreeable to plan of care and goals.     Anticipated barriers to physical therapy: none    Goals: STGs to be Achieved within 2-3 weeks of eval  PT to independent with HEP Program  Pt to report pain at worst in shoulder to <= 6/10 Met 3/1/24  Pt to display PROM flexion >= 155 deg scaption >= 160 deg in order to improve pt ability to use shoulder without compensation for basic ADLs Met 3/1/24  Pt to improve AROM shoulder flexion >= 125 deg in order to improve  ability to reach over head at work Met 3/1/24  Pt to improve R+ Shoulder MMT's >= 4/5 in order to allow her to lift 10 pounds which is a light duty level of restriction      LTGs to be achieved within 4-8 weeks of eval - 3/1/2024 All goals in progress   Pt to report pain <= 3/10 in order to improve overall QOL  PT to demonstrate PROM shoulder flex/scap to >=160 deg and ER/IR >= 55 deg  Pt to display shoulder flexion and scaption >= 130-155 deg in order to allow pt to retreive objects in/out of cabinets/shelves  Pt to improve shoulder ER/IR to C7/spinous process of L4 in order to perform basic ADLs such as grooming hair, tucking in shirt, and putting on belt  Pt to improve shoulder MMT's shoulder flexion/scaption 4+/5 and ER 4+/5 and IR 5/5 in order to display ability to carry weighted objects as needed at work    Plan     Plan of care Certification: 1/30/2024 to 3/24/2024.     Outpatient Physical Therapy 2 times weekly for 6 weeks to include the following interventions: Cervical/Lumbar Traction, Electrical Stimulation re-eval, dry needling, Gait Training, Iontophoresis (with ), Manual Therapy, Moist Heat/ Ice, Neuromuscular Re-ed, Patient Education, Self Care, Therapeutic Activities, and Therapeutic Exercise.      Upon discharge from further skilled PT intervention it will determined if the need for a work conditioning program or Functional Capacity Evaluation is required to allow the injured worker to return to work with full potential achieved, continued improvement with body mechanics with advanced functional activities, and further minimize future work-related injuries.      Physical therapist and physical therapy assistant(s) will met face to face to discuss patient's treatment plan and progress towards established goals. Pt will be seen by a physical therapist minimally every 6th visit or every 30 days.    Denise Cronin, PT

## 2024-03-08 ENCOUNTER — PATIENT MESSAGE (OUTPATIENT)
Dept: REHABILITATION | Facility: HOSPITAL | Age: 54
End: 2024-03-08
Payer: COMMERCIAL

## 2024-03-15 ENCOUNTER — CLINICAL SUPPORT (OUTPATIENT)
Dept: REHABILITATION | Facility: HOSPITAL | Age: 54
End: 2024-03-15
Payer: OTHER GOVERNMENT

## 2024-03-15 DIAGNOSIS — M25.519 SHOULDER PAIN WITH HISTORY OF REPAIR OF ROTATOR CUFF: Primary | ICD-10-CM

## 2024-03-15 DIAGNOSIS — Z98.890 SHOULDER PAIN WITH HISTORY OF REPAIR OF ROTATOR CUFF: Primary | ICD-10-CM

## 2024-03-15 PROCEDURE — 97110 THERAPEUTIC EXERCISES: CPT | Mod: PO

## 2024-03-15 PROCEDURE — 97112 NEUROMUSCULAR REEDUCATION: CPT | Mod: PO

## 2024-03-15 NOTE — PROGRESS NOTES
[facility]   Physical Therapy Treatment Note      Name: Celia Hernandez [facility]  Clinic Number: [id number]    Therapy Diagnosis:   No diagnosis found.    Physician: Olman Pillai IV, MD    Visit Date: 3/15/2024    Physician Orders: PT Eval and Treat AMB REFERRAL/CONSULT TO PHYSICAL THERAPY/OCCUPATIONAL THERAPY  Medical Diagnosis from Referral:   Diagnosis   M75.111 (ICD-10-CM) - Incomplete tear of right rotator cuff, unspecified whether traumatic   M75.21 (ICD-10-CM) - Biceps tendonitis on right   Z98.890 (ICD-10-CM) - S/P rotator cuff surgery      Evaluation Date: 1/30/2024  Authorization Period Expiration: March 24, 2024  Plan of Care Expiration: 3/24/2024  Visit # / Visits authorized: 6/12       Foto  Date  Score    #1/3 1/30/2024 48.05   #2/3       #3/3          Time In: 2:00 pm  Time Out: 2:40 pm  Total Appointment Time (timed & untimed codes): 40 minutes      PTA Visit #: 0/5       Subjective     Patient reports: that she is unsure if her shoulder will every work like it used to .  She  did not have an HEP -  compliant with home exercise program.  Response to previous treatment: good  Functional change: none at current time    Pain: 6/10  Location: right shoulder      Objective      Objective Measures updated at progress report unless specified.       Last PN 3/1/24       Treatment     Celia received the treatments listed below:        therapeutic exercises to develop strength, endurance, ROM, flexibility, and posture of R+ shoulder girdle musculature and GH Joint for 23 minutes including:  [x] SL Abduction 1# 3 x 10  [x] SL ER 3 x 10 2#  [x] Shoulder flexion w/ 1# weight to 100 deg - focus on scap setting with eccentric control   [x]Pulley Flexion / Scaption 3 min each way  [x]UBE 2 min fwd/bkwd each         manual therapy techniques: Joint mobilizations, Manual traction, Myofacial release, Soft tissue Mobilization, and Friction Massage for 0 minutes:  R+ bicep deep STM distal end  to medial muscle belly  Scap Mobilization all 4 Direction  Serratus Anterior STM with pt in SL  PROM Flexion/Abduction       neuromuscular re-education activities to improve: Coordination, Kinesthetic, Sense, Proprioception, and Posture for 15 minutes. The following activities were included:  [x] Scap retractions BTB 3 x 10  [] [place] with 4# weight 3 x 10  [x] Seated B+ ER w/ YTB 3 x 10  [x]Prone horizontal abduction in half empty can pain free 2 x 10      therapeutic activities to improve functional performance for 0  minutes, including:          Patient Education and Home Exercises       Education provided:   - no add'l education provided today    Written Home Exercises Provided:  no . Exercises were reviewed and Celia was able to demonstrate them prior to the end of the session.  Celia demonstrated good  understanding of the education provided. See Electronic Medical Record under Patient Instructions for exercises provided during therapy sessions    Assessment   Pt continues with weakness of RTC muscles. During targeting , supraspinatus quivers due to weakness, especially eccentrically   Celia Is progressing well towards her goals.   Patient prognosis is Good.     Patient will continue to benefit from skilled outpatient physical therapy to address the deficits listed in the problem list box on initial evaluation, provide pt/family education and to maximize pt's level of independence in the home and community environment.     Patient's spiritual, cultural and educational needs considered and pt agreeable to plan of care and goals.     Anticipated barriers to physical therapy: none    Goals: STGs to be Achieved within 2-3 weeks of eval  PT to independent with HEP Program  Pt to report pain at worst in shoulder to <= 6/10 Met 3/1/24  Pt to display PROM flexion >= 155 deg scaption >= 160 deg in order to improve pt ability to use shoulder without compensation for basic ADLs Met 3/1/24  Pt to improve AROM  shoulder flexion >= 125 deg in order to improve ability to reach over head at work Met 3/1/24  Pt to improve R+ Shoulder MMT's >= 4/5 in order to allow her to lift 10 pounds which is a light duty level of restriction      LTGs to be achieved within 4-8 weeks of eval - 3/1/2024 All goals in progress   Pt to report pain <= 3/10 in order to improve overall QOL  PT to demonstrate PROM shoulder flex/scap to >=160 deg and ER/IR >= 55 deg  Pt to display shoulder flexion and scaption >= 130-155 deg in order to allow pt to retreive objects in/out of cabinets/shelves  Pt to improve shoulder ER/IR to C7/spinous process of L4 in order to perform basic ADLs such as grooming hair, tucking in shirt, and putting on belt  Pt to improve shoulder MMT's shoulder flexion/scaption 4+/5 and ER 4+/5 and IR 5/5 in order to display ability to carry weighted objects as needed at work    Plan     Plan of care Certification: 1/30/2024 to 3/24/2024.     Outpatient Physical Therapy 2 times weekly for 6 weeks to include the following interventions: Cervical/Lumbar Traction, Electrical Stimulation re-eval, dry needling, Gait Training, Iontophoresis (with ), Manual Therapy, Moist Heat/ Ice, Neuromuscular Re-ed, Patient Education, Self Care, Therapeutic Activities, and Therapeutic Exercise.      Upon discharge from further skilled PT intervention it will determined if the need for a work conditioning program or Functional Capacity Evaluation is required to allow the injured worker to return to work with full potential achieved, continued improvement with body mechanics with advanced functional activities, and further minimize future work-related injuries.      Physical therapist and physical therapy assistant(s) will met face to face to discuss patient's treatment plan and progress towards established goals. Pt will be seen by a physical therapist minimally every 6th visit or every 30 days.    Denise Cronin, PT

## 2024-03-20 ENCOUNTER — HOSPITAL ENCOUNTER (OUTPATIENT)
Dept: RADIOLOGY | Facility: HOSPITAL | Age: 54
Discharge: HOME | End: 2024-03-20
Attending: ORTHOPAEDIC SURGERY
Payer: OTHER GOVERNMENT

## 2024-03-20 ENCOUNTER — OFFICE VISIT (OUTPATIENT)
Dept: ORTHOPEDICS | Facility: CLINIC | Age: 54
End: 2024-03-20
Payer: COMMERCIAL

## 2024-03-20 VITALS — BODY MASS INDEX: 30.08 KG/M2 | HEIGHT: 69 IN | WEIGHT: 203.06 LBS

## 2024-03-20 DIAGNOSIS — G89.29 CHRONIC RIGHT SHOULDER PAIN: Primary | ICD-10-CM

## 2024-03-20 DIAGNOSIS — Z98.890 S/P ROTATOR CUFF SURGERY: ICD-10-CM

## 2024-03-20 DIAGNOSIS — M25.511 CHRONIC RIGHT SHOULDER PAIN: ICD-10-CM

## 2024-03-20 DIAGNOSIS — M25.511 CHRONIC RIGHT SHOULDER PAIN: Primary | ICD-10-CM

## 2024-03-20 DIAGNOSIS — G89.29 CHRONIC RIGHT SHOULDER PAIN: ICD-10-CM

## 2024-03-20 DIAGNOSIS — M75.111 INCOMPLETE TEAR OF RIGHT ROTATOR CUFF, UNSPECIFIED WHETHER TRAUMATIC: ICD-10-CM

## 2024-03-20 PROCEDURE — 73030 X-RAY EXAM OF SHOULDER: CPT | Mod: TC,PN,RT

## 2024-03-20 PROCEDURE — 99213 OFFICE O/P EST LOW 20 MIN: CPT | Mod: S$GLB,,, | Performed by: ORTHOPAEDIC SURGERY

## 2024-03-20 PROCEDURE — 99999 PR PBB SHADOW E&M-EST. PATIENT-LVL III: CPT | Mod: PBBFAC,,, | Performed by: ORTHOPAEDIC SURGERY

## 2024-03-20 PROCEDURE — 3008F BODY MASS INDEX DOCD: CPT | Mod: CPTII,S$GLB,, | Performed by: ORTHOPAEDIC SURGERY

## 2024-03-20 PROCEDURE — 1159F MED LIST DOCD IN RCRD: CPT | Mod: CPTII,S$GLB,, | Performed by: ORTHOPAEDIC SURGERY

## 2024-03-20 PROCEDURE — 73030 X-RAY EXAM OF SHOULDER: CPT | Mod: 26,RT,, | Performed by: RADIOLOGY

## 2024-03-20 NOTE — PROGRESS NOTES
[facility], Orthopedics and Sports Medicine  [facility]    Established Patient Office Visit  03/20/2024     Diagnosis:  Right shoulder pain  High Grade Partial Rotator Cuff Tear  Biceps Tendonitis  Subacromial Impingement  Prior rotator cuff repair    Procedure:   (5/2/23) Arthroscopic Rotator Cuff Repair, revision  Open Biceps Tenodesis  Arthroscopic shoulder debridement - Extensive      Subjective:      Celia Alford is a 53 y.o. female who presents for follow up for treatment of the above mentioned diagnosis. Patient reports continued pain, stiffness, and weakness with the right shoulder. These symtpoms are improving slowly with continued physical therapy and home exercises. Pain is located over the anterior shoulder. She reports pain is mostly with lifting her arm above her head. She reports that these symptoms are improved following her surgery in May 2023. She previously had worsening of symptoms prior to that and after her first surgery in 2020. She currently is performing once weekly physical therapy and home exercises nightly. She currently works at the [employer] in [place] on light duty restrictions.     Outside reports reviewed: historical medical records, office notes, and x-ray reports.    Past Medical History:   Diagnosis Date    COVID-19 virus infection 8/6/2021 7/2021    Essential (primary) hypertension     Gastroesophageal reflux disease 10/6/2023       Patient Active Problem List   Diagnosis    Chronic right shoulder pain    Shoulder joint dysfunction    Neck pain on right side    Traumatic incomplete tear of right rotator cuff    Decreased range of motion of shoulder, right    Decreased strength of upper extremity    Essential hypertension    Neck muscle spasm    Encounter for long-term (current) use of other medications    Overweight (BMI 25.0-29.9)    Palpitations    Right cervical radiculopathy    Shoulder weakness    Biceps tendinitis of right upper  extremity    S/P rotator cuff surgery    Mixed hyperlipidemia    Insomnia    Macrocytic anemia    Anxiety    Subacromial impingement of right shoulder    Incomplete tear of right rotator cuff    Shoulder impingement syndrome, right    Biceps tendonitis on right    Decreased ROM of right shoulder    Tachycardia    Gastroesophageal reflux disease    Sciatica of right side    Shoulder pain with history of repair of rotator cuff    Decreased range of motion (ROM) of shoulder       Past Surgical History:   Procedure Laterality Date    ADENOIDECTOMY  January 2009    ARTHROSCOPIC REPAIR OF ROTATOR CUFF OF SHOULDER Right 11/20/2020    Procedure: REPAIR, ROTATOR CUFF, ARTHROSCOPIC;  Surgeon: Jens Han Jr., MD;  Location: [facility] OR;  Service: Orthopedics;  Laterality: Right;  need opus system- Bahai notified tori-11/10  video Worship confirmed 11/19/2020 KB [id number]    ARTHROSCOPY OF SHOULDER WITH DECOMPRESSION OF SUBACROMIAL SPACE Right 05/02/2023    Procedure: ARTHROSCOPY, SHOULDER, WITH SUBACROMIAL SPACE DECOMPRESSION;  Surgeon: Olman Pillai IV, MD;  Location: [facility] OR;  Service: Orthopedics;  Laterality: Right;    EYE SURGERY  April 2019    Laser eye surgery    ROTATOR CUFF REPAIR  11/20/2020    ROTATOR CUFF REPAIR Right 05/02/2023    Procedure: REPAIR, ROTATOR CUFF;  Surgeon: Olman Pillai IV, MD;  Location: [facility] OR;  Service: Orthopedics;  Laterality: Right;    SHOULDER ARTHROSCOPY Right 05/02/2023    Procedure: ARTHROSCOPY, SHOULDER; rotator cuff repair, subacromial decompression, possible mini-open biceps tenodesis;  Surgeon: Olman Pillai IV, MD;  Location: [facility] OR;  Service: Orthopedics;  Laterality: Right;  Beachchair position, spider arm isaac, arthrex suture anchors, arthrex biceps tenodesis button, Dr. Pillai special Frank retractor set arthrex eleanor notified cc  Have available depu    TONSILLECTOMY          Current Outpatient Medications   Medication Instructions    gabapentin (NEURONTIN) 300 mg,  Oral, 3 times daily    hydroCHLOROthiazide (HYDRODIURIL) 25 mg, Oral, Daily    hydrocortisone 2.5 % ointment APPLY 1 APPLICATION TOPICALLY ONCE A DAY 30 DAYS    ketoconazole (NIZORAL) 2 % cream Topical (Top)    medroxyPROGESTERone (DEPO-PROVERA) 150 mg, Intramuscular, Every 3 months    metoprolol succinate (TOPROL-XL) 50 mg, Oral, Daily    naproxen (NAPROSYN) 500 MG tablet Take with food.    nitrofurantoin, macrocrystal-monohydrate, (MACROBID) 100 MG capsule 100 mg, Oral, 2 times daily    omeprazole (PRILOSEC) 20 mg, Oral, Daily    pimecrolimus (ELIDEL) 1 % cream No dose, route, or frequency recorded.    terbinafine HCL (LAMISIL) 250 mg, Oral        Review of patient's allergies indicates:   Allergen Reactions    Pcn [penicillins] Hives and Rash       Social History     Socioeconomic History    Marital status: Single   Tobacco Use    Smoking status: Never    Smokeless tobacco: Never   Substance and Sexual Activity    Alcohol use: Not Currently     Alcohol/week: 3.0 standard drinks of alcohol     Types: 3 Glasses of wine per week     Comment: seldom    Drug use: Never    Sexual activity: Not Currently     Partners: Male     Birth control/protection: Injection     Social Determinants of Health     Social Connections: Unknown (11/29/2023)    Social Connection and Isolation Panel [NHANES]     Active Member of Clubs or Organizations: No     Attends Club or Organization Meetings: Patient declined       Family History   Problem Relation Age of Onset    Diabetes Maternal Grandmother     Hypertension Maternal Grandmother     Stroke Maternal Grandmother     No Known Problems Mother     Breast cancer Neg Hx     Colon cancer Neg Hx     Ovarian cancer Neg Hx          Review of Systems   Constitutional: Negative for chills and fever.   HENT:  Negative for congestion.    Eyes:  Negative for blurred vision.   Cardiovascular:  Negative for chest pain, dyspnea on exertion and palpitations.   Respiratory:  Negative for cough and  shortness of breath.    Hematologic/Lymphatic: Negative for bleeding problem. Does not bruise/bleed easily.   Skin:  Negative for color change and rash.   Musculoskeletal:  Positive for joint pain (R shoulder) and muscle weakness (R shoulder). Negative for joint swelling.   Gastrointestinal:  Negative for abdominal pain.   Genitourinary:  Negative for bladder incontinence.   Neurological:  Negative for focal weakness, numbness and paresthesias.   Psychiatric/Behavioral:  Negative for depression. The patient is not nervous/anxious.           Objective:      General    Constitutional: She is oriented to person, place, and time. She appears well-developed and well-nourished.   HENT:   Head: Normocephalic and atraumatic.   Eyes: Pupils are equal, round, and reactive to light.   Cardiovascular:  Normal rate and regular rhythm.            Pulmonary/Chest: Effort normal and breath sounds normal.   Abdominal: Soft.   Neurological: She is alert and oriented to person, place, and time.   Psychiatric: She has a normal mood and affect. Her behavior is normal.         Right Shoulder Exam     Inspection/Observation   Swelling: absent  Deformity: absent    Tenderness   The patient is tender to palpation of the supraspinatus.    Range of Motion   Active abduction:  100   Forward Flexion:  120   External Rotation 0 degrees:  30   Internal rotation 0 degrees:  L1     Tests & Signs   Drop arm: negative  Corbett test: negative  Impingement: negative  Rotator Cuff Painful Arc/Range: moderate  Active Compression Test (Klamath's Sign): negative  Speed's Test: negative    Other   Sensation: normal    Comments:  Wang's Test Positive - Right    Left Shoulder Exam     Inspection/Observation   Swelling: absent  Deformity: absent    Tenderness   The patient is experiencing no tenderness.     Range of Motion   Active abduction:  160   Forward Flexion:  170   External Rotation 0 degrees:  50   Internal rotation 0 degrees:  T7     Tests & Signs    Drop arm: negative  Corbett test: negative  Impingement: negative  Active Compression test (Crisp's Sign): negative  Speed's Test: negative    Other   Sensation: normal     Comments:  Wang's Test Negative - Left      Muscle Strength   Right Upper Extremity   Shoulder Abduction: 5/5   Shoulder Internal Rotation: 5/5   Shoulder External Rotation: 5/5   Biceps: 5/5   Left Upper Extremity  Shoulder Abduction: 5/5   Shoulder Internal Rotation: 5/5   Shoulder External Rotation: 5/5   Biceps: 5/5     Vascular Exam     Right Pulses      Radial:                    2+      Left Pulses      Radial:                    2+        Imaging:      Procedures        Assessment:       Celia Alford is a 53 y.o. female seen in the office today. The primary encounter diagnosis was Chronic right shoulder pain. Diagnoses of S/P rotator cuff surgery and Incomplete tear of right rotator cuff, unspecified whether traumatic were also pertinent to this visit.  Non-operative treatment is recommended at this time. Continue with physical therapy and home exercises. Can consider CSI in future if worsening of pain. A form was completed today for light duty for patient's employer.  The natural history and expected course discussed with patient. Various treatment options were discussed, including their risks and benefits. All of the patient's questions were answered.     Plan:      Continue physical therapy as currently ordered.   Tylenol 650mg TID, PRN pain.  Follow up in 6 weeks.          Olman Pillai IV, MD   of Clinical Orthopedics  Department of Orthopedic Surgery  [facility]  Office: [phone]  Website: [email or web address]    ---------------------------------------

## 2024-04-11 ENCOUNTER — PATIENT MESSAGE (OUTPATIENT)
Dept: ORTHOPEDICS | Facility: CLINIC | Age: 54
End: 2024-04-11
Payer: COMMERCIAL

## 2024-05-27 ENCOUNTER — PATIENT MESSAGE (OUTPATIENT)
Dept: ORTHOPEDICS | Facility: CLINIC | Age: 54
End: 2024-05-27
Payer: COMMERCIAL

## 2024-05-27 DIAGNOSIS — M25.511 RIGHT SHOULDER PAIN, UNSPECIFIED CHRONICITY: Primary | ICD-10-CM

## 2024-07-03 ENCOUNTER — OFFICE VISIT (OUTPATIENT)
Dept: ORTHOPEDICS | Facility: CLINIC | Age: 54
End: 2024-07-03
Payer: OTHER GOVERNMENT

## 2024-07-03 ENCOUNTER — HOSPITAL ENCOUNTER (OUTPATIENT)
Dept: RADIOLOGY | Facility: HOSPITAL | Age: 54
Discharge: HOME | End: 2024-07-03
Attending: ORTHOPAEDIC SURGERY
Payer: OTHER GOVERNMENT

## 2024-07-03 DIAGNOSIS — M25.511 RIGHT SHOULDER PAIN, UNSPECIFIED CHRONICITY: ICD-10-CM

## 2024-07-03 DIAGNOSIS — G89.29 CHRONIC RIGHT SHOULDER PAIN: ICD-10-CM

## 2024-07-03 DIAGNOSIS — M25.511 CHRONIC RIGHT SHOULDER PAIN: ICD-10-CM

## 2024-07-03 DIAGNOSIS — M25.511 RIGHT SHOULDER PAIN, UNSPECIFIED CHRONICITY: Primary | ICD-10-CM

## 2024-07-03 DIAGNOSIS — Z98.890 S/P ROTATOR CUFF SURGERY: ICD-10-CM

## 2024-07-03 PROCEDURE — 73030 X-RAY EXAM OF SHOULDER: CPT | Mod: TC,PN,RT

## 2024-07-03 NOTE — PROGRESS NOTES
[facility]  [facility]    Established Patient Shoulder Office Visit  07/03/2024     Diagnosis:  Right shoulder pain  High Grade Partial Rotator Cuff Tear  Biceps Tendonitis  Subacromial Impingement  Prior rotator cuff repair     Procedure:   (5/2/23) Right Arthroscopic Rotator Cuff Repair, revision; Open Biceps Tenodesis; Arthroscopic shoulder debridement - Extensive    Subjective:      Celia Alford is a 53 y.o. female who returns for follow up treatment of the right shoulder problem.    She continues with pain in the right shoulder.  She finished shoulder therapy and feels she has reached a plateau. Pain is much better than prior to surgery 5/2023, but still having difficulty doing normal daily activity.      She is at work limited duty.     Outside reports reviewed: historical medical records, office notes, and radiology reports.    Past Medical History:   Diagnosis Date    COVID-19 virus infection 8/6/2021 7/2021    Essential (primary) hypertension     Gastroesophageal reflux disease 10/6/2023       Patient Active Problem List   Diagnosis    Chronic right shoulder pain    Shoulder joint dysfunction    Neck pain on right side    Traumatic incomplete tear of right rotator cuff    Decreased range of motion of shoulder, right    Decreased strength of upper extremity    Essential hypertension    Neck muscle spasm    Encounter for long-term (current) use of other medications    Overweight (BMI 25.0-29.9)    Palpitations    Right cervical radiculopathy    Shoulder weakness    Biceps tendinitis of right upper extremity    S/P rotator cuff surgery    Mixed hyperlipidemia    Insomnia    Macrocytic anemia    Anxiety    Subacromial impingement of right shoulder    Incomplete tear of right rotator cuff    Shoulder impingement syndrome, right    Biceps tendonitis on right    Decreased ROM of right shoulder    Tachycardia    Gastroesophageal reflux disease     Sciatica of right side    Shoulder pain with history of repair of rotator cuff    Decreased range of motion (ROM) of shoulder       Past Surgical History:   Procedure Laterality Date    ADENOIDECTOMY  January 2009    ARTHROSCOPIC REPAIR OF ROTATOR CUFF OF SHOULDER Right 11/20/2020    Procedure: REPAIR, ROTATOR CUFF, ARTHROSCOPIC;  Surgeon: Jens Han Jr., MD;  Location: [facility] OR;  Service: Orthopedics;  Laterality: Right;  need opus system- Congregational notified tori-11/10  video Hinduism confirmed 11/19/2020 KB [id number]    ARTHROSCOPY OF SHOULDER WITH DECOMPRESSION OF SUBACROMIAL SPACE Right 05/02/2023    Procedure: ARTHROSCOPY, SHOULDER, WITH SUBACROMIAL SPACE DECOMPRESSION;  Surgeon: Olman Pillai IV, MD;  Location: [facility] OR;  Service: Orthopedics;  Laterality: Right;    EYE SURGERY  April 2019    Laser eye surgery    ROTATOR CUFF REPAIR  11/20/2020    ROTATOR CUFF REPAIR Right 05/02/2023    Procedure: REPAIR, ROTATOR CUFF;  Surgeon: Olman Pillai IV, MD;  Location: [facility] OR;  Service: Orthopedics;  Laterality: Right;    SHOULDER ARTHROSCOPY Right 05/02/2023    Procedure: ARTHROSCOPY, SHOULDER; rotator cuff repair, subacromial decompression, possible mini-open biceps tenodesis;  Surgeon: Olman Pillai IV, MD;  Location: [facility] OR;  Service: Orthopedics;  Laterality: Right;  Beachchair position, spider arm isaac, arthrex suture anchors, arthrex biceps tenodesis button, Dr. Pillai special Frank retractor set arthrex eleanor notified cc  Have available depu    TONSILLECTOMY          Current Outpatient Medications   Medication Instructions    gabapentin (NEURONTIN) 300 mg, Oral, 3 times daily    hydroCHLOROthiazide (HYDRODIURIL) 25 mg, Oral, Daily    hydrocortisone 2.5 % ointment APPLY 1 APPLICATION TOPICALLY ONCE A DAY 30 DAYS    ketoconazole (NIZORAL) 2 % cream Topical (Top)    medroxyPROGESTERone (DEPO-PROVERA) 150 mg, Intramuscular, Every 3 months    metoprolol succinate (TOPROL-XL) 50 mg, Oral, Daily    naproxen  (NAPROSYN) 500 MG tablet Take with food.    nitrofurantoin, macrocrystal-monohydrate, (MACROBID) 100 MG capsule 100 mg, Oral, 2 times daily    omeprazole (PRILOSEC) 20 mg, Oral, Daily    pimecrolimus (ELIDEL) 1 % cream No dose, route, or frequency recorded.    terbinafine HCL (LAMISIL) 250 mg, Oral        Review of patient's allergies indicates:   Allergen Reactions    Pcn [penicillins] Hives and Rash       Social History     Socioeconomic History    Marital status: Single   Tobacco Use    Smoking status: Never    Smokeless tobacco: Never   Substance and Sexual Activity    Alcohol use: Not Currently     Alcohol/week: 3.0 standard drinks of alcohol     Types: 3 Glasses of wine per week     Comment: seldom    Drug use: Never    Sexual activity: Not Currently     Partners: Male     Birth control/protection: Injection       Family History   Problem Relation Name Age of Onset    Diabetes Maternal Grandmother Grandma Bina     Hypertension Maternal Grandmother Grandma Bina     Stroke Maternal Grandmother Grandma Bina     No Known Problems Mother      Breast cancer Neg Hx      Colon cancer Neg Hx      Ovarian cancer Neg Hx           Review of Systems   Constitutional: Negative for chills and fever.   HENT:  Negative for hearing loss.    Eyes:  Negative for blurred vision.   Cardiovascular:  Negative for chest pain.   Respiratory:  Negative for shortness of breath.    Gastrointestinal:  Negative for abdominal pain.   Neurological:  Negative for light-headedness.        Objective:      General    Constitutional: She is oriented to person, place, and time. She appears well-developed and well-nourished.   HENT:   Head: Normocephalic and atraumatic.   Eyes: Pupils are equal, round, and reactive to light.   Cardiovascular:  Normal rate and regular rhythm.            Pulmonary/Chest: Effort normal and breath sounds normal.   Abdominal: Soft.   Neurological: She is alert and oriented to person, place, and time.   Psychiatric:  She has a normal mood and affect. Her behavior is normal.         Right Shoulder Exam     Inspection/Observation   Swelling: absent  Deformity: absent    Tenderness   The patient is tender to palpation of the supraspinatus.    Range of Motion   Active abduction:  140   Forward Flexion:  140   External Rotation 0 degrees:  40   Internal rotation 0 degrees:  L1     Tests & Signs   Drop arm: negative  Corbett test: negative  Impingement: negative  Rotator Cuff Painful Arc/Range: moderate  Active Compression Test (Rockingham's Sign): negative  Speed's Test: negative    Other   Sensation: normal    Comments:  Wang's Test Positive - Right    Left Shoulder Exam     Inspection/Observation   Swelling: absent  Deformity: absent    Tenderness   The patient is experiencing no tenderness.     Range of Motion   Active abduction:  160   Forward Flexion:  170   External Rotation 0 degrees:  50   Internal rotation 0 degrees:  T7     Tests & Signs   Drop arm: negative  Corbett test: negative  Impingement: negative  Active Compression test (Rockingham's Sign): negative  Speed's Test: negative    Other   Sensation: normal     Comments:  Wang's Test Negative - Left      Muscle Strength   Right Upper Extremity   Shoulder Abduction: 5/5   Shoulder Internal Rotation: 5/5   Shoulder External Rotation: 5/5   Biceps: 5/5   Left Upper Extremity  Shoulder Abduction: 5/5   Shoulder Internal Rotation: 5/5   Shoulder External Rotation: 5/5   Biceps: 5/5     Vascular Exam     Right Pulses      Radial:                    2+      Left Pulses      Radial:                    2+        Imaging:  Radiographs of the right shoulder taken 07/03/2024 were personally reviewed from the [facility]MR.  Multiple views of the shoulder are available today for review, including an AP, scapular Y, axillary view.  The hardware placed in recent surgery is in appropriate position with no evidence of hardware failure or loosening.  There is no acute fracture or  dislocation.       Procedures      Assessment:       Celia Alford is a 53 y.o. female seen in the office today. The primary encounter diagnosis was Right shoulder pain, unspecified chronicity. Diagnoses of Chronic right shoulder pain and S/P rotator cuff surgery were also pertinent to this visit.  Final evaluation by occupational health to determine MME is recommended at this time. I will refer the patient to occupational health for further evaluation at this time. The natural history and expected course discussed with patient. Various treatment options were discussed, including their risks and benefits. All of the patient's questions were answered.     Plan:      Tylenol 650mg TID, PRN pain.  Follow up as needed if symptoms worsen.  Referral to occupational health for MME         Olman Pillai IV, MD   of Clinical Orthopedics  Department of Orthopedic Surgery  [facility]  Office: [phone]  Website: [email or web address]      Orders Placed This Encounter    Ambulatory referral/consult to Occupational Medicine

## 2024-07-05 ENCOUNTER — OFFICE VISIT (OUTPATIENT)
Dept: URGENT CARE | Facility: CLINIC | Age: 54
End: 2024-07-05
Payer: COMMERCIAL

## 2024-07-05 VITALS
HEIGHT: 68 IN | OXYGEN SATURATION: 98 % | SYSTOLIC BLOOD PRESSURE: 128 MMHG | BODY MASS INDEX: 30.31 KG/M2 | HEART RATE: 104 BPM | WEIGHT: 200 LBS | TEMPERATURE: 99 F | RESPIRATION RATE: 20 BRPM | DIASTOLIC BLOOD PRESSURE: 85 MMHG

## 2024-07-05 DIAGNOSIS — M25.552 LEFT HIP PAIN: ICD-10-CM

## 2024-07-05 DIAGNOSIS — M46.1 SI (SACROILIAC) JOINT INFLAMMATION: Primary | ICD-10-CM

## 2024-07-05 RX ORDER — MELOXICAM 15 MG/1
15 TABLET ORAL DAILY
Qty: 30 TABLET | Refills: 0 | Status: SHIPPED | OUTPATIENT
Start: 2024-07-05 | End: 2024-08-04

## 2024-07-05 RX ORDER — METHOCARBAMOL 500 MG/1
500 TABLET, FILM COATED ORAL 4 TIMES DAILY
Qty: 40 TABLET | Refills: 0 | Status: SHIPPED | OUTPATIENT
Start: 2024-07-05 | End: 2024-07-15

## 2024-07-05 RX ORDER — KETOROLAC TROMETHAMINE 30 MG/ML
30 INJECTION, SOLUTION INTRAMUSCULAR; INTRAVENOUS
Status: COMPLETED | OUTPATIENT
Start: 2024-07-05 | End: 2024-07-05

## 2024-07-05 RX ADMIN — KETOROLAC TROMETHAMINE 30 MG: 30 INJECTION, SOLUTION INTRAMUSCULAR; INTRAVENOUS at 06:07

## 2024-07-05 NOTE — PROGRESS NOTES
"Subjective:      Patient ID: Celia Alford is a 53 y.o. female.    Vitals:  height is 5' 8" (1.727 m) and weight is 90.7 kg (200 lb). Her temperature is 98.7 °F (37.1 °C). Her blood pressure is 128/85 and her pulse is 104. Her respiration is 20 and oxygen saturation is 98%.     Chief Complaint: Hip Pain    Yesterday upon waking.  She reports she has sciatic nerve pain on the other side and this feels nothing like that.  Discussed the pain is intermittent and with certain positions.  It feels" deep in her hip" there is tenderness upon palpation to the external left hip.  Discussed conservative anti-inflammatory treatment and orthopedic follow up with pain persists    Hip Pain   The incident occurred 12 to 24 hours ago (x's 1.5 days ago). There was no injury mechanism. The pain is present in the left hip. The pain is at a severity of 10/10. The pain is severe. The pain has been Constant since onset. Pertinent negatives include no numbness. She has tried acetaminophen for the symptoms. The treatment provided no relief.       Constitution: Positive for activity change. Negative for chills, fatigue and fever.   HENT: Negative.     Cardiovascular: Negative.    Respiratory: Negative.     Gastrointestinal: Negative.    Musculoskeletal:  Positive for joint pain. Negative for trauma and joint swelling.   Neurological:  Negative for numbness and tingling.   Psychiatric/Behavioral: Negative.        Objective:     Physical Exam   Constitutional: She is oriented to person, place, and time. No distress. normal  HENT:   Head: Normocephalic and atraumatic.   Ears:   Right Ear: External ear normal.   Left Ear: External ear normal.   Nose: Nose normal.   Mouth/Throat: Mucous membranes are moist. Oropharynx is clear.   Eyes: Conjunctivae are normal.   Cardiovascular: Normal rate.   Pulmonary/Chest: Effort normal. No respiratory distress.   Abdominal: Normal appearance.   Musculoskeletal:         General: Tenderness present. No " swelling, deformity or signs of injury.      Left hip: She exhibits tenderness. She exhibits normal strength, no bony tenderness and no crepitus.        Legs:    Neurological: She is alert and oriented to person, place, and time. She displays no weakness.   Skin: Skin is warm and dry. Capillary refill takes less than 2 seconds.   Psychiatric: Her behavior is normal. Mood, judgment and thought content normal.   Nursing note and vitals reviewed.      Assessment:     1. SI (sacroiliac) joint inflammation    2. Left hip pain        Plan:       SI (sacroiliac) joint inflammation  -     ketorolac injection 30 mg  -     meloxicam (MOBIC) 15 MG tablet; Take 1 tablet (15 mg total) by mouth once daily.  Dispense: 30 tablet; Refill: 0    Left hip pain  -     methocarbamoL (ROBAXIN) 500 MG Tab; Take 1 tablet (500 mg total) by mouth 4 (four) times daily. for 10 days  Dispense: 40 tablet; Refill: 0      Discussed medication with patient who acknowledges understanding and is agreeable to POC. Follow up with primary care. Increase fluid intake. Red flags for ER discussed.

## 2024-07-10 ENCOUNTER — OFFICE VISIT (OUTPATIENT)
Dept: URGENT CARE | Facility: CLINIC | Age: 54
End: 2024-07-10
Payer: OTHER GOVERNMENT

## 2024-07-10 VITALS
TEMPERATURE: 98 F | HEART RATE: 85 BPM | RESPIRATION RATE: 17 BRPM | OXYGEN SATURATION: 98 % | DIASTOLIC BLOOD PRESSURE: 83 MMHG | WEIGHT: 200 LBS | BODY MASS INDEX: 30.31 KG/M2 | SYSTOLIC BLOOD PRESSURE: 126 MMHG | HEIGHT: 68 IN

## 2024-07-10 DIAGNOSIS — M25.511 RIGHT SHOULDER PAIN, UNSPECIFIED CHRONICITY: ICD-10-CM

## 2024-07-10 DIAGNOSIS — Z98.890 S/P ROTATOR CUFF SURGERY: Primary | ICD-10-CM

## 2024-07-10 DIAGNOSIS — Z02.6 ENCOUNTER RELATED TO WORKER'S COMPENSATION CLAIM: ICD-10-CM

## 2024-07-10 DIAGNOSIS — M25.511 CHRONIC RIGHT SHOULDER PAIN: ICD-10-CM

## 2024-07-10 DIAGNOSIS — G89.29 CHRONIC RIGHT SHOULDER PAIN: ICD-10-CM
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
## 2024-07-10 NOTE — PROGRESS NOTES
Subjective:      Patient ID: Celia Alford is a 53 y.o. female.    Chief Complaint: Shoulder Injury (DOI 07/18/2019 Rt shoulder ROBB)    Patient's place of employment - [employer]  Patient's job title -    Date of injury - 07/18/2019  Body part injured including left or right -  rt shoulder ( torn rotator cuff)   Injury Mechanism -  Fall   What they were doing when they got hurt - Patient trip over a tie wrap causing her to fall.  What they did immediately after -  Urgent Care  Pain scale right now -  4  Robb    Provider note:   53-year-old right-hand dominant female presents with chronic right shoulder pain from a work-related injury that occurred 5 years ago.  Patient has been following with Orthopedics and has had two surgeries on the shoulder.  She reports improvement after the 2nd surgery however still has significant pain in the right shoulder.  She reports difficulty raising overhead.  She reports pain with lifting activities.  Patient was following with Orthopedics but was referred here to complete paperwork for MMI.  Patient has been working light duty since her injury. MEB    Shoulder Injury   The incident occurred at work. The incident occurred more than 1 week ago. The injury mechanism was a fall. The quality of the pain is described as aching. The pain radiates to the right arm. The pain is at a severity of 4/10. Associated symptoms include tingling. Pertinent negatives include no chest pain, muscle weakness or numbness. The symptoms are aggravated by overhead lifting and movement. She has tried nothing for the symptoms. The treatment provided no relief.       Constitution: Positive for activity change.   HENT:  Negative for facial trauma.    Neck: Negative for neck pain.   Cardiovascular:  Negative for chest pain.   Eyes:  Negative for eye trauma.   Respiratory:  Negative for shortness of breath.    Gastrointestinal:  Negative for abdominal trauma.   Musculoskeletal:  Positive for pain, joint pain  and abnormal ROM of joint.   Skin:  Negative for wound.   Neurological:  Negative for numbness and tingling.     Objective:     Physical Exam  Vitals and nursing note reviewed.   Constitutional:       General: She is not in acute distress.     Appearance: She is well-developed. She is not diaphoretic.   HENT:      Head: Normocephalic and atraumatic.      Right Ear: Hearing and external ear normal.      Left Ear: Hearing and external ear normal.      Nose: Nose normal. No nasal deformity.   Eyes:      General: Lids are normal. No scleral icterus.     Conjunctiva/sclera: Conjunctivae normal.   Neck:      Trachea: Trachea normal.   Cardiovascular:      Pulses: Normal pulses.   Pulmonary:      Effort: Pulmonary effort is normal. No respiratory distress.      Breath sounds: No stridor.   Musculoskeletal:      Right shoulder: Tenderness present. No swelling or deformity. Decreased range of motion. Decreased strength. Normal pulse.      Cervical back: Normal range of motion.      Comments: Right shoulder demonstrates decreased flexion, abduction and internal rotation.  There is tenderness about the anterior and lateral aspects.  No E/E/D.  Resisted abduction, biceps curls and triceps extension Strength 4+/5.  Right upper extremity neurovascularly intact.   Skin:     General: Skin is warm and dry.      Capillary Refill: Capillary refill takes less than 2 seconds.   Neurological:      Mental Status: She is alert. She is not disoriented.      GCS: GCS eye subscore is 4. GCS verbal subscore is 5. GCS motor subscore is 6.      Sensory: No sensory deficit.   Psychiatric:         Attention and Perception: She is attentive.         Speech: Speech normal.         Behavior: Behavior normal.        Assessment:      1. S/P rotator cuff surgery    2. Right shoulder pain, unspecified chronicity    3. Chronic right shoulder pain    4. Encounter related to worker's compensation claim    5. Work related injury      Plan:     53-year-old  right-hand dominant [place]  with permanent disability from work-related injury that occurred 5 years ago.  I have completed the CA 17 with congruent restrictions from her orthopedist.  These restrictions will be permanent.  Patient has reached MMI and will be discharged with instructions to follow up as needed.  Patient verbalized understanding and agreement.         Restrictions:  (See CA-17)  No follow-ups on file.

## 2024-07-22 RX ORDER — MEDROXYPROGESTERONE ACETATE 150 MG/ML
150 INJECTION, SUSPENSION INTRAMUSCULAR
Qty: 1 EACH | Refills: 1 | Status: SHIPPED | OUTPATIENT
Start: 2024-07-22

## 2024-07-22 NOTE — TELEPHONE ENCOUNTER
Refill Routing Note   Medication(s) are not appropriate for processing by [facility] for the following reason(s):        Medication outside of protocol    [facility] action(s):  Route               Appointments  past 12m or future 3m with PCP    Date Provider   Last Visit   5/24/2023 Mare Santos MD   Next Visit   Visit date not found Mare Santos MD   ED visits in past 90 days: 0        Note composed:7:40 AM 07/22/2024

## 2024-08-13 ENCOUNTER — TELEPHONE (OUTPATIENT)
Dept: INTERNAL MEDICINE | Facility: CLINIC | Age: 54
End: 2024-08-13
Payer: COMMERCIAL

## 2024-08-13 NOTE — TELEPHONE ENCOUNTER
----- Message from Jeb Roblero MD sent at 8/13/2024 12:45 PM CDT -----  Due for mammogram if can get this scheduled some time soon. Order is in system

## 2024-08-21 ENCOUNTER — PATIENT MESSAGE (OUTPATIENT)
Dept: ORTHOPEDICS | Facility: CLINIC | Age: 54
End: 2024-08-21
Payer: COMMERCIAL

## 2024-08-22 ENCOUNTER — PATIENT MESSAGE (OUTPATIENT)
Dept: INTERNAL MEDICINE | Facility: CLINIC | Age: 54
End: 2024-08-22
Payer: COMMERCIAL

## 2024-08-23 NOTE — TELEPHONE ENCOUNTER
Called and spoke to pt on the phone -- pt was just getting to work so she requested that we schedule the apt later in the day -- pt will message when she is ready and I will reach out via phone call to schedule

## 2024-09-18 ENCOUNTER — OFFICE VISIT (OUTPATIENT)
Dept: URGENT CARE | Facility: CLINIC | Age: 54
End: 2024-09-18
Payer: COMMERCIAL

## 2024-09-18 VITALS
HEIGHT: 69 IN | BODY MASS INDEX: 29.62 KG/M2 | TEMPERATURE: 99 F | RESPIRATION RATE: 16 BRPM | SYSTOLIC BLOOD PRESSURE: 139 MMHG | OXYGEN SATURATION: 98 % | HEART RATE: 63 BPM | WEIGHT: 200 LBS | DIASTOLIC BLOOD PRESSURE: 90 MMHG

## 2024-09-18 DIAGNOSIS — H66.91 RIGHT OTITIS MEDIA, UNSPECIFIED OTITIS MEDIA TYPE: ICD-10-CM

## 2024-09-18 DIAGNOSIS — J40 BRONCHITIS: Primary | ICD-10-CM

## 2024-09-18 DIAGNOSIS — R09.89 CHEST CONGESTION: ICD-10-CM

## 2024-09-18 DIAGNOSIS — R05.9 COUGH, UNSPECIFIED TYPE: ICD-10-CM

## 2024-09-18 DIAGNOSIS — R09.81 SINUS CONGESTION: ICD-10-CM

## 2024-09-18 LAB
CTP QC/QA: YES
CTP QC/QA: YES
FLUAV AG NPH QL: NEGATIVE
FLUBV AG NPH QL: NEGATIVE
SARS-COV-2 AG RESP QL IA.RAPID: NEGATIVE

## 2024-09-18 PROCEDURE — 87804 INFLUENZA ASSAY W/OPTIC: CPT | Mod: QW,,,

## 2024-09-18 PROCEDURE — 99214 OFFICE O/P EST MOD 30 MIN: CPT | Mod: S$GLB,,,

## 2024-09-18 PROCEDURE — 87811 SARS-COV-2 COVID19 W/OPTIC: CPT | Mod: QW,S$GLB,,

## 2024-09-18 RX ORDER — FLUTICASONE PROPIONATE 50 MCG
1 SPRAY, SUSPENSION (ML) NASAL DAILY
Qty: 15.8 ML | Refills: 0 | Status: SHIPPED | OUTPATIENT
Start: 2024-09-18

## 2024-09-18 RX ORDER — CETIRIZINE HYDROCHLORIDE 10 MG/1
10 TABLET ORAL DAILY
Qty: 30 TABLET | Refills: 0 | Status: SHIPPED | OUTPATIENT
Start: 2024-09-18

## 2024-09-18 RX ORDER — PROMETHAZINE HYDROCHLORIDE AND DEXTROMETHORPHAN HYDROBROMIDE 6.25; 15 MG/5ML; MG/5ML
5 SYRUP ORAL NIGHTLY PRN
Qty: 50 ML | Refills: 0 | Status: SHIPPED | OUTPATIENT
Start: 2024-09-18 | End: 2024-09-28

## 2024-09-18 RX ORDER — ALBUTEROL SULFATE 90 UG/1
2 INHALANT RESPIRATORY (INHALATION) EVERY 6 HOURS PRN
Qty: 18 G | Refills: 0 | Status: SHIPPED | OUTPATIENT
Start: 2024-09-18 | End: 2025-09-18

## 2024-09-18 RX ORDER — CHLOPHEDIANOL HCL AND PYRILAMINE MALEATE 12.5; 12.5 MG/5ML; MG/5ML
10 SOLUTION ORAL EVERY 8 HOURS PRN
Qty: 300 ML | Refills: 0 | Status: SHIPPED | OUTPATIENT
Start: 2024-09-18 | End: 2024-09-28

## 2024-09-18 RX ORDER — AZITHROMYCIN 500 MG/1
500 TABLET, FILM COATED ORAL DAILY
Qty: 5 TABLET | Refills: 0 | Status: SHIPPED | OUTPATIENT
Start: 2024-09-18 | End: 2024-09-23

## 2024-09-18 NOTE — PATIENT INSTRUCTIONS
Symptomatic treatment to include:     Rest, increase fluid intake to include electrolyte replacement  Ibuprofen/Tylenol as directed for fever, sore throat, body aches  Zrytec and flonase for sinus symptoms  Ninjacof for daytime cough medication  Phenergan DM cough syrup for night time use as this may make you drowsy.   Albuterol inhaler as needed for cough, shortness of breath or chest tightness  Antibiotic as prescribed for ear infection  Warm, salt water gargles, over the counter throat lozenges or sprays as desires.   ER for difficulty breathing not relieved by rest, excessive lethargy and/or change in mental status

## 2024-09-18 NOTE — LETTER
September 18, 2024      [facility]  [address] [address] [id number]       Patient: Celia Alford   YOB: 1970  Date of Visit: 09/18/2024    To Whom It May Concern:    Connie Alford  was at [facility] on 09/18/2024. The patient may return to work on 09/23/2024 with no restrictions. If you have any questions or concerns, or if I can be of further assistance, please do not hesitate to contact me.    Sincerely,    Betty Giron NP

## 2024-09-18 NOTE — PROGRESS NOTES
"Subjective:      Patient ID: Celia Alford is a 53 y.o. female.    Vitals:  height is 5' 9" (1.753 m) and weight is 90.7 kg (200 lb). Her temperature is 99.2 °F (37.3 °C). Her blood pressure is 139/90 (abnormal) and her pulse is 63. Her respiration is 16 and oxygen saturation is 98%.     Chief Complaint: Cough    Female presents for evaluation of 3 days of cough, headache, R ear pain x3 days.  She reports cough as deep and nonproductive.  She does have mild pain or tenderness when coughing on the right lower fields.  Lung sounds are diminished in his areas well.  Chest x-ray completed to rule out pneumonia.  Patient was also had low-grade fever, chills, body aches.    Cough  Associated symptoms include chills, a fever, postnasal drip and a sore throat. Pertinent negatives include no shortness of breath or wheezing.       Constitution: Positive for chills, fatigue and fever.   HENT:  Positive for congestion, postnasal drip, sinus pressure and sore throat.    Cardiovascular: Negative.    Respiratory:  Positive for chest tightness and cough. Negative for sputum production, shortness of breath and wheezing.    Musculoskeletal: Negative.    Neurological: Negative.    Psychiatric/Behavioral: Negative.        Objective:     Physical Exam   Constitutional: She is oriented to person, place, and time. She appears well-developed. She is cooperative.  Non-toxic appearance. She does not appear ill. No distress.   HENT:   Head: Normocephalic and atraumatic.   Ears:   Right Ear: Hearing, tympanic membrane, external ear and ear canal normal.   Left Ear: Hearing, tympanic membrane, external ear and ear canal normal.   Nose: Rhinorrhea and congestion present. No mucosal edema or nasal deformity. No epistaxis. Right sinus exhibits no maxillary sinus tenderness and no frontal sinus tenderness. Left sinus exhibits no maxillary sinus tenderness and no frontal sinus tenderness.   Mouth/Throat: Uvula is midline and mucous membranes " are normal. Mucous membranes are moist. No trismus in the jaw. Normal dentition. No uvula swelling. Posterior oropharyngeal erythema present. No posterior oropharyngeal edema.   Eyes: Conjunctivae and lids are normal. No scleral icterus.   Neck: Trachea normal and phonation normal. Neck supple. No edema present. No erythema present. No neck rigidity present.   Cardiovascular: Normal rate, regular rhythm and normal heart sounds.   Pulmonary/Chest: Effort normal. No respiratory distress. She has decreased breath sounds in the right lower field. She has no wheezes. She has no rhonchi. She has no rales.   Abdominal: Normal appearance.   Musculoskeletal: Normal range of motion.         General: No deformity. Normal range of motion.   Neurological: She is alert and oriented to person, place, and time. She displays no weakness. She exhibits normal muscle tone.   Skin: Skin is warm, dry, intact, not diaphoretic and not pale.   Psychiatric: Her speech is normal and behavior is normal. Mood, judgment and thought content normal.   Nursing note and vitals reviewed.      Assessment:     1. Bronchitis    2. Cough, unspecified type    3. Chest congestion    4. Sinus congestion    5. Right otitis media, unspecified otitis media type      EXAMINATION:  XR CHEST PA AND LATERAL     CLINICAL HISTORY:  Cough, unspecified     TECHNIQUE:  PA and lateral views of the chest were performed.     COMPARISON:  04/20/2023     FINDINGS:  The lungs are clear, with normal appearance of pulmonary vasculature and no pleural effusion or pneumothorax.     The cardiac silhouette is normal in size. The hilar and mediastinal contours are unremarkable.     Bones are intact.     Impression:     No acute abnormality.        Electronically signed by:Lora Espinoza MD  Date:                                            09/18/2024  Time:                                           12:03    Plan:       Bronchitis    Cough, unspecified type  -     SARS Coronavirus  2 Antigen, POCT Manual Read  -     POCT Influenza A/B Rapid Antigen  -     XR CHEST PA AND LATERAL; Future; Expected date: 09/18/2024  -     pyrilamine-chlophedianoL (NINJACOF) 12.5-12.5 mg/5 mL Liqd; Take 10 mLs by mouth every 8 (eight) hours as needed (cough).  Dispense: 300 mL; Refill: 0  -     promethazine-dextromethorphan (PROMETHAZINE-DM) 6.25-15 mg/5 mL Syrp; Take 5 mLs by mouth nightly as needed (cough).  Dispense: 50 mL; Refill: 0  -     albuterol (VENTOLIN HFA) 90 mcg/actuation inhaler; Inhale 2 puffs into the lungs every 6 (six) hours as needed for Wheezing. Rescue  Dispense: 18 g; Refill: 0    Chest congestion  -     albuterol (VENTOLIN HFA) 90 mcg/actuation inhaler; Inhale 2 puffs into the lungs every 6 (six) hours as needed for Wheezing. Rescue  Dispense: 18 g; Refill: 0    Sinus congestion  -     fluticasone propionate (FLONASE) 50 mcg/actuation nasal spray; 1 spray (50 mcg total) by Each Nostril route once daily.  Dispense: 15.8 mL; Refill: 0  -     cetirizine (ZYRTEC) 10 MG tablet; Take 1 tablet (10 mg total) by mouth once daily.  Dispense: 30 tablet; Refill: 0    Right otitis media, unspecified otitis media type  -     azithromycin (ZITHROMAX) 500 MG tablet; Take 1 tablet (500 mg total) by mouth once daily. for 5 days  Dispense: 5 tablet; Refill: 0      COVID: neg  FLU: neg    CXR independently reviewed. Small area on RLL that I have slight suspicion for pneumonia but waiting for rad read to confirm. Patient is going to be on azithromycin for otitis media anyway so discussed with patient if pneumonia is confirmed we can add second antibiotic for dual therapy later. Patient acknowledges understanding.     Discussed medication with patient who acknowledges understanding and is agreeable to POC. Follow up with primary care. Increase fluid intake. Red flags for ER discussed.

## 2024-09-23 ENCOUNTER — LAB VISIT (OUTPATIENT)
Dept: LAB | Facility: HOSPITAL | Age: 54
End: 2024-09-23
Attending: FAMILY MEDICINE
Payer: COMMERCIAL

## 2024-09-23 ENCOUNTER — OFFICE VISIT (OUTPATIENT)
Dept: INTERNAL MEDICINE | Facility: CLINIC | Age: 54
End: 2024-09-23
Payer: COMMERCIAL

## 2024-09-23 VITALS
BODY MASS INDEX: 30.21 KG/M2 | SYSTOLIC BLOOD PRESSURE: 122 MMHG | WEIGHT: 203.94 LBS | HEART RATE: 80 BPM | DIASTOLIC BLOOD PRESSURE: 86 MMHG | HEIGHT: 69 IN

## 2024-09-23 DIAGNOSIS — Z12.11 ENCOUNTER FOR COLORECTAL CANCER SCREENING: ICD-10-CM

## 2024-09-23 DIAGNOSIS — Z00.00 ANNUAL PHYSICAL EXAM: Primary | ICD-10-CM

## 2024-09-23 DIAGNOSIS — R05.3 CHRONIC COUGH: ICD-10-CM

## 2024-09-23 DIAGNOSIS — Z00.00 ANNUAL PHYSICAL EXAM: ICD-10-CM

## 2024-09-23 DIAGNOSIS — R00.0 TACHYCARDIA: ICD-10-CM

## 2024-09-23 DIAGNOSIS — Z79.899 ENCOUNTER FOR LONG-TERM (CURRENT) USE OF OTHER MEDICATIONS: ICD-10-CM

## 2024-09-23 DIAGNOSIS — R00.2 PALPITATIONS: ICD-10-CM

## 2024-09-23 DIAGNOSIS — Z12.12 ENCOUNTER FOR COLORECTAL CANCER SCREENING: ICD-10-CM

## 2024-09-23 DIAGNOSIS — I10 ESSENTIAL HYPERTENSION: ICD-10-CM

## 2024-09-23 DIAGNOSIS — K21.9 GASTROESOPHAGEAL REFLUX DISEASE, UNSPECIFIED WHETHER ESOPHAGITIS PRESENT: ICD-10-CM

## 2024-09-23 LAB
25(OH)D3+25(OH)D2 SERPL-MCNC: 9 NG/ML (ref 30–96)
ALBUMIN SERPL BCP-MCNC: 4.5 G/DL (ref 3.5–5.2)
ALP SERPL-CCNC: 58 U/L (ref 55–135)
ALT SERPL W/O P-5'-P-CCNC: 50 U/L (ref 10–44)
ANION GAP SERPL CALC-SCNC: 15 MMOL/L (ref 8–16)
AST SERPL-CCNC: 36 U/L (ref 10–40)
BILIRUB SERPL-MCNC: 0.4 MG/DL (ref 0.1–1)
BUN SERPL-MCNC: 12 MG/DL (ref 6–20)
CALCIUM SERPL-MCNC: 10.1 MG/DL (ref 8.7–10.5)
CHLORIDE SERPL-SCNC: 104 MMOL/L (ref 95–110)
CHOLEST SERPL-MCNC: 246 MG/DL (ref 120–199)
CHOLEST/HDLC SERPL: 5.5 {RATIO} (ref 2–5)
CO2 SERPL-SCNC: 21 MMOL/L (ref 23–29)
CREAT SERPL-MCNC: 1 MG/DL (ref 0.5–1.4)
ERYTHROCYTE [DISTWIDTH] IN BLOOD BY AUTOMATED COUNT: 13 % (ref 11.5–14.5)
EST. GFR  (NO RACE VARIABLE): >60 ML/MIN/1.73 M^2
ESTIMATED AVG GLUCOSE: 100 MG/DL (ref 68–131)
GLUCOSE SERPL-MCNC: 80 MG/DL (ref 70–110)
HBA1C MFR BLD: 5.1 % (ref 4–5.6)
HCT VFR BLD AUTO: 36.4 % (ref 37–48.5)
HDLC SERPL-MCNC: 45 MG/DL (ref 40–75)
HDLC SERPL: 18.3 % (ref 20–50)
HGB BLD-MCNC: 11.9 G/DL (ref 12–16)
LDLC SERPL CALC-MCNC: 146 MG/DL (ref 63–159)
MAGNESIUM SERPL-MCNC: 1.8 MG/DL (ref 1.6–2.6)
MCH RBC QN AUTO: 33.7 PG (ref 27–31)
MCHC RBC AUTO-ENTMCNC: 32.7 G/DL (ref 32–36)
MCV RBC AUTO: 103 FL (ref 82–98)
NONHDLC SERPL-MCNC: 201 MG/DL
PLATELET # BLD AUTO: 400 K/UL (ref 150–450)
PMV BLD AUTO: 10.1 FL (ref 9.2–12.9)
POTASSIUM SERPL-SCNC: 3.5 MMOL/L (ref 3.5–5.1)
PROT SERPL-MCNC: 8.2 G/DL (ref 6–8.4)
RBC # BLD AUTO: 3.53 M/UL (ref 4–5.4)
SODIUM SERPL-SCNC: 140 MMOL/L (ref 136–145)
TRIGL SERPL-MCNC: 275 MG/DL (ref 30–150)
TSH SERPL DL<=0.005 MIU/L-ACNC: 0.72 UIU/ML (ref 0.4–4)
VIT B12 SERPL-MCNC: 392 PG/ML (ref 210–950)
WBC # BLD AUTO: 7.21 K/UL (ref 3.9–12.7)

## 2024-09-23 PROCEDURE — 99999 PR PBB SHADOW E&M-EST. PATIENT-LVL V: CPT | Mod: PBBFAC,,, | Performed by: FAMILY MEDICINE

## 2024-09-23 PROCEDURE — 84443 ASSAY THYROID STIM HORMONE: CPT | Performed by: FAMILY MEDICINE

## 2024-09-23 PROCEDURE — 1159F MED LIST DOCD IN RCRD: CPT | Mod: CPTII,S$GLB,, | Performed by: FAMILY MEDICINE

## 2024-09-23 PROCEDURE — 85027 COMPLETE CBC AUTOMATED: CPT | Performed by: FAMILY MEDICINE

## 2024-09-23 PROCEDURE — 3079F DIAST BP 80-89 MM HG: CPT | Mod: CPTII,S$GLB,, | Performed by: FAMILY MEDICINE

## 2024-09-23 PROCEDURE — 80053 COMPREHEN METABOLIC PANEL: CPT | Performed by: FAMILY MEDICINE

## 2024-09-23 PROCEDURE — 83735 ASSAY OF MAGNESIUM: CPT | Performed by: FAMILY MEDICINE

## 2024-09-23 PROCEDURE — 99396 PREV VISIT EST AGE 40-64: CPT | Mod: S$GLB,,, | Performed by: FAMILY MEDICINE

## 2024-09-23 PROCEDURE — 80061 LIPID PANEL: CPT | Performed by: FAMILY MEDICINE

## 2024-09-23 PROCEDURE — 82306 VITAMIN D 25 HYDROXY: CPT | Performed by: FAMILY MEDICINE

## 2024-09-23 PROCEDURE — 3008F BODY MASS INDEX DOCD: CPT | Mod: CPTII,S$GLB,, | Performed by: FAMILY MEDICINE

## 2024-09-23 PROCEDURE — 82607 VITAMIN B-12: CPT | Performed by: FAMILY MEDICINE

## 2024-09-23 PROCEDURE — 3074F SYST BP LT 130 MM HG: CPT | Mod: CPTII,S$GLB,, | Performed by: FAMILY MEDICINE

## 2024-09-23 PROCEDURE — 99214 OFFICE O/P EST MOD 30 MIN: CPT | Mod: 25,S$GLB,, | Performed by: FAMILY MEDICINE

## 2024-09-23 PROCEDURE — 83036 HEMOGLOBIN GLYCOSYLATED A1C: CPT | Performed by: FAMILY MEDICINE

## 2024-09-23 RX ORDER — BENZONATATE 200 MG/1
200 CAPSULE ORAL 2 TIMES DAILY PRN
Qty: 20 CAPSULE | Refills: 0 | Status: SHIPPED | OUTPATIENT
Start: 2024-09-23 | End: 2024-09-30

## 2024-09-23 RX ORDER — OMEPRAZOLE 20 MG/1
20 CAPSULE, DELAYED RELEASE ORAL DAILY
Qty: 30 CAPSULE | Refills: 11 | Status: SHIPPED | OUTPATIENT
Start: 2024-09-23 | End: 2025-09-23

## 2024-09-23 RX ORDER — METOPROLOL SUCCINATE 100 MG/1
100 TABLET, EXTENDED RELEASE ORAL DAILY
Qty: 30 TABLET | Refills: 11 | Status: SHIPPED | OUTPATIENT
Start: 2024-09-23 | End: 2025-09-23

## 2024-09-23 RX ORDER — METHYLPREDNISOLONE 4 MG/1
TABLET ORAL
Qty: 21 EACH | Refills: 0 | Status: SHIPPED | OUTPATIENT
Start: 2024-09-23 | End: 2024-10-14

## 2024-09-23 RX ORDER — HYDROCHLOROTHIAZIDE 25 MG/1
25 TABLET ORAL DAILY
Qty: 30 TABLET | Refills: 11 | Status: SHIPPED | OUTPATIENT
Start: 2024-09-23 | End: 2025-09-23

## 2024-09-23 NOTE — PROGRESS NOTES
Subjective:       Patient ID: Celia Alford is a 53 y.o. female.    Chief Complaint: Annual Exam (Pt also wants to follow up from her urgent care visit, breathing has been hard. Her BP has been high lately and her heart has been beating fast. )    HPI    Celia Alford is a 53 y.o. female PMHx HTN for checkup.     Persistent cough, congestion    Random eps of heart racing, skipping beats    Breakthru symptoms when using ppi and zantac daily     #Cards: HTN, HLD, Palps  - est w/ Dr. Lobo,  7/2022  - reg: HCTZ 25 qd; Metoprolol 50 qd  - adverse effects w/ amlodipine    #GI: GERD     #Heme: Macrocytic anemia     #Ortho: S/p Rt shldr rotator cuff repair (5/2023)  - est w/ Dr. Pillai     #Obgyn:  - est w/ Dr. Ricardo,  8/2022     #Psych: Anx, Insomnia     #BMI 30    Review of Systems   Constitutional:  Negative for activity change and unexpected weight change.   HENT:  Negative for hearing loss, rhinorrhea and trouble swallowing.    Eyes:  Negative for discharge and visual disturbance.   Respiratory:  Positive for cough and wheezing. Negative for chest tightness.    Cardiovascular:  Positive for palpitations. Negative for chest pain.   Gastrointestinal:  Negative for blood in stool, constipation, diarrhea and vomiting.   Endocrine: Negative for polydipsia and polyuria.   Genitourinary:  Negative for difficulty urinating, dysuria, hematuria and menstrual problem.   Musculoskeletal:  Negative for arthralgias, joint swelling and neck pain.   Neurological:  Negative for weakness and headaches.   Psychiatric/Behavioral:  Negative for confusion and dysphoric mood.          Past Medical History:   Diagnosis Date    COVID-19 virus infection 8/6/2021 7/2021    Essential (primary) hypertension     Gastroesophageal reflux disease 10/6/2023        Prior to Admission medications    Medication Sig Start Date End Date Taking? Authorizing Provider   albuterol (VENTOLIN HFA) 90 mcg/actuation inhaler Inhale 2 puffs  into the lungs every 6 (six) hours as needed for Wheezing. Rescue 9/18/24 9/18/25  Betty Giron, ALICIA   azithromycin (ZITHROMAX) 500 MG tablet Take 1 tablet (500 mg total) by mouth once daily. for 5 days 9/18/24 9/23/24  Betty Giron, ALICIA   cetirizine (ZYRTEC) 10 MG tablet Take 1 tablet (10 mg total) by mouth once daily. 9/18/24   Betty Giron, ALICIA   fluticasone propionate (FLONASE) 50 mcg/actuation nasal spray 1 spray (50 mcg total) by Each Nostril route once daily. 9/18/24   Betty Giron NP   gabapentin (NEURONTIN) 300 MG capsule Take 1 capsule (300 mg total) by mouth 3 (three) times daily. 11/29/23 11/28/24  Jeb Roblero MD   hydroCHLOROthiazide (HYDRODIURIL) 25 MG tablet Take 1 tablet (25 mg total) by mouth once daily. 10/6/23 10/5/24  Jeb Roblero MD   hydrocortisone 2.5 % ointment APPLY 1 APPLICATION TOPICALLY ONCE A DAY 30 DAYS 8/2/23   Provider, Historical   ketoconazole (NIZORAL) 2 % cream Apply topically. 11/20/23   Provider, Historical   medroxyPROGESTERone (DEPO-PROVERA) 150 mg/mL Syrg Inject 1 mL (150 mg total) into the muscle every 3 (three) months. 7/22/24   Mare Santos MD   metoprolol succinate (TOPROL-XL) 50 MG 24 hr tablet Take 1 tablet (50 mg total) by mouth once daily. 10/6/23 10/5/24  Jeb Roblero MD   naproxen (NAPROSYN) 500 MG tablet Take with food. 11/24/23   Nirmal Mayo DNP   nitrofurantoin, macrocrystal-monohydrate, (MACROBID) 100 MG capsule Take 1 capsule (100 mg total) by mouth 2 (two) times daily.  Patient not taking: Reported on 7/10/2024 1/24/24   Jeb Roblero MD   omeprazole (PRILOSEC) 20 MG capsule Take 1 capsule (20 mg total) by mouth once daily. 10/6/23 10/5/24  Jeb Roblero MD   pimecrolimus (ELIDEL) 1 % cream  8/2/23   Provider, Historical   promethazine-dextromethorphan (PROMETHAZINE-DM) 6.25-15 mg/5 mL Syrp Take 5 mLs by mouth nightly as needed (cough). 9/18/24 9/28/24  Betty Giron, ALICIA   pyrilamine-chlophedianoL (NINJACOF) 12.5-12.5  "mg/5 mL Liqd Take 10 mLs by mouth every 8 (eight) hours as needed (cough). 9/18/24 9/28/24  Betty Giron, ALICIA   terbinafine HCL (LAMISIL) 250 mg tablet Take 250 mg by mouth.  Patient not taking: Reported on 9/18/2024 8/1/23   Provider, Historical        Past medical history, surgical history, and family medical history reviewed and updated as appropriate.    Medications and allergies reviewed.     Objective:          Vitals:    09/23/24 1339   BP: 122/86   BP Location: Right arm   Patient Position: Sitting   BP Method: Medium (Manual)   Pulse: 80   Weight: 92.5 kg (203 lb 14.8 oz)   Height: 5' 9" (1.753 m)     Body mass index is 30.11 kg/m².  Physical Exam  Vitals and nursing note reviewed.   Constitutional:       General: She is not in acute distress.     Appearance: She is not ill-appearing.   HENT:      Right Ear: Tympanic membrane, ear canal and external ear normal. There is no impacted cerumen.      Left Ear: Tympanic membrane, ear canal and external ear normal. There is no impacted cerumen.   Cardiovascular:      Rate and Rhythm: Normal rate and regular rhythm.      Pulses: Normal pulses.      Heart sounds: Normal heart sounds. No murmur heard.  Pulmonary:      Effort: Pulmonary effort is normal. No respiratory distress.      Breath sounds: Normal breath sounds. No wheezing.   Neurological:      Mental Status: She is alert and oriented to person, place, and time.   Psychiatric:         Mood and Affect: Mood normal.         Behavior: Behavior normal.         Lab Results   Component Value Date    WBC 5.64 04/20/2023    HGB 11.0 (L) 04/20/2023    HCT 34.1 (L) 04/20/2023     04/20/2023    CHOL 248 (H) 09/20/2022    TRIG 137 09/20/2022    HDL 58 09/20/2022    ALT 47 (H) 07/01/2022    AST 24 07/01/2022     04/20/2023    K 4.2 04/20/2023     04/20/2023    CREATININE 0.9 04/20/2023    BUN 11 04/20/2023    CO2 25 04/20/2023    TSH 0.944 09/20/2022    INR 1.0 04/20/2023    HGBA1C 5.1 09/09/2021 "       Assessment:       1. Annual physical exam    2. Tachycardia    3. Palpitations    4. Essential hypertension    5. Gastroesophageal reflux disease, unspecified whether esophagitis present    6. Encounter for long-term (current) use of other medications    7. Encounter for colorectal cancer screening    8. Chronic cough          Plan:   1. Annual physical exam  -     Comprehensive Metabolic Panel; Future; Expected date: 09/23/2024  -     CBC Without Differential; Future; Expected date: 09/23/2024  -     Lipid Panel; Future; Expected date: 09/23/2024  -     Hemoglobin A1C; Future; Expected date: 09/23/2024  -     TSH; Future; Expected date: 09/23/2024  -     Magnesium; Future; Expected date: 09/23/2024  -     Vitamin B12; Future; Expected date: 09/23/2024  -     Vitamin D; Future; Expected date: 09/23/2024    2. Tachycardia  -     Ambulatory referral/consult to Cardiology; Future; Expected date: 09/23/2024  -     metoprolol succinate (TOPROL-XL) 100 MG 24 hr tablet; Take 1 tablet (100 mg total) by mouth once daily.  Dispense: 30 tablet; Refill: 11    3. Palpitations  Overview:  Benign exam today, rec holter monitor    Orders:  -     Ambulatory referral/consult to Cardiology; Future; Expected date: 09/23/2024    4. Essential hypertension  Overview:  Cont reg  - consider adding on combo agent to hctz (ARB)  - adverse effects w/ amlodipine    Orders:  -     hydroCHLOROthiazide (HYDRODIURIL) 25 MG tablet; Take 1 tablet (25 mg total) by mouth once daily.  Dispense: 30 tablet; Refill: 11    5. Gastroesophageal reflux disease, unspecified whether esophagitis present  -     omeprazole (PRILOSEC) 20 MG capsule; Take 1 capsule (20 mg total) by mouth once daily.  Dispense: 30 capsule; Refill: 11  -     Ambulatory referral/consult to Gastroenterology; Future; Expected date: 09/23/2024    6. Encounter for long-term (current) use of other medications  -     Comprehensive Metabolic Panel; Future; Expected date: 09/23/2024  -      CBC Without Differential; Future; Expected date: 09/23/2024  -     Lipid Panel; Future; Expected date: 09/23/2024  -     Hemoglobin A1C; Future; Expected date: 09/23/2024  -     TSH; Future; Expected date: 09/23/2024  -     Magnesium; Future; Expected date: 09/23/2024  -     Vitamin B12; Future; Expected date: 09/23/2024  -     Vitamin D; Future; Expected date: 09/23/2024    7. Encounter for colorectal cancer screening  -     Cologuard Screening (Multitarget Stool DNA); Future; Expected date: 09/23/2024    8. Chronic cough  -     methylPREDNISolone (MEDROL DOSEPACK) 4 mg tablet; use as directed  Dispense: 21 each; Refill: 0  -     benzonatate (TESSALON) 200 MG capsule; Take 1 capsule (200 mg total) by mouth 2 (two) times daily as needed for Cough.  Dispense: 20 capsule; Refill: 0        Health maintenance reviewed with patient.     Prob:  Persistent cough, congestion    Random eps of heart racing, skipping beats    Breakthru symptoms when using ppi and zantac daily  Ros: +cough, no fever; +burning pain midepigastrically; +palps  PE: rrr today  Plan:  - increase dose of bb; try medrol satish short course, otc options recommended for cough,   - labs today    Follow up should symptoms persist or worsen. If symptoms become severe, please report immediately to urgent care or emergency room for further evaluation. Patient voiced understanding.      Follow up in about 3 months (around 12/23/2024) for Checkup, Medication Checkup.    Jeb Roblero MD  Family Medicine / Primary Care  [facility]  9/23/2024

## 2024-09-24 DIAGNOSIS — E55.9 VITAMIN D DEFICIENCY: Primary | ICD-10-CM

## 2024-09-24 RX ORDER — ERGOCALCIFEROL 1.25 MG/1
50000 CAPSULE ORAL
Qty: 12 CAPSULE | Refills: 0 | Status: SHIPPED | OUTPATIENT
Start: 2024-09-24

## 2024-10-10 DIAGNOSIS — R09.81 SINUS CONGESTION: ICD-10-CM

## 2024-10-14 DIAGNOSIS — R09.81 SINUS CONGESTION: ICD-10-CM

## 2024-10-28 ENCOUNTER — OFFICE VISIT (OUTPATIENT)
Dept: URGENT CARE | Facility: CLINIC | Age: 54
End: 2024-10-28
Payer: COMMERCIAL

## 2024-10-28 VITALS
BODY MASS INDEX: 30.07 KG/M2 | WEIGHT: 203 LBS | DIASTOLIC BLOOD PRESSURE: 81 MMHG | TEMPERATURE: 99 F | OXYGEN SATURATION: 100 % | HEART RATE: 84 BPM | SYSTOLIC BLOOD PRESSURE: 122 MMHG | RESPIRATION RATE: 16 BRPM | HEIGHT: 69 IN

## 2024-10-28 DIAGNOSIS — R10.31 ACUTE RIGHT LOWER QUADRANT PAIN: Primary | ICD-10-CM

## 2024-10-28 DIAGNOSIS — R10.10 PAIN OF UPPER ABDOMEN: ICD-10-CM

## 2024-10-28 DIAGNOSIS — R10.9 ABDOMINAL CRAMPING: ICD-10-CM

## 2024-10-28 LAB
BILIRUB UR QL STRIP: NEGATIVE
GLUCOSE UR QL STRIP: NEGATIVE
KETONES UR QL STRIP: NEGATIVE
LEUKOCYTE ESTERASE UR QL STRIP: NEGATIVE
PH, POC UA: 6
POC BLOOD, URINE: NEGATIVE
POC NITRATES, URINE: NEGATIVE
PROT UR QL STRIP: NEGATIVE
SP GR UR STRIP: 1.01 (ref 1–1.03)
UROBILINOGEN UR STRIP-ACNC: NORMAL (ref 0.1–1.1)

## 2024-10-28 PROCEDURE — 99213 OFFICE O/P EST LOW 20 MIN: CPT | Mod: S$GLB,,, | Performed by: NURSE PRACTITIONER

## 2024-10-28 PROCEDURE — 81003 URINALYSIS AUTO W/O SCOPE: CPT | Mod: QW,S$GLB,, | Performed by: NURSE PRACTITIONER

## 2024-10-28 RX ORDER — DICYCLOMINE HYDROCHLORIDE 10 MG/1
10 CAPSULE ORAL 4 TIMES DAILY PRN
Qty: 40 CAPSULE | Refills: 0 | Status: SHIPPED | OUTPATIENT
Start: 2024-10-28 | End: 2024-11-07

## 2024-10-30 ENCOUNTER — OFFICE VISIT (OUTPATIENT)
Dept: GASTROENTEROLOGY | Facility: CLINIC | Age: 54
End: 2024-10-30
Payer: COMMERCIAL

## 2024-10-30 ENCOUNTER — OFFICE VISIT (OUTPATIENT)
Dept: OTOLARYNGOLOGY | Facility: CLINIC | Age: 54
End: 2024-10-30
Payer: COMMERCIAL

## 2024-10-30 VITALS
DIASTOLIC BLOOD PRESSURE: 68 MMHG | BODY MASS INDEX: 30.14 KG/M2 | SYSTOLIC BLOOD PRESSURE: 126 MMHG | WEIGHT: 203.5 LBS | HEIGHT: 69 IN

## 2024-10-30 VITALS
DIASTOLIC BLOOD PRESSURE: 80 MMHG | HEIGHT: 69 IN | SYSTOLIC BLOOD PRESSURE: 128 MMHG | WEIGHT: 203.25 LBS | BODY MASS INDEX: 30.1 KG/M2 | HEART RATE: 72 BPM

## 2024-10-30 DIAGNOSIS — R12 WATERBRASH: ICD-10-CM

## 2024-10-30 DIAGNOSIS — H93.8X1 EAR FULLNESS, RIGHT: Primary | ICD-10-CM

## 2024-10-30 DIAGNOSIS — M26.629 TMJPDS (TEMPOROMANDIBULAR JOINT PAIN DYSFUNCTION SYNDROME): ICD-10-CM

## 2024-10-30 DIAGNOSIS — R12 HEARTBURN: ICD-10-CM

## 2024-10-30 DIAGNOSIS — R11.0 NAUSEA: ICD-10-CM

## 2024-10-30 DIAGNOSIS — H93.291 DISTORTED HEARING OF RIGHT EAR: ICD-10-CM

## 2024-10-30 DIAGNOSIS — H61.22 EXCESSIVE CERUMEN IN EAR CANAL, LEFT: ICD-10-CM

## 2024-10-30 DIAGNOSIS — K21.9 GASTROESOPHAGEAL REFLUX DISEASE, UNSPECIFIED WHETHER ESOPHAGITIS PRESENT: ICD-10-CM

## 2024-10-30 DIAGNOSIS — R10.30 LOWER ABDOMINAL PAIN: Primary | ICD-10-CM

## 2024-10-30 DIAGNOSIS — K59.09 INTERMITTENT CONSTIPATION: ICD-10-CM

## 2024-10-30 PROCEDURE — 3044F HG A1C LEVEL LT 7.0%: CPT | Mod: CPTII,S$GLB,, | Performed by: OTOLARYNGOLOGY

## 2024-10-30 PROCEDURE — 99999 PR PBB SHADOW E&M-EST. PATIENT-LVL V: CPT | Mod: PBBFAC,,,

## 2024-10-30 PROCEDURE — 3008F BODY MASS INDEX DOCD: CPT | Mod: CPTII,S$GLB,, | Performed by: OTOLARYNGOLOGY

## 2024-10-30 PROCEDURE — 3079F DIAST BP 80-89 MM HG: CPT | Mod: CPTII,S$GLB,,

## 2024-10-30 PROCEDURE — 3074F SYST BP LT 130 MM HG: CPT | Mod: CPTII,S$GLB,,

## 2024-10-30 PROCEDURE — 3078F DIAST BP <80 MM HG: CPT | Mod: CPTII,S$GLB,, | Performed by: OTOLARYNGOLOGY

## 2024-10-30 PROCEDURE — 1160F RVW MEDS BY RX/DR IN RCRD: CPT | Mod: CPTII,S$GLB,,

## 2024-10-30 PROCEDURE — 3044F HG A1C LEVEL LT 7.0%: CPT | Mod: CPTII,S$GLB,,

## 2024-10-30 PROCEDURE — 1159F MED LIST DOCD IN RCRD: CPT | Mod: CPTII,S$GLB,,

## 2024-10-30 PROCEDURE — 3008F BODY MASS INDEX DOCD: CPT | Mod: CPTII,S$GLB,,

## 2024-10-30 PROCEDURE — 99203 OFFICE O/P NEW LOW 30 MIN: CPT | Mod: S$GLB,,, | Performed by: OTOLARYNGOLOGY

## 2024-10-30 PROCEDURE — 1159F MED LIST DOCD IN RCRD: CPT | Mod: CPTII,S$GLB,, | Performed by: OTOLARYNGOLOGY

## 2024-10-30 PROCEDURE — 3074F SYST BP LT 130 MM HG: CPT | Mod: CPTII,S$GLB,, | Performed by: OTOLARYNGOLOGY

## 2024-10-30 PROCEDURE — 1160F RVW MEDS BY RX/DR IN RCRD: CPT | Mod: CPTII,S$GLB,, | Performed by: OTOLARYNGOLOGY

## 2024-10-30 PROCEDURE — 99999 PR PBB SHADOW E&M-EST. PATIENT-LVL III: CPT | Mod: PBBFAC,,, | Performed by: OTOLARYNGOLOGY

## 2024-10-30 PROCEDURE — 99204 OFFICE O/P NEW MOD 45 MIN: CPT | Mod: S$GLB,,,

## 2024-10-30 RX ORDER — OMEPRAZOLE 40 MG/1
40 CAPSULE, DELAYED RELEASE ORAL DAILY
Qty: 90 CAPSULE | Refills: 1 | Status: SHIPPED | OUTPATIENT
Start: 2024-10-30 | End: 2025-04-28

## 2024-10-31 ENCOUNTER — PATIENT MESSAGE (OUTPATIENT)
Dept: GASTROENTEROLOGY | Facility: CLINIC | Age: 54
End: 2024-10-31
Payer: COMMERCIAL

## 2024-12-09 RX ORDER — ERGOCALCIFEROL 1.25 MG/1
50000 CAPSULE ORAL
Qty: 12 CAPSULE | Refills: 0 | Status: SHIPPED | OUTPATIENT
Start: 2024-12-09

## 2024-12-09 NOTE — TELEPHONE ENCOUNTER
No care due was identified.  [facility] Embedded Care Due Messages. Reference number: [id number].   12/09/2024 12:31:21 PM CST

## 2025-01-14 RX ORDER — FLUTICASONE PROPIONATE 50 MCG
SPRAY, SUSPENSION (ML) NASAL
Qty: 16 ML | OUTPATIENT
Start: 2025-01-14

## 2025-01-14 RX ORDER — CETIRIZINE HYDROCHLORIDE 10 MG/1
10 TABLET ORAL
Qty: 90 TABLET | Refills: 1 | OUTPATIENT
Start: 2025-01-14

## 2025-01-17 ENCOUNTER — PATIENT MESSAGE (OUTPATIENT)
Dept: OBSTETRICS AND GYNECOLOGY | Facility: CLINIC | Age: 55
End: 2025-01-17
Payer: COMMERCIAL

## 2025-01-17 ENCOUNTER — PATIENT MESSAGE (OUTPATIENT)
Dept: GASTROENTEROLOGY | Facility: CLINIC | Age: 55
End: 2025-01-17
Payer: COMMERCIAL

## 2025-01-17 RX ORDER — MEDROXYPROGESTERONE ACETATE 150 MG/ML
150 INJECTION, SUSPENSION INTRAMUSCULAR
Qty: 1 EACH | Refills: 1 | OUTPATIENT
Start: 2025-01-17

## 2025-01-17 NOTE — TELEPHONE ENCOUNTER
Refill Routing Note   Medication(s) are not appropriate for processing by [facility] for the following reason(s):        Outside of protocol    [facility] action(s):  Route               Appointments  past 12m or future 3m with PCP    Date Provider   Last Visit   5/24/2023 Mare Santos MD   Next Visit   Visit date not found Mare Santos MD   ED visits in past 90 days: 0        Note composed:8:13 AM 01/17/2025

## 2025-01-17 NOTE — TELEPHONE ENCOUNTER
Pt needs annual for refills. Last visit I recommended DXA scan to make sure her bone density was not affected by the depo and this was not done.

## 2025-01-23 ENCOUNTER — TELEPHONE (OUTPATIENT)
Dept: GASTROENTEROLOGY | Facility: CLINIC | Age: 55
End: 2025-01-23
Payer: COMMERCIAL

## 2025-01-23 NOTE — TELEPHONE ENCOUNTER
----- Message from Maira sent at 1/23/2025  9:02 AM CST -----  Contact: self  Type: Needs Medical Advice  Who Called:  pt  Best Call Back Number: [phone]   Additional Information: pt would like to cancel EGD and colonoscopy on 1/24 and 1/30.please advise

## 2025-01-28 ENCOUNTER — HOSPITAL ENCOUNTER (OUTPATIENT)
Dept: RADIOLOGY | Facility: CLINIC | Age: 55
Discharge: HOME | End: 2025-01-28
Attending: STUDENT IN AN ORGANIZED HEALTH CARE EDUCATION/TRAINING PROGRAM
Payer: COMMERCIAL

## 2025-01-28 ENCOUNTER — PATIENT MESSAGE (OUTPATIENT)
Dept: OBSTETRICS AND GYNECOLOGY | Facility: CLINIC | Age: 55
End: 2025-01-28

## 2025-01-28 ENCOUNTER — OFFICE VISIT (OUTPATIENT)
Dept: OBSTETRICS AND GYNECOLOGY | Facility: CLINIC | Age: 55
End: 2025-01-28
Payer: COMMERCIAL

## 2025-01-28 VITALS
HEART RATE: 74 BPM | DIASTOLIC BLOOD PRESSURE: 70 MMHG | WEIGHT: 203.13 LBS | BODY MASS INDEX: 30 KG/M2 | SYSTOLIC BLOOD PRESSURE: 117 MMHG

## 2025-01-28 DIAGNOSIS — Z12.31 ENCOUNTER FOR SCREENING MAMMOGRAM FOR BREAST CANCER: ICD-10-CM

## 2025-01-28 DIAGNOSIS — R23.2 HOT FLASHES: Primary | ICD-10-CM

## 2025-01-28 DIAGNOSIS — Z30.42 ENCOUNTER FOR SURVEILLANCE OF INJECTABLE CONTRACEPTIVE: ICD-10-CM

## 2025-01-28 PROCEDURE — 77067 SCR MAMMO BI INCL CAD: CPT | Mod: 26,,, | Performed by: RADIOLOGY

## 2025-01-28 PROCEDURE — 3074F SYST BP LT 130 MM HG: CPT | Mod: CPTII,S$GLB,, | Performed by: STUDENT IN AN ORGANIZED HEALTH CARE EDUCATION/TRAINING PROGRAM

## 2025-01-28 PROCEDURE — 99214 OFFICE O/P EST MOD 30 MIN: CPT | Mod: 25,S$GLB,, | Performed by: STUDENT IN AN ORGANIZED HEALTH CARE EDUCATION/TRAINING PROGRAM

## 2025-01-28 PROCEDURE — 77067 SCR MAMMO BI INCL CAD: CPT | Mod: TC,PO

## 2025-01-28 PROCEDURE — 96372 THER/PROPH/DIAG INJ SC/IM: CPT | Mod: S$GLB,,, | Performed by: STUDENT IN AN ORGANIZED HEALTH CARE EDUCATION/TRAINING PROGRAM

## 2025-01-28 PROCEDURE — 1160F RVW MEDS BY RX/DR IN RCRD: CPT | Mod: CPTII,S$GLB,, | Performed by: STUDENT IN AN ORGANIZED HEALTH CARE EDUCATION/TRAINING PROGRAM

## 2025-01-28 PROCEDURE — 3008F BODY MASS INDEX DOCD: CPT | Mod: CPTII,S$GLB,, | Performed by: STUDENT IN AN ORGANIZED HEALTH CARE EDUCATION/TRAINING PROGRAM

## 2025-01-28 PROCEDURE — 3078F DIAST BP <80 MM HG: CPT | Mod: CPTII,S$GLB,, | Performed by: STUDENT IN AN ORGANIZED HEALTH CARE EDUCATION/TRAINING PROGRAM

## 2025-01-28 PROCEDURE — 1159F MED LIST DOCD IN RCRD: CPT | Mod: CPTII,S$GLB,, | Performed by: STUDENT IN AN ORGANIZED HEALTH CARE EDUCATION/TRAINING PROGRAM

## 2025-01-28 PROCEDURE — 99999 PR PBB SHADOW E&M-EST. PATIENT-LVL III: CPT | Mod: PBBFAC,,, | Performed by: STUDENT IN AN ORGANIZED HEALTH CARE EDUCATION/TRAINING PROGRAM

## 2025-01-28 PROCEDURE — 77063 BREAST TOMOSYNTHESIS BI: CPT | Mod: 26,,, | Performed by: RADIOLOGY

## 2025-01-28 RX ORDER — MEDROXYPROGESTERONE ACETATE 150 MG/ML
150 INJECTION, SUSPENSION INTRAMUSCULAR
Qty: 1 EACH | Refills: 1 | Status: SHIPPED | OUTPATIENT
Start: 2025-01-28

## 2025-01-28 RX ORDER — MEDROXYPROGESTERONE ACETATE 150 MG/ML
150 INJECTION, SUSPENSION INTRAMUSCULAR
Status: SHIPPED | OUTPATIENT
Start: 2025-01-28 | End: 2026-01-23

## 2025-01-28 RX ADMIN — MEDROXYPROGESTERONE ACETATE 150 MG: 150 INJECTION, SUSPENSION INTRAMUSCULAR at 09:01

## 2025-01-28 NOTE — PROGRESS NOTES
Chief Complaint   Patient presents with    Contraception       History of Present Illness   Celia Alford is 54 y.o. BF  patient who presents today for DMPA.  Patient has been on DMPA for >15 yrs for contraception and amenorrhea.  She has no desires to stop use.  She does endorse hot flashes at night but denies any other menopausal symptoms.  Patient also need a MMG scheduled.    History  PMH: HTN, GERD  Psx: Tonsils, R Rotator cuff repair  All: PCN  OB:   GYN: Denies any STIs or abnl Pap  FH: Breast cancer (P aunt, age late 60s); Denies any other female/colon cancer or MI prior to 53yo  SH: Denies THOMAS  Meds:  Current Outpatient Medications   Medication Instructions    albuterol (VENTOLIN HFA) 90 mcg/actuation inhaler 2 puffs, Inhalation, Every 6 hours PRN, Rescue    cetirizine (ZYRTEC) 10 mg, Oral, Daily    ergocalciferol (ERGOCALCIFEROL) 50,000 Units, Oral, Every 7 days    fluticasone propionate (FLONASE) 50 mcg, Each Nostril, Daily    hydroCHLOROthiazide (HYDRODIURIL) 25 mg, Oral, Daily    hydrocortisone 2.5 % ointment APPLY 1 APPLICATION TOPICALLY ONCE A DAY 30 DAYS    medroxyPROGESTERone (DEPO-PROVERA) 150 mg, Intramuscular, Every 3 months    metoprolol succinate (TOPROL-XL) 100 mg, Oral, Daily    omeprazole (PRILOSEC) 40 mg, Oral, Daily    pimecrolimus (ELIDEL) 1 % cream No dose, route, or frequency recorded.       Review of Systems   Constitutional:  Negative for chills and fever.   Eyes:  Negative for visual disturbance.   Respiratory:  Negative for cough and shortness of breath.    Cardiovascular:  Negative for chest pain and palpitations.   Gastrointestinal:  Negative for abdominal pain, nausea and vomiting.   Genitourinary:  Negative for dysmenorrhea, menstrual problem, vaginal discharge, vaginal pain and vaginal odor.   Neurological:  Negative for headaches.   Psychiatric/Behavioral:  Negative for depression and sleep disturbance. The patient is not nervous/anxious.    All other  systems reviewed and are negative.  Breast: Negative for lump and mastodynia        Physical Examination:  Vitals:    01/28/25 0927   BP: 117/70   BP Location: Left arm   Patient Position: Sitting   Pulse: 74   Weight: 92.1 kg (203 lb 2.5 oz)        Physical Exam  Vitals reviewed.   Constitutional:       Appearance: Normal appearance.   HENT:      Head: Normocephalic and atraumatic.   Eyes:      Conjunctiva/sclera: Conjunctivae normal.   Cardiovascular:      Rate and Rhythm: Normal rate and regular rhythm.   Pulmonary:      Effort: Pulmonary effort is normal.      Breath sounds: Normal breath sounds. No wheezing.   Abdominal:      General: Abdomen is flat.      Palpations: Abdomen is soft.      Tenderness: There is no abdominal tenderness.   Musculoskeletal:         General: Normal range of motion.      Cervical back: Normal range of motion.   Skin:     General: Skin is warm and dry.   Neurological:      General: No focal deficit present.      Mental Status: She is alert and oriented to person, place, and time.   Psychiatric:         Mood and Affect: Mood normal.         Behavior: Behavior normal.         Thought Content: Thought content normal.         Judgment: Judgment normal.          Assessment:    1. Hot flashes  Estradiol    Luteinizing Hormone    Follicle Stimulating Hormone    17-Hydroxyprogesterone      2. Encounter for surveillance of injectable contraceptive  medroxyPROGESTERone (DEPO-PROVERA) injection 150 mg    medroxyPROGESTERone (DEPO-PROVERA) 150 mg/mL Syrg      3. Encounter for screening mammogram for breast cancer  Mammo Digital Screening Bilat w/ [facility]          Plan:  DMPA injection given today.  Rx sent for self-injection per patient request  Will check hormone panel for menopausal status  MMG ordered  Pap due after 7.9.26.  Patient to schedule annual at that time    I spent a total of 30 minutes on the day of the visit.This includes face to face time and non-face to face time preparing to see  the patient (eg, review of tests), obtaining and/or reviewing separately obtained history, documenting clinical information in the electronic or other health record, independently interpreting results and communicating results to the patient/family/caregiver, or care coordinator.

## 2025-01-29 ENCOUNTER — PATIENT MESSAGE (OUTPATIENT)
Dept: OBSTETRICS AND GYNECOLOGY | Facility: CLINIC | Age: 55
End: 2025-01-29
Payer: COMMERCIAL

## 2025-04-25 ENCOUNTER — PATIENT MESSAGE (OUTPATIENT)
Dept: ORTHOPEDICS | Facility: CLINIC | Age: 55
End: 2025-04-25
Payer: COMMERCIAL

## 2025-04-30 ENCOUNTER — OFFICE VISIT (OUTPATIENT)
Dept: ORTHOPEDICS | Facility: CLINIC | Age: 55
End: 2025-04-30
Payer: COMMERCIAL

## 2025-04-30 ENCOUNTER — OFFICE VISIT (OUTPATIENT)
Dept: FAMILY MEDICINE | Facility: CLINIC | Age: 55
End: 2025-04-30
Payer: COMMERCIAL

## 2025-04-30 VITALS
HEIGHT: 69 IN | DIASTOLIC BLOOD PRESSURE: 76 MMHG | BODY MASS INDEX: 31.38 KG/M2 | WEIGHT: 211.88 LBS | RESPIRATION RATE: 12 BRPM | OXYGEN SATURATION: 99 % | HEART RATE: 106 BPM | TEMPERATURE: 98 F | SYSTOLIC BLOOD PRESSURE: 128 MMHG

## 2025-04-30 VITALS — HEIGHT: 69 IN | WEIGHT: 203.06 LBS | BODY MASS INDEX: 30.08 KG/M2

## 2025-04-30 DIAGNOSIS — K21.9 GASTROESOPHAGEAL REFLUX DISEASE, UNSPECIFIED WHETHER ESOPHAGITIS PRESENT: ICD-10-CM

## 2025-04-30 DIAGNOSIS — M25.511 RIGHT SHOULDER PAIN, UNSPECIFIED CHRONICITY: Primary | ICD-10-CM

## 2025-04-30 DIAGNOSIS — G89.29 CHRONIC RIGHT SHOULDER PAIN: Primary | ICD-10-CM

## 2025-04-30 DIAGNOSIS — R11.0 NAUSEA: ICD-10-CM

## 2025-04-30 DIAGNOSIS — G89.29 CHRONIC RIGHT-SIDED LOW BACK PAIN WITHOUT SCIATICA: Primary | ICD-10-CM

## 2025-04-30 DIAGNOSIS — M25.511 CHRONIC RIGHT SHOULDER PAIN: Primary | ICD-10-CM

## 2025-04-30 DIAGNOSIS — M54.50 CHRONIC RIGHT-SIDED LOW BACK PAIN WITHOUT SCIATICA: Primary | ICD-10-CM

## 2025-04-30 DIAGNOSIS — Z98.890 S/P ROTATOR CUFF SURGERY: ICD-10-CM

## 2025-04-30 PROCEDURE — 20610 DRAIN/INJ JOINT/BURSA W/O US: CPT | Mod: PBBFAC,PN,RT | Performed by: ORTHOPAEDIC SURGERY

## 2025-04-30 PROCEDURE — 3074F SYST BP LT 130 MM HG: CPT | Mod: CPTII,S$GLB,,

## 2025-04-30 PROCEDURE — 99999PBSHW PR PBB SHADOW TECHNICAL ONLY FILED TO HB: Mod: PBBFAC,,,

## 2025-04-30 PROCEDURE — 99213 OFFICE O/P EST LOW 20 MIN: CPT | Mod: PBBFAC,PN,25 | Performed by: ORTHOPAEDIC SURGERY

## 2025-04-30 PROCEDURE — 99999 PR PBB SHADOW E&M-EST. PATIENT-LVL V: CPT | Mod: PBBFAC,,,

## 2025-04-30 PROCEDURE — 3008F BODY MASS INDEX DOCD: CPT | Mod: CPTII,S$GLB,,

## 2025-04-30 PROCEDURE — 3078F DIAST BP <80 MM HG: CPT | Mod: CPTII,S$GLB,,

## 2025-04-30 PROCEDURE — 99214 OFFICE O/P EST MOD 30 MIN: CPT | Mod: S$GLB,,,

## 2025-04-30 PROCEDURE — 99999 PR PBB SHADOW E&M-EST. PATIENT-LVL III: CPT | Mod: PBBFAC,,, | Performed by: ORTHOPAEDIC SURGERY

## 2025-04-30 PROCEDURE — 1160F RVW MEDS BY RX/DR IN RCRD: CPT | Mod: CPTII,S$GLB,,

## 2025-04-30 PROCEDURE — 1159F MED LIST DOCD IN RCRD: CPT | Mod: CPTII,S$GLB,,

## 2025-04-30 RX ORDER — CELECOXIB 200 MG/1
200 CAPSULE ORAL 2 TIMES DAILY
Qty: 14 CAPSULE | Refills: 0 | Status: SHIPPED | OUTPATIENT
Start: 2025-04-30 | End: 2025-05-07

## 2025-04-30 RX ORDER — OMEPRAZOLE 40 MG/1
40 CAPSULE, DELAYED RELEASE ORAL DAILY
Qty: 90 CAPSULE | Refills: 1 | Status: SHIPPED | OUTPATIENT
Start: 2025-04-30 | End: 2025-10-27

## 2025-04-30 RX ORDER — TIZANIDINE 2 MG/1
4 TABLET ORAL EVERY 6 HOURS PRN
Qty: 30 TABLET | Refills: 0 | Status: SHIPPED | OUTPATIENT
Start: 2025-04-30 | End: 2025-05-10

## 2025-04-30 RX ORDER — TRIAMCINOLONE ACETONIDE 40 MG/ML
40 INJECTION, SUSPENSION INTRA-ARTICULAR; INTRAMUSCULAR
Status: DISCONTINUED | OUTPATIENT
Start: 2025-04-30 | End: 2025-04-30 | Stop reason: HOSPADM

## 2025-04-30 RX ORDER — ONDANSETRON 8 MG/1
8 TABLET, ORALLY DISINTEGRATING ORAL 2 TIMES DAILY
Qty: 14 TABLET | Refills: 0 | Status: SHIPPED | OUTPATIENT
Start: 2025-04-30 | End: 2025-05-07

## 2025-04-30 RX ADMIN — TRIAMCINOLONE ACETONIDE 40 MG: 40 INJECTION, SUSPENSION INTRA-ARTICULAR; INTRAMUSCULAR at 01:04

## 2025-04-30 NOTE — PROGRESS NOTES
Subjective:       Patient ID:  [id number]     Chief Complaint: Back Pain (Lower right side)      History of Present Illness    Ms. Alford presents today for back pain. She has a history of sciatica previously treated with steroid injections. She reports constant daily lower back pain that radiates partially down her leg, stopping in the buttock area. The pain is described as achy and burning in nature, sometimes waking her at night. Pain worsens with stress and certain positions. She reports partial improvement with stretching and Tylenol, but notes the pain remains constant without complete relief from any interventions. Current Naproxen provides no relief. Previous trials of Meloxicam, Gabapentin, and Robaxin (muscle relaxer) were ineffective. She experienced severe nausea on Monday that has been gradually improving. The nausea was particularly severe upon waking, with inability to vomit. She also reports acid reflux symptoms and feels her current acid reflux medication dosage of 20mg may be inadequate for symptom management. No other concerns today.       Past Medical History:   Diagnosis Date    COVID-19 virus infection 08/06/2021 7/2021    Essential (primary) hypertension     Gastroesophageal reflux disease 10/06/2023      Active Problem List with Overview Notes    Diagnosis Date Noted    Vitamin D deficiency 09/24/2024     9 in 9/2024      Shoulder pain with history of repair of rotator cuff 01/30/2024    Decreased range of motion (ROM) of shoulder 01/30/2024    Sciatica of right side 11/29/2023    Tachycardia 10/06/2023    Gastroesophageal reflux disease 10/06/2023    Incomplete tear of right rotator cuff 05/11/2023    Shoulder impingement syndrome, right 05/11/2023    Biceps tendonitis on right 05/11/2023    Decreased ROM of right shoulder 05/11/2023    Subacromial impingement of right shoulder 05/04/2023    Mixed hyperlipidemia 09/20/2022     Recheck lab      Insomnia 09/20/2022    Macrocytic anemia 09/20/2022     Anxiety 09/20/2022    S/P rotator cuff surgery 09/07/2022    Biceps tendinitis of right upper extremity 07/22/2022    Shoulder weakness 06/24/2022    Right cervical radiculopathy 06/01/2022    Essential hypertension 09/09/2021     Cont reg  - consider adding on combo agent to hctz (ARB)  - adverse effects w/ amlodipine      Neck muscle spasm 09/09/2021    Encounter for long-term (current) use of other medications 09/09/2021    Overweight (BMI 25.0-29.9) 09/09/2021    Palpitations 09/09/2021     Benign exam today, rec holter monitor      Decreased range of motion of shoulder, right 03/31/2021    Decreased strength of upper extremity 03/31/2021    Neck pain on right side 11/20/2020    Traumatic incomplete tear of right rotator cuff 11/20/2020    Shoulder joint dysfunction 03/09/2020    Chronic right shoulder pain 11/06/2019      Review of patient's allergies indicates:   Allergen Reactions    Pcn [penicillins] Hives and Rash       Current Medications[1]    Lab Results   Component Value Date    WBC 7.21 09/23/2024    HGB 11.9 (L) 09/23/2024    HCT 36.4 (L) 09/23/2024     09/23/2024    CHOL 246 (H) 09/23/2024    TRIG 275 (H) 09/23/2024    HDL 45 09/23/2024    ALT 50 (H) 09/23/2024    AST 36 09/23/2024     09/23/2024    K 3.5 09/23/2024     09/23/2024    CREATININE 1.0 09/23/2024    BUN 12 09/23/2024    CO2 21 (L) 09/23/2024    TSH 0.716 09/23/2024    INR 1.0 04/20/2023    HGBA1C 5.1 09/23/2024       Review of Systems   Constitutional:  Negative for fatigue and fever.   HENT: Negative.  Negative for congestion, sneezing and sore throat.    Eyes: Negative.    Respiratory:  Negative for cough, shortness of breath and wheezing.    Cardiovascular:  Negative for chest pain, palpitations and leg swelling.   Gastrointestinal:  Negative for abdominal pain, nausea and vomiting.   Genitourinary: Negative.    Musculoskeletal:  Positive for back pain. Negative for arthralgias.   Skin: Negative.  Negative for  rash.   Neurological:  Negative for dizziness, weakness, light-headedness, numbness and headaches.   Hematological: Negative.    Psychiatric/Behavioral: Negative.         Objective:      Physical Exam  Constitutional:       Appearance: Normal appearance.   HENT:      Head: Normocephalic and atraumatic.      Nose: Nose normal.   Eyes:      Extraocular Movements: Extraocular movements intact.   Cardiovascular:      Rate and Rhythm: Normal rate and regular rhythm.   Pulmonary:      Effort: Pulmonary effort is normal.      Breath sounds: Normal breath sounds.   Musculoskeletal:         General: Normal range of motion.      Cervical back: Normal range of motion.      Lumbar back: No tenderness. Negative right straight leg raise test and negative left straight leg raise test.   Skin:     General: Skin is warm and dry.   Neurological:      General: No focal deficit present.      Mental Status: She is alert and oriented to person, place, and time.   Psychiatric:         Mood and Affect: Mood normal.         Assessment:       1. Chronic right-sided low back pain without sciatica    2. Gastroesophageal reflux disease, unspecified whether esophagitis present    3. Nausea        Plan:       Celia was seen today for back pain.    Diagnoses and all orders for this visit:    Chronic right-sided low back pain without sciatica  -     celecoxib (CELEBREX) 200 MG capsule; Take 1 capsule (200 mg total) by mouth 2 (two) times daily. for 7 days  -     tiZANidine (ZANAFLEX) 2 MG tablet; Take 2 tablets (4 mg total) by mouth every 6 (six) hours as needed (back pain).  - Ms. Alford reports constant lower back pain with occasional radiation to the buttocks, occurring daily and nightly.  - Pain is described as achy and burning, sometimes waking patient up.  - Certain movements and stress exacerbate the pain.  - Ms. Alford has tried stretching and Tylenol without relief.  - Physical exam revealed no pain during straight leg raise test on both  legs, and no tenderness or bruising in the affected area.  -     Ambulatory Referral/Consult to Physical Therapy; Future    Gastroesophageal reflux disease, unspecified whether esophagitis present  -     omeprazole (PRILOSEC) 40 MG capsule; Take 1 capsule (40 mg total) by mouth Daily.  - avoid red sauce foods, ETOH, and acidic foods  - remain upright for 2 hours after eating   - discussed use of OTC antiacids for relief     Nausea  -     ondansetron (ZOFRAN-ODT) 8 MG TbDL; Take 1 tablet (8 mg total) by mouth 2 (two) times daily. for 7 days  - Ms. Alford experienced severe nausea on Monday that improved over the next 2 days.  - This could be due to a viral infection or acid reflux.  - Prescribed Zofran as needed for nausea relief.  - Ms. Alfrod advised to monitor symptoms and report if they persis            Future Appointments       Date Provider Specialty Appt Notes    6/11/2025 Olman Pillai IV, MD Orthopedics right shoulder WC               Portions of this note were dictated using voice recognition software and may contain dictation related errors in spelling / grammar / syntax not discovered on text review.     Ester Koch PA-C         [1]   Current Outpatient Medications:     albuterol (VENTOLIN HFA) 90 mcg/actuation inhaler, Inhale 2 puffs into the lungs every 6 (six) hours as needed for Wheezing. Rescue, Disp: 18 g, Rfl: 0    cetirizine (ZYRTEC) 10 MG tablet, Take 1 tablet (10 mg total) by mouth once daily., Disp: 30 tablet, Rfl: 0    ergocalciferol (ERGOCALCIFEROL) 50,000 unit Cap, TAKE 1 CAPSULE BY MOUTH ONE TIME PER WEEK, Disp: 12 capsule, Rfl: 0    fluticasone propionate (FLONASE) 50 mcg/actuation nasal spray, 1 spray (50 mcg total) by Each Nostril route once daily., Disp: 15.8 mL, Rfl: 0    hydroCHLOROthiazide (HYDRODIURIL) 25 MG tablet, Take 1 tablet (25 mg total) by mouth once daily., Disp: 30 tablet, Rfl: 11    hydrocortisone 2.5 % ointment, APPLY 1 APPLICATION TOPICALLY ONCE A DAY 30 DAYS, Disp: ,  Rfl:     medroxyPROGESTERone (DEPO-PROVERA) 150 mg/mL Syrg, Inject 1 mL (150 mg total) into the muscle every 3 (three) months., Disp: 1 each, Rfl: 1    metoprolol succinate (TOPROL-XL) 100 MG 24 hr tablet, Take 1 tablet (100 mg total) by mouth once daily., Disp: 30 tablet, Rfl: 11    pimecrolimus (ELIDEL) 1 % cream, , Disp: , Rfl:     celecoxib (CELEBREX) 200 MG capsule, Take 1 capsule (200 mg total) by mouth 2 (two) times daily. for 7 days, Disp: 14 capsule, Rfl: 0    omeprazole (PRILOSEC) 40 MG capsule, Take 1 capsule (40 mg total) by mouth Daily., Disp: 90 capsule, Rfl: 1    ondansetron (ZOFRAN-ODT) 8 MG TbDL, Take 1 tablet (8 mg total) by mouth 2 (two) times daily. for 7 days, Disp: 14 tablet, Rfl: 0    tiZANidine (ZANAFLEX) 2 MG tablet, Take 2 tablets (4 mg total) by mouth every 6 (six) hours as needed (back pain)., Disp: 30 tablet, Rfl: 0    Current Facility-Administered Medications:     medroxyPROGESTERone (DEPO-PROVERA) injection 150 mg, 150 mg, Intramuscular, Q90 Days, , 150 mg at 01/28/25 0945    Facility-Administered Medications Ordered in Other Visits:     lactated ringers infusion, , Intravenous, Continuous, Rox Landry DNP    LIDOcaine (PF) 10 mg/ml (1%) injection 10 mg, 1 mL, Intradermal, Once, Rox Landry, NETO

## 2025-04-30 NOTE — PROGRESS NOTES
[facility], Orthopedics and Sports Medicine  [facility]    Established Patient Shoulder Office Visit  04/30/2025     Diagnosis:  Right shoulder pain  High Grade Partial Rotator Cuff Tear  Biceps Tendonitis  Subacromial Impingement  Prior rotator cuff repair     Procedure:   (5/2/23) Right Arthroscopic Rotator Cuff Repair, revision; Open Biceps Tenodesis; Arthroscopic shoulder debridement - Extensive  Subjective:      Celia Alford is a 54 y.o. female who returns for follow up treatment of the right shoulder problem.  She is nearly 2 years s/p the above surgery.  Patient endorsing right shoulder pain for the past couple of weeks which pain progressively becoming more constant. Patient has decreased use of the right shoulder and has attempted stretching for management of symptoms. Denies any inciting event.     She had initial rotator cuff repair surgery by another provider, had minimal improvement in pain, then had revision rotation cuff surgery which improved symptoms significantly, but not complete pain relief.  She now continues with shoulder pain and pain with lifting the arm.  Also pain with lowering the arm.      Pain localized to the anterior shoulder.      Outside reports reviewed: historical medical records, office notes, and radiology reports.    Past Medical History:   Diagnosis Date    COVID-19 virus infection 08/06/2021 7/2021    Essential (primary) hypertension     Gastroesophageal reflux disease 10/06/2023       Problem List[1]    Past Surgical History:   Procedure Laterality Date    ADENOIDECTOMY  January 2009    ARTHROSCOPIC REPAIR OF ROTATOR CUFF OF SHOULDER Right 11/20/2020    Procedure: REPAIR, ROTATOR CUFF, ARTHROSCOPIC;  Surgeon: Jens Han Jr., MD;  Location: [facility];  Service: Orthopedics;  Laterality: Right;  need opus system- rufino notified tori-11/10  video Rufino confirmed 11/19/2020 KB [id number]    ARTHROSCOPY OF SHOULDER WITH DECOMPRESSION OF  SUBACROMIAL SPACE Right 05/02/2023    Procedure: ARTHROSCOPY, SHOULDER, WITH SUBACROMIAL SPACE DECOMPRESSION;  Surgeon: Olman Pillai IV, MD;  Location: [facility] OR;  Service: Orthopedics;  Laterality: Right;    EYE SURGERY  April 2019    Laser eye surgery    ROTATOR CUFF REPAIR  11/20/2020    ROTATOR CUFF REPAIR Right 05/02/2023    Procedure: REPAIR, ROTATOR CUFF;  Surgeon: Olman Pillai IV, MD;  Location: [facility] OR;  Service: Orthopedics;  Laterality: Right;    SHOULDER ARTHROSCOPY Right 05/02/2023    Procedure: ARTHROSCOPY, SHOULDER; rotator cuff repair, subacromial decompression, possible mini-open biceps tenodesis;  Surgeon: Olman Pillai IV, MD;  Location: [facility] OR;  Service: Orthopedics;  Laterality: Right;  Beachchair position, spider arm isaac, arthrex suture anchors, arthrex biceps tenodesis button, Dr. Pillai special Frank retractor set arthrex eleanor notified cc  Have available depu    TONSILLECTOMY          Current Outpatient Medications   Medication Instructions    albuterol (VENTOLIN HFA) 90 mcg/actuation inhaler 2 puffs, Inhalation, Every 6 hours PRN, Rescue    cetirizine (ZYRTEC) 10 mg, Oral, Daily    ergocalciferol (ERGOCALCIFEROL) 50,000 Units, Oral, Every 7 days    fluticasone propionate (FLONASE) 50 mcg, Each Nostril, Daily    hydroCHLOROthiazide (HYDRODIURIL) 25 mg, Oral, Daily    hydrocortisone 2.5 % ointment APPLY 1 APPLICATION TOPICALLY ONCE A DAY 30 DAYS    medroxyPROGESTERone (DEPO-PROVERA) 150 mg, Intramuscular, Every 3 months    metoprolol succinate (TOPROL-XL) 100 mg, Oral, Daily    omeprazole (PRILOSEC) 40 mg, Oral, Daily    pimecrolimus (ELIDEL) 1 % cream No dose, route, or frequency recorded.        Review of patient's allergies indicates:   Allergen Reactions    Pcn [penicillins] Hives and Rash       Social History[2]    Family History   Problem Relation Name Age of Onset    Diabetes Maternal Grandmother Grandma Bina     Hypertension Maternal Grandmother Grandma Bina     Stroke  Maternal Grandmother Grandma Bina     No Known Problems Mother      Breast cancer Neg Hx      Colon cancer Neg Hx      Ovarian cancer Neg Hx           Review of Systems   Constitutional: Negative for chills and fever.   HENT:  Negative for hearing loss.    Eyes:  Negative for blurred vision.   Cardiovascular:  Negative for chest pain.   Respiratory:  Negative for shortness of breath.    Gastrointestinal:  Negative for abdominal pain.   Neurological:  Negative for light-headedness.        Objective:      General    Constitutional: She is oriented to person, place, and time. She appears well-developed and well-nourished.   HENT:   Head: Normocephalic and atraumatic.   Eyes: Pupils are equal, round, and reactive to light.   Cardiovascular:  Normal rate and regular rhythm.            Pulmonary/Chest: Effort normal and breath sounds normal.   Abdominal: Soft.   Neurological: She is alert and oriented to person, place, and time.   Psychiatric: She has a normal mood and affect. Her behavior is normal.         Right Shoulder Exam     Inspection/Observation   Swelling: absent  Deformity: absent    Tenderness   The patient is tender to palpation of the greater tuberosity.    Range of Motion   Active abduction:  110   Passive abduction:  160   Forward Flexion:  100   External Rotation 0 degrees:  40   Internal rotation 0 degrees:  L1     Tests & Signs   Drop arm: positive  Corbett test: positive  Impingement: positive  Rotator Cuff Painful Arc/Range: moderate  Active Compression Test (Joseph's Sign): negative  Speed's Test: negative    Other   Sensation: normal    Comments:  Wang's Test Positive - Right    Left Shoulder Exam     Inspection/Observation   Swelling: absent  Deformity: absent    Tenderness   The patient is experiencing no tenderness.     Range of Motion   Active abduction:  160   Forward Flexion:  170   External Rotation 0 degrees:  50   Internal rotation 0 degrees:  T7     Tests & Signs   Drop arm:  negative  Corbett test: negative  Impingement: negative  Active Compression test (Patterson's Sign): negative  Speed's Test: negative    Other   Sensation: normal     Comments:  Wang's Test Negative - Left      Muscle Strength   Right Upper Extremity   Shoulder Abduction: 4/5   Shoulder Internal Rotation: 5/5   Shoulder External Rotation: 5/5   Biceps: 5/5   Left Upper Extremity  Shoulder Abduction: 5/5   Shoulder Internal Rotation: 5/5   Shoulder External Rotation: 5/5   Biceps: 5/5     Vascular Exam     Right Pulses      Radial:                    2+      Left Pulses      Radial:                    2+        Imaging:  None today    Large Joint Aspiration/Injection: R subacromial bursa    Date/Time: 4/30/2025 1:45 PM    Performed by: Olman Pillai IV, MD  Authorized by: Olman Pillai IV, MD    Consent Done?:  Yes (Verbal)  Indications:  Pain  Site marked: the procedure site was marked    Timeout: prior to procedure the correct patient, procedure, and site was verified    Prep: patient was prepped and draped in usual sterile fashion      Local anesthesia used?: Yes    Local anesthetic:  Lidocaine 1% without epinephrine    Details:  Needle Size:  22 G  Ultrasonic Guidance for needle placement?: No    Approach:  Lateral  Location:  Shoulder  Site:  R subacromial bursa  Medications:  40 mg triamcinolone acetonide 40 mg/mL  Patient tolerance:  Patient tolerated the procedure well with no immediate complications        Assessment:       Celia Alford is a 54 y.o. female seen in the office today. The primary encounter diagnosis was Chronic right shoulder pain. A diagnosis of S/P rotator cuff surgery was also pertinent to this visit.  Non-operative treatment is recommended at this time. The patient was given diagnostic and therapeutic CSI SA right shoulder.  She had immediate pain relief.  We discussed that cervical spine may have been cause of pain, but less likely given results from injection today.  Also  discussed MRI for additional shoulder pathology if symptoms do not improve after CSI and therapy, ordered today. The natural history and expected course discussed with patient. Various treatment options were discussed, including their risks and benefits. All of the patient's questions were answered.     Plan:      Physical therapy and rehabilitation treatment.  Tylenol 650mg TID, PRN pain.  Follow up in 6 weeks.   Corticosteroid injection given today (right subacromial).         Olman Pillai IV, MD   of Clinical Orthopedics  Department of Orthopedic Surgery  [facility]  Office: [phone]  Website: [email or web address]      Orders Placed This Encounter    Large Joint Aspiration/Injection    Ambulatory Referral/Consult to Physical Therapy               [1]   Patient Active Problem List  Diagnosis    Chronic right shoulder pain    Shoulder joint dysfunction    Neck pain on right side    Traumatic incomplete tear of right rotator cuff    Decreased range of motion of shoulder, right    Decreased strength of upper extremity    Essential hypertension    Neck muscle spasm    Encounter for long-term (current) use of other medications    Overweight (BMI 25.0-29.9)    Palpitations    Right cervical radiculopathy    Shoulder weakness    Biceps tendinitis of right upper extremity    S/P rotator cuff surgery    Mixed hyperlipidemia    Insomnia    Macrocytic anemia    Anxiety    Subacromial impingement of right shoulder    Incomplete tear of right rotator cuff    Shoulder impingement syndrome, right    Biceps tendonitis on right    Decreased ROM of right shoulder    Tachycardia    Gastroesophageal reflux disease    Sciatica of right side    Shoulder pain with history of repair of rotator cuff    Decreased range of motion (ROM) of shoulder    Vitamin D deficiency   [2]   Social History  Socioeconomic History    Marital status: Single   Tobacco Use    Smoking status: Never     Smokeless tobacco: Never   Substance and Sexual Activity    Alcohol use: Not Currently     Alcohol/week: 3.0 standard drinks of alcohol     Types: 3 Glasses of wine per week     Comment: seldom    Drug use: Never    Sexual activity: Not Currently     Partners: Male     Birth control/protection: Injection

## 2025-05-21 ENCOUNTER — CLINICAL SUPPORT (OUTPATIENT)
Dept: REHABILITATION | Facility: HOSPITAL | Age: 55
End: 2025-05-21
Payer: OTHER GOVERNMENT

## 2025-05-21 DIAGNOSIS — M25.511 CHRONIC RIGHT SHOULDER PAIN: Primary | ICD-10-CM

## 2025-05-21 DIAGNOSIS — G89.29 CHRONIC RIGHT SHOULDER PAIN: Primary | ICD-10-CM

## 2025-05-21 PROCEDURE — 97112 NEUROMUSCULAR REEDUCATION: CPT | Mod: PO | Performed by: PHYSICAL THERAPIST

## 2025-05-21 PROCEDURE — 97161 PT EVAL LOW COMPLEX 20 MIN: CPT | Mod: PO | Performed by: PHYSICAL THERAPIST

## 2025-05-21 PROCEDURE — 97530 THERAPEUTIC ACTIVITIES: CPT | Mod: PO | Performed by: PHYSICAL THERAPIST

## 2025-05-21 NOTE — PROGRESS NOTES
Outpatient Rehab    Physical Therapy Evaluation    Patient Name: Celia Alford  MRN: [id number]  YOB: 1970  Encounter Date: 5/21/2025    Therapy Diagnosis:   Encounter Diagnosis   Name Primary?    Chronic right shoulder pain Yes     Physician: Olman Pillai IV, MD    Physician Orders: Eval and Treat  Medical Diagnosis: Chronic right shoulder pain  S/P rotator cuff surgery    Visit # / Visits Authorized:  1 / 12  Insurance Authorization Period: 5/5/2025 to 6/6/2025  Date of Evaluation: 5/21/2025  Plan of Care Certification: 5/21/2025 to 7/2/2025     Time In: 1606   Time Out: 1705  Total Time (in minutes): 59   Total Billable Time (in minutes): 59      Intake Outcome Measure for FOTO Survey    Therapist reviewed FOTO scores for Celia Alford on 5/21/2025.   FOTO report - see Media section or FOTO account episode details.     Intake Score: 52%    Precautions:       Subjective   History of Present Illness  Celia is a 54 y.o. female who reports to physical therapy with a chief concern of R shoulder pain.     The patient reports a medical diagnosis of Chronic right shoulder pain.  Patient reports a surgery of S/P rotator cuff surgery.  Diagnostic tests related to this condition: X-ray.   X-Ray Details: X-Ray 2024:    FINDINGS:  Right glenohumeral articulation appears maintained.  Prior subacromial decompression.  No acute fracture, dislocation, or osseous destruction.  Right humeral head bone anchors and tenodesis hardware again noted.    Dominant Hand: Right  History of Present Condition/Illness: Case is being managed through [facility]. Patient reports long history of chronic R shoulder pain. She has had 1 RCR and a revision surgery in 2023. She always has a resting level of manageable pain, but recently reports increased pain w/o known MOA or injury. She had reduced ROM and pain with all motions. She had CSI performed, which has helped reduce pain to a moderate level and ROM has  improved. She has been prescribed PT for conservative management at this time. Patient currently having difficulty and pain artemio with OH activities.    Pain     Patient reports a current pain level of 4/10. Pain at best is reported as 3/10. Pain at worst is reported as 9/10.   Location: R shoulder  Clinical Progression (since onset): Stable  Pain Qualities: Aching, Dull, Sharp  Pain-Relieving Factors: Activity modification, Medications - over-the-counter, Other (Comment)  Other Pain-Relieving Factors: CSI  Pain-Aggravating Factors: Driving, Lifting, Movement, Reaching, Other (Comment)  Other Pain-Aggravating Factors: OH activities; laying on R side           Past Medical History/Physical Systems Review:   Celia Alford  has a past medical history of COVID-19 virus infection, Essential (primary) hypertension, and Gastroesophageal reflux disease.    Celia Alford  has a past surgical history that includes Tonsillectomy; Arthroscopic repair of rotator cuff of shoulder (Right, 11/20/2020); Rotator cuff repair (11/20/2020); Shoulder arthroscopy (Right, 05/02/2023); Rotator cuff repair (Right, 05/02/2023); Arthroscopy of shoulder with decompression of subacromial space (Right, 05/02/2023); Adenoidectomy (January 2009); and Eye surgery (April 2019).    Celia has a current medication list which includes the following prescription(s): albuterol, cetirizine, ergocalciferol, fluticasone propionate, hydrochlorothiazide, hydrocortisone, medroxyprogesterone, metoprolol succinate, omeprazole, and pimecrolimus, and the following Facility-Administered Medications: lactated ringers, lidocaine (pf) 10 mg/ml (1%), and medroxyprogesterone.    Review of patient's allergies indicates:   Allergen Reactions    Pcn [penicillins] Hives and Rash        Objective   Posture                 Anterior humeral head posture serge    Shoulder Range of Motion  Right Shoulder   Active (deg) Passive (deg) Pain   Flexion 95 165 Yes    Extension         Scaption         ABduction 90   Yes   ADduction         Horizontal ABduction         Horizontal ADduction         External Rotation (Shoulder ABducted 0 degrees) 85       External Rotation (Shoulder ABducted 45 degrees)         External Rotation (Shoulder ABducted 90 degrees)   95     Internal Rotation (Shoulder ABducted 0 degrees)         Internal Rotation (Shoulder ABducted 45 degrees)         Internal Rotation (Shoulder ABducted 90 degrees)           Left Shoulder   Active (deg) Passive (deg) Pain   Flexion 155 165     Extension         Scaption         ABduction 155       ADduction         Horizontal ABduction         Horizontal ADduction         External Rotation (Shoulder ABducted 0 degrees) 85       External Rotation (Shoulder ABducted 45 degrees)         External Rotation (Shoulder ABducted 90 degrees)   95     Internal Rotation (Shoulder ABducted 0 degrees)         Internal Rotation (Shoulder ABducted 45 degrees)         Internal Rotation (Shoulder ABducted 90 degrees)             Shoulder, Elbow, or Forearm Range of Motion Details: [place] limited on R; HBB WFL         Shoulder Strength - Planes of Motion   Right Strength Right Pain Left Strength Left  Pain   Flexion           Extension           ABduction           ADduction           Horizontal ABduction           Horizontal ADduction           Internal Rotation 0°  (7 kg) Yes  (18 kg)     Internal Rotation 90°           External Rotation 0°  (4 kg) Yes  (13 kg)     External Rotation 90°               Shoulder Strength - Rotator Cuff Muscles   Right Strength Right Pain Left Strength Left  Pain   Supraspinatus 4- Yes 4-     Infraspinatus           Teres Minor           Subscapularis                       Shoulder Special Tests  Rotator Cuff Tests  Positive: Right Empty Can  Negative: Right Drop Arm, Left Drop Arm, and Left Empty Can  Positive: Right Full Can  Negative: Left Full Can  Impingement Tests  Positive: Right Mario  Right Neer's, and Right Jame's  Negative: Left Corbett-Héctor, Left Neer's, and Left Jame's  Other Shoulder Tests  Positive: Right Speed's       Scapular assist +        Shoulder Joint Mobility  Right Shoulder Mobility  Normal: Posterior Capsule Mobility  Hypermobile: Inferior Capsule Mobility  Left Shoulder Mobility  Normal: Posterior Capsule Mobility  Hypermobile: Inferior Capsule Mobility                        Treatment:  Balance/Neuromuscular Re-Education  NMR 1: Serratus punch 20x3 sec  NMR 2: Wall serratus slide (elbows) 20x3 sec - difficulty with proprioception  NMR 3: S/L ER 20x3 sec  NMR 4: IR BTB 3x15  Therapeutic Activity  TA 1: Patient education: diagnosis, prognosis, impairments, and anatomy as it relates to presentation and function; importance of NM activation of RC and serratus with education on LSI testing using HHD; importance of consistency long term for management and prevent recurrence of flares. HEP.    LSI IR 38%  LSI ER 30%    NEXT VISIT:  - cont to progress above as able  - add landmine with serratus focus  - add ER with RTB    Time Entry(in minutes):  PT Evaluation (Low) Time Entry: 35  Neuromuscular Re-Education Time Entry: 16  Therapeutic Activity Time Entry: 8    Assessment & Plan   Assessment  Celia presents with a condition of Low complexity.   Presentation of Symptoms: Stable  Will Comorbidities Impact Care: Yes  X2 rotator cuff surgeries    Functional Limitations: Completing self-care activities, Completing work/school activities, Pain with ADLs/IADLs, Pain when reaching, Proprioception, Range of motion, Reaching  Impairments: Lack of appropriate home exercise program, Pain with functional activity, Impaired physical strength, Abnormal muscle firing, Abnormal or restricted range of motion    Patient Goal for Therapy (PT): ADLs and work duties w/o complaint  Prognosis: Good  Assessment Details: Celia is a 54 y.o. female referred to outpatient Physical Therapy with a medical  diagnosis of Chronic right shoulder pain and S/P rotator cuff surgery. Patient presents with pain, decreased AROM, + impingement testing, significant LSI deficits of rotator cuff (38% for IR and 30% for ER), and poor scapular upward rotation due to inadequate serratus NM activation. Impairments contributory to pain affecting ADLs and job duties involving R UE.    Plan  From a physical therapy perspective, the patient would benefit from: Skilled Rehab Services    Planned therapy interventions include: Therapeutic exercise, Therapeutic activities, Neuromuscular re-education, and Manual therapy.    Planned modalities to include: Cryotherapy (cold pack) and Thermotherapy (hot pack).        Visit Frequency: 2 times Per Week for 6 Weeks.       This plan was discussed with Patient.   Discussion participants: Agreed Upon Plan of Care             Patient's spiritual, cultural, and educational needs considered and patient agreeable to plan of care and goals.           Goals:   Active       Functional outcome       Patient will show a significant change in FOTO patient-reported outcome tool to demonstrate subjective improvement       Start:  05/21/25    Expected End:  07/02/25            Patient will demonstrate independence in home program for support of progression       Start:  05/21/25    Expected End:  07/02/25               Range of Motion       Patient will achieve right shoulder flexion of 155 degrees       Start:  05/21/25    Expected End:  07/02/25               Strength       Patient will achieve bilateral shoulder external rotation LSI of 75% or more        Start:  05/21/25    Expected End:  07/02/25            Patient will achieve Patient will achieve bilateral shoulder internal rotation LSI of 75% or more        Start:  05/21/25    Expected End:  07/02/25                NAVI MORGAN, PT, DPT, OCS

## 2025-05-29 ENCOUNTER — CLINICAL SUPPORT (OUTPATIENT)
Dept: REHABILITATION | Facility: HOSPITAL | Age: 55
End: 2025-05-29
Payer: OTHER GOVERNMENT

## 2025-05-29 DIAGNOSIS — M25.511 CHRONIC RIGHT SHOULDER PAIN: Primary | ICD-10-CM

## 2025-05-29 DIAGNOSIS — G89.29 CHRONIC RIGHT SHOULDER PAIN: Primary | ICD-10-CM

## 2025-05-29 PROCEDURE — 97112 NEUROMUSCULAR REEDUCATION: CPT | Mod: PO,CQ

## 2025-05-29 NOTE — PROGRESS NOTES
Outpatient Rehab    Physical Therapy Visit    Patient Name: Celia Alford  MRN: [id number]  YOB: 1970  Encounter Date: 5/29/2025    Therapy Diagnosis:   Encounter Diagnosis   Name Primary?    Chronic right shoulder pain Yes     Physician: Olman Pillai IV, MD    Physician Orders: Eval and Treat  Medical Diagnosis: Chronic right shoulder pain  S/P rotator cuff surgery    Visit # / Visits Authorized:  2 / 12  Insurance Authorization Period: 5/5/2025 to 6/6/2025  Date of Evaluation: 5/21/2025  Plan of Care Certification: 5/21/2025 to 7/2/2025       NEXT VISIT:  - cont to progress above as able  - add landmine with serratus focus  - add ER with RTB    \   PT/PTA: PTA   Number of PTA visits since last PT visit:1  Time In: 1549   Time Out: 1640  Total Time (in minutes): 51   Total Billable Time (in minutes): 51    FOTO:  Intake Score: 52%  Survey Score 2:  %  Survey Score 3:  %    Precautions:       Subjective   Pt reports shoulder pain about the same as it usually.  Pain reported as 3/10.      Objective            Treatment:  Balance/Neuromuscular Re-Education  NMR 1: Serratus punch 20x3 sec  NMR 2: Wall serratus slide (elbows) 20x3 sec - difficulty with proprioception  NMR 3: S/L ER 20x3 sec  NMR 4: IR BTB 3x15  NMR 5: ER YTB: 3 X 15  NMR 6: LANDMINE PRESS: 3 X 15  NMR 7: standing no monies at wall w/ bolster behind back: 3 x 15 YTB    Time Entry(in minutes):  Neuromuscular Re-Education Time Entry: 51    Assessment & Plan   Assessment: Moses presents to clinic with 3/10 shouder pain. Tx session focuses on improving GH mobility/ROM, UE strengthening, and postural control. Pt had good tolerance to all activites performed, however, noted D/C with ER with red tband. Modified and decreased resistance which pt reported no longer having D/C. Pt required verbal cues regarding immproved form. Pt will continue to benefit from skilled therapy. Will continue to progress as tolerated.  Evaluation/Treatment  Tolerance: Patient tolerated treatment well    The patient will continue to benefit from skilled outpatient physical therapy in order to address the deficits listed in the problem list on the initial evaluation, provide patient and family education, and maximize the patients level of independence in the home and community environments.     The patient's spiritual, cultural, and educational needs were considered, and the patient is agreeable to the plan of care and goals.           Plan: continue per PT POC    Goals:   Active       Functional outcome       Patient will show a significant change in FOTO patient-reported outcome tool to demonstrate subjective improvement (Ongoing)       Start:  05/21/25    Expected End:  07/02/25            Patient will demonstrate independence in home program for support of progression (Ongoing)       Start:  05/21/25    Expected End:  07/02/25               Range of Motion       Patient will achieve right shoulder flexion of 155 degrees (Ongoing)       Start:  05/21/25    Expected End:  07/02/25               Strength       Patient will achieve bilateral shoulder external rotation LSI of 75% or more  (Ongoing)       Start:  05/21/25    Expected End:  07/02/25            Patient will achieve Patient will achieve bilateral shoulder internal rotation LSI of 75% or more  (Ongoing)       Start:  05/21/25    Expected End:  07/02/25                Vero Martinez, PTA

## 2025-06-04 ENCOUNTER — CLINICAL SUPPORT (OUTPATIENT)
Dept: REHABILITATION | Facility: HOSPITAL | Age: 55
End: 2025-06-04
Payer: OTHER GOVERNMENT

## 2025-06-04 DIAGNOSIS — G89.29 CHRONIC RIGHT SHOULDER PAIN: Primary | ICD-10-CM

## 2025-06-04 DIAGNOSIS — M25.511 CHRONIC RIGHT SHOULDER PAIN: Primary | ICD-10-CM

## 2025-06-04 PROCEDURE — 97530 THERAPEUTIC ACTIVITIES: CPT | Mod: PO | Performed by: PHYSICAL THERAPIST

## 2025-06-04 PROCEDURE — 97112 NEUROMUSCULAR REEDUCATION: CPT | Mod: PO | Performed by: PHYSICAL THERAPIST

## 2025-06-11 ENCOUNTER — CLINICAL SUPPORT (OUTPATIENT)
Dept: REHABILITATION | Facility: HOSPITAL | Age: 55
End: 2025-06-11
Payer: OTHER GOVERNMENT

## 2025-06-11 DIAGNOSIS — M25.511 CHRONIC RIGHT SHOULDER PAIN: Primary | ICD-10-CM

## 2025-06-11 DIAGNOSIS — G89.29 CHRONIC RIGHT SHOULDER PAIN: Primary | ICD-10-CM

## 2025-06-11 PROCEDURE — 97112 NEUROMUSCULAR REEDUCATION: CPT | Mod: PO,CQ

## 2025-06-11 PROCEDURE — 97530 THERAPEUTIC ACTIVITIES: CPT | Mod: PO,CQ

## 2025-06-16 ENCOUNTER — PATIENT MESSAGE (OUTPATIENT)
Dept: REHABILITATION | Facility: HOSPITAL | Age: 55
End: 2025-06-16
Payer: COMMERCIAL

## 2025-08-21 ENCOUNTER — OFFICE VISIT (OUTPATIENT)
Dept: GASTROENTEROLOGY | Facility: CLINIC | Age: 55
End: 2025-08-21
Payer: COMMERCIAL

## 2025-08-21 VITALS — BODY MASS INDEX: 32.82 KG/M2 | WEIGHT: 221.56 LBS | HEIGHT: 69 IN

## 2025-08-21 DIAGNOSIS — K21.9 GASTROESOPHAGEAL REFLUX DISEASE, UNSPECIFIED WHETHER ESOPHAGITIS PRESENT: ICD-10-CM

## 2025-08-21 DIAGNOSIS — R10.13 EPIGASTRIC PAIN: ICD-10-CM

## 2025-08-21 DIAGNOSIS — R11.0 NAUSEA: ICD-10-CM

## 2025-08-21 DIAGNOSIS — K59.09 INTERMITTENT CONSTIPATION: ICD-10-CM

## 2025-08-21 DIAGNOSIS — K21.9 GASTROESOPHAGEAL REFLUX DISEASE, UNSPECIFIED WHETHER ESOPHAGITIS PRESENT: Primary | ICD-10-CM

## 2025-08-21 DIAGNOSIS — R10.84 GENERALIZED ABDOMINAL PAIN: ICD-10-CM

## 2025-08-21 PROCEDURE — 99999 PR PBB SHADOW E&M-EST. PATIENT-LVL III: CPT | Mod: PBBFAC,,,

## 2025-08-21 PROCEDURE — 1159F MED LIST DOCD IN RCRD: CPT | Mod: CPTII,S$GLB,,

## 2025-08-21 PROCEDURE — 99213 OFFICE O/P EST LOW 20 MIN: CPT | Mod: S$GLB,,,

## 2025-08-21 PROCEDURE — 3008F BODY MASS INDEX DOCD: CPT | Mod: CPTII,S$GLB,,

## 2025-08-21 RX ORDER — SUCRALFATE 1 G/1
1 TABLET ORAL 4 TIMES DAILY
Qty: 40 TABLET | Refills: 0 | Status: SHIPPED | OUTPATIENT
Start: 2025-08-21 | End: 2025-08-31

## 2025-08-21 RX ORDER — ESOMEPRAZOLE MAGNESIUM 40 MG/1
40 CAPSULE, DELAYED RELEASE ORAL
Qty: 90 CAPSULE | Refills: 1 | Status: CANCELLED | OUTPATIENT
Start: 2025-08-21 | End: 2026-02-17

## 2025-08-21 RX ORDER — ESOMEPRAZOLE MAGNESIUM 40 MG/1
40 CAPSULE, DELAYED RELEASE ORAL
Qty: 90 CAPSULE | Refills: 1 | Status: SHIPPED | OUTPATIENT
Start: 2025-08-21 | End: 2026-02-17

## 2025-08-25 ENCOUNTER — PATIENT MESSAGE (OUTPATIENT)
Dept: GASTROENTEROLOGY | Facility: CLINIC | Age: 55
End: 2025-08-25
Payer: COMMERCIAL

## 2025-08-25 RX ORDER — RABEPRAZOLE SODIUM 20 MG/1
20 TABLET, DELAYED RELEASE ORAL
Qty: 90 TABLET | Refills: 0 | Status: SHIPPED | OUTPATIENT
Start: 2025-08-25 | End: 2025-11-23

## (undated) DEVICE — BLADE SURG CARBON STEEL SZ11

## (undated) DEVICE — SEE MEDLINE ITEM 157216

## (undated) DEVICE — SEE MEDLINE ITEM 152622

## (undated) DEVICE — SEE MEDLINE ITEM 156955

## (undated) DEVICE — SEE MEDLINE ITEM 157117

## (undated) DEVICE — ELECTRODE REM PLYHSV RETURN 9

## (undated) DEVICE — PAD ABDOMINAL 5X9 STERILE

## (undated) DEVICE — GOWN POLY REINF BRTH SLV LG

## (undated) DEVICE — SEE MEDLINE ITEM 157131

## (undated) DEVICE — TUBE SET INFLOW/OUTFLOW

## (undated) DEVICE — DRESSING AQUACEL SACRAL 8 X 7

## (undated) DEVICE — DRESSING XEROFORM FOIL PK 1X8

## (undated) DEVICE — PILLOW FACE ADLT FOAM W/VELCRO

## (undated) DEVICE — GAUZE SPONGE 4X4 12PLY

## (undated) DEVICE — TAPE ADH MEDIPORE 4 X 10YDS

## (undated) DEVICE — GLOVE BIOGEL SKINSENSE PI 8.5

## (undated) DEVICE — SUPPORT ULNA NERVE PROTECTOR

## (undated) DEVICE — BLADE SHAVER 4.5 6/BX

## (undated) DEVICE — SEE MEDLINE ITEM 157116

## (undated) DEVICE — INSTRAMOD SHOULDER TRACTION

## (undated) DEVICE — ALCOHOL 70% ISOP W/GREEN 16OZ

## (undated) DEVICE — TOWEL OR DISP STRL BLUE 4/PK

## (undated) DEVICE — WAND COBLATION TURBOVAC 90

## (undated) DEVICE — CANNULA TWIST IN 7MM X 7CM

## (undated) DEVICE — GLOVE BIOGEL PIMICRO INDIC 8.5

## (undated) DEVICE — SEE MEDLINE ITEM 157125

## (undated) DEVICE — SUT ETHILON 3/0 18IN PS-1

## (undated) DEVICE — GOWN POLY REINF X-LONG 2XL

## (undated) DEVICE — NDL SPINAL 18GX3.5 SPINOCAN

## (undated) DEVICE — KIT SHOULDER POSITIONER SPIDER

## (undated) DEVICE — SUT ETHILON 3-0 PS2 18 BLK

## (undated) DEVICE — MANIFOLD 4 PORT

## (undated) DEVICE — DRAPE U SPLIT SHEET 54X76IN

## (undated) DEVICE — COVER OVERHEAD SURG LT BLUE

## (undated) DEVICE — DRAPE THREE-QTR REINF 53X77IN

## (undated) DEVICE — DRESSING AQUACEL SACRAL 9 X 9

## (undated) DEVICE — SOL IRR NACL .9% 3000ML

## (undated) DEVICE — MAT SUCTION PUDDLEVAC ORANGE

## (undated) DEVICE — ABLATOR EXTENDED LENGTH

## (undated) DEVICE — SUT MONOCRYL 3-0 PS-2 UND

## (undated) DEVICE — BNDG COFLEX FOAM LF2 ST 6X5YD

## (undated) DEVICE — SUPPORT SLING SHOT II MEDIUM

## (undated) DEVICE — SEE MEDLINE ITEM 146292

## (undated) DEVICE — APPLICATOR CHLORAPREP ORN 26ML

## (undated) DEVICE — BLADE SURG #15 CARBON STEEL

## (undated) DEVICE — Device

## (undated) DEVICE — CANNULA CRYTSAL PT 5.75MMX7CM

## (undated) DEVICE — GLOVE SURG BIOGEL LATEX SZ 7.5

## (undated) DEVICE — SEE MEDLINE ITEM 146420

## (undated) DEVICE — SPONGE DERMACEA GAUZE 4X4

## (undated) DEVICE — PACK SURGERY START

## (undated) DEVICE — DRESSING XEROFORM 1X8IN

## (undated) DEVICE — PACK BASIC

## (undated) DEVICE — DRAPE SHOULDER 160X102IN 10/CA

## (undated) DEVICE — GAUZE AVANT SPNG 4PLY STRL 4X4

## (undated) DEVICE — SLING ARM COMFT NAVY BLU LG

## (undated) DEVICE — DRAPE STERI-DRAPE 1000 17X11IN

## (undated) DEVICE — SEE L#120831

## (undated) DEVICE — ADHESIVE DERMABOND ADVANCED

## (undated) DEVICE — DRESSING XEROFORM NONADH 1X8IN

## (undated) DEVICE — PACK FLUID CONTROL SHOULDER

## (undated) DEVICE — DRAPE TOP 53X102IN

## (undated) DEVICE — SEE MEDLINE ITEM 146313

## (undated) DEVICE — SUT ETHILON 3-0 BLK MONO FS

## (undated) DEVICE — COVER PROXIMA MAYO STAND

## (undated) DEVICE — SYR 50CC LL

## (undated) DEVICE — CANNULA ARTHROSCOPIC

## (undated) DEVICE — DRAPE PLASTIC U 60X72

## (undated) DEVICE — NDL ARTHSCP MF SCORPION

## (undated) DEVICE — PROBE ARTHSCP EDGE  90 SUCTION

## (undated) DEVICE — NDL HYPO REG 25G X 1 1/2

## (undated) DEVICE — DRAPE STERI U-SHAPED 47X51IN

## (undated) DEVICE — TUBING SUC UNIV W/CONN 12FT